# Patient Record
Sex: MALE | Race: WHITE | Employment: FULL TIME | URBAN - METROPOLITAN AREA
[De-identification: names, ages, dates, MRNs, and addresses within clinical notes are randomized per-mention and may not be internally consistent; named-entity substitution may affect disease eponyms.]

---

## 2018-01-17 PROBLEM — I21.4 NSTEMI (NON-ST ELEVATED MYOCARDIAL INFARCTION) (HCC): Status: ACTIVE | Noted: 2018-01-17

## 2018-01-17 PROBLEM — R07.9 CHEST PAIN: Status: ACTIVE | Noted: 2018-01-17

## 2018-01-19 PROBLEM — I21.4 NSTEMI (NON-ST ELEVATED MYOCARDIAL INFARCTION) (HCC): Status: RESOLVED | Noted: 2018-01-17 | Resolved: 2018-01-19

## 2018-01-24 ENCOUNTER — TELEPHONE (OUTPATIENT)
Dept: INTERNAL MEDICINE CLINIC | Age: 68
End: 2018-01-24

## 2018-01-24 NOTE — TELEPHONE ENCOUNTER
They opened the home care today. Wants skilled nursing 2 x a week for 4 weeks and 1x week for 4 weeks.

## 2018-01-30 ENCOUNTER — TELEPHONE (OUTPATIENT)
Dept: CARDIOTHORACIC SURGERY | Age: 68
End: 2018-01-30

## 2018-01-30 NOTE — TELEPHONE ENCOUNTER
I called and spoke with Mr. Mendel Falco in regards to his recovery thus far. He reports that overall he is doing well. Is having some shortness of breath w/exertion that resides quickly with rest.  Is using his IS 3 times a day. Encouraged for him to increase IS use to at least 6 times a day. Denies any redness or purulence to incisions and is cleaning them daily with antibacterial soap. Denies fevers. Heart beat is regular- pulse 77 today and /66. Today's weight was 220.2 lb. He is walking daily and is up to 8 min at a time. Appetite is good and he is moving his bowels regularly. Pain is well controlled. Is only requiring 2 Oxycodone's a day. He is resting well at night. He is scheduled for a f/u appointment with Dr. Lori Krueger on 2/2/18. Instructed for him to call the office in the meantime with any questions or concerns.

## 2018-02-02 ENCOUNTER — OFFICE VISIT (OUTPATIENT)
Dept: CARDIOTHORACIC SURGERY | Age: 68
End: 2018-02-02

## 2018-02-02 VITALS
BODY MASS INDEX: 33.56 KG/M2 | OXYGEN SATURATION: 96 % | TEMPERATURE: 98.4 F | HEIGHT: 68 IN | HEART RATE: 83 BPM | SYSTOLIC BLOOD PRESSURE: 122 MMHG | DIASTOLIC BLOOD PRESSURE: 70 MMHG | WEIGHT: 221.4 LBS

## 2018-02-02 DIAGNOSIS — Z09 POSTOP CHECK: Primary | ICD-10-CM

## 2018-02-02 PROCEDURE — 99024 POSTOP FOLLOW-UP VISIT: CPT | Performed by: THORACIC SURGERY (CARDIOTHORACIC VASCULAR SURGERY)

## 2018-02-02 RX ORDER — OXYCODONE HYDROCHLORIDE 5 MG/1
5 TABLET ORAL EVERY 4 HOURS PRN
COMMUNITY
End: 2018-02-23 | Stop reason: ALTCHOICE

## 2018-02-02 NOTE — PATIENT INSTRUCTIONS
PREVENTION OF RECURRENT ATHEROSCLEROSIS:  A MESSAGE TO PATIENTS AND THEIR LOVED ONES FROM YOUR SURGEON    You have recently had successful surgery for atherosclerosis. Now your real work begins. Atherosclerosis (hardening of the arteries by cholesterol and fat deposits) can be slowed or stopped from further blocking your own arteries or the new bypass grafts by following \"risk factor management\". If you follow these principles carefully, you can reduce your chances of having heart attacks, strokes, and the need for future surgery. Your cardiologist and primary care doctor should follow these concepts, but your surgeons believe that this is so important that we want you to begin thinking about and following these concepts now. These are the important risk factors you and your doctors should follow carefully. The marked ones apply to YOU:    [] SMOKING: Absolutely positively never smoke again. Keep your home and work place smoke­ free. Your doctors can prescribe patches, gum or other medications to help. [x] BLOOD PRESSURE CONTROL: Your blood pressure should be less than 140/90. If you have diabetes or kidney disease, your blood pressure should be less than 130/80. Weight reduction, exercise and medications can help you control high blood pressure. [x] CHOLESTEROL AND FATS: A diet low in saturated fat (< 7%) and low in cholesterol (< 200 mg/day), and weight reduction, and exercise, and medications as necessary can help you achieve these goals:  Low density (bad) cholesterol: <70 mg/dL (the lower, the better)   Triglycerides: <150 mg/dL (the lower, the better)   High density (good) cholesterol: >40 mg/dL (the higher, the better)  Make an appointment to see your primary care physician, Dr. Lazara Gooden 2 months after your surgery to check your cholesterol profile.   [x] EXERCISE: Your cardiac rehabilitation program will help you work up to a regular make you sweat\" program of at least 30 minutes, at least 6 times per week, preferably daily. Make an appointment with your cardiologist Dr. Jeison Chou 3-4 weeks after your surgery. [x] WEIGHT REDUCTION: Your ideal body mass index is 18.5-24.9 kg/m2. Your body mass index is Body mass index is 33.66 kg/m². . You may calculate this by using the following formula: (weight in pounds X 0.45) / (height in inches X 0.0254) squared. A waist circumference of < 40 inches for men and < 35 inches for women is recommended. A 10% weight reduction can lower your risk of future events. [] DIABETES: Your HbA1c should be <7%. Diet, exercise, weight reduction and proper medications can reduce your body's tendency toward high blood sugar. Check with your PCP for assistance. [x] PROPER MEDICATIONS:  [x] Aspirin  [] ACE inhibitor / ARB  (-opril / -sartan)  [x] Beta-blocker (-olol or -ilol)  [x] Statin    Risk factor management works. The person for whom this is most important is YOU. Post this information on your refrigerator or other prominent place as a reminder to you. Please call or write if you have further questions. We want you and your operation to last for many more years.     MAY LIFT MORE THAN 5 LBS ON 2/17/18

## 2018-02-02 NOTE — PROGRESS NOTES
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  Cont BB.  - no need to refill protonix  - cardiac rehab per cards  - follow up 3 weeks    Maicol Garcia MD  2/2/2018  9:51 AM

## 2018-02-23 ENCOUNTER — TELEPHONE (OUTPATIENT)
Dept: INTERNAL MEDICINE CLINIC | Age: 68
End: 2018-02-23

## 2018-02-23 ENCOUNTER — OFFICE VISIT (OUTPATIENT)
Dept: CARDIOTHORACIC SURGERY | Age: 68
End: 2018-02-23

## 2018-02-23 VITALS
OXYGEN SATURATION: 98 % | TEMPERATURE: 98.4 F | WEIGHT: 215.6 LBS | HEIGHT: 68 IN | BODY MASS INDEX: 32.67 KG/M2 | SYSTOLIC BLOOD PRESSURE: 118 MMHG | HEART RATE: 70 BPM | DIASTOLIC BLOOD PRESSURE: 68 MMHG

## 2018-02-23 DIAGNOSIS — Z09 POSTOP CHECK: Primary | ICD-10-CM

## 2018-02-23 PROCEDURE — 99024 POSTOP FOLLOW-UP VISIT: CPT | Performed by: THORACIC SURGERY (CARDIOTHORACIC VASCULAR SURGERY)

## 2018-02-23 NOTE — LETTER
02/23/18        [facility] ([facility]) Cardiovascular and Thoracic Surgeons  [address]  [address] [place] [address]        RE:    Leo Yip,   1950    Dear Dr. Baron Ann,    It was my pleasure to see your patient, Leo Yip, in the office today in follow up of cabgx2 1/17/18 at [facility].    I have attached a copy of the office evaluation. Thank you for allowing me to participate in the care of this patient.       Sincerely,     Diego Espinoza MD    CC: Harpreet Saab MD  [address] [address]  VIA In Basket

## 2018-03-02 ENCOUNTER — OFFICE VISIT (OUTPATIENT)
Dept: CARDIOLOGY CLINIC | Age: 68
End: 2018-03-02

## 2018-03-02 VITALS
DIASTOLIC BLOOD PRESSURE: 68 MMHG | BODY MASS INDEX: 32.74 KG/M2 | OXYGEN SATURATION: 98 % | HEIGHT: 68 IN | WEIGHT: 216 LBS | SYSTOLIC BLOOD PRESSURE: 118 MMHG | HEART RATE: 65 BPM

## 2018-03-02 DIAGNOSIS — Z95.1 S/P CABG X 4: ICD-10-CM

## 2018-03-02 DIAGNOSIS — I21.4 NSTEMI (NON-ST ELEVATED MYOCARDIAL INFARCTION) (HCC): Primary | ICD-10-CM

## 2018-03-02 PROCEDURE — 99214 OFFICE O/P EST MOD 30 MIN: CPT | Performed by: INTERNAL MEDICINE

## 2018-03-02 PROCEDURE — 93000 ELECTROCARDIOGRAM COMPLETE: CPT | Performed by: INTERNAL MEDICINE

## 2018-03-02 RX ORDER — SILDENAFIL 100 MG/1
100 TABLET, FILM COATED ORAL PRN
Qty: 30 TABLET | Refills: 1 | Status: SHIPPED | OUTPATIENT
Start: 2018-03-02 | End: 2018-03-02 | Stop reason: SDUPTHER

## 2018-03-02 RX ORDER — SILDENAFIL 100 MG/1
100 TABLET, FILM COATED ORAL PRN
Qty: 30 TABLET | Refills: 1 | Status: SHIPPED | OUTPATIENT
Start: 2018-03-02 | End: 2018-07-11 | Stop reason: ALTCHOICE

## 2018-03-02 NOTE — PATIENT INSTRUCTIONS
If you avoid sex because you are afraid of having erection problems, your partner may worry that you are no longer interested. ? · If you and your partner have trouble talking about sex, see a therapist who can help you talk about it. Reading books with your partner about sexual health may also help. ? · Relax. Take time for more foreplay. Worrying about your erections may only make things worse. Medicines  ? · Tell your doctor about all the medicines that you take. ¨ Some medicines can cause erection problems. ¨ Some medicines can have dangerous interactions with medicines that are prescribed for ED, including over-the-counter medicines and herbal products. ? · Be safe with medicines. Take your medicines exactly as prescribed. Call your doctor if you think you are having a problem with your medicine. ? · Talk to your doctor about trying a medicine to help you keep an erection. This could be a medicine such as Viagra, Levitra, or Cialis. If you have a heart problem, ask your doctor if these are safe for you. Do not take these medicines if you take nitroglycerin or other nitrate medicine. When should you call for help? Call your doctor now or seek immediate medical care if:  ? · You took a medicine for erectile dysfunction and you have an erection that lasts longer than 3 hours. ? Watch closely for changes in your health, and be sure to contact your doctor if you have any problems. Where can you learn more? Go to https://jose.[employer]z [email or web address] org and sign in to your [employer] account. Enter [phone] in the [facility] box to learn more about \"Erectile Dysfunction: Care Instructions. \"     If you do not have an account, please click on the \"Sign Up Now\" link. Current as of: March 14, 2017  Content Version: 11.5  © 2621-6375 [employer]. Care instructions adapted under license by [facility] ([facility]).  If you have questions about a medical condition or this instruction, always ask

## 2018-03-07 ENCOUNTER — HOSPITAL ENCOUNTER (OUTPATIENT)
Dept: OTHER | Age: 68
Discharge: HOME | End: 2018-03-31
Attending: INTERNAL MEDICINE | Admitting: INTERNAL MEDICINE

## 2018-03-13 ENCOUNTER — OFFICE VISIT (OUTPATIENT)
Dept: INTERNAL MEDICINE CLINIC | Age: 68
End: 2018-03-13

## 2018-03-13 VITALS
RESPIRATION RATE: 18 BRPM | WEIGHT: 218.4 LBS | HEIGHT: 68 IN | HEART RATE: 80 BPM | BODY MASS INDEX: 33.1 KG/M2 | OXYGEN SATURATION: 97 % | SYSTOLIC BLOOD PRESSURE: 118 MMHG | DIASTOLIC BLOOD PRESSURE: 70 MMHG | TEMPERATURE: 98.4 F

## 2018-03-13 DIAGNOSIS — J40 BRONCHITIS: Primary | ICD-10-CM

## 2018-03-13 PROCEDURE — 99213 OFFICE O/P EST LOW 20 MIN: CPT | Performed by: INTERNAL MEDICINE

## 2018-03-13 RX ORDER — AZITHROMYCIN 250 MG/1
TABLET, FILM COATED ORAL
Qty: 1 PACKET | Refills: 0 | Status: SHIPPED | OUTPATIENT
Start: 2018-03-13 | End: 2018-03-23

## 2018-03-13 ASSESSMENT — ENCOUNTER SYMPTOMS
COUGH: 1
RHINORRHEA: 0
SHORTNESS OF BREATH: 0
SORE THROAT: 0
WHEEZING: 1

## 2018-03-21 ENCOUNTER — TELEPHONE (OUTPATIENT)
Dept: CARDIOLOGY CLINIC | Age: 68
End: 2018-03-21

## 2018-03-21 NOTE — TELEPHONE ENCOUNTER
K,  Please call the patient and let him know that we did not receive the [facility] paperwork. Lilliam verified this- she is willing to complete if he has it sent. Thanks!

## 2018-03-21 NOTE — LETTER
[facility] HEART [address]  Phone: [phone]  Fax: [phone]    Harpreet Saab MD        March 22, 2018     Patient: Leo Yip   YOB: 1950   Date: 3/21/2018       To Whom It May Concern: It is my medical opinion that Leo Yip is stable from a cardiovascular standpoint to return to work without restrictions. He is scheduled to follow up with me on 6/4/18. If you have any questions or concerns, please don't hesitate to call.     Sincerely,        Harpreet Saab MD

## 2018-04-01 ENCOUNTER — HOSPITAL ENCOUNTER (OUTPATIENT)
Dept: OTHER | Age: 68
Discharge: HOME | End: 2018-04-30
Attending: INTERNAL MEDICINE | Admitting: INTERNAL MEDICINE

## 2018-05-01 ENCOUNTER — HOSPITAL ENCOUNTER (OUTPATIENT)
Dept: OTHER | Age: 68
Discharge: HOME | End: 2018-05-31
Attending: INTERNAL MEDICINE | Admitting: INTERNAL MEDICINE

## 2018-05-21 RX ORDER — ATORVASTATIN CALCIUM 40 MG/1
40 TABLET, FILM COATED ORAL NIGHTLY
Qty: 30 TABLET | Refills: 5 | Status: SHIPPED | OUTPATIENT
Start: 2018-05-21 | End: 2018-11-25 | Stop reason: SDUPTHER

## 2018-06-01 ENCOUNTER — HOSPITAL ENCOUNTER (OUTPATIENT)
Dept: OTHER | Age: 68
Discharge: HOME | End: 2018-06-11
Attending: INTERNAL MEDICINE | Admitting: INTERNAL MEDICINE

## 2018-06-13 ENCOUNTER — HOSPITAL ENCOUNTER (OUTPATIENT)
Dept: OTHER | Age: 68
Discharge: HOME | End: 2018-06-30
Attending: INTERNAL MEDICINE | Admitting: INTERNAL MEDICINE

## 2018-07-01 ENCOUNTER — HOSPITAL ENCOUNTER (OUTPATIENT)
Dept: OTHER | Age: 68
Discharge: HOME | End: 2018-07-31
Attending: INTERNAL MEDICINE | Admitting: INTERNAL MEDICINE

## 2018-07-11 ENCOUNTER — OFFICE VISIT (OUTPATIENT)
Dept: CARDIOLOGY CLINIC | Age: 68
End: 2018-07-11

## 2018-07-11 VITALS
OXYGEN SATURATION: 98 % | WEIGHT: 207 LBS | HEIGHT: 68 IN | BODY MASS INDEX: 31.37 KG/M2 | DIASTOLIC BLOOD PRESSURE: 68 MMHG | HEART RATE: 63 BPM | SYSTOLIC BLOOD PRESSURE: 128 MMHG

## 2018-07-11 DIAGNOSIS — Z95.1 S/P CABG X 4: Primary | ICD-10-CM

## 2018-07-11 DIAGNOSIS — N52.9 ERECTILE DYSFUNCTION, UNSPECIFIED ERECTILE DYSFUNCTION TYPE: ICD-10-CM

## 2018-07-11 PROCEDURE — 99214 OFFICE O/P EST MOD 30 MIN: CPT | Performed by: INTERNAL MEDICINE

## 2018-07-11 RX ORDER — TADALAFIL 20 MG/1
20 TABLET ORAL PRN
Qty: 30 TABLET | Refills: 3 | Status: SHIPPED | OUTPATIENT
Start: 2018-07-11 | End: 2018-07-11 | Stop reason: DRUGHIGH

## 2018-07-11 RX ORDER — TADALAFIL 20 MG/1
20 TABLET ORAL PRN
Qty: 30 TABLET | Refills: 3 | Status: SHIPPED | OUTPATIENT
Start: 2018-07-11 | End: 2020-10-02

## 2018-07-11 RX ORDER — NAPROXEN SODIUM 220 MG
220 TABLET ORAL 2 TIMES DAILY WITH MEALS
COMMUNITY
End: 2018-10-22

## 2018-07-11 RX ORDER — TADALAFIL 20 MG/1
20 TABLET ORAL PRN
Qty: 30 TABLET | Refills: 3 | Status: SHIPPED | OUTPATIENT
Start: 2018-07-11 | End: 2018-07-11 | Stop reason: SDUPTHER

## 2018-07-11 NOTE — PATIENT INSTRUCTIONS
Patient Education        Erectile Dysfunction: Care Instructions  Your Care Instructions    A man has erectile dysfunction (ED) when he routinely can't get or keep an erection that allows satisfactory sex. He may not be able to have an erection at any time. Or he may not be able to have one that is firm enough or lasts long enough to complete intercourse. ED is not the same as having trouble getting an erection now and then. That's common. It happens to most men at some time. ED can be caused by problems with the blood vessels, nerves, or hormones. It can be caused by diabetes, heart disease, and injuries. Nerve disorders, such as multiple sclerosis or Parkinson's disease, can also cause it. ED can also be caused by medicines, alcohol, and tobacco. Or it may be caused by depression, stress, grief, or relationship problems. Follow-up care is a key part of your treatment and safety. Be sure to make and go to all appointments, and call your doctor if you are having problems. It's also a good idea to know your test results and keep a list of the medicines you take. How can you care for yourself at home?   Lifestyle    · Limit alcohol. Have no more than 2 drinks a day.     · Do not smoke. Smoking makes it harder for the blood vessels in the penis to relax and let blood flow in. If you need help quitting, talk to your doctor about stop-smoking programs and medicines. These can increase your chances of quitting for good.     · Do not use cocaine, heroin, or other illegal drugs.     · Try to reduce stress.     · Give yourself time to adjust to change. Changes in your job, family, relationships, home life, and other areas can cause stress. And stress can cause erection problems.    Work with your partner    · Don't assume that you know what your partner likes when it comes to sex. You may be wrong. Talk about what each of you does and does not enjoy.     · Make time outside of the bedroom to talk about your sex life. If you avoid sex because you are afraid of having erection problems, your partner may worry that you are no longer interested.     · If you and your partner have trouble talking about sex, see a therapist who can help you talk about it. Reading books with your partner about sexual health may also help.     · Relax. Take time for more foreplay. Worrying about your erections may only make things worse. Medicines    · Tell your doctor about all the medicines that you take. ¨ Some medicines can cause erection problems. ¨ Some medicines can have dangerous interactions with medicines that are prescribed for ED, including over-the-counter medicines and herbal products.     · Be safe with medicines. Take your medicines exactly as prescribed. Call your doctor if you think you are having a problem with your medicine.     · Talk to your doctor about trying a medicine to help you keep an erection. This could be a medicine such as Viagra, Levitra, or Cialis. If you have a heart problem, ask your doctor if these are safe for you. Do not take these medicines if you take nitroglycerin or other nitrate medicine. When should you call for help? Call your doctor now or seek immediate medical care if:    · You took a medicine for erectile dysfunction and you have an erection that lasts longer than 3 hours.    Watch closely for changes in your health, and be sure to contact your doctor if you have any problems. Where can you learn more? Go to https://jose.[email or web address] org and sign in to your [employer] account. Enter [phone] in the [facility] box to learn more about \"Erectile Dysfunction: Care Instructions. \"     If you do not have an account, please click on the \"Sign Up Now\" link. Current as of: December 3, 2017  Content Version: 11.6  © 2876-5860 [employer]. Care instructions adapted under license by [facility] ([facility]).  If you have questions about a medical condition or this instruction, always ask your healthcare professional. [place] [address] any warranty or liability for your use of this information.

## 2018-07-11 NOTE — PROGRESS NOTES
[facility]  outpatient Progress Note    CC: \" I've been walking. \"         HPI:   75 yr old patient who presented to [facility] [place] [facility] with c/o pressure and heaviness. He has a FH of CAD. Ruled in for NSTEMI and underwent coronary angiogram showing 3 vessel disease, Ostial LAD 80%, mid LAD 80%, mid circumflex 90%, mid distal RCA 60%. Referred to Dr. Tarun Russell for Bypass. S/p CABGx4 1/17/18. Patient is here for routine f/u. He says he graduated from cardiac rehab and still participates. He also walks 2 days weekly for 1 hr intervals. He denies any shortness of breath, but says he continues to have some some incisional discomfort, no chest pain. He has been well and takes all medication as prescribed. Specifically denies any dyspnea, chest pain, palpitations and dizziness. Review of Systems -   Constitutional: Negative for weight gain/loss; malaise, fever  Respiratory: Negative for Asthma;  cough and hemoptysis  Cardiovascular: Negative for palpitations,dizziness   Gastrointestinal: Negative for abd.pain; constipation/diarrhea;    Genitourinary: Negative for stones; hematuria; frequency hesitancy  Integumentt: Negative for rash or pruritis  Hematologic/lymphatic: Negative for blood dyscrasia; leukemia/lymphoma  Musculoskeletal: Negative for Connective tissue disease  Neurological:  Negative for Seizure   Behavioral/Psych:Negative for Bipolar disorder, Schizophrenia; Dementia  Endocrine: negative for thyroid, parathyroid disease      Physical Examination:    /68 (Site: Left Arm, Position: Sitting)   Pulse 63   Ht 5' 8\" (1.727 m)   Wt 207 lb (93.9 kg)   SpO2 98%   BMI 31.47 kg/m²   HEENT:  Face: Atraumatic, Conjunctiva: Pink; non icteric,  Mucous Memb:  Moist, No thyromegaly or Lymphadenopathy  Respiratory:  Resp Assessment: normal, Resp Auscultation: clear   Cardiovascular: Auscultation: nl S1 & S2, Palpation:  Nl PMI;  No heaves or thrills, JVP:  normal  Abdomen: Soft, non-tender, Normal bowel sounds,  No organomegaly  Extremities: No Cyanosis or Clubbing; Edema none  Neurological: Oriented to time, place, and person, Non-anxious  Psychiatric: Normal mood and affect  Skin: Warm and dry,  No rash seen    Current Outpatient Prescriptions   Medication Sig Dispense Refill    aspirin 325 MG EC tablet Take 1 tablet by mouth daily 30 tablet 5    atorvastatin (LIPITOR) 40 MG tablet Take 1 tablet by mouth nightly 30 tablet 5    sildenafil (VIAGRA) 100 MG tablet Take 1 tablet by mouth as needed for Erectile Dysfunction 30 tablet 1    acetaminophen (TYLENOL) 325 MG tablet Take 2 tablets by mouth every 4 hours as needed for Pain or Fever 120 tablet 0    metoprolol tartrate (LOPRESSOR) 25 MG tablet Take 0.5 tablets by mouth 2 times daily 60 tablet 3     No current facility-administered medications for this visit. Labs:     EKG:  None today    Corornary angiogram: 1/17/18  CORONARY ANGIOGRAM:  There is three-vessel disease in this right dominant  circulation. 1.  Left main:  Free of disease. 2.  LAD:  Ostial has an 80% lesion, proximal has another 70% to 80% lesion  and then the mid has 70% lesion. The first diagonal is a large vessel and  has a 60% to 70% mid-lesion. 3.  Left circumflex:  Has a high-grade 90% mid lesion. 4.  Right coronary artery:  Has a 50% to 60% distal lesion.     CONCLUSIONS:  1. Three-vessel coronary artery disease. 2.  Ostial LAD 80%, mid LAD 80%, mid circumflex 90%, mid distal RCA 60%. 3.  Normal left ventricular size and systolic function. 4.  Slightly elevated left heart filling pressures. 1/17/18: CABGX4 (LIMA-LAD, SVG-D1, SVG-OM1-PLV).        ASSESSMENT AND PLAN:      Status post CABG ×4  for multi-vessel coronary artery disease  CABGX4 (LIMA-LAD, SVG-D1, SVG-OM1-PLV) with Dr. Cornelius Nguyen 1/17/18  Participating in cardiac rehab and walks 2 days weekly for 1 hour  Has no angina; doing well  Continues to have some soreness at sternotomy site  Taking all medication as prescribed    Erectile Dysfunction  May try without BB and see if  ED improves.  If not will try Viagra/Cialis  Viagra not covered by insurance  Will try Cialis      Follow up in 1 year        Kurtis Shone M.D  7/11/2018

## 2018-08-01 ENCOUNTER — HOSPITAL ENCOUNTER (OUTPATIENT)
Dept: OTHER | Age: 68
Discharge: HOME | End: 2018-08-31
Attending: INTERNAL MEDICINE | Admitting: INTERNAL MEDICINE

## 2018-09-01 ENCOUNTER — HOSPITAL ENCOUNTER (OUTPATIENT)
Dept: OTHER | Age: 68
Discharge: HOME | End: 2018-09-01
Attending: INTERNAL MEDICINE | Admitting: INTERNAL MEDICINE

## 2018-09-27 PROCEDURE — 9900000065 HC CARDIAC REHAB PHASE 3 - 1 VISIT

## 2018-09-28 ENCOUNTER — HOSPITAL ENCOUNTER (OUTPATIENT)
Age: 68
Discharge: HOME | End: 2018-09-28

## 2018-10-01 ENCOUNTER — HOSPITAL ENCOUNTER (OUTPATIENT)
Age: 68
Discharge: HOME | End: 2018-10-01

## 2018-10-01 PROCEDURE — 9900000038 HC CARDIAC REHAB PHASE 3 - MONTHLY

## 2018-10-22 ENCOUNTER — OFFICE VISIT (OUTPATIENT)
Dept: INTERNAL MEDICINE CLINIC | Age: 68
End: 2018-10-22
Payer: COMMERCIAL

## 2018-10-22 VITALS
HEART RATE: 60 BPM | HEIGHT: 68 IN | DIASTOLIC BLOOD PRESSURE: 70 MMHG | BODY MASS INDEX: 32.04 KG/M2 | TEMPERATURE: 98.5 F | SYSTOLIC BLOOD PRESSURE: 130 MMHG | RESPIRATION RATE: 18 BRPM | OXYGEN SATURATION: 98 % | WEIGHT: 211.4 LBS

## 2018-10-22 DIAGNOSIS — N40.0 BENIGN PROSTATIC HYPERPLASIA WITHOUT LOWER URINARY TRACT SYMPTOMS: Primary | ICD-10-CM

## 2018-10-22 DIAGNOSIS — R35.0 URINARY FREQUENCY: ICD-10-CM

## 2018-10-22 DIAGNOSIS — R33.9 URINARY RETENTION: ICD-10-CM

## 2018-10-22 LAB
ANION GAP SERPL CALCULATED.3IONS-SCNC: 13 MMOL/L (ref 3–16)
BILIRUBIN, POC: NORMAL
BLOOD URINE, POC: NORMAL
BUN BLDV-MCNC: 26 MG/DL (ref 7–20)
CALCIUM SERPL-MCNC: 9.2 MG/DL (ref 8.3–10.6)
CHLORIDE BLD-SCNC: 106 MMOL/L (ref 99–110)
CLARITY, POC: CLEAR
CO2: 25 MMOL/L (ref 21–32)
COLOR, POC: YELLOW
CREAT SERPL-MCNC: 0.8 MG/DL (ref 0.8–1.3)
GFR AFRICAN AMERICAN: >60
GFR NON-AFRICAN AMERICAN: >60
GLUCOSE BLD-MCNC: 104 MG/DL (ref 70–99)
GLUCOSE URINE, POC: NORMAL
KETONES, POC: NORMAL
LEUKOCYTE EST, POC: NORMAL
NITRITE, POC: NORMAL
PH, POC: 5.5
POTASSIUM SERPL-SCNC: 3.9 MMOL/L (ref 3.5–5.1)
PROSTATE SPECIFIC ANTIGEN: 60.43 NG/ML (ref 0–4)
PROTEIN, POC: NORMAL
SODIUM BLD-SCNC: 144 MMOL/L (ref 136–145)
SPECIFIC GRAVITY, POC: >=1.03
UROBILINOGEN, POC: 0.2

## 2018-10-22 PROCEDURE — 99213 OFFICE O/P EST LOW 20 MIN: CPT | Performed by: INTERNAL MEDICINE

## 2018-10-22 PROCEDURE — 81002 URINALYSIS NONAUTO W/O SCOPE: CPT | Performed by: INTERNAL MEDICINE

## 2018-11-12 ENCOUNTER — TELEPHONE (OUTPATIENT)
Dept: CARDIOLOGY CLINIC | Age: 68
End: 2018-11-12

## 2018-11-12 NOTE — TELEPHONE ENCOUNTER
Please advise if pt can be cleared for surgery and hold aspirin. Pt last seen Dr. Maritza Gordon on 07/11/18. Presented to [facility] [place] [facility] with c/o pressure and heaviness. He has a FH of CAD. Ruled in for NSTEMI and underwent coronary angiogram showing 3 vessel disease, Ostial LAD 80%, mid LAD 80%, mid circumflex 90%, mid distal RCA 60%. Referred to Dr. Kenna Homans for Bypass. S/p CABGx4 1/17/18.

## 2018-11-27 RX ORDER — ATORVASTATIN CALCIUM 40 MG/1
TABLET, FILM COATED ORAL
Qty: 30 TABLET | Refills: 4 | Status: SHIPPED | OUTPATIENT
Start: 2018-11-27 | End: 2019-04-22 | Stop reason: SDUPTHER

## 2018-12-06 ENCOUNTER — HOSPITAL ENCOUNTER (OUTPATIENT)
Dept: NUCLEAR MEDICINE | Age: 68
Discharge: HOME | End: 2018-12-06
Payer: COMMERCIAL

## 2018-12-06 ENCOUNTER — HOSPITAL ENCOUNTER (OUTPATIENT)
Dept: CT IMAGING | Age: 68
Discharge: HOME | End: 2018-12-06
Payer: COMMERCIAL

## 2018-12-06 DIAGNOSIS — R97.20 ELEVATED PROSTATE SPECIFIC ANTIGEN (PSA): ICD-10-CM

## 2018-12-06 DIAGNOSIS — R97.20 ABNORMAL PROSTATE SPECIFIC ANTIGEN: ICD-10-CM

## 2018-12-06 PROCEDURE — A9503 TC99M MEDRONATE: HCPCS | Performed by: UROLOGY

## 2018-12-06 PROCEDURE — 74177 CT ABD & PELVIS W/CONTRAST: CPT

## 2018-12-06 PROCEDURE — 3430000000 HC RX DIAGNOSTIC RADIOPHARMACEUTICAL: Performed by: UROLOGY

## 2018-12-06 PROCEDURE — 6360000004 HC RX CONTRAST MEDICATION: Performed by: UROLOGY

## 2018-12-06 PROCEDURE — 78306 BONE IMAGING WHOLE BODY: CPT

## 2018-12-06 RX ORDER — TC 99M MEDRONATE 20 MG/10ML
25 INJECTION, POWDER, LYOPHILIZED, FOR SOLUTION INTRAVENOUS
Status: COMPLETED | OUTPATIENT
Start: 2018-12-06 | End: 2018-12-06

## 2018-12-06 RX ADMIN — TC 99M MEDRONATE 25 MILLICURIE: 20 INJECTION, POWDER, LYOPHILIZED, FOR SOLUTION INTRAVENOUS at 07:18

## 2018-12-06 RX ADMIN — IOPAMIDOL 75 ML: 755 INJECTION, SOLUTION INTRAVENOUS at 07:13

## 2019-04-22 RX ORDER — ATORVASTATIN CALCIUM 40 MG/1
TABLET, FILM COATED ORAL
Qty: 30 TABLET | Refills: 3 | Status: SHIPPED | OUTPATIENT
Start: 2019-04-22 | End: 2019-07-17 | Stop reason: SDUPTHER

## 2019-06-11 ENCOUNTER — OFFICE VISIT (OUTPATIENT)
Dept: INTERNAL MEDICINE CLINIC | Age: 69
End: 2019-06-11
Payer: COMMERCIAL

## 2019-06-11 VITALS
RESPIRATION RATE: 16 BRPM | SYSTOLIC BLOOD PRESSURE: 128 MMHG | OXYGEN SATURATION: 95 % | HEIGHT: 68 IN | WEIGHT: 226 LBS | BODY MASS INDEX: 34.25 KG/M2 | HEART RATE: 73 BPM | DIASTOLIC BLOOD PRESSURE: 75 MMHG | TEMPERATURE: 98.8 F

## 2019-06-11 DIAGNOSIS — H61.23 HEARING LOSS OF BOTH EARS DUE TO CERUMEN IMPACTION: Primary | ICD-10-CM

## 2019-06-11 PROCEDURE — 99213 OFFICE O/P EST LOW 20 MIN: CPT | Performed by: NURSE PRACTITIONER

## 2019-06-11 RX ORDER — ABIRATERONE ACETATE 500 MG/1
1000 TABLET, FILM COATED ORAL DAILY
COMMUNITY
Start: 2019-05-14

## 2019-06-11 RX ORDER — PREDNISONE 1 MG/1
5 TABLET ORAL DAILY
COMMUNITY
Start: 2019-06-11

## 2019-06-11 ASSESSMENT — PATIENT HEALTH QUESTIONNAIRE - PHQ9
SUM OF ALL RESPONSES TO PHQ QUESTIONS 1-9: 0
1. LITTLE INTEREST OR PLEASURE IN DOING THINGS: 0
2. FEELING DOWN, DEPRESSED OR HOPELESS: 0
SUM OF ALL RESPONSES TO PHQ QUESTIONS 1-9: 0
SUM OF ALL RESPONSES TO PHQ9 QUESTIONS 1 & 2: 0

## 2019-06-11 ASSESSMENT — ENCOUNTER SYMPTOMS
COUGH: 0
SINUS PAIN: 0
RHINORRHEA: 0
VOMITING: 0
SINUS PRESSURE: 0
FACIAL SWELLING: 0
ABDOMINAL PAIN: 0
DIARRHEA: 0
SORE THROAT: 0

## 2019-06-11 NOTE — PROGRESS NOTES
Pt is here for:     Chief Complaint   Patient presents with    Ear Fullness     both ears        HPI      Past Medical History:   Diagnosis Date    CAD (coronary artery disease) 01/17/2018    NSTEMI - CABG x4 1/17/18    Cancer of prostate ([facility] [address]        Past Surgical History:   Procedure Laterality Date    CORONARY ARTERY BYPASS GRAFT  01/17/2018    cabgx4 Sharonda Tellez)       No Known Allergies    Outpatient Medications Marked as Taking for the 6/11/19 encounter (Office Visit) with BRIAN Maloney CNP   Medication Sig Dispense Refill    metoprolol tartrate (LOPRESSOR) 25 MG tablet TAKE ONE-HALF TABLET BY MOUTH TWICE A DAY 60 tablet 2    atorvastatin (LIPITOR) 40 MG tablet TAKE ONE TABLET BY MOUTH ONCE NIGHTLY 30 tablet 3    aspirin 325 MG EC tablet Take 1 tablet by mouth daily 30 tablet 5       Family History   Problem Relation Age of Onset    No Known Problems Mother         still living, age 80    Heart Attack Father         noted at an advanced age, silent. lived to age 80.        Social History     Socioeconomic History    Marital status:      Spouse name: Not on file    Number of children: Not on file    Years of education: Not on file    Highest education level: Not on file   Occupational History    Not on file   Social Needs    Financial resource strain: Not on file    Food insecurity:     Worry: Not on file     Inability: Not on file    Transportation needs:     Medical: Not on file     Non-medical: Not on file   Tobacco Use    Smoking status: Never Smoker    Smokeless tobacco: Never Used    Tobacco comment: occasional cigar years ago   Substance and Sexual Activity    Alcohol use: No     Comment: 2-3 beers, infrequent    Drug use: No     Comment: never    Sexual activity: Not on file   Lifestyle    Physical activity:     Days per week: Not on file     Minutes per session: Not on file    Stress: Not on file   Relationships    Social connections:     Talks on phone: Not on file     Gets together: Not on file     Attends Gnosticism service: Not on file     Active member of club or organization: Not on file     Attends meetings of clubs or organizations: Not on file     Relationship status: Not on file    Intimate partner violence:     Fear of current or ex partner: Not on file     Emotionally abused: Not on file     Physically abused: Not on file     Forced sexual activity: Not on file   Other Topics Concern    Not on file   Social History Narrative    Not on file       Review of Systems    Physical Exam   Nursing note reviewed  /75 (Site: Left Upper Arm, Position: Sitting, Cuff Size: Large Adult)   Pulse 73   Temp 98.8 °F (37.1 °C) (Oral)   Resp 16   Ht 5' 8\" (1.727 m)   Wt 226 lb (102.5 kg)   SpO2 95%   BMI 34.36 kg/m²   BP Readings from Last 3 Encounters:   06/11/19 128/75   10/22/18 130/70   07/11/18 128/68     Wt Readings from Last 3 Encounters:   06/11/19 226 lb (102.5 kg)   10/22/18 211 lb 6.4 oz (95.9 kg)   07/11/18 207 lb (93.9 kg)     Body mass index is 34.36 kg/m². No results found for this visit on 06/11/19. Physical Exam     Assessment/Plan     There are no diagnoses linked to this encounter. No problem-specific Assessment & Plan notes found for this encounter. No follow-ups on file.

## 2019-06-11 NOTE — PROGRESS NOTES
Skin: Negative for rash. Neurological: Negative for headaches. Physical Exam   Nursing note reviewed  /75 (Site: Left Upper Arm, Position: Sitting, Cuff Size: Large Adult)   Pulse 73   Temp 98.8 °F (37.1 °C) (Oral)   Resp 16   Ht 5' 8\" (1.727 m)   Wt 226 lb (102.5 kg)   SpO2 95%   BMI 34.36 kg/m²   BP Readings from Last 3 Encounters:   06/11/19 128/75   10/22/18 130/70   07/11/18 128/68     Wt Readings from Last 3 Encounters:   06/11/19 226 lb (102.5 kg)   10/22/18 211 lb 6.4 oz (95.9 kg)   07/11/18 207 lb (93.9 kg)     Body mass index is 34.36 kg/m². No results found for this visit on 06/11/19. /75 (Site: Left Upper Arm, Position: Sitting, Cuff Size: Large Adult)   Pulse 73   Temp 98.8 °F (37.1 °C) (Oral)   Resp 16   Ht 5' 8\" (1.727 m)   Wt 226 lb (102.5 kg)   SpO2 95%   BMI 34.36 kg/m²       Physical Exam   Constitutional: He is oriented to person, place, and time. He appears well-developed and well-nourished. HENT:   Right Ear: Decreased hearing (ear wax visualized ) is noted. Left Ear: Decreased hearing (ear wax visualized ) is noted. Cardiovascular: Normal rate and normal heart sounds. Pulmonary/Chest: Effort normal and breath sounds normal.   Neurological: He is alert and oriented to person, place, and time. Skin: Skin is warm and dry. Assessment/Plan   Chiara Jarvis was seen today for ear fullness. Diagnoses and all orders for this visit:    Hearing loss of both ears due to cerumen impaction  -s/p bilateral ear wax removal in office   -     Tang Ruelas MD, Otolaryngology, [facility], appreciate input in regards to treatment     All questions addressed and answered. Patient agrees with plan of care. No follow-ups on file.

## 2019-06-13 ENCOUNTER — OFFICE VISIT (OUTPATIENT)
Dept: ENT CLINIC | Age: 69
End: 2019-06-13
Payer: COMMERCIAL

## 2019-06-13 VITALS
TEMPERATURE: 97.7 F | BODY MASS INDEX: 34.1 KG/M2 | DIASTOLIC BLOOD PRESSURE: 88 MMHG | HEART RATE: 75 BPM | HEIGHT: 68 IN | WEIGHT: 225 LBS | SYSTOLIC BLOOD PRESSURE: 130 MMHG

## 2019-06-13 DIAGNOSIS — H91.90 PERCEIVED HEARING CHANGES: ICD-10-CM

## 2019-06-13 DIAGNOSIS — H66.3X3 CHRONIC SUPPURATIVE OTITIS MEDIA OF BOTH EARS, UNSPECIFIED OTITIS MEDIA LOCATION: Primary | ICD-10-CM

## 2019-06-13 PROCEDURE — 92504 EAR MICROSCOPY EXAMINATION: CPT | Performed by: OTOLARYNGOLOGY

## 2019-06-13 PROCEDURE — 99203 OFFICE O/P NEW LOW 30 MIN: CPT | Performed by: OTOLARYNGOLOGY

## 2019-06-13 RX ORDER — AMOXICILLIN AND CLAVULANATE POTASSIUM 875; 125 MG/1; MG/1
1 TABLET, FILM COATED ORAL 2 TIMES DAILY
Qty: 20 TABLET | Refills: 0 | Status: SHIPPED | OUTPATIENT
Start: 2019-06-13 | End: 2019-06-23

## 2019-06-13 ASSESSMENT — ENCOUNTER SYMPTOMS
PHOTOPHOBIA: 0
VOMITING: 0
CHOKING: 0
EYE DISCHARGE: 0
NAUSEA: 0
SORE THROAT: 0
BLOOD IN STOOL: 0
TROUBLE SWALLOWING: 0
SHORTNESS OF BREATH: 0
COLOR CHANGE: 0
SINUS PAIN: 0
STRIDOR: 0
EYE ITCHING: 0
RHINORRHEA: 0
BACK PAIN: 0
COUGH: 0
WHEEZING: 0
CONSTIPATION: 0
SINUS PRESSURE: 0
DIARRHEA: 0
VOICE CHANGE: 0
FACIAL SWELLING: 0

## 2019-06-13 NOTE — PROGRESS NOTES
Positive for hearing loss. Negative for congestion, dental problem, drooling, ear discharge, ear pain, facial swelling, mouth sores, nosebleeds, postnasal drip, rhinorrhea, sinus pressure, sinus pain, sneezing, sore throat, tinnitus, trouble swallowing and voice change. Eyes: Negative for photophobia, discharge, itching and visual disturbance. Respiratory: Negative for cough, choking, shortness of breath, wheezing and stridor. Gastrointestinal: Negative for blood in stool, constipation, diarrhea, nausea and vomiting. Endocrine: Negative for cold intolerance, heat intolerance, polyphagia and polyuria. Musculoskeletal: Negative for back pain, gait problem, neck pain and neck stiffness. Skin: Negative for color change, pallor, rash and wound. Neurological: Negative for dizziness, syncope, facial asymmetry, speech difficulty, light-headedness, numbness and headaches. Hematological: Negative for adenopathy. Does not bruise/bleed easily. Psychiatric/Behavioral: Negative for agitation, confusion and sleep disturbance. PhysicalExam     Vitals:    06/13/19 1518   BP: 130/88   Pulse: 75   Temp: 97.7 °F (36.5 °C)       Physical Exam   Constitutional: He is oriented to person, place, and time. He appears well-developed and well-nourished. HENT:   Head: Normocephalic and atraumatic. Not macrocephalic and not microcephalic. Head is without raccoon's eyes, without Kennedy's sign, without abrasion, without contusion, without laceration, without right periorbital erythema and without left periorbital erythema. Hair is normal.   Right Ear: External ear normal. There is drainage and swelling. No tenderness. No mastoid tenderness. Tympanic membrane is bulging. Tympanic membrane is not perforated and not retracted. Tympanic membrane mobility is normal. A middle ear effusion is present. Decreased hearing is noted. Left Ear: External ear normal. There is drainage and swelling. No tenderness.  No mastoid tenderness. Tympanic membrane is bulging. Tympanic membrane is not perforated and not retracted. Tympanic membrane mobility is normal. A middle ear effusion is present. Decreased hearing is noted. Nose: No mucosal edema, rhinorrhea, nose lacerations, sinus tenderness, nasal deformity, septal deviation or nasal septal hematoma. No epistaxis. No foreign bodies. Right sinus exhibits no maxillary sinus tenderness and no frontal sinus tenderness. Left sinus exhibits no maxillary sinus tenderness and no frontal sinus tenderness. Mouth/Throat: Uvula is midline and oropharynx is clear and moist. Mucous membranes are not pale, not dry and not cyanotic. No oral lesions. No trismus in the jaw. Normal dentition. No dental abscesses, uvula swelling, lacerations or dental caries. No oropharyngeal exudate, posterior oropharyngeal edema, posterior oropharyngeal erythema or tonsillar abscesses. Eyes: Lids are normal. Right eye exhibits no chemosis, no discharge and no exudate. Left eye exhibits no chemosis, no discharge and no exudate. Right eye exhibits normal extraocular motion and no nystagmus. Left eye exhibits normal extraocular motion and no nystagmus. Neck: Neck supple. Normal carotid pulses present. No tracheal tenderness present. No tracheal deviation present. No thyroid mass and no thyromegaly present. Cardiovascular: Normal rate and regular rhythm. Pulmonary/Chest: No stridor. No apnea, no tachypnea and no bradypnea. No respiratory distress. Musculoskeletal:        Right shoulder: He exhibits normal range of motion. Lymphadenopathy:        Head (right side): No submental, no submandibular, no tonsillar, no preauricular, no posterior auricular and no occipital adenopathy present. Head (left side): No submental, no submandibular, no tonsillar, no preauricular, no posterior auricular and no occipital adenopathy present. He has no cervical adenopathy.         Right cervical: No superficial cervical, no deep cervical and no posterior cervical adenopathy present. Left cervical: No superficial cervical, no deep cervical and no posterior cervical adenopathy present. Neurological: He is alert and oriented to person, place, and time. No cranial nerve deficit. Skin: Skin is warm and dry. No bruising, no laceration, no lesion and no rash noted. No erythema. Psychiatric: He has a normal mood and affect. His speech is normal and behavior is normal.         Procedure     Otomicroscopy    An operating microscope was utilized to visualize the external auditory canals using a 5mm speculum. External auditory canals clear bilaterally. Tympanic membranes appear thickened bilaterally there is some purulent debris in the anterior sulcus bilaterally there is purulent debris on the lateral aspect of the tympanic membrane. There appears to be purulent debris in the middle ear space and opacification. Assessment and Plan     1. Chronic suppurative otitis media of both ears, unspecified otitis media location  Patient appears to have bilateral otitis media. I recommend Augmentin for 10 days. Afterwards he will obtain a audiogram.  I will call him with the results to discuss whether we need to do further testing including a CT scan or intervention with possible myringotomy and PE tube placement. - Audiometry with tympanometry; Future  - amoxicillin-clavulanate (AUGMENTIN) 875-125 MG per tablet; Take 1 tablet by mouth 2 times daily for 10 days  Dispense: 20 tablet; Refill: 0    2. Perceived hearing changes    - Audiometry with tympanometry; Future      Return in about 3 weeks (around 7/4/2019). Portions of this note were dictated using Dragon.  There may be linguistic errors secondary to the use of this program.

## 2019-06-18 NOTE — PROGRESS NOTES
Mario Grider   1950, 78 y.o. male   [id number]       Referring Provider: Fede Boone DO  Referral Type: In an order in [address] [place] [address]    Reason for Visit: Evaluation of suspected change in hearing, tinnitus, or balance. ADULT AUDIOLOGIC EVALUATION      Mario Grider is a 78 y.o. male seen today, 6/25/19, for an initial audiologic evaluation. Patient was seen accompanied by his wife. AUDIOLOGIC AND OTHER PERTINENT MEDICAL HISTORY:        Mario Grider noted decreased hearing bilaterally; ear pressure bilaterally, RE>LE, right ear pops at night; began treatment for bilateral ear infections with medication on 6/13/2019 with Dr. Ashlee Rodriguez, he has noted some improvement since that time; some right ear otorrhea, his wife noted concern for the amount of fluid that drains from his right ear; hears his voice echo in his right ear; mother has hearing loss that started with age. Mario Grider denied otalgia, tinnitus, dizziness, history of occupational/recreational noise exposure, history of head trauma, and history of ear surgery. ASSESSMENT AND FINDINGS:       Otoscopy revealed: some white debris in right ear canal, clear ear canal left ear bilaterally      RIGHT EAR:  Hearing Sensitivity: Within normal limits through 500 Hz sloping to moderate mixed hearing loss. Speech Recognition Threshold: 30 dB HL  Word Recognition: Excellent (92%), based on NU-6 25-word list at 55 dBHL, masked, using recorded speech stimuli. This finding is consistent with hearing sensitivity. Tympanometry: Positive peak pressure (+138) with normal compliance. LEFT EAR:  Hearing Sensitivity: Within normal limits through 500 Hz sloping to moderate mixed hearing loss. Speech Recognition Threshold: 25 dB HL  Word Recognition: Excellent (92%), based on NU-6 25-word list at 50 dBHL, masked, using recorded speech stimuli. This finding is consistent with hearing sensitivity.   Tympanometry: Positive peak pressure (+74) with normal compliance. Reliability: Good  Transducer: Phones    See scanned audiogram dated 6/25/19 for results. PATIENT EDUCATION:       The following items were discussed with the patient:    - Good Communication Strategies    Educational information was shared in the After Visit Summary. RECOMMENDATIONS:                                                                                                                                                                                                                                                                      The following items are recommended based on patient report and results from today's appointment:   - Continue medical follow-up with Justen Castro DO. He scheduled a follow-up visit before leaving the office today. - Retest hearing as medically indicated and/or sooner if a change in hearing is noted. - Utilize \"Good Communication Strategies\" as discussed to assist in speech understanding with communication partners. [facility] [place], [place]  Audiologist      Chart CC'd to:  Justen Castro DO      Degree of   Hearing Sensitivity dB Range   Within Normal Limits (WNL) 0 - 20   Mild 20 - 40   Moderate 40 - 55   Moderately-Severe 55 - 70   Severe 70 - 90   Profound 90 +

## 2019-06-25 ENCOUNTER — PROCEDURE VISIT (OUTPATIENT)
Dept: AUDIOLOGY | Age: 69
End: 2019-06-25
Payer: COMMERCIAL

## 2019-06-25 DIAGNOSIS — H90.6 MIXED HEARING LOSS, BILATERAL: Primary | ICD-10-CM

## 2019-06-25 DIAGNOSIS — H93.8X3 EAR PRESSURE, BILATERAL: ICD-10-CM

## 2019-06-25 PROCEDURE — 92557 COMPREHENSIVE HEARING TEST: CPT | Performed by: AUDIOLOGIST

## 2019-06-25 PROCEDURE — 92567 TYMPANOMETRY: CPT | Performed by: AUDIOLOGIST

## 2019-06-25 NOTE — PATIENT INSTRUCTIONS
directly into a persons ear      Some additional items that may be helpful:   - Remain patient - this is important for both parties   - Write down items that still cannot be heard/understood. You may write with pen/paper or consider typing/texting on a cell phone or smart device. - If background noise is unavoidable, try to keep yourself in a good position in the room. By sitting at a [place] ri[place] on the side of the restaurant (preferably a corner), it will be easier to communicate than if you were sitting at a table in the middle with background noise surrounding you. Keep yourself positioned away from music speakers or heavy foot traffic.

## 2019-06-25 NOTE — Clinical Note
Dr. Lian Cornejo see note from this patient's audiogram from today; you saw him in the office 6/13/2019. Please let me know if there is anything further you need. Haider Shankar, AuDAudiologist

## 2019-06-26 NOTE — PROGRESS NOTES
Please notify patient the hearing test shows some mixed hearing loss. There could still be fluid behind the ear drum. Please have him follow up to discuss further management, including possible placement of ear tubes.

## 2019-07-11 ENCOUNTER — OFFICE VISIT (OUTPATIENT)
Dept: ENT CLINIC | Age: 69
End: 2019-07-11
Payer: COMMERCIAL

## 2019-07-11 VITALS
WEIGHT: 227 LBS | BODY MASS INDEX: 34.4 KG/M2 | HEIGHT: 68 IN | HEART RATE: 71 BPM | SYSTOLIC BLOOD PRESSURE: 136 MMHG | DIASTOLIC BLOOD PRESSURE: 85 MMHG | TEMPERATURE: 98.3 F

## 2019-07-11 DIAGNOSIS — H92.11 OTORRHEA OF RIGHT EAR: Primary | ICD-10-CM

## 2019-07-11 DIAGNOSIS — H90.6 MIXED HEARING LOSS, BILATERAL: ICD-10-CM

## 2019-07-11 PROCEDURE — 92504 EAR MICROSCOPY EXAMINATION: CPT | Performed by: OTOLARYNGOLOGY

## 2019-07-11 PROCEDURE — 99213 OFFICE O/P EST LOW 20 MIN: CPT | Performed by: OTOLARYNGOLOGY

## 2019-07-11 RX ORDER — CIPROFLOXACIN AND DEXAMETHASONE 3; 1 MG/ML; MG/ML
4 SUSPENSION/ DROPS AURICULAR (OTIC) 2 TIMES DAILY
Qty: 1 BOTTLE | Refills: 0 | Status: SHIPPED | OUTPATIENT
Start: 2019-07-11 | End: 2019-07-18

## 2019-07-11 ASSESSMENT — ENCOUNTER SYMPTOMS
PHOTOPHOBIA: 0
NAUSEA: 0
SORE THROAT: 0
DIARRHEA: 0
SINUS PRESSURE: 0
EYE REDNESS: 0
TROUBLE SWALLOWING: 0
FACIAL SWELLING: 0
SINUS PAIN: 0
VOICE CHANGE: 0
RHINORRHEA: 0
EYE PAIN: 0
SHORTNESS OF BREATH: 0
COLOR CHANGE: 0
COUGH: 0
STRIDOR: 0
CHOKING: 0
EYE ITCHING: 0

## 2019-07-11 NOTE — PROGRESS NOTES
[facility]  [address] [address] [facility], [address] [place] [address]  P: [phone]  F: [phone]       Patient     [facility]  1950    ChiefComplaint     Chief Complaint   Patient presents with    Follow-up     follow up from 3 weeks ago he can hear alot better, still having some drainage but not as bad as it was        History of Present Illness     Ivon Armenta is here for follow-up for otorrhea. He completed the Augmentin. He noticed improvement in his hearing. He has been having mild drainage from the right ear. He completed his audiogram.  Audiometry reveals bilateral mild to moderate mixed hearing loss with normal tympanograms and good word recognition scores. Overall feels improved. Past Medical History     Past Medical History:   Diagnosis Date    CAD (coronary artery disease) 01/17/2018    NSTEMI - CABG x4 1/17/18    Cancer of prostate [facility])        Past Surgical History     Past Surgical History:   Procedure Laterality Date    CORONARY ARTERY BYPASS GRAFT  01/17/2018    cabgx4 Irena Gaetano)       Family History     Family History   Problem Relation Age of Onset    No Known Problems Mother         still living, age 80    Heart Attack Father         noted at an advanced age, silent. lived to age 80.        Social History     Social History     Socioeconomic History    Marital status:      Spouse name: Not on file    Number of children: Not on file    Years of education: Not on file    Highest education level: Not on file   Occupational History    Not on file   Social Needs    Financial resource strain: Not on file    Food insecurity:     Worry: Not on file     Inability: Not on file    Transportation needs:     Medical: Not on file     Non-medical: Not on file   Tobacco Use    Smoking status: Never Smoker    Smokeless tobacco: Never Used    Tobacco comment: occasional cigar years ago   Substance and Sexual Activity    Alcohol use: No     Comment: 2-3 beers, Right external auditory canal is some otorrhea. Debris suctioned from the ear canal.  Tympanic membrane is intact. Appears to have a right otitis externa. Bucio ear wick placed in the ear canal.  Ciprodex drops placed in ear canal.      Assessment and Plan     1. Otorrhea of right ear  Patient appears to have some right otitis externa. There is purulent debris in the right ear canal.  Debridement of the ear canal was performed. Tympanic membrane intact. Bucio ear wick placed in ear canal.  Recommend Ciprodex for 1 week. We will follow-up in 1 week to assess improvement. - ciprofloxacin-dexamethasone (CIPRODEX) 0.3-0.1 % otic suspension; Place 4 drops into the right ear 2 times daily for 7 days  Dispense: 1 Bottle; Refill: 0    2. Mixed hearing loss, bilateral  Audiometry reveals bilateral mild to moderate mixed hearing loss with normal tympanograms. Return in about 1 week (around 7/18/2019). Portions of this note were dictated using Dragon.  There may be linguistic errors secondary to the use of this program.

## 2019-07-17 ENCOUNTER — OFFICE VISIT (OUTPATIENT)
Dept: CARDIOLOGY CLINIC | Age: 69
End: 2019-07-17
Payer: COMMERCIAL

## 2019-07-17 VITALS
OXYGEN SATURATION: 97 % | HEIGHT: 68 IN | DIASTOLIC BLOOD PRESSURE: 80 MMHG | WEIGHT: 227 LBS | BODY MASS INDEX: 34.4 KG/M2 | SYSTOLIC BLOOD PRESSURE: 138 MMHG | HEART RATE: 54 BPM

## 2019-07-17 DIAGNOSIS — I21.4 NSTEMI (NON-ST ELEVATED MYOCARDIAL INFARCTION) (HCC): Primary | ICD-10-CM

## 2019-07-17 PROCEDURE — 99214 OFFICE O/P EST MOD 30 MIN: CPT | Performed by: INTERNAL MEDICINE

## 2019-07-17 PROCEDURE — 93000 ELECTROCARDIOGRAM COMPLETE: CPT | Performed by: INTERNAL MEDICINE

## 2019-07-17 RX ORDER — ATORVASTATIN CALCIUM 40 MG/1
TABLET, FILM COATED ORAL
Qty: 90 TABLET | Refills: 3 | Status: SHIPPED | OUTPATIENT
Start: 2019-07-17 | End: 2020-07-14 | Stop reason: SDUPTHER

## 2019-07-17 NOTE — PROGRESS NOTES
S2, Palpation:  Nl PMI; No heaves or thrills, JVP:  normal  Abdomen: Soft, non-tender, Normal bowel sounds,  No organomegaly  Extremities: No Cyanosis or Clubbing; Edema none  Neurological: Oriented to time, place, and person, Non-anxious  Psychiatric: Normal mood and affect  Skin: Warm and dry,  No rash seen    Current Outpatient Medications   Medication Sig Dispense Refill    ciprofloxacin-dexamethasone (CIPRODEX) 0.3-0.1 % otic suspension Place 4 drops into the right ear 2 times daily for 7 days 1 Bottle 0    predniSONE (DELTASONE) 5 MG tablet       ZYTIGA 500 MG TABS       metoprolol tartrate (LOPRESSOR) 25 MG tablet TAKE ONE-HALF TABLET BY MOUTH TWICE A DAY 60 tablet 2    atorvastatin (LIPITOR) 40 MG tablet TAKE ONE TABLET BY MOUTH ONCE NIGHTLY 30 tablet 3    tadalafil (CIALIS) 20 MG tablet Take 1 tablet by mouth as needed for Erectile Dysfunction 30 tablet 3    aspirin 325 MG EC tablet Take 1 tablet by mouth daily 30 tablet 5     No current facility-administered medications for this visit. Labs:     EK19, normal rhythm     Corornary angiogram: 18  CORONARY ANGIOGRAM:  There is three-vessel disease in this right dominant  circulation. 1.  Left main:  Free of disease. 2.  LAD:  Ostial has an 80% lesion, proximal has another 70% to 80% lesion  and then the mid has 70% lesion. The first diagonal is a large vessel and  has a 60% to 70% mid-lesion. 3.  Left circumflex:  Has a high-grade 90% mid lesion. 4.  Right coronary artery:  Has a 50% to 60% distal lesion.     CONCLUSIONS:  1. Three-vessel coronary artery disease. 2.  Ostial LAD 80%, mid LAD 80%, mid circumflex 90%, mid distal RCA 60%. 3.  Normal left ventricular size and systolic function. 4.  Slightly elevated left heart filling pressures. 18: CABGX4 (LIMA-LAD, SVG-D1, SVG-OM1-PLV).        ASSESSMENT AND PLAN:      Status post CABG ×4  for multi-vessel coronary artery disease  CABGX4 (LIMA-LAD, SVG-D1, SVG-OM1-PLV)

## 2019-07-18 ENCOUNTER — OFFICE VISIT (OUTPATIENT)
Dept: ENT CLINIC | Age: 69
End: 2019-07-18
Payer: COMMERCIAL

## 2019-07-18 VITALS
HEIGHT: 68 IN | BODY MASS INDEX: 34.1 KG/M2 | WEIGHT: 225 LBS | HEART RATE: 73 BPM | DIASTOLIC BLOOD PRESSURE: 77 MMHG | SYSTOLIC BLOOD PRESSURE: 116 MMHG | TEMPERATURE: 98.4 F

## 2019-07-18 DIAGNOSIS — H90.6 MIXED HEARING LOSS, BILATERAL: ICD-10-CM

## 2019-07-18 DIAGNOSIS — H92.11 OTORRHEA OF RIGHT EAR: Primary | ICD-10-CM

## 2019-07-18 PROCEDURE — 99213 OFFICE O/P EST LOW 20 MIN: CPT | Performed by: OTOLARYNGOLOGY

## 2019-07-18 PROCEDURE — 92504 EAR MICROSCOPY EXAMINATION: CPT | Performed by: OTOLARYNGOLOGY

## 2019-07-18 ASSESSMENT — ENCOUNTER SYMPTOMS
EYE REDNESS: 0
FACIAL SWELLING: 0
STRIDOR: 0
SINUS PAIN: 0
SORE THROAT: 0
CHOKING: 0
COUGH: 0
EYE PAIN: 0
DIARRHEA: 0
RHINORRHEA: 0
VOICE CHANGE: 0
PHOTOPHOBIA: 0
NAUSEA: 0
COLOR CHANGE: 0
SHORTNESS OF BREATH: 0
TROUBLE SWALLOWING: 0
SINUS PRESSURE: 0
EYE ITCHING: 0

## 2019-07-18 NOTE — PROGRESS NOTES
eczematous skin on the chondral bowl. I instructed him to use hydrocortisone cream on this twice a day. May follow-up if he has any further issues. 2. Mixed hearing loss, bilateral  Repeat hearing test in 1 to 3 years. Return in about 1 year (around 7/18/2020). Portions of this note were dictated using Dragon.  There may be linguistic errors secondary to the use of this program.

## 2019-08-03 PROCEDURE — 9900000038 HC CARDIAC REHAB PHASE 3 - MONTHLY

## 2019-08-26 ENCOUNTER — HOSPITAL ENCOUNTER (OUTPATIENT)
Dept: CARDIAC REHAB | Age: 69
Discharge: HOME | End: 2019-08-26

## 2020-02-28 ENCOUNTER — HOSPITAL ENCOUNTER (OUTPATIENT)
Dept: NUCLEAR MEDICINE | Age: 70
Discharge: HOME | End: 2020-02-28
Payer: COMMERCIAL

## 2020-02-28 ENCOUNTER — HOSPITAL ENCOUNTER (OUTPATIENT)
Dept: CT IMAGING | Age: 70
Discharge: HOME | End: 2020-02-28
Payer: COMMERCIAL

## 2020-02-28 PROCEDURE — 74177 CT ABD & PELVIS W/CONTRAST: CPT

## 2020-02-28 PROCEDURE — A9503 TC99M MEDRONATE: HCPCS | Performed by: UROLOGY

## 2020-02-28 PROCEDURE — 6360000004 HC RX CONTRAST MEDICATION: Performed by: UROLOGY

## 2020-02-28 PROCEDURE — 78306 BONE IMAGING WHOLE BODY: CPT

## 2020-02-28 PROCEDURE — 3430000000 HC RX DIAGNOSTIC RADIOPHARMACEUTICAL: Performed by: UROLOGY

## 2020-02-28 RX ORDER — TC 99M MEDRONATE 20 MG/10ML
25 INJECTION, POWDER, LYOPHILIZED, FOR SOLUTION INTRAVENOUS
Status: COMPLETED | OUTPATIENT
Start: 2020-02-28 | End: 2020-02-28

## 2020-02-28 RX ADMIN — IOPAMIDOL 75 ML: 755 INJECTION, SOLUTION INTRAVENOUS at 08:22

## 2020-02-28 RX ADMIN — TC 99M MEDRONATE 25 MILLICURIE: 20 INJECTION, POWDER, LYOPHILIZED, FOR SOLUTION INTRAVENOUS at 08:36

## 2020-05-04 LAB
A/G RATIO: 1.8 (ref 1.1–2.2)
ALBUMIN SERPL-MCNC: 4.4 G/DL (ref 3.4–5)
ALP BLD-CCNC: 83 U/L (ref 40–129)
ALT SERPL-CCNC: 12 U/L (ref 10–40)
ANION GAP SERPL CALCULATED.3IONS-SCNC: 13 MMOL/L (ref 3–16)
AST SERPL-CCNC: 18 U/L (ref 15–37)
BILIRUB SERPL-MCNC: 0.7 MG/DL (ref 0–1)
BUN BLDV-MCNC: 17 MG/DL (ref 7–20)
CALCIUM SERPL-MCNC: 10.1 MG/DL (ref 8.3–10.6)
CHLORIDE BLD-SCNC: 102 MMOL/L (ref 99–110)
CO2: 28 MMOL/L (ref 21–32)
CREAT SERPL-MCNC: 0.9 MG/DL (ref 0.8–1.3)
GFR AFRICAN AMERICAN: >60
GFR NON-AFRICAN AMERICAN: >60
GLOBULIN: 2.4 G/DL
GLUCOSE BLD-MCNC: 89 MG/DL (ref 70–99)
POTASSIUM SERPL-SCNC: 4.6 MMOL/L (ref 3.5–5.1)
PROSTATE SPECIFIC ANTIGEN: 0.62 NG/ML (ref 0–4)
SODIUM BLD-SCNC: 143 MMOL/L (ref 136–145)
TOTAL PROTEIN: 6.8 G/DL (ref 6.4–8.2)

## 2020-07-14 RX ORDER — ATORVASTATIN CALCIUM 40 MG/1
TABLET, FILM COATED ORAL
Qty: 90 TABLET | Refills: 3 | Status: SHIPPED | OUTPATIENT
Start: 2020-07-14 | End: 2020-07-20 | Stop reason: SDUPTHER

## 2020-07-17 NOTE — PROGRESS NOTES
[facility]  outpatient Progress Note    CC: No new complaints         HPI:   78 yr old patient who presented to [facility] 1/[facility] with c/o pressure and heaviness. He has a FH of CAD. Ruled in for NSTEMI and underwent coronary angiogram showing 3 vessel disease, Ostial LAD 80%, mid LAD 80%, mid circumflex 90%, mid distal RCA 60%. Referred to Dr. Bharat Jessica for Bypass. S/p CABGx4 1/17/18. Comes for follow up. Has gained some weight, at least 30 pounds in 1 year. He offers eating more as a result of COVID. He says he and his wife are walking. He denies any chest discomfort when walking. Says he feels good and has no new complaints today. Denies any dyspnea, chest pain, palpitations and dizziness. Review of Systems -   Constitutional: Negative for weight gain/loss; malaise, fever  Respiratory: Negative for Asthma;  cough and hemoptysis  Cardiovascular: Negative for palpitations,dizziness   Gastrointestinal: Negative for abd.pain; constipation/diarrhea;    Genitourinary: Negative for stones; hematuria; frequency hesitancy  Integumentt: Negative for rash or pruritis  Hematologic/lymphatic: Negative for blood dyscrasia; leukemia/lymphoma  Musculoskeletal: Negative for Connective tissue disease  Neurological:  Negative for Seizure   Behavioral/Psych:Negative for Bipolar disorder, Schizophrenia; Dementia  Endocrine: negative for thyroid, parathyroid disease      Physical Examination:    /72   Pulse 73   Temp 98.6 °F (37 °C)   Ht 5' 8\" (1.727 m)   Wt 254 lb 3.2 oz (115.3 kg)   SpO2 95%   BMI 38.65 kg/m²   HEENT:  Face: Atraumatic, Conjunctiva: Pink; non icteric,  Mucous Memb:  Moist, No thyromegaly or Lymphadenopathy  Respiratory:  Resp Assessment: normal, Resp Auscultation: clear   Cardiovascular: Auscultation: nl S1 & S2, Palpation:  Nl PMI;  No heaves or thrills, JVP:  normal  Abdomen: Soft, non-tender, Normal bowel sounds,  No organomegaly  Extremities: No Cyanosis or Clubbing; Edema none  Neurological: Oriented to time, place, and person, Non-anxious  Psychiatric: Normal mood and affect  Skin: Warm and dry,  No rash seen    Current Outpatient Medications   Medication Sig Dispense Refill    metoprolol tartrate (LOPRESSOR) 25 MG tablet TAKE ONE-HALF TABLET BY MOUTH TWICE A DAY 90 tablet 3    atorvastatin (LIPITOR) 40 MG tablet TAKE ONE TABLET BY MOUTH ONCE NIGHTLY 90 tablet 3    predniSONE (DELTASONE) 5 MG tablet       ZYTIGA 500 MG TABS       tadalafil (CIALIS) 20 MG tablet Take 1 tablet by mouth as needed for Erectile Dysfunction 30 tablet 3    aspirin 325 MG EC tablet Take 1 tablet by mouth daily 30 tablet 5     No current facility-administered medications for this visit. Labs:     EK19, normal rhythm   20, sinus rhythm    Corornary angiogram: 18  CORONARY ANGIOGRAM:  There is three-vessel disease in this right dominant circulation. 1.  Left main:  Free of disease. 2.  LAD:  Ostial has an 80% lesion, proximal has another 70% to 80% lesion and then the mid has 70% lesion. The first diagonal is a large vessel and has a 60% to 70% mid-lesion. 3.  Left circumflex:  Has a high-grade 90% mid lesion. 4.  Right coronary artery:  Has a 50% to 60% distal lesion.     CONCLUSIONS:  1. Three-vessel coronary artery disease. 2.  Ostial LAD 80%, mid LAD 80%, mid circumflex 90%, mid distal RCA 60%. 3.  Normal left ventricular size and systolic function. 4.  Slightly elevated left heart filling pressures. 18: CABGX4 (LIMA-LAD, SVG-D1, SVG-OM1-PLV). ASSESSMENT AND PLAN:      Status post CABG ×4  for multi-vessel coronary artery disease  CABGX4 (LIMA-LAD, SVG-D1, SVG-OM1-PLV) with Dr. Camacho Doing 18  Denies any angina  Has put on weight; encouraged daily exercise   Continue medical management     Prostate cancer:  Metastatic but responding well to Rx.       Follow up in 1 year        Tamia Rivas M.D  2020     This note was scribed in the presence of  Ladan Zarate MD by [facility]    Physician Attestation:  The scribes documentation has been prepared under my direction and personally reviewed by me in its entirety. I confirm that the note above accurately reflects all work, treatment, procedures, and medical decision making performed by me.

## 2020-07-20 ENCOUNTER — OFFICE VISIT (OUTPATIENT)
Dept: CARDIOLOGY CLINIC | Age: 70
End: 2020-07-20
Payer: COMMERCIAL

## 2020-07-20 VITALS
WEIGHT: 254.2 LBS | BODY MASS INDEX: 38.52 KG/M2 | OXYGEN SATURATION: 95 % | HEART RATE: 73 BPM | TEMPERATURE: 98.6 F | SYSTOLIC BLOOD PRESSURE: 124 MMHG | DIASTOLIC BLOOD PRESSURE: 72 MMHG | HEIGHT: 68 IN

## 2020-07-20 PROCEDURE — 99214 OFFICE O/P EST MOD 30 MIN: CPT | Performed by: INTERNAL MEDICINE

## 2020-07-20 PROCEDURE — 93000 ELECTROCARDIOGRAM COMPLETE: CPT | Performed by: INTERNAL MEDICINE

## 2020-07-20 RX ORDER — ATORVASTATIN CALCIUM 40 MG/1
TABLET, FILM COATED ORAL
Qty: 90 TABLET | Refills: 3 | Status: SHIPPED
Start: 2020-07-20 | End: 2020-10-23 | Stop reason: ALTCHOICE

## 2020-07-20 NOTE — PATIENT INSTRUCTIONS
Patient Education        Walking for Exercise: Care Instructions  Your Care Instructions     Walking is one of the easiest ways to get the exercise you need for good health. A brisk, 30-minute walk each day can help you feel better and have more energy. It can help you lower your risk of disease. Walking can help you keep your bones strong and your heart healthy. Check with your doctor before you start a walking plan if you have heart problems, other health issues, or you have not been active in a long time. Follow your doctor's instructions for safe levels of exercise. Follow-up care is a key part of your treatment and safety. Be sure to make and go to all appointments, and call your doctor if you are having problems. It's also a good idea to know your test results and keep a list of the medicines you take. How can you care for yourself at home? Getting started  · Start slowly and set a short-term goal. For example, walk for 5 or 10 minutes every day. · Bit by bit, increase the amount you walk every day. Try for at least 30 minutes on most days of the week. You also may want to swim, bike, or do other activities. · If finding enough time is a problem, it is fine to be active in blocks of 10 minutes or more throughout your day and week. · To get the heart-healthy benefits of walking, you need to walk briskly enough to increase your heart rate and breathing, but not so fast that you cannot talk comfortably. · Wear comfortable shoes that fit well and provide good support for your feet and ankles. Staying with your plan  · After you've made walking a habit, set a longer-term goal. You may want to set a goal of walking briskly for longer or walking farther. Experts say to do 2½ hours of moderate activity a week. A faster heartbeat is what defines moderate-level activity. · To stay motivated, walk with friends, coworkers, or pets. · Use a phone nellie or pedometer to track your steps each day.  Set a goal to increase your steps. Once you get there, set a higher goal. Aim for 10,000 steps a day. · If the weather keeps you from walking outside, go for walks at the mall with a friend. Local schools and churches may have indoor gyms where you can walk. Fitting a walk into your workday  · Park several blocks away from work, or get off the bus a few stops early. · Use the stairs instead of the elevator, at least for a few floors. · Suggest holding meetings with colleagues during a walk inside or outside the building. · Use the restroom that is the farthest from your desk or workstation. · Use your morning and afternoon breaks to take quick 15-minute walks. Staying safe  · Know your surroundings. Walk in a well-lighted, safe place. If it is dark, walk with a partner. Wear light-colored clothing. If you can, buy a vest or jacket that reflects light. · Carry a cell phone for emergencies. · Drink plenty of water. Take a water bottle with you when you walk. This is very important if it is hot out. · Be careful not to slip on wet or icy ground. You can buy \"grippers\" for your shoes to help keep you from slipping. · Pay attention to your walking surface. Use sidewalks and paths. · If you have breathing problems like asthma or COPD, ask your doctor when it is safe for you to walk outdoors. Cold, dry air, smog, pollen, or other things in the air could cause breathing problems. Where can you learn more? Go to https://Epticabryan.healthWSO2. org and sign in to your [employer] account. Enter R159 in the [employer] box to learn more about \"Walking for Exercise: Care Instructions. \"     If you do not have an account, please click on the \"Sign Up Now\" link. Current as of: January 16, 2020               Content Version: 12.5  © 1335-8267 [employer]wise, [employer]. Care instructions adapted under license by [facility] ([facility]).  If you have questions about a medical condition or this instruction, always ask your healthcare professional. [address] any warranty or liability for your use of this information.

## 2020-08-04 LAB
A/G RATIO: 1.6 (ref 1.1–2.2)
ALBUMIN SERPL-MCNC: 4.3 G/DL (ref 3.4–5)
ALP BLD-CCNC: 91 U/L (ref 40–129)
ALT SERPL-CCNC: 14 U/L (ref 10–40)
ANION GAP SERPL CALCULATED.3IONS-SCNC: 15 MMOL/L (ref 3–16)
AST SERPL-CCNC: 21 U/L (ref 15–37)
BILIRUB SERPL-MCNC: 0.6 MG/DL (ref 0–1)
BUN BLDV-MCNC: 19 MG/DL (ref 7–20)
CALCIUM SERPL-MCNC: 9.4 MG/DL (ref 8.3–10.6)
CHLORIDE BLD-SCNC: 106 MMOL/L (ref 99–110)
CO2: 21 MMOL/L (ref 21–32)
CREAT SERPL-MCNC: 0.8 MG/DL (ref 0.8–1.3)
GFR AFRICAN AMERICAN: >60
GFR NON-AFRICAN AMERICAN: >60
GLOBULIN: 2.7 G/DL
GLUCOSE BLD-MCNC: 81 MG/DL (ref 70–99)
POTASSIUM SERPL-SCNC: 4.2 MMOL/L (ref 3.5–5.1)
PROSTATE SPECIFIC ANTIGEN: 0.66 NG/ML (ref 0–4)
SODIUM BLD-SCNC: 142 MMOL/L (ref 136–145)
TOTAL PROTEIN: 7 G/DL (ref 6.4–8.2)

## 2020-10-02 ENCOUNTER — OFFICE VISIT (OUTPATIENT)
Dept: INTERNAL MEDICINE CLINIC | Age: 70
End: 2020-10-02
Payer: COMMERCIAL

## 2020-10-02 VITALS
TEMPERATURE: 97.4 F | DIASTOLIC BLOOD PRESSURE: 80 MMHG | RESPIRATION RATE: 18 BRPM | WEIGHT: 246.4 LBS | SYSTOLIC BLOOD PRESSURE: 126 MMHG | BODY MASS INDEX: 37.46 KG/M2 | HEART RATE: 59 BPM | OXYGEN SATURATION: 98 %

## 2020-10-02 LAB
BASOPHILS ABSOLUTE: 0 K/UL (ref 0–0.2)
BASOPHILS RELATIVE PERCENT: 1 %
CHOLESTEROL, TOTAL: 177 MG/DL (ref 0–199)
EOSINOPHILS ABSOLUTE: 0.1 K/UL (ref 0–0.6)
EOSINOPHILS RELATIVE PERCENT: 2.1 %
HCT VFR BLD CALC: 36.2 % (ref 40.5–52.5)
HDLC SERPL-MCNC: 57 MG/DL (ref 40–60)
HEMOGLOBIN: 12.2 G/DL (ref 13.5–17.5)
LDL CHOLESTEROL CALCULATED: 101 MG/DL
LYMPHOCYTES ABSOLUTE: 0.7 K/UL (ref 1–5.1)
LYMPHOCYTES RELATIVE PERCENT: 18.1 %
MCH RBC QN AUTO: 31.5 PG (ref 26–34)
MCHC RBC AUTO-ENTMCNC: 33.6 G/DL (ref 31–36)
MCV RBC AUTO: 93.6 FL (ref 80–100)
MONOCYTES ABSOLUTE: 0.3 K/UL (ref 0–1.3)
MONOCYTES RELATIVE PERCENT: 8.9 %
NEUTROPHILS ABSOLUTE: 2.7 K/UL (ref 1.7–7.7)
NEUTROPHILS RELATIVE PERCENT: 69.9 %
PDW BLD-RTO: 14.5 % (ref 12.4–15.4)
PLATELET # BLD: 130 K/UL (ref 135–450)
PMV BLD AUTO: 10.7 FL (ref 5–10.5)
RBC # BLD: 3.86 M/UL (ref 4.2–5.9)
TRIGL SERPL-MCNC: 96 MG/DL (ref 0–150)
VLDLC SERPL CALC-MCNC: 19 MG/DL
WBC # BLD: 3.9 K/UL (ref 4–11)

## 2020-10-02 PROCEDURE — 90471 IMMUNIZATION ADMIN: CPT | Performed by: NURSE PRACTITIONER

## 2020-10-02 PROCEDURE — 90670 PCV13 VACCINE IM: CPT | Performed by: NURSE PRACTITIONER

## 2020-10-02 PROCEDURE — 90694 VACC AIIV4 NO PRSRV 0.5ML IM: CPT | Performed by: NURSE PRACTITIONER

## 2020-10-02 PROCEDURE — 99214 OFFICE O/P EST MOD 30 MIN: CPT | Performed by: NURSE PRACTITIONER

## 2020-10-02 PROCEDURE — 90472 IMMUNIZATION ADMIN EACH ADD: CPT | Performed by: NURSE PRACTITIONER

## 2020-10-02 ASSESSMENT — ENCOUNTER SYMPTOMS
VOMITING: 0
TROUBLE SWALLOWING: 0
ABDOMINAL PAIN: 0
CONSTIPATION: 0
CHEST TIGHTNESS: 0
COUGH: 0
NAUSEA: 0
WHEEZING: 0
DIARRHEA: 0
SHORTNESS OF BREATH: 0

## 2020-10-02 ASSESSMENT — PATIENT HEALTH QUESTIONNAIRE - PHQ9
2. FEELING DOWN, DEPRESSED OR HOPELESS: 0
SUM OF ALL RESPONSES TO PHQ QUESTIONS 1-9: 0
SUM OF ALL RESPONSES TO PHQ9 QUESTIONS 1 & 2: 0
1. LITTLE INTEREST OR PLEASURE IN DOING THINGS: 0
SUM OF ALL RESPONSES TO PHQ QUESTIONS 1-9: 0

## 2020-10-02 NOTE — PROGRESS NOTES
Vaccine Information Sheet, \"Influenza - Inactivated\"  given to Puma Stephens, or parent/legal guardian of  Puma Stephens and verbalized understanding. Patient responses:    Have you ever had a reaction to a flu vaccine? No  Are you able to eat eggs without adverse effects? Yes  Do you have any current illness? No  Have you ever had Guillian Moffett Syndrome? No    Flu vaccine given per order. Please see immunization tab.

## 2020-10-02 NOTE — PROGRESS NOTES
10/2/2020     Giovanni Vidales (:  1950) is a 69 y.o. male, here for evaluation of the following medical concerns:    Chief Complaint   Patient presents with   Lyubov Esquivel Doctor     former Dr Elizabeth Cochran patient       Hyperlipidemia   This is a chronic problem. The current episode started more than 1 year ago. The problem is controlled. Recent lipid tests were reviewed and are normal. There are no known factors aggravating his hyperlipidemia. Pertinent negatives include no chest pain, focal sensory loss, focal weakness, leg pain, myalgias or shortness of breath. Current antihyperlipidemic treatment includes statins. The current treatment provides significant improvement of lipids. There are no compliance problems. Risk factors for coronary artery disease include dyslipidemia, male sex and obesity. CAD/CABGx 4 : followed and managed by DR Bravo. He is on BB, asa, Lipitor for treatment. Dr Salas Guevara refills all meds. He has a visit yearly. Denies any SOB, chest pain, palpations, LE edema. He walks 2 miles a day . Still works as a . Aware that he needs to lose weight. Metastatic prostate CA: followed an managed by Dr Rey Castaneda every 3- 4 months . Tolerating rx treatment and doing well. Review of Systems   Constitutional: Negative for appetite change, chills, fatigue and fever. HENT: Negative for trouble swallowing. Eyes: Negative for visual disturbance. Respiratory: Negative for cough, chest tightness, shortness of breath and wheezing. Cardiovascular: Negative for chest pain, palpitations and leg swelling. Gastrointestinal: Negative for abdominal pain, constipation, diarrhea, nausea and vomiting. Genitourinary: Negative for dysuria. Musculoskeletal: Negative for arthralgias and myalgias. Neurological: Negative for dizziness, focal weakness, weakness, light-headedness and headaches. Psychiatric/Behavioral: Negative for dysphoric mood.        Prior to Visit Medications Medication Sig Taking? Authorizing Provider   metoprolol tartrate (LOPRESSOR) 25 MG tablet TAKE ONE-HALF TABLET BY MOUTH TWICE A DAY Yes Dejan Torres MD   atorvastatin (LIPITOR) 40 MG tablet TAKE ONE TABLET BY MOUTH ONCE NIGHTLY Yes Dejan Torres MD   predniSONE (DELTASONE) 5 MG tablet  Yes Historical Provider, MD   ZYTIGA 500 MG TABS  Yes Historical Provider, MD   aspirin 325 MG EC tablet Take 1 tablet by mouth daily Yes Dejan Torres MD        Social History     Tobacco Use    Smoking status: Never Smoker    Smokeless tobacco: Never Used    Tobacco comment: occasional cigar years ago   Substance Use Topics    Alcohol use: No     Comment: 2-3 beers, infrequent        Vitals:    10/02/20 0859 10/02/20 0937   BP: (!) 144/84 126/80   Site: Left Upper Arm Right Upper Arm   Position: Sitting Sitting   Cuff Size: Large Adult Large Adult   Pulse: 59    Resp: 18    Temp: 97.4 °F (36.3 °C)    SpO2: 98%    Weight: 246 lb 6.4 oz (111.8 kg)      Estimated body mass index is 37.46 kg/m² as calculated from the following:    Height as of 7/20/20: 5' 8\" (1.727 m). Weight as of this encounter: 246 lb 6.4 oz (111.8 kg). Physical Exam  Vitals signs reviewed. Constitutional:       General: He is not in acute distress. Appearance: Normal appearance. He is not ill-appearing, toxic-appearing or diaphoretic. HENT:      Head: Normocephalic and atraumatic. Right Ear: Tympanic membrane, ear canal and external ear normal.      Left Ear: Tympanic membrane, ear canal and external ear normal.      Nose: Nose normal.      Mouth/Throat:      Mouth: Mucous membranes are moist.   Eyes:      General:         Right eye: No discharge. Left eye: No discharge. Extraocular Movements: Extraocular movements intact. Conjunctiva/sclera: Conjunctivae normal.      Pupils: Pupils are equal, round, and reactive to light. Neck:      Musculoskeletal: Normal range of motion and neck supple. Vascular: No carotid bruit. Cardiovascular:      Rate and Rhythm: Normal rate and regular rhythm. Pulses: Normal pulses. Heart sounds: Normal heart sounds. No murmur. Pulmonary:      Effort: Pulmonary effort is normal. No respiratory distress. Breath sounds: Normal breath sounds. Abdominal:      General: Bowel sounds are normal. There is no distension. Palpations: Abdomen is soft. There is no mass. Tenderness: There is no abdominal tenderness. Hernia: No hernia is present. Musculoskeletal: Normal range of motion. Right lower leg: No edema. Left lower leg: No edema. Lymphadenopathy:      Cervical: No cervical adenopathy. Skin:     General: Skin is warm and dry. Capillary Refill: Capillary refill takes less than 2 seconds. Neurological:      General: No focal deficit present. Mental Status: He is alert and oriented to person, place, and time. Psychiatric:         Mood and Affect: Mood normal.         Behavior: Behavior normal.         ASSESSMENT/PLAN:  1. Mixed hyperlipidemia  Will assess  Currently taking lipitor    Reports compliance   No dosage changes , will monitor   - Lipid Panel; Future  - Lipid Panel    2. S/P CABG x 4, stable   Followed and managed by Dr Andrzej Bejarano BB. ASA, lipitor     3. Prostate cancer [facility])  Followed an managed by Dr Paulo Newell  Last PSA: .66  On pred and zytiga    4. Abnormal CBC  Lab Results   Component Value Date    WBC 7.5 01/23/2018    HGB 10.0 (L) 01/23/2018    HCT 29.7 (L) 01/23/2018    MCV 92.9 01/23/2018     01/23/2018   will assess  - CBC Auto Differential; Future  - CBC Auto Differential    5. Diabetes mellitus screening    - Hemoglobin A1C; Future  - Hemoglobin A1C    6. Immunization due  Agrees to vaccine  - PREVNAR 13 IM (Pneumococcal conjugate vaccine 13-valent)  Flu vaccine      Return in about 6 months (around 4/2/2021). All questions addresses and answered . Patient agrees with plan of care.      An electronic signature was used to authenticate this note.     --Noe Hall, APRN - CNP on 10/2/2020 at 2:01 PM 95

## 2020-10-03 LAB
ESTIMATED AVERAGE GLUCOSE: 116.9 MG/DL
HBA1C MFR BLD: 5.7 %

## 2020-10-14 DIAGNOSIS — Z95.1 S/P CABG X 4: ICD-10-CM

## 2020-10-14 DIAGNOSIS — D64.9 ANEMIA, UNSPECIFIED TYPE: ICD-10-CM

## 2020-10-14 LAB
A/G RATIO: 1.8 (ref 1.1–2.2)
ALBUMIN SERPL-MCNC: 4.2 G/DL (ref 3.4–5)
ALP BLD-CCNC: 84 U/L (ref 40–129)
ALT SERPL-CCNC: 13 U/L (ref 10–40)
ANION GAP SERPL CALCULATED.3IONS-SCNC: 10 MMOL/L (ref 3–16)
AST SERPL-CCNC: 17 U/L (ref 15–37)
BASOPHILS ABSOLUTE: 0 K/UL (ref 0–0.2)
BASOPHILS RELATIVE PERCENT: 0.8 %
BILIRUB SERPL-MCNC: 0.9 MG/DL (ref 0–1)
BUN BLDV-MCNC: 18 MG/DL (ref 7–20)
CALCIUM SERPL-MCNC: 9.8 MG/DL (ref 8.3–10.6)
CHLORIDE BLD-SCNC: 103 MMOL/L (ref 99–110)
CHOLESTEROL, FASTING: 167 MG/DL (ref 0–199)
CO2: 25 MMOL/L (ref 21–32)
CREAT SERPL-MCNC: 0.8 MG/DL (ref 0.8–1.3)
EOSINOPHILS ABSOLUTE: 0.1 K/UL (ref 0–0.6)
EOSINOPHILS RELATIVE PERCENT: 1.8 %
GFR AFRICAN AMERICAN: >60
GFR NON-AFRICAN AMERICAN: >60
GLOBULIN: 2.3 G/DL
GLUCOSE BLD-MCNC: 85 MG/DL (ref 70–99)
HCT VFR BLD CALC: 36.7 % (ref 40.5–52.5)
HDLC SERPL-MCNC: 62 MG/DL (ref 40–60)
HEMOGLOBIN: 12.6 G/DL (ref 13.5–17.5)
LDL CHOLESTEROL CALCULATED: 84 MG/DL
LYMPHOCYTES ABSOLUTE: 1.2 K/UL (ref 1–5.1)
LYMPHOCYTES RELATIVE PERCENT: 25.7 %
MCH RBC QN AUTO: 31.9 PG (ref 26–34)
MCHC RBC AUTO-ENTMCNC: 34.4 G/DL (ref 31–36)
MCV RBC AUTO: 92.9 FL (ref 80–100)
MONOCYTES ABSOLUTE: 0.4 K/UL (ref 0–1.3)
MONOCYTES RELATIVE PERCENT: 7.9 %
NEUTROPHILS ABSOLUTE: 3 K/UL (ref 1.7–7.7)
NEUTROPHILS RELATIVE PERCENT: 63.8 %
PDW BLD-RTO: 14.3 % (ref 12.4–15.4)
PLATELET # BLD: 120 K/UL (ref 135–450)
PMV BLD AUTO: 10.6 FL (ref 5–10.5)
POTASSIUM SERPL-SCNC: 4.2 MMOL/L (ref 3.5–5.1)
PROSTATE SPECIFIC ANTIGEN: 0.53 NG/ML (ref 0–4)
RBC # BLD: 3.96 M/UL (ref 4.2–5.9)
SODIUM BLD-SCNC: 138 MMOL/L (ref 136–145)
TOTAL PROTEIN: 6.5 G/DL (ref 6.4–8.2)
TRIGLYCERIDE, FASTING: 107 MG/DL (ref 0–150)
VLDLC SERPL CALC-MCNC: 21 MG/DL
WBC # BLD: 4.6 K/UL (ref 4–11)

## 2020-10-15 DIAGNOSIS — D64.9 ANEMIA, UNSPECIFIED TYPE: ICD-10-CM

## 2020-10-15 DIAGNOSIS — D64.9 ANEMIA, UNSPECIFIED TYPE: Primary | ICD-10-CM

## 2020-10-15 DIAGNOSIS — R79.89 ABNORMAL CBC: ICD-10-CM

## 2020-10-15 LAB — BLOOD SMEAR REVIEW: NORMAL

## 2020-10-16 LAB — HEMATOLOGY PATH CONSULT: NORMAL

## 2020-10-23 RX ORDER — ROSUVASTATIN CALCIUM 40 MG/1
40 TABLET, COATED ORAL DAILY
Qty: 30 TABLET | Refills: 5 | Status: SHIPPED | OUTPATIENT
Start: 2020-10-23 | End: 2021-04-28

## 2020-12-30 LAB
A/G RATIO: 1.9 (ref 1.1–2.2)
ALBUMIN SERPL-MCNC: 4.3 G/DL (ref 3.4–5)
ALP BLD-CCNC: 70 U/L (ref 40–129)
ALT SERPL-CCNC: 10 U/L (ref 10–40)
ANION GAP SERPL CALCULATED.3IONS-SCNC: 11 MMOL/L (ref 3–16)
AST SERPL-CCNC: 15 U/L (ref 15–37)
BILIRUB SERPL-MCNC: 0.6 MG/DL (ref 0–1)
BUN BLDV-MCNC: 22 MG/DL (ref 7–20)
CALCIUM SERPL-MCNC: 9.6 MG/DL (ref 8.3–10.6)
CHLORIDE BLD-SCNC: 105 MMOL/L (ref 99–110)
CO2: 26 MMOL/L (ref 21–32)
CREAT SERPL-MCNC: 0.7 MG/DL (ref 0.8–1.3)
GFR AFRICAN AMERICAN: >60
GFR NON-AFRICAN AMERICAN: >60
GLOBULIN: 2.3 G/DL
GLUCOSE BLD-MCNC: 90 MG/DL (ref 70–99)
POTASSIUM SERPL-SCNC: 4.5 MMOL/L (ref 3.5–5.1)
PROSTATE SPECIFIC ANTIGEN: 0.44 NG/ML (ref 0–4)
SODIUM BLD-SCNC: 142 MMOL/L (ref 136–145)
TOTAL PROTEIN: 6.6 G/DL (ref 6.4–8.2)

## 2021-02-10 ENCOUNTER — IMMUNIZATION (OUTPATIENT)
Dept: PRIMARY CARE CLINIC | Age: 71
End: 2021-02-10
Payer: COMMERCIAL

## 2021-02-10 PROCEDURE — 0001A COVID-19, PFIZER VACCINE 30MCG/0.3ML DOSE: CPT | Performed by: FAMILY MEDICINE

## 2021-02-10 PROCEDURE — 91300 COVID-19, PFIZER VACCINE 30MCG/0.3ML DOSE: CPT | Performed by: FAMILY MEDICINE

## 2021-03-03 ENCOUNTER — IMMUNIZATION (OUTPATIENT)
Dept: PRIMARY CARE CLINIC | Age: 71
End: 2021-03-03
Payer: COMMERCIAL

## 2021-03-03 PROCEDURE — 0002A COVID-19, PFIZER VACCINE 30MCG/0.3ML DOSE: CPT | Performed by: NURSE PRACTITIONER

## 2021-03-03 PROCEDURE — 91300 COVID-19, PFIZER VACCINE 30MCG/0.3ML DOSE: CPT | Performed by: NURSE PRACTITIONER

## 2021-04-17 LAB
A/G RATIO: 2.3 (ref 1.1–2.2)
ALBUMIN SERPL-MCNC: 4.6 G/DL (ref 3.4–5)
ALP BLD-CCNC: 61 U/L (ref 40–129)
ALT SERPL-CCNC: 11 U/L (ref 10–40)
ANION GAP SERPL CALCULATED.3IONS-SCNC: 11 MMOL/L (ref 3–16)
AST SERPL-CCNC: 16 U/L (ref 15–37)
BILIRUB SERPL-MCNC: 0.8 MG/DL (ref 0–1)
BUN BLDV-MCNC: 32 MG/DL (ref 7–20)
CALCIUM SERPL-MCNC: 9.5 MG/DL (ref 8.3–10.6)
CHLORIDE BLD-SCNC: 106 MMOL/L (ref 99–110)
CO2: 24 MMOL/L (ref 21–32)
CREAT SERPL-MCNC: 1 MG/DL (ref 0.8–1.3)
GFR AFRICAN AMERICAN: >60
GFR NON-AFRICAN AMERICAN: >60
GLOBULIN: 2 G/DL
GLUCOSE BLD-MCNC: 102 MG/DL (ref 70–99)
POTASSIUM SERPL-SCNC: 4.4 MMOL/L (ref 3.5–5.1)
PROSTATE SPECIFIC ANTIGEN: 0.56 NG/ML (ref 0–4)
SODIUM BLD-SCNC: 141 MMOL/L (ref 136–145)
TOTAL PROTEIN: 6.6 G/DL (ref 6.4–8.2)

## 2021-04-28 DIAGNOSIS — E78.5 HYPERLIPIDEMIA LDL GOAL <70: ICD-10-CM

## 2021-04-30 RX ORDER — ROSUVASTATIN CALCIUM 40 MG/1
TABLET, COATED ORAL
Qty: 30 TABLET | Refills: 2 | Status: SHIPPED | OUTPATIENT
Start: 2021-04-30 | End: 2021-08-02

## 2021-06-23 DIAGNOSIS — E78.5 HYPERLIPIDEMIA LDL GOAL <70: ICD-10-CM

## 2021-06-24 LAB
CHOLESTEROL, FASTING: 162 MG/DL (ref 0–199)
HDLC SERPL-MCNC: 61 MG/DL (ref 40–60)
LDL CHOLESTEROL CALCULATED: 83 MG/DL
PROSTATE SPECIFIC ANTIGEN: 0.58 NG/ML (ref 0–4)
TRIGLYCERIDE, FASTING: 92 MG/DL (ref 0–150)
VLDLC SERPL CALC-MCNC: 18 MG/DL

## 2021-06-25 LAB
A/G RATIO: 1.8 (ref 1.1–2.2)
ALBUMIN SERPL-MCNC: 4.4 G/DL (ref 3.4–5)
ALP BLD-CCNC: 73 U/L (ref 40–129)
ALT SERPL-CCNC: 11 U/L (ref 10–40)
ANION GAP SERPL CALCULATED.3IONS-SCNC: 15 MMOL/L (ref 3–16)
AST SERPL-CCNC: 17 U/L (ref 15–37)
BILIRUB SERPL-MCNC: 0.5 MG/DL (ref 0–1)
BUN BLDV-MCNC: 22 MG/DL (ref 7–20)
CALCIUM SERPL-MCNC: 9.8 MG/DL (ref 8.3–10.6)
CHLORIDE BLD-SCNC: 107 MMOL/L (ref 99–110)
CO2: 21 MMOL/L (ref 21–32)
CREAT SERPL-MCNC: 0.9 MG/DL (ref 0.8–1.3)
GFR AFRICAN AMERICAN: >60
GFR NON-AFRICAN AMERICAN: >60
GLOBULIN: 2.4 G/DL
GLUCOSE BLD-MCNC: 109 MG/DL (ref 70–99)
POTASSIUM SERPL-SCNC: 4.6 MMOL/L (ref 3.5–5.1)
SODIUM BLD-SCNC: 143 MMOL/L (ref 136–145)
TOTAL PROTEIN: 6.8 G/DL (ref 6.4–8.2)

## 2021-07-21 NOTE — TELEPHONE ENCOUNTER
Last OV: 07/20/2020  Next OV: 08/05/2021  Last refill:07/14/2020  Most recent Labs: cmp, lipid 06/24/2021  Last PT/INR (if needed):  Last EKG (if needed):07/20/2020

## 2021-07-23 NOTE — TELEPHONE ENCOUNTER
Medication Refill    Medication needing refilled:METOPROLOL    Dosage of the medication:25MG    How are you taking this medication (QD, BID, TID, QID, PRN):I/2 TABLET TWICE DAILY    30 or 90 day supply called in:90 DAYS    When will you run out of your medication:OUT    Which Pharmacy are we sending the medication to?:[facility] [address] [place] [address] [phone]   [place] [address], [address]   Phone:  [phone]  Fax:  [phone]

## 2021-08-01 DIAGNOSIS — E78.5 HYPERLIPIDEMIA LDL GOAL <70: ICD-10-CM

## 2021-08-02 RX ORDER — ROSUVASTATIN CALCIUM 40 MG/1
TABLET, COATED ORAL
Qty: 30 TABLET | Refills: 1 | Status: SHIPPED | OUTPATIENT
Start: 2021-08-02 | End: 2021-08-05 | Stop reason: SDUPTHER

## 2021-08-04 NOTE — PROGRESS NOTES
[facility] [address]  outpatient Progress Note    CC: No new complaints         HPI:   66 yr old patient who presented to [facility] 1/2018 with c/o pressure and heaviness. He has a FH of CAD. Ruled in for NSTEMI and underwent coronary angiogram showing 3 vessel disease, Ostial LAD 80%, mid LAD 80%, mid circumflex 90%, mid distal RCA 60%. Referred to Dr. Eugenie Galvan for Bypass. S/p CABGx4 1/17/18. Here for yearly follow up and accompanied by wife. 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 Has no new cardiac complaints. Stays active walking daily. Says he walks at least 10,000 steps daily. He also walks up an incline and denies any chest pain, pressure, tightness or shortness of breath. Remains compliant with medication and tolerating. Review of Systems -   Constitutional: Negative for weight gain/loss; malaise, fever  Respiratory: Negative for Asthma;  cough and hemoptysis  Cardiovascular: Negative for palpitations,dizziness   Gastrointestinal: Negative for abd.pain; constipation/diarrhea;    Genitourinary: Negative for stones; hematuria; frequency hesitancy  Integumentt: Negative for rash or pruritis  Hematologic/lymphatic: Negative for blood dyscrasia; leukemia/lymphoma  Musculoskeletal: Negative for Connective tissue disease  Neurological:  Negative for Seizure   Behavioral/Psych:Negative for Bipolar disorder, Schizophrenia; Dementia  Endocrine: negative for thyroid, parathyroid disease      Physical Examination:    /76   Pulse 56   Ht 5' 9\" (1.753 m)   Wt 240 lb (108.9 kg)   SpO2 98%   BMI 35.44 kg/m²   HEENT:  Face: Atraumatic, Conjunctiva: Pink; non icteric,  Mucous Memb:  Moist, No thyromegaly or Lymphadenopathy  Respiratory:  Resp Assessment: normal, Resp Auscultation: clear   Cardiovascular: Auscultation: nl S1 & S2, Palpation:  Nl PMI;  No heaves or thrills, JVP:  normal  Abdomen: Soft, non-tender, Normal bowel sounds,  No organomegaly  Extremities: No Cyanosis or Clubbing; Edema none  Neurological: Oriented to time, place, and person, Non-anxious  Psychiatric: Normal mood and affect  Skin: Warm and dry,  No rash seen    Current Outpatient Medications   Medication Sig Dispense Refill    Denosumab (PROLIA SC) Inject into the skin every 6 months      Leuprolide Acetate (LUPRON SC) Inject into the skin every 6 months      Multiple Minerals-Vitamins (PROSTEON PO) Take by mouth 4 tablets daily      rosuvastatin (CRESTOR) 40 MG tablet TAKE ONE TABLET BY MOUTH DAILY 30 tablet 1    metoprolol tartrate (LOPRESSOR) 25 MG tablet TAKE ONE-HALF TABLET BY MOUTH TWICE A DAY 90 tablet 3    predniSONE (DELTASONE) 5 MG tablet Take 5 mg by mouth daily       ZYTIGA 500 MG TABS Take 1,000 mg by mouth daily       aspirin 325 MG EC tablet Take 1 tablet by mouth daily 30 tablet 5     No current facility-administered medications for this visit. Labs:     EK19, normal rhythm   20, sinus rhythm  21, sinus rhythm rate 54      Corornary angiogram: 18  CORONARY ANGIOGRAM:  There is three-vessel disease in this right dominant circulation. 1.  Left main:  Free of disease. 2.  LAD:  Ostial has an 80% lesion, proximal has another 70% to 80% lesion and then the mid has 70% lesion. The first diagonal is a large vessel and has a 60% to 70% mid-lesion. 3.  Left circumflex:  Has a high-grade 90% mid lesion. 4.  Right coronary artery:  Has a 50% to 60% distal lesion.     CONCLUSIONS:  1. Three-vessel coronary artery disease. 2.  Ostial LAD 80%, mid LAD 80%, mid circumflex 90%, mid distal RCA 60%. 3.  Normal left ventricular size and systolic function. 4.  Slightly elevated left heart filling pressures. 18: CABGX4 (LIMA-LAD, SVG-D1, SVG-OM1-PLV).        ASSESSMENT AND PLAN:      Status post CABG ×4  for multi-vessel coronary artery disease  CABGX4 (LIMA-LAD, SVG-D1, SVG-OM1-PLV) with Dr. Natty Parisi 18  Continue medical management     Prostate cancer:  Metastatic but responding well to therapy     Hyperlipidemia  LDL goal <70  Current LDL 83 (6/24/21)   Continue Rosuvastatin 40 mg nightly and add Zetia 10 mg daily     Bradycardia  Rate 54  Can discontinue Metoprolol       Follow up in 1 year        Rochelle Clark M.D  8/5/2021     This note was scribed in the presence of Dr. Rochelle Clark MD by Simone Phelan  Physician Attestation:  The scribes documentation has been prepared under my direction and personally reviewed by me in its entirety. I confirm that the note above accurately reflects all work, treatment, procedures, and medical decision making performed by me.

## 2021-08-05 ENCOUNTER — OFFICE VISIT (OUTPATIENT)
Dept: CARDIOLOGY CLINIC | Age: 71
End: 2021-08-05
Payer: COMMERCIAL

## 2021-08-05 VITALS
DIASTOLIC BLOOD PRESSURE: 76 MMHG | WEIGHT: 240 LBS | HEIGHT: 69 IN | HEART RATE: 56 BPM | OXYGEN SATURATION: 98 % | SYSTOLIC BLOOD PRESSURE: 118 MMHG | BODY MASS INDEX: 35.55 KG/M2

## 2021-08-05 DIAGNOSIS — E78.5 HYPERLIPIDEMIA LDL GOAL <70: ICD-10-CM

## 2021-08-05 DIAGNOSIS — Z95.1 S/P CORONARY ARTERY BYPASS GRAFT X 4: ICD-10-CM

## 2021-08-05 DIAGNOSIS — I25.10 CAD IN NATIVE ARTERY: Primary | ICD-10-CM

## 2021-08-05 DIAGNOSIS — C61 PROSTATE CANCER (HCC): ICD-10-CM

## 2021-08-05 PROCEDURE — 93000 ELECTROCARDIOGRAM COMPLETE: CPT | Performed by: INTERNAL MEDICINE

## 2021-08-05 PROCEDURE — G8427 DOCREV CUR MEDS BY ELIG CLIN: HCPCS | Performed by: INTERNAL MEDICINE

## 2021-08-05 PROCEDURE — 99213 OFFICE O/P EST LOW 20 MIN: CPT | Performed by: INTERNAL MEDICINE

## 2021-08-05 PROCEDURE — 1036F TOBACCO NON-USER: CPT | Performed by: INTERNAL MEDICINE

## 2021-08-05 PROCEDURE — 1123F ACP DISCUSS/DSCN MKR DOCD: CPT | Performed by: INTERNAL MEDICINE

## 2021-08-05 PROCEDURE — 4040F PNEUMOC VAC/ADMIN/RCVD: CPT | Performed by: INTERNAL MEDICINE

## 2021-08-05 PROCEDURE — 3017F COLORECTAL CA SCREEN DOC REV: CPT | Performed by: INTERNAL MEDICINE

## 2021-08-05 PROCEDURE — G8417 CALC BMI ABV UP PARAM F/U: HCPCS | Performed by: INTERNAL MEDICINE

## 2021-08-05 RX ORDER — ROSUVASTATIN CALCIUM 40 MG/1
40 TABLET, COATED ORAL EVERY EVENING
Qty: 90 TABLET | Refills: 3 | Status: SHIPPED | OUTPATIENT
Start: 2021-08-05 | End: 2022-09-29

## 2021-08-05 RX ORDER — EZETIMIBE 10 MG/1
10 TABLET ORAL DAILY
Qty: 90 TABLET | Refills: 3 | Status: SHIPPED | OUTPATIENT
Start: 2021-08-05 | End: 2022-07-28

## 2021-09-14 LAB
A/G RATIO: 1.9 (ref 1.1–2.2)
ALBUMIN SERPL-MCNC: 4.1 G/DL (ref 3.4–5)
ALP BLD-CCNC: 62 U/L (ref 40–129)
ALT SERPL-CCNC: 12 U/L (ref 10–40)
ANION GAP SERPL CALCULATED.3IONS-SCNC: 9 MMOL/L (ref 3–16)
AST SERPL-CCNC: 16 U/L (ref 15–37)
BILIRUB SERPL-MCNC: 0.5 MG/DL (ref 0–1)
BUN BLDV-MCNC: 23 MG/DL (ref 7–20)
CALCIUM SERPL-MCNC: 9.5 MG/DL (ref 8.3–10.6)
CHLORIDE BLD-SCNC: 108 MMOL/L (ref 99–110)
CO2: 22 MMOL/L (ref 21–32)
CREAT SERPL-MCNC: 0.8 MG/DL (ref 0.8–1.3)
GFR AFRICAN AMERICAN: >60
GFR NON-AFRICAN AMERICAN: >60
GLOBULIN: 2.2 G/DL
GLUCOSE BLD-MCNC: 102 MG/DL (ref 70–99)
POTASSIUM SERPL-SCNC: 4.6 MMOL/L (ref 3.5–5.1)
PROSTATE SPECIFIC ANTIGEN: 0.71 NG/ML (ref 0–4)
SODIUM BLD-SCNC: 139 MMOL/L (ref 136–145)
TOTAL PROTEIN: 6.3 G/DL (ref 6.4–8.2)

## 2021-11-09 ENCOUNTER — OFFICE VISIT (OUTPATIENT)
Dept: INTERNAL MEDICINE CLINIC | Age: 71
End: 2021-11-09
Payer: COMMERCIAL

## 2021-11-09 VITALS
OXYGEN SATURATION: 97 % | HEIGHT: 69 IN | HEART RATE: 65 BPM | DIASTOLIC BLOOD PRESSURE: 80 MMHG | RESPIRATION RATE: 16 BRPM | WEIGHT: 241.8 LBS | SYSTOLIC BLOOD PRESSURE: 130 MMHG | BODY MASS INDEX: 35.81 KG/M2

## 2021-11-09 DIAGNOSIS — Z85.46 H/O PROSTATE CANCER: ICD-10-CM

## 2021-11-09 DIAGNOSIS — Z12.11 COLON CANCER SCREENING: ICD-10-CM

## 2021-11-09 DIAGNOSIS — Z95.1 S/P CABG X 4: ICD-10-CM

## 2021-11-09 DIAGNOSIS — Z23 NEED FOR TDAP VACCINATION: ICD-10-CM

## 2021-11-09 DIAGNOSIS — Z23 FLU VACCINE NEED: ICD-10-CM

## 2021-11-09 DIAGNOSIS — Z86.39 H/O MIXED HYPERLIPIDEMIA: Primary | ICD-10-CM

## 2021-11-09 DIAGNOSIS — Z23 NEED FOR 23-POLYVALENT PNEUMOCOCCAL POLYSACCHARIDE VACCINE: ICD-10-CM

## 2021-11-09 DIAGNOSIS — R73.03 PRE-DIABETES: ICD-10-CM

## 2021-11-09 PROBLEM — H93.8X3 EAR PRESSURE, BILATERAL: Status: RESOLVED | Noted: 2019-06-25 | Resolved: 2021-11-09

## 2021-11-09 PROBLEM — R07.9 CHEST PAIN: Status: RESOLVED | Noted: 2018-01-17 | Resolved: 2021-11-09

## 2021-11-09 PROCEDURE — 90694 VACC AIIV4 NO PRSRV 0.5ML IM: CPT | Performed by: NURSE PRACTITIONER

## 2021-11-09 PROCEDURE — G8484 FLU IMMUNIZE NO ADMIN: HCPCS | Performed by: NURSE PRACTITIONER

## 2021-11-09 PROCEDURE — 4040F PNEUMOC VAC/ADMIN/RCVD: CPT | Performed by: NURSE PRACTITIONER

## 2021-11-09 PROCEDURE — 1036F TOBACCO NON-USER: CPT | Performed by: NURSE PRACTITIONER

## 2021-11-09 PROCEDURE — G8427 DOCREV CUR MEDS BY ELIG CLIN: HCPCS | Performed by: NURSE PRACTITIONER

## 2021-11-09 PROCEDURE — 3017F COLORECTAL CA SCREEN DOC REV: CPT | Performed by: NURSE PRACTITIONER

## 2021-11-09 PROCEDURE — 90732 PPSV23 VACC 2 YRS+ SUBQ/IM: CPT | Performed by: NURSE PRACTITIONER

## 2021-11-09 PROCEDURE — 90471 IMMUNIZATION ADMIN: CPT | Performed by: NURSE PRACTITIONER

## 2021-11-09 PROCEDURE — 1123F ACP DISCUSS/DSCN MKR DOCD: CPT | Performed by: NURSE PRACTITIONER

## 2021-11-09 PROCEDURE — 99214 OFFICE O/P EST MOD 30 MIN: CPT | Performed by: NURSE PRACTITIONER

## 2021-11-09 PROCEDURE — 90472 IMMUNIZATION ADMIN EACH ADD: CPT | Performed by: NURSE PRACTITIONER

## 2021-11-09 PROCEDURE — G8417 CALC BMI ABV UP PARAM F/U: HCPCS | Performed by: NURSE PRACTITIONER

## 2021-11-09 SDOH — ECONOMIC STABILITY: FOOD INSECURITY: WITHIN THE PAST 12 MONTHS, YOU WORRIED THAT YOUR FOOD WOULD RUN OUT BEFORE YOU GOT MONEY TO BUY MORE.: NEVER TRUE

## 2021-11-09 SDOH — ECONOMIC STABILITY: FOOD INSECURITY: WITHIN THE PAST 12 MONTHS, THE FOOD YOU BOUGHT JUST DIDN'T LAST AND YOU DIDN'T HAVE MONEY TO GET MORE.: NEVER TRUE

## 2021-11-09 SDOH — ECONOMIC STABILITY: TRANSPORTATION INSECURITY
IN THE PAST 12 MONTHS, HAS LACK OF TRANSPORTATION KEPT YOU FROM MEETINGS, WORK, OR FROM GETTING THINGS NEEDED FOR DAILY LIVING?: NO

## 2021-11-09 SDOH — ECONOMIC STABILITY: TRANSPORTATION INSECURITY
IN THE PAST 12 MONTHS, HAS THE LACK OF TRANSPORTATION KEPT YOU FROM MEDICAL APPOINTMENTS OR FROM GETTING MEDICATIONS?: NO

## 2021-11-09 ASSESSMENT — ENCOUNTER SYMPTOMS
SHORTNESS OF BREATH: 0
NAUSEA: 0
ABDOMINAL PAIN: 0
VOMITING: 0

## 2021-11-09 ASSESSMENT — PATIENT HEALTH QUESTIONNAIRE - PHQ9
SUM OF ALL RESPONSES TO PHQ QUESTIONS 1-9: 0
SUM OF ALL RESPONSES TO PHQ9 QUESTIONS 1 & 2: 0
2. FEELING DOWN, DEPRESSED OR HOPELESS: 0
1. LITTLE INTEREST OR PLEASURE IN DOING THINGS: 0

## 2021-11-09 ASSESSMENT — SOCIAL DETERMINANTS OF HEALTH (SDOH): HOW HARD IS IT FOR YOU TO PAY FOR THE VERY BASICS LIKE FOOD, HOUSING, MEDICAL CARE, AND HEATING?: NOT HARD AT ALL

## 2021-11-09 NOTE — PROGRESS NOTES
2021     Ferdinand Israel (:  1950) is a 66 y.o. male, here for evaluation of the following medical concerns:    Chief Complaint   Patient presents with    Hyperlipidemia     Overall feels well. No complaints today. HPI     Hyperlipidemia   This is a chronic problem. The current episode started more than 1 year ago. The problem is controlled. Recent lipid tests were reviewed and are normal. There are no known factors aggravating his hyperlipidemia. Pertinent negatives include no chest pain, focal sensory loss, focal weakness, leg pain, myalgias or shortness of breath. Current antihyperlipidemic treatment includes statins. The current treatment provides significant improvement of lipids. There are no compliance problems. Risk factors for coronary artery disease include dyslipidemia, male sex and obesity.      CAD/CABGx 4 : followed and managed by DR Robert Rivas. He is on asa, crestor and zetia  for treatment. Dr Robert Rivas refills all meds. He has a visit yearly. Denies any SOB, chest pain, palpations, LE edema. He walks 2 miles a day . Still works as a . Aware that he needs to lose weight.      Metastatic prostate CA: followed an managed by Dr Madisyn Everett every 3- 4 months . Tolerating rx treatment and doing well. Prediabetes: discussed dx and need for testing. Agrees to have A1c drawn at his next lab appt for Dr Robert Rivas. Aware of need for FIT testing for colon cancer screen - agrees to complete FIT card. Agrees to flu and pneumonia vaccine today. Review of Systems   Constitutional: Negative for appetite change, chills, fatigue and fever. HENT: Negative for tinnitus. Eyes: Negative for visual disturbance. Respiratory: Negative for shortness of breath. Cardiovascular: Negative for chest pain and leg swelling. Gastrointestinal: Negative for abdominal pain, nausea and vomiting. Musculoskeletal: Negative for myalgias.    Neurological: Negative for dizziness, syncope, numbness and headaches. Psychiatric/Behavioral: Negative for dysphoric mood and sleep disturbance. The patient is not nervous/anxious. Prior to Visit Medications    Medication Sig Taking? Authorizing Provider   Tetanus-Diphth-Acell Pertussis (BOOSTRIX) 5-2.5-18.5 LF-MCG/0.5 injection Inject 0.5 mLs into the muscle once for 1 dose Yes BRIAN Berg - CNP   Denosumab (PROLIA SC) Inject into the skin every 6 months Yes Historical Provider, MD   Leuprolide Acetate (LUPRON SC) Inject into the skin every 6 months Yes Historical Provider, MD   Multiple Minerals-Vitamins (PROSTEON PO) Take by mouth 4 tablets daily Yes Historical Provider, MD   rosuvastatin (CRESTOR) 40 MG tablet Take 1 tablet by mouth every evening Yes Sheri Huang MD   ezetimibe (ZETIA) 10 MG tablet Take 1 tablet by mouth daily Yes Sheri Huang MD   predniSONE (DELTASONE) 5 MG tablet Take 5 mg by mouth daily  Yes Historical Provider, MD   ZYTIGA 500 MG TABS Take 1,000 mg by mouth daily  Yes Historical Provider, MD   aspirin 325 MG EC tablet Take 1 tablet by mouth daily Yes Sheri Huang MD        Social History     Tobacco Use    Smoking status: Never Smoker    Smokeless tobacco: Never Used    Tobacco comment: occasional cigar years ago   Substance Use Topics    Alcohol use: No     Comment: 2-3 beers, infrequent        Vitals:    11/09/21 1125   BP: 130/80   Site: Left Upper Arm   Position: Sitting   Cuff Size: Large Adult   Pulse: 65   Resp: 16   SpO2: 97%   Weight: 241 lb 12.8 oz (109.7 kg)   Height: 5' 9\" (1.753 m)     Estimated body mass index is 35.71 kg/m² as calculated from the following:    Height as of this encounter: 5' 9\" (1.753 m). Weight as of this encounter: 241 lb 12.8 oz (109.7 kg). Physical Exam  Vitals reviewed. Constitutional:       General: He is not in acute distress. Appearance: Normal appearance. He is not ill-appearing, toxic-appearing or diaphoretic. HENT:      Head: Normocephalic and atraumatic.    Neck: Vascular: No carotid bruit. Cardiovascular:      Rate and Rhythm: Normal rate and regular rhythm. Pulses: Normal pulses. Heart sounds: Normal heart sounds. Pulmonary:      Effort: Pulmonary effort is normal.      Breath sounds: Normal breath sounds. Abdominal:      General: Bowel sounds are normal. There is no distension. Palpations: Abdomen is soft. There is no mass. Tenderness: There is no abdominal tenderness. There is no guarding or rebound. Hernia: No hernia is present. Musculoskeletal:         General: Normal range of motion. Cervical back: Normal range of motion and neck supple. Right lower leg: No edema. Left lower leg: No edema. Lymphadenopathy:      Cervical: No cervical adenopathy. Skin:     General: Skin is warm and dry. Capillary Refill: Capillary refill takes less than 2 seconds. Neurological:      General: No focal deficit present. Mental Status: He is alert and oriented to person, place, and time. Psychiatric:         Mood and Affect: Mood normal.         Behavior: Behavior normal.         Thought Content: Thought content normal.         Judgment: Judgment normal.         ASSESSMENT/PLAN:  1. H/O mixed hyperlipidemia, stable   Currently taking crestor  zetia   Reports compliance   No dosage changes , will monitor   Last LDL 81  HDL 61    2. S/P CABG x 4, stable   Followed and managed by amrik Malcolm ASA     3. H/O prostate cancer, stable   Last PSA: .71  On pred and zytiga, lupron, prolia   Followed and managed by Dr Glen Benavides       4. Flu vaccine need    - INFLUENZA, QUADV, ADJUVANTED, 65 YRS =, IM, PF, PREFILL SYR, 0.5ML (FLUAD)    5. Colon cancer screening  FIT card given   - POCT Fecal Immunochemical Test (FIT); Future    6. Pre-diabetes  Last A1C 5.7  Will obtain lab next week   + exercise daily , watching diet   - Hemoglobin A1C; Future    7.  Need for 23-polyvalent pneumococcal polysaccharide vaccine    - PNEUMOVAX 23 subcutaneous/IM (Pneumococcal polysaccharide vaccine 23-valent >= 1yo)    8. Need for Tdap vaccination  Aware to obtain at pharmacy   - Tetanus-Diphth-Acell Pertussis ([address]) 5-2.5-18.5 LF-MCG/0.5 injection; Inject 0.5 mLs into the muscle once for 1 dose  Dispense: 0.5 mL; Refill: 0      Return in about 6 months (around 5/9/2022) for f/u with DR PAREDES. All questions addresses and answered . Patient agrees with plan of care. An electronic signature was used to authenticate this note.     --BRIAN Armas - CNP on 11/9/2021 at 11:49 AM

## 2021-11-23 DIAGNOSIS — E78.5 HYPERLIPIDEMIA LDL GOAL <70: ICD-10-CM

## 2021-11-23 DIAGNOSIS — R73.03 PRE-DIABETES: ICD-10-CM

## 2021-11-23 LAB
CHOLESTEROL, FASTING: 147 MG/DL (ref 0–199)
HDLC SERPL-MCNC: 53 MG/DL (ref 40–60)
LDL CHOLESTEROL CALCULATED: 73 MG/DL
PROSTATE SPECIFIC ANTIGEN: 0.9 NG/ML (ref 0–4)
TRIGLYCERIDE, FASTING: 106 MG/DL (ref 0–150)
VLDLC SERPL CALC-MCNC: 21 MG/DL

## 2021-11-24 LAB
ESTIMATED AVERAGE GLUCOSE: 114 MG/DL
HBA1C MFR BLD: 5.6 %

## 2022-01-20 LAB
A/G RATIO: 1.8 (ref 1.1–2.2)
ALBUMIN SERPL-MCNC: 4.4 G/DL (ref 3.4–5)
ALP BLD-CCNC: 62 U/L (ref 40–129)
ALT SERPL-CCNC: 15 U/L (ref 10–40)
ANION GAP SERPL CALCULATED.3IONS-SCNC: 14 MMOL/L (ref 3–16)
AST SERPL-CCNC: 18 U/L (ref 15–37)
BILIRUB SERPL-MCNC: 0.6 MG/DL (ref 0–1)
BUN BLDV-MCNC: 17 MG/DL (ref 7–20)
CALCIUM SERPL-MCNC: 9.9 MG/DL (ref 8.3–10.6)
CHLORIDE BLD-SCNC: 104 MMOL/L (ref 99–110)
CO2: 25 MMOL/L (ref 21–32)
CREAT SERPL-MCNC: 0.9 MG/DL (ref 0.8–1.3)
GFR AFRICAN AMERICAN: >60
GFR NON-AFRICAN AMERICAN: >60
GLUCOSE BLD-MCNC: 94 MG/DL (ref 70–99)
POTASSIUM SERPL-SCNC: 5 MMOL/L (ref 3.5–5.1)
PROSTATE SPECIFIC ANTIGEN: 0.99 NG/ML (ref 0–4)
SODIUM BLD-SCNC: 143 MMOL/L (ref 136–145)
TOTAL PROTEIN: 6.8 G/DL (ref 6.4–8.2)

## 2022-02-03 ENCOUNTER — HOSPITAL ENCOUNTER (OUTPATIENT)
Dept: CT IMAGING | Age: 72
Discharge: HOME | End: 2022-02-03
Payer: COMMERCIAL

## 2022-02-03 ENCOUNTER — HOSPITAL ENCOUNTER (OUTPATIENT)
Dept: NUCLEAR MEDICINE | Age: 72
Discharge: HOME | End: 2022-02-03
Payer: COMMERCIAL

## 2022-02-03 DIAGNOSIS — C79.52 SECONDARY MALIGNANT NEOPLASM OF BONE AND BONE MARROW (HCC): ICD-10-CM

## 2022-02-03 DIAGNOSIS — R39.12 POOR URINARY STREAM: ICD-10-CM

## 2022-02-03 DIAGNOSIS — M81.8 OTHER OSTEOPOROSIS WITHOUT CURRENT PATHOLOGICAL FRACTURE: ICD-10-CM

## 2022-02-03 DIAGNOSIS — C61 MALIGNANT NEOPLASM OF PROSTATE (HCC): ICD-10-CM

## 2022-02-03 DIAGNOSIS — R33.9 RETENTION OF URINE, UNSPECIFIED: ICD-10-CM

## 2022-02-03 DIAGNOSIS — N40.3 NODULAR PROSTATE WITH LOWER URINARY TRACT SYMPTOMS: ICD-10-CM

## 2022-02-03 DIAGNOSIS — R97.20 ELEVATED PROSTATE SPECIFIC ANTIGEN (PSA): ICD-10-CM

## 2022-02-03 DIAGNOSIS — C79.51 SECONDARY MALIGNANT NEOPLASM OF BONE AND BONE MARROW (HCC): ICD-10-CM

## 2022-02-03 PROCEDURE — 3430000000 HC RX DIAGNOSTIC RADIOPHARMACEUTICAL: Performed by: UROLOGY

## 2022-02-03 PROCEDURE — 74177 CT ABD & PELVIS W/CONTRAST: CPT

## 2022-02-03 PROCEDURE — 78306 BONE IMAGING WHOLE BODY: CPT

## 2022-02-03 PROCEDURE — 6360000004 HC RX CONTRAST MEDICATION: Performed by: UROLOGY

## 2022-02-03 PROCEDURE — A9503 TC99M MEDRONATE: HCPCS | Performed by: UROLOGY

## 2022-02-03 RX ORDER — TC 99M MEDRONATE 20 MG/10ML
25 INJECTION, POWDER, LYOPHILIZED, FOR SOLUTION INTRAVENOUS
Status: COMPLETED | OUTPATIENT
Start: 2022-02-03 | End: 2022-02-03

## 2022-02-03 RX ADMIN — IOPAMIDOL 75 ML: 755 INJECTION, SOLUTION INTRAVENOUS at 07:13

## 2022-02-03 RX ADMIN — TC 99M MEDRONATE 25 MILLICURIE: 20 INJECTION, POWDER, LYOPHILIZED, FOR SOLUTION INTRAVENOUS at 07:42

## 2022-04-04 LAB
A/G RATIO: 2 (ref 1.1–2.2)
ALBUMIN SERPL-MCNC: 4.7 G/DL (ref 3.4–5)
ALP BLD-CCNC: 52 U/L (ref 40–129)
ALT SERPL-CCNC: 12 U/L (ref 10–40)
ANION GAP SERPL CALCULATED.3IONS-SCNC: 15 MMOL/L (ref 3–16)
AST SERPL-CCNC: 16 U/L (ref 15–37)
BILIRUB SERPL-MCNC: 0.5 MG/DL (ref 0–1)
BUN BLDV-MCNC: 22 MG/DL (ref 7–20)
CALCIUM SERPL-MCNC: 10 MG/DL (ref 8.3–10.6)
CHLORIDE BLD-SCNC: 107 MMOL/L (ref 99–110)
CO2: 23 MMOL/L (ref 21–32)
CREAT SERPL-MCNC: 0.9 MG/DL (ref 0.8–1.3)
GFR AFRICAN AMERICAN: >60
GFR NON-AFRICAN AMERICAN: >60
GLUCOSE BLD-MCNC: 95 MG/DL (ref 70–99)
POTASSIUM SERPL-SCNC: 4.9 MMOL/L (ref 3.5–5.1)
PROSTATE SPECIFIC ANTIGEN: 1.2 NG/ML (ref 0–4)
SODIUM BLD-SCNC: 145 MMOL/L (ref 136–145)
TOTAL PROTEIN: 7 G/DL (ref 6.4–8.2)

## 2022-06-14 ENCOUNTER — OFFICE VISIT (OUTPATIENT)
Dept: INTERNAL MEDICINE CLINIC | Age: 72
End: 2022-06-14
Payer: COMMERCIAL

## 2022-06-14 VITALS
WEIGHT: 244.4 LBS | SYSTOLIC BLOOD PRESSURE: 130 MMHG | DIASTOLIC BLOOD PRESSURE: 64 MMHG | HEART RATE: 58 BPM | RESPIRATION RATE: 16 BRPM | HEIGHT: 68 IN | OXYGEN SATURATION: 98 % | BODY MASS INDEX: 37.04 KG/M2

## 2022-06-14 DIAGNOSIS — E78.2 MIXED HYPERLIPIDEMIA: Primary | ICD-10-CM

## 2022-06-14 DIAGNOSIS — Z23 NEED FOR TDAP VACCINATION: ICD-10-CM

## 2022-06-14 DIAGNOSIS — Z95.1 S/P CABG X 4: ICD-10-CM

## 2022-06-14 DIAGNOSIS — Z23 NEED FOR SHINGLES VACCINE: ICD-10-CM

## 2022-06-14 DIAGNOSIS — C61 PROSTATE CANCER (HCC): ICD-10-CM

## 2022-06-14 LAB — PROSTATE SPECIFIC ANTIGEN: 1.4 NG/ML (ref 0–4)

## 2022-06-14 PROCEDURE — G8427 DOCREV CUR MEDS BY ELIG CLIN: HCPCS | Performed by: INTERNAL MEDICINE

## 2022-06-14 PROCEDURE — 99214 OFFICE O/P EST MOD 30 MIN: CPT | Performed by: INTERNAL MEDICINE

## 2022-06-14 PROCEDURE — 1036F TOBACCO NON-USER: CPT | Performed by: INTERNAL MEDICINE

## 2022-06-14 PROCEDURE — 3017F COLORECTAL CA SCREEN DOC REV: CPT | Performed by: INTERNAL MEDICINE

## 2022-06-14 PROCEDURE — G8417 CALC BMI ABV UP PARAM F/U: HCPCS | Performed by: INTERNAL MEDICINE

## 2022-06-14 PROCEDURE — 1123F ACP DISCUSS/DSCN MKR DOCD: CPT | Performed by: INTERNAL MEDICINE

## 2022-06-14 RX ORDER — RELUGOLIX 120 MG/1
TABLET, FILM COATED ORAL
COMMUNITY
Start: 2022-06-13

## 2022-06-14 RX ORDER — CLINDAMYCIN PHOSPHATE 10 UG/ML
LOTION TOPICAL
COMMUNITY
Start: 2022-06-07

## 2022-06-14 ASSESSMENT — PATIENT HEALTH QUESTIONNAIRE - PHQ9
1. LITTLE INTEREST OR PLEASURE IN DOING THINGS: 0
SUM OF ALL RESPONSES TO PHQ QUESTIONS 1-9: 0
SUM OF ALL RESPONSES TO PHQ QUESTIONS 1-9: 0
2. FEELING DOWN, DEPRESSED OR HOPELESS: 0
SUM OF ALL RESPONSES TO PHQ QUESTIONS 1-9: 0
SUM OF ALL RESPONSES TO PHQ9 QUESTIONS 1 & 2: 0
SUM OF ALL RESPONSES TO PHQ QUESTIONS 1-9: 0

## 2022-06-14 ASSESSMENT — ENCOUNTER SYMPTOMS
ABDOMINAL PAIN: 0
NAUSEA: 0
SORE THROAT: 0
CHEST TIGHTNESS: 0
BLOOD IN STOOL: 0
VOMITING: 0
SHORTNESS OF BREATH: 0
COUGH: 0

## 2022-06-14 NOTE — PROGRESS NOTES
Bryan Pereira (:  1950) is a 67 y.o. male, New patient, here for evaluation of the following chief complaint(s):  Established New Doctor and Hyperlipidemia         ASSESSMENT/PLAN:  1. Mixed hyperlipidemia  - Stable   - Continue current dose of Crestor and Zetia  2. S/P CABG x 4  - Stable   - Continue current dose of aspirin and Crestor  3. Prostate cancer ([facility] [address]  - Stable   Continue current dose of zytiga, lupron, prolia   Follows with urology  4. Need for shingles vaccine  -     Zoster, SHINGRIX, (18 yrs +), IM; Future  5. Need for Tdap vaccination  -     Tdap, BOOSTRIX, (age 8 yrs+), IM; Future      Return in about 3 months (around 2022). Subjective   SUBJECTIVE/OBJECTIVE:  Hyperlipidemia  This is a chronic problem. The current episode started more than 1 year ago. The problem is controlled. Pertinent negatives include no chest pain or shortness of breath. Current antihyperlipidemic treatment includes statins and ezetimibe. The current treatment provides significant improvement of lipids. There are no compliance problems. Risk factors for coronary artery disease include dyslipidemia, male sex and obesity. Coronary Artery Disease  Presents for follow-up visit. Pertinent negatives include no chest pain, chest pressure, chest tightness, dizziness, leg swelling, palpitations or shortness of breath. Risk factors include hyperlipidemia. The symptoms have been resolved. Compliance with diet is good. Compliance with exercise is good. Compliance with medications is good. Review of Systems   Constitutional: Negative for fatigue and fever. HENT: Negative for nosebleeds and sore throat. Respiratory: Negative for cough, chest tightness and shortness of breath. Cardiovascular: Negative for chest pain, palpitations and leg swelling. Gastrointestinal: Negative for abdominal pain, blood in stool, nausea and vomiting. Neurological: Negative for dizziness and weakness.           Objective Physical Exam  Constitutional:       Appearance: Normal appearance. HENT:      Head: Normocephalic and atraumatic. Eyes:      General: No scleral icterus. Conjunctiva/sclera: Conjunctivae normal.   Cardiovascular:      Rate and Rhythm: Normal rate and regular rhythm. Pulses: Normal pulses. Heart sounds: Normal heart sounds. Pulmonary:      Effort: Pulmonary effort is normal.      Breath sounds: Normal breath sounds. Musculoskeletal:         General: No swelling. Right lower leg: No edema. Left lower leg: No edema. Skin:     General: Skin is warm and dry. Neurological:      Mental Status: He is alert and oriented to person, place, and time. Mental status is at baseline. Psychiatric:         Mood and Affect: Mood normal.         Behavior: Behavior normal.              An electronic signature was used to authenticate this note.     --Manisha Veronica MD

## 2022-06-15 LAB
A/G RATIO: 2.3 (ref 1.1–2.2)
ALBUMIN SERPL-MCNC: 4.9 G/DL (ref 3.4–5)
ALP BLD-CCNC: 65 U/L (ref 40–129)
ALT SERPL-CCNC: 11 U/L (ref 10–40)
ANION GAP SERPL CALCULATED.3IONS-SCNC: 17 MMOL/L (ref 3–16)
AST SERPL-CCNC: 17 U/L (ref 15–37)
BILIRUB SERPL-MCNC: 0.5 MG/DL (ref 0–1)
BUN BLDV-MCNC: 22 MG/DL (ref 7–20)
CALCIUM SERPL-MCNC: 10 MG/DL (ref 8.3–10.6)
CHLORIDE BLD-SCNC: 102 MMOL/L (ref 99–110)
CO2: 21 MMOL/L (ref 21–32)
CREAT SERPL-MCNC: 0.8 MG/DL (ref 0.8–1.3)
GFR AFRICAN AMERICAN: >60
GFR NON-AFRICAN AMERICAN: >60
GLUCOSE BLD-MCNC: 93 MG/DL (ref 70–99)
POTASSIUM SERPL-SCNC: 4.3 MMOL/L (ref 3.5–5.1)
SODIUM BLD-SCNC: 140 MMOL/L (ref 136–145)
TOTAL PROTEIN: 7 G/DL (ref 6.4–8.2)

## 2022-07-28 RX ORDER — EZETIMIBE 10 MG/1
TABLET ORAL
Qty: 90 TABLET | Refills: 3 | Status: SHIPPED | OUTPATIENT
Start: 2022-07-28

## 2022-07-28 NOTE — TELEPHONE ENCOUNTER
Last OV: 8/5/21  Next OV: 8/8/22  Last refill:8/5/21  Most recent Labs: 6/14/22  Last EKG (if needed):8/5/21

## 2022-08-08 ENCOUNTER — OFFICE VISIT (OUTPATIENT)
Dept: CARDIOLOGY CLINIC | Age: 72
End: 2022-08-08
Payer: COMMERCIAL

## 2022-08-08 VITALS
SYSTOLIC BLOOD PRESSURE: 136 MMHG | BODY MASS INDEX: 36.68 KG/M2 | HEART RATE: 92 BPM | DIASTOLIC BLOOD PRESSURE: 66 MMHG | OXYGEN SATURATION: 97 % | WEIGHT: 242 LBS | HEIGHT: 68 IN

## 2022-08-08 DIAGNOSIS — Z95.1 S/P CABG X 4: Primary | ICD-10-CM

## 2022-08-08 DIAGNOSIS — C61 PROSTATE CANCER (HCC): ICD-10-CM

## 2022-08-08 DIAGNOSIS — I10 BENIGN ESSENTIAL HYPERTENSION: ICD-10-CM

## 2022-08-08 DIAGNOSIS — E78.5 HYPERLIPIDEMIA LDL GOAL <70: ICD-10-CM

## 2022-08-08 PROCEDURE — G8427 DOCREV CUR MEDS BY ELIG CLIN: HCPCS | Performed by: INTERNAL MEDICINE

## 2022-08-08 PROCEDURE — G8417 CALC BMI ABV UP PARAM F/U: HCPCS | Performed by: INTERNAL MEDICINE

## 2022-08-08 PROCEDURE — 1123F ACP DISCUSS/DSCN MKR DOCD: CPT | Performed by: INTERNAL MEDICINE

## 2022-08-08 PROCEDURE — 1036F TOBACCO NON-USER: CPT | Performed by: INTERNAL MEDICINE

## 2022-08-08 PROCEDURE — 93000 ELECTROCARDIOGRAM COMPLETE: CPT | Performed by: INTERNAL MEDICINE

## 2022-08-08 PROCEDURE — 99214 OFFICE O/P EST MOD 30 MIN: CPT | Performed by: INTERNAL MEDICINE

## 2022-08-08 PROCEDURE — 3017F COLORECTAL CA SCREEN DOC REV: CPT | Performed by: INTERNAL MEDICINE

## 2022-08-08 RX ORDER — LISINOPRIL 20 MG/1
20 TABLET ORAL DAILY
Qty: 90 TABLET | Refills: 3 | Status: SHIPPED | OUTPATIENT
Start: 2022-08-08

## 2022-08-08 NOTE — PATIENT INSTRUCTIONS
Blood pressure is slightly elevated today. Begin taking lisinopril 20 mg daily. Repeat labs in the next 7-10 days (lipid panel to check cholesterol and CMP to check kidney function and potassium).

## 2022-08-08 NOTE — PROGRESS NOTES
[facility]  outpatient Progress Note    CC: \" I'm retired and play golf. \"         HPI:   67 yr old patient who presented to [facility], [facility] 1/2018 with c/o pressure and heaviness. He has a FH of CAD. Ruled in for NSTEMI and underwent coronary angiogram showing 3 vessel disease, Ostial LAD 80%, mid LAD 80%, mid circumflex 90%, mid distal RCA 60%. Referred to Dr. Myrna Womack for Bypass. S/p CABGx4 1/17/18. He returns for 1 year follow up. Accompanied by his wife and denies any cardiac symptoms. States that he is now retired (). He is feeling well and has been spending time playing golf. States he plays 18 holes and occasionally plays 2 rounds. He specifically denies any chest pain or pressure, shortness of breath, dyspnea on exertion, lightheadedness/dizziness, PND, orthopnea, and syncope. Review of Systems -   Constitutional: Negative for weight gain/loss; malaise, fever  Respiratory: Negative for Asthma;  cough and hemoptysis  Cardiovascular: Negative for palpitations,dizziness   Gastrointestinal: Negative for abd.pain; constipation/diarrhea;    Genitourinary: Negative for stones; hematuria; frequency hesitancy  Integumentt: Negative for rash or pruritis  Hematologic/lymphatic: Negative for blood dyscrasia; leukemia/lymphoma  Musculoskeletal: Negative for Connective tissue disease  Neurological:  Negative for Seizure   Behavioral/Psych:Negative for Bipolar disorder, Schizophrenia; Dementia  Endocrine: negative for thyroid, parathyroid disease      Physical Examination:    /66   Pulse 92   Ht 5' 8\" (1.727 m)   Wt 242 lb (109.8 kg)   SpO2 97%   BMI 36.80 kg/m²   HEENT:  Face: Atraumatic, Conjunctiva: Pink; non icteric,  Mucous Memb:  Moist, No thyromegaly or Lymphadenopathy  Respiratory:  Resp Assessment: normal, Resp Auscultation: clear   Cardiovascular: Auscultation: nl S1 & S2, Palpation:  Nl PMI;  No heaves or thrills, JVP:  normal  Abdomen: Soft, non-tender, Normal bowel sounds,  No organomegaly  Extremities: No Cyanosis or Clubbing; Edema none  Neurological: Oriented to time, place, and person, Non-anxious  Psychiatric: Normal mood and affect  Skin: Warm and dry,  No rash seen    Current Outpatient Medications   Medication Sig Dispense Refill    ezetimibe (ZETIA) 10 MG tablet TAKE ONE TABLET BY MOUTH DAILY 90 tablet 3    clindamycin (CLEOCIN T) 1 % lotion       ORGOVYX 120 MG TABS       Denosumab (PROLIA SC) Inject into the skin every 6 months      Multiple Minerals-Vitamins (PROSTEON PO) Take by mouth 4 tablets daily      rosuvastatin (CRESTOR) 40 MG tablet Take 1 tablet by mouth every evening 90 tablet 3    predniSONE (DELTASONE) 5 MG tablet Take 5 mg by mouth daily       ZYTIGA 500 MG TABS Take 1,000 mg by mouth daily       aspirin 325 MG EC tablet Take 1 tablet by mouth daily 30 tablet 5     No current facility-administered medications for this visit. Labs:     EK19, normal rhythm   20, sinus rhythm  21, sinus rhythm rate 54  22, sinus rhythm     Corornary angiogram: 18  CORONARY ANGIOGRAM:  There is three-vessel disease in this right dominant circulation. 1.  Left main:  Free of disease. 2.  LAD:  Ostial has an 80% lesion, proximal has another 70% to 80% lesion and then the mid has 70% lesion. The first diagonal is a large vessel and has a 60% to 70% mid-lesion. 3.  Left circumflex:  Has a high-grade 90% mid lesion. 4.  Right coronary artery:  Has a 50% to 60% distal lesion. CONCLUSIONS:  1. Three-vessel coronary artery disease. 2.  Ostial LAD 80%, mid LAD 80%, mid circumflex 90%, mid distal RCA 60%. 3.  Normal left ventricular size and systolic function. 4.  Slightly elevated left heart filling pressures. 18: CABGX4 (LIMA-LAD, SVG-D1, SVG-OM1-PLV).        ASSESSMENT AND PLAN:      Status post CABG ×4  for multi-vessel coronary artery disease  CABGX4 (LIMA-LAD, SVG-D1, SVG-OM1-PLV) with Dr. Heidi Anderson 18  Denies any angina  Continue medical therapy; aspirin, statin and Zetia     Prostate cancer:  Metastatic  Responding well to therapy     Hyperlipidemia  LDL goal <70  Current LDL 73 (11/23/21)  Continue high intensity statin and Zetia  Repeat fasting lipid panel    Benign essential hypertension  Goal BP <130/80  Today's BP is slightly elevated  Will begin lisinopril 20 mg daily  Repeat CMP in 7-10 days       Follow up in 1 year        Charmayne Coral M.D  8/8/2022     This note was scribed in the presence of Dr. Charmayne Coral, MD by Pepper Arora, ALBERT  Physician Attestation:  The scribes documentation has been prepared under my direction and personally reviewed by me in its entirety. I confirm that the note above accurately reflects all work, treatment, procedures, and medical decision making performed by me.

## 2022-08-23 DIAGNOSIS — E78.5 HYPERLIPIDEMIA LDL GOAL <70: ICD-10-CM

## 2022-08-23 DIAGNOSIS — I10 BENIGN ESSENTIAL HYPERTENSION: ICD-10-CM

## 2022-08-23 LAB
A/G RATIO: 2.4 (ref 1.1–2.2)
ALBUMIN SERPL-MCNC: 4.7 G/DL (ref 3.4–5)
ALP BLD-CCNC: 64 U/L (ref 40–129)
ALT SERPL-CCNC: 12 U/L (ref 10–40)
ANION GAP SERPL CALCULATED.3IONS-SCNC: 10 MMOL/L (ref 3–16)
AST SERPL-CCNC: 19 U/L (ref 15–37)
BILIRUB SERPL-MCNC: 0.5 MG/DL (ref 0–1)
BUN BLDV-MCNC: 17 MG/DL (ref 7–20)
CALCIUM SERPL-MCNC: 10.1 MG/DL (ref 8.3–10.6)
CHLORIDE BLD-SCNC: 104 MMOL/L (ref 99–110)
CHOLESTEROL, FASTING: 141 MG/DL (ref 0–199)
CO2: 25 MMOL/L (ref 21–32)
CREAT SERPL-MCNC: 0.8 MG/DL (ref 0.8–1.3)
GFR AFRICAN AMERICAN: >60
GFR NON-AFRICAN AMERICAN: >60
GLUCOSE BLD-MCNC: 89 MG/DL (ref 70–99)
HDLC SERPL-MCNC: 59 MG/DL (ref 40–60)
LDL CHOLESTEROL CALCULATED: 67 MG/DL
POTASSIUM SERPL-SCNC: 4.7 MMOL/L (ref 3.5–5.1)
SODIUM BLD-SCNC: 139 MMOL/L (ref 136–145)
TOTAL PROTEIN: 6.7 G/DL (ref 6.4–8.2)
TRIGLYCERIDE, FASTING: 76 MG/DL (ref 0–150)
VLDLC SERPL CALC-MCNC: 15 MG/DL

## 2022-09-29 DIAGNOSIS — E78.5 HYPERLIPIDEMIA LDL GOAL <70: ICD-10-CM

## 2022-09-29 RX ORDER — ROSUVASTATIN CALCIUM 40 MG/1
TABLET, COATED ORAL
Qty: 90 TABLET | Refills: 3 | Status: SHIPPED | OUTPATIENT
Start: 2022-09-29

## 2022-12-14 ENCOUNTER — OFFICE VISIT (OUTPATIENT)
Dept: INTERNAL MEDICINE CLINIC | Age: 72
End: 2022-12-14
Payer: COMMERCIAL

## 2022-12-14 VITALS
HEART RATE: 66 BPM | OXYGEN SATURATION: 97 % | BODY MASS INDEX: 36.49 KG/M2 | SYSTOLIC BLOOD PRESSURE: 107 MMHG | DIASTOLIC BLOOD PRESSURE: 66 MMHG | TEMPERATURE: 97.6 F | WEIGHT: 240 LBS

## 2022-12-14 DIAGNOSIS — Z12.11 COLON CANCER SCREENING: ICD-10-CM

## 2022-12-14 DIAGNOSIS — Z95.1 S/P CABG X 4: ICD-10-CM

## 2022-12-14 DIAGNOSIS — Z23 NEED FOR INFLUENZA VACCINATION: ICD-10-CM

## 2022-12-14 DIAGNOSIS — R73.09 INCREASED BLOOD GLUCOSE: ICD-10-CM

## 2022-12-14 DIAGNOSIS — E78.2 MIXED HYPERLIPIDEMIA: Primary | ICD-10-CM

## 2022-12-14 DIAGNOSIS — C61 PROSTATE CANCER (HCC): ICD-10-CM

## 2022-12-14 LAB — HBA1C MFR BLD: 5.7 %

## 2022-12-14 PROCEDURE — 90694 VACC AIIV4 NO PRSRV 0.5ML IM: CPT | Performed by: INTERNAL MEDICINE

## 2022-12-14 PROCEDURE — 83036 HEMOGLOBIN GLYCOSYLATED A1C: CPT | Performed by: INTERNAL MEDICINE

## 2022-12-14 PROCEDURE — G8417 CALC BMI ABV UP PARAM F/U: HCPCS | Performed by: INTERNAL MEDICINE

## 2022-12-14 PROCEDURE — 1036F TOBACCO NON-USER: CPT | Performed by: INTERNAL MEDICINE

## 2022-12-14 PROCEDURE — 90471 IMMUNIZATION ADMIN: CPT | Performed by: INTERNAL MEDICINE

## 2022-12-14 PROCEDURE — G8484 FLU IMMUNIZE NO ADMIN: HCPCS | Performed by: INTERNAL MEDICINE

## 2022-12-14 PROCEDURE — 3017F COLORECTAL CA SCREEN DOC REV: CPT | Performed by: INTERNAL MEDICINE

## 2022-12-14 PROCEDURE — 1123F ACP DISCUSS/DSCN MKR DOCD: CPT | Performed by: INTERNAL MEDICINE

## 2022-12-14 PROCEDURE — 99214 OFFICE O/P EST MOD 30 MIN: CPT | Performed by: INTERNAL MEDICINE

## 2022-12-14 PROCEDURE — G8427 DOCREV CUR MEDS BY ELIG CLIN: HCPCS | Performed by: INTERNAL MEDICINE

## 2022-12-14 SDOH — ECONOMIC STABILITY: FOOD INSECURITY: WITHIN THE PAST 12 MONTHS, YOU WORRIED THAT YOUR FOOD WOULD RUN OUT BEFORE YOU GOT MONEY TO BUY MORE.: NEVER TRUE

## 2022-12-14 SDOH — ECONOMIC STABILITY: FOOD INSECURITY: WITHIN THE PAST 12 MONTHS, THE FOOD YOU BOUGHT JUST DIDN'T LAST AND YOU DIDN'T HAVE MONEY TO GET MORE.: NEVER TRUE

## 2022-12-14 ASSESSMENT — ENCOUNTER SYMPTOMS
CHEST TIGHTNESS: 0
BLOOD IN STOOL: 0
SHORTNESS OF BREATH: 0

## 2022-12-14 ASSESSMENT — SOCIAL DETERMINANTS OF HEALTH (SDOH): HOW HARD IS IT FOR YOU TO PAY FOR THE VERY BASICS LIKE FOOD, HOUSING, MEDICAL CARE, AND HEATING?: NOT HARD AT ALL

## 2022-12-14 NOTE — PROGRESS NOTES
Jermaine Warren (:  1950) is a 67 y.o. male, New patient, here for evaluation of the following chief complaint(s):  Medication Refill (Flu shot) and Cholesterol Problem         ASSESSMENT/PLAN:  1. Mixed hyperlipidemia  - Stable   - Continue current dose of Crestor and Zetia  2. S/P CABG x 4  - Stable   - Continue current dose of aspirin and Crestor  3. Prostate cancer [facility])  Follows with urology and oncology. Planning for chemotherapy since the cancer is active now. Patient takes zytiga, lupron, prolia   4. Colon cancer screening  -     AFL - Cleve Chilel MD, Gastroenterology (ERCP & EUS), [facility]  5. Increased blood glucose  -     POCT glycosylated hemoglobin (Hb A1C)  6. Need for influenza vaccination  -     Influenza, FLUAD, (age 72 y+), IM, Preservative Free, 0.5 mL        Return in about 6 months (around 2023). Subjective   SUBJECTIVE/OBJECTIVE:  Hyperlipidemia  This is a chronic problem. The current episode started more than 1 year ago. The problem is controlled. Pertinent negatives include no shortness of breath. Current antihyperlipidemic treatment includes statins and ezetimibe. The current treatment provides significant improvement of lipids. There are no compliance problems. Risk factors for coronary artery disease include dyslipidemia, male sex and obesity. Coronary Artery Disease  Presents for follow-up visit. Pertinent negatives include no chest pressure, chest tightness, dizziness, leg swelling, palpitations or shortness of breath. Risk factors include hyperlipidemia. The symptoms have been resolved. Compliance with diet is good. Compliance with exercise is good. Compliance with medications is good. Review of Systems   HENT:  Negative for nosebleeds. Respiratory:  Negative for chest tightness and shortness of breath. Cardiovascular:  Negative for palpitations and leg swelling. Gastrointestinal:  Negative for blood in stool.    Neurological: Negative for dizziness. Objective   Physical Exam  Constitutional:       Appearance: Normal appearance. HENT:      Head: Normocephalic and atraumatic. Eyes:      General: No scleral icterus. Conjunctiva/sclera: Conjunctivae normal.   Cardiovascular:      Rate and Rhythm: Normal rate and regular rhythm. Pulses: Normal pulses. Heart sounds: Normal heart sounds. Pulmonary:      Effort: Pulmonary effort is normal.      Breath sounds: Normal breath sounds. Musculoskeletal:         General: No swelling. Right lower leg: No edema. Left lower leg: No edema. Skin:     General: Skin is warm and dry. Neurological:      Mental Status: He is alert and oriented to person, place, and time. Mental status is at baseline. Psychiatric:         Mood and Affect: Mood normal.         Behavior: Behavior normal.            An electronic signature was used to authenticate this note.     --Domingo Khan MD

## 2022-12-28 ENCOUNTER — TELEPHONE (OUTPATIENT)
Dept: INTERVENTIONAL RADIOLOGY/VASCULAR | Age: 72
End: 2022-12-28

## 2022-12-28 DIAGNOSIS — C61 PROSTATE CANCER (HCC): Primary | ICD-10-CM

## 2022-12-28 NOTE — TELEPHONE ENCOUNTER
Order for port placement from Dr. Shirley Lopezelor before 1/4. Spoke with the patients wife for scheduling. The following was discussed:    -Will arrive at 1130 for procedure at 1300  -NPO after midnight  -Will have a  home  -Takes 435 ASA. Instructed to hold starting tomorrow. 5 day hold. Verbalized understanding of instructions.      Electronically signed by Henrik Schaffer RN on 12/28/2022 at 3:33 PM

## 2023-01-03 ENCOUNTER — HOSPITAL ENCOUNTER (OUTPATIENT)
Dept: INTERVENTIONAL RADIOLOGY/VASCULAR | Age: 73
Discharge: HOME | End: 2023-01-03
Payer: COMMERCIAL

## 2023-01-03 VITALS
OXYGEN SATURATION: 98 % | TEMPERATURE: 97 F | DIASTOLIC BLOOD PRESSURE: 70 MMHG | SYSTOLIC BLOOD PRESSURE: 128 MMHG | HEART RATE: 77 BPM | BODY MASS INDEX: 37.11 KG/M2 | HEIGHT: 68 IN | RESPIRATION RATE: 16 BRPM | WEIGHT: 244.9 LBS

## 2023-01-03 DIAGNOSIS — C61 PROSTATE CANCER (HCC): ICD-10-CM

## 2023-01-03 LAB
HCT VFR BLD CALC: 36.2 % (ref 40.5–52.5)
HEMOGLOBIN: 11.7 G/DL (ref 13.5–17.5)
INR BLD: 1.01 (ref 0.87–1.14)
MCH RBC QN AUTO: 30.5 PG (ref 26–34)
MCHC RBC AUTO-ENTMCNC: 32.2 G/DL (ref 31–36)
MCV RBC AUTO: 94.6 FL (ref 80–100)
PDW BLD-RTO: 14.4 % (ref 12.4–15.4)
PLATELET # BLD: 164 K/UL (ref 135–450)
PMV BLD AUTO: 9.9 FL (ref 5–10.5)
PROTHROMBIN TIME: 13.2 SEC (ref 11.7–14.5)
RBC # BLD: 3.83 M/UL (ref 4.2–5.9)
WBC # BLD: 4.2 K/UL (ref 4–11)

## 2023-01-03 PROCEDURE — 6360000002 HC RX W HCPCS: Performed by: RADIOLOGY

## 2023-01-03 PROCEDURE — 76937 US GUIDE VASCULAR ACCESS: CPT

## 2023-01-03 PROCEDURE — 85027 COMPLETE CBC AUTOMATED: CPT

## 2023-01-03 PROCEDURE — 2580000003 HC RX 258: Performed by: RADIOLOGY

## 2023-01-03 PROCEDURE — 7100000010 HC PHASE II RECOVERY - FIRST 15 MIN

## 2023-01-03 PROCEDURE — 77001 FLUOROGUIDE FOR VEIN DEVICE: CPT

## 2023-01-03 PROCEDURE — 2500000003 HC RX 250 WO HCPCS

## 2023-01-03 PROCEDURE — 99152 MOD SED SAME PHYS/QHP 5/>YRS: CPT

## 2023-01-03 PROCEDURE — 7100000011 HC PHASE II RECOVERY - ADDTL 15 MIN

## 2023-01-03 PROCEDURE — 36415 COLL VENOUS BLD VENIPUNCTURE: CPT

## 2023-01-03 PROCEDURE — 85610 PROTHROMBIN TIME: CPT

## 2023-01-03 PROCEDURE — C1788 PORT, INDWELLING, IMP: HCPCS

## 2023-01-03 PROCEDURE — 6360000004 HC RX CONTRAST MEDICATION: Performed by: RADIOLOGY

## 2023-01-03 PROCEDURE — 36561 INSERT TUNNELED CV CATH: CPT

## 2023-01-03 PROCEDURE — 99153 MOD SED SAME PHYS/QHP EA: CPT

## 2023-01-03 RX ORDER — ACETAMINOPHEN 325 MG/1
650 TABLET ORAL EVERY 4 HOURS PRN
Status: DISCONTINUED | OUTPATIENT
Start: 2023-01-03 | End: 2023-01-04 | Stop reason: HOSPADM

## 2023-01-03 RX ORDER — FENTANYL CITRATE 50 UG/ML
INJECTION, SOLUTION INTRAMUSCULAR; INTRAVENOUS DAILY PRN
Status: COMPLETED | OUTPATIENT
Start: 2023-01-03 | End: 2023-01-03

## 2023-01-03 RX ORDER — SODIUM CHLORIDE 9 MG/ML
INJECTION, SOLUTION INTRAVENOUS ONCE
Status: DISCONTINUED | OUTPATIENT
Start: 2023-01-03 | End: 2023-01-04 | Stop reason: HOSPADM

## 2023-01-03 RX ORDER — MIDAZOLAM HYDROCHLORIDE 1 MG/ML
INJECTION INTRAMUSCULAR; INTRAVENOUS DAILY PRN
Status: COMPLETED | OUTPATIENT
Start: 2023-01-03 | End: 2023-01-03

## 2023-01-03 RX ADMIN — CEFAZOLIN 2000 MG: 2 INJECTION, POWDER, FOR SOLUTION INTRAMUSCULAR; INTRAVENOUS at 13:25

## 2023-01-03 RX ADMIN — IOPAMIDOL 5 ML: 612 INJECTION, SOLUTION INTRAVENOUS at 15:16

## 2023-01-03 RX ADMIN — MIDAZOLAM 1 MG: 1 INJECTION INTRAMUSCULAR; INTRAVENOUS at 13:41

## 2023-01-03 RX ADMIN — FENTANYL CITRATE 50 MCG: 50 INJECTION INTRAMUSCULAR; INTRAVENOUS at 13:50

## 2023-01-03 RX ADMIN — MIDAZOLAM 1 MG: 1 INJECTION INTRAMUSCULAR; INTRAVENOUS at 13:50

## 2023-01-03 RX ADMIN — FENTANYL CITRATE 50 MCG: 50 INJECTION INTRAMUSCULAR; INTRAVENOUS at 13:42

## 2023-01-03 ASSESSMENT — PAIN SCALES - GENERAL
PAINLEVEL_OUTOF10: 0

## 2023-01-03 ASSESSMENT — PAIN - FUNCTIONAL ASSESSMENT: PAIN_FUNCTIONAL_ASSESSMENT: 0-10

## 2023-01-03 NOTE — PRE SEDATION
Sedation Pre-Procedure Note    Patient Name: Meche Davila   YOB: 1950  Room/Bed: Room/bed info not found  Medical Record Number: [id number]  Date: 1/3/2023   Time: 2:24 PM       Indication:  Port placement. Consent: I have discussed with the patient and/or the patient representative the indication, alternatives, and the possible risks and/or complications of the planned procedure and the anesthesia methods. The patient and/or patient representative appear to understand and agree to proceed. Vital Signs:   Vitals:    01/03/23 1420   BP:    Pulse: 75   Resp: 15   Temp:    SpO2: 99%       Past Medical History:   has a past medical history of CAD (coronary artery disease), Cancer of prostate ([facility] [address] Chest pain, and Unstable angina pectoris ([facility] [address] Past Surgical History:   has a past surgical history that includes Coronary artery bypass graft (01/17/2018); Skin cancer excision; and IR PORT PLACEMENT > 5 YEARS (Right, 01/03/2023). Medications:   Scheduled Meds:   Continuous Infusions:    sodium chloride       PRN Meds: midazolam, fentanNYL  Home Meds:   Prior to Admission medications    Medication Sig Start Date End Date Taking? Authorizing Provider   rosuvastatin (CRESTOR) 40 MG tablet TAKE ONE TABLET BY MOUTH EVERY EVENING 9/29/22   Monet Carrillo MD   lisinopril (PRINIVIL;ZESTRIL) 20 MG tablet Take 1 tablet by mouth in the morning.  8/8/22   Monet Carrillo MD   ezetimibe (ZETIA) 10 MG tablet TAKE ONE TABLET BY MOUTH DAILY 7/28/22   Monet Carrillo MD   clindamycin (CLEOCIN T) 1 % lotion  6/7/22   Historical Provider, MD   ORGOVYX 120 MG TABS  6/13/22   Historical Provider, MD   Denosumab (PROLIA SC) Inject into the skin every 6 months    Historical Provider, MD   Multiple Minerals-Vitamins (PROSTEON PO) Take by mouth 4 tablets daily    Historical Provider, MD   predniSONE (DELTASONE) 5 MG tablet Take 5 mg by mouth daily  6/11/19   Historical Provider, MD   ZYTIGA 500 MG TABS Take 1,000 mg by mouth daily 5/14/19   Historical Provider, MD   aspirin 325 MG EC tablet Take 1 tablet by mouth daily 5/21/18   Hortensia Desir MD     Coumadin Use Last 7 Days:  no  Antiplatelet drug therapy use last 7 days: no  Other anticoagulant use last 7 days: no  Additional Medication Information:  n/a      Pre-Sedation Documentation and Exam:   I have reviewed the patient's history and review of systems.     Mallampati Airway Assessment:  Mallampati Class II - (soft palate, fauces & uvula are visible)    Prior History of Anesthesia Complications:   none    ASA Classification:  Class 2 - A normal healthy patient with mild systemic disease    Sedation/ Anesthesia Plan:   intravenous sedation    Medications Planned:   midazolam (Versed) intravenously and fentanyl intravenously    Patient is an appropriate candidate for plan of sedation: yes    Electronically signed by Bao Toure MD on 1/3/2023 at 2:24 PM

## 2023-01-03 NOTE — DISCHARGE INSTRUCTIONS
Rigo De La Torre  [address]  Telephone: [phone]      PATIENT NAME: [address] RECORD NUMBER:  [id number]  TODAY'S DATE: @ED@          [facility] DISCHARGE INSTRUCTIONS      If you have a dressing in place, do not remove for two days. If there is no dressing, that means we used a liquid bandage underneath your steri strips. A little oozing is expected, if it continues contact BRIAN Kan CNP . No lifting over 5 lbs for the first 4 days and nothing over 10lbs for the next three days. Also limit your overhead arm movement and pulling for 3-4 days. You have dissolvable sutures in place so they will not need to be removed. The little steri strips will fall off over the next 2-3 weeks - do not remove them, just let them fall off. You may shower starting tomorrow, just dry off your incision area with a towel when you are done. Do not submerge the incision in water until the steri strips are completely gone. 7.     You should contact BRIAN Kan CNP if you experience any of the following: Fever          Greater than 100, any pus or drainage from the incision, any redness,warmth           Or swelling at the site. 8.     Always keep your port ID card with you. 9.   If you take blood thinners,including Aspirin,  you may resume them tomorrow, do not take them today.

## 2023-01-03 NOTE — PROGRESS NOTES
Discharge instructions given to Pt and spouse, all questions answered.  Pt discharged to home with spouse to transport

## 2023-01-03 NOTE — BRIEF OP NOTE
Brief Postoperative Note    Apolonio Crigler  YOB: 1950  [id number]    Pre-operative Diagnosis: Prostate cancer    Post-operative Diagnosis: Same    Procedure: Port placement    Anesthesia: Moderate Sedation    Surgeons: Yon Polo MD    Estimated Blood Loss: Less than 5 mL    Complications: None    Specimens: Was Not Obtained    Findings: Successful placement of a right IJ port.     Electronically signed by Yon Polo MD on 1/3/2023 at 2:25 PM

## 2023-04-17 ENCOUNTER — HOSPITAL ENCOUNTER (OUTPATIENT)
Dept: CT IMAGING | Age: 73
Discharge: HOME | End: 2023-04-17

## 2023-04-17 DIAGNOSIS — C61 PROSTATE CA (HCC): ICD-10-CM

## 2023-07-21 RX ORDER — EZETIMIBE 10 MG/1
TABLET ORAL
Qty: 90 TABLET | Refills: 0 | Status: SHIPPED | OUTPATIENT
Start: 2023-07-21

## 2023-07-21 NOTE — TELEPHONE ENCOUNTER
Last OV: 8/8/22  Next OV: 8/14/23  Last refill: 7/28/22  #90  3 R/F  Most recent Labs: 8/23/22  Last EKG (if needed): 8/8/22

## 2023-08-01 RX ORDER — LISINOPRIL 20 MG/1
TABLET ORAL
Qty: 90 TABLET | Refills: 3 | Status: SHIPPED | OUTPATIENT
Start: 2023-08-01

## 2023-08-01 NOTE — TELEPHONE ENCOUNTER
Medication:   Requested Prescriptions     Pending Prescriptions Disp Refills    lisinopril (PRINIVIL;ZESTRIL) 20 MG tablet [Pharmacy Med Name: LISINOPRIL 20 MG TABLET] 90 tablet 3     Sig: TAKE ONE TABLET BY MOUTH EVERY MORNING       Last Filled:  5/3/2023    Patient Phone Number: [phone] (home)     Last appt: 2022   Next appt: 2023  Last ek2022    Last Lipid:   Lab Results   Component Value Date/Time    CHOL 177 10/02/2020 09:29 AM    TRIG 96 10/02/2020 09:29 AM    HDL 59 2022 11:53 AM    LDLCALC 67 2022 11:53 AM

## 2023-08-21 ENCOUNTER — APPOINTMENT (OUTPATIENT)
Dept: CT IMAGING | Age: 73
DRG: 683 | End: 2023-08-21
Payer: COMMERCIAL

## 2023-08-21 ENCOUNTER — HOSPITAL ENCOUNTER (INPATIENT)
Age: 73
LOS: 5 days | Discharge: HOME | DRG: 683 | End: 2023-08-26
Attending: INTERNAL MEDICINE | Admitting: INTERNAL MEDICINE
Payer: COMMERCIAL

## 2023-08-21 DIAGNOSIS — C61 PROSTATE CANCER (HCC): ICD-10-CM

## 2023-08-21 DIAGNOSIS — R63.0 DECREASED APPETITE: ICD-10-CM

## 2023-08-21 DIAGNOSIS — N30.01 ACUTE CYSTITIS WITH HEMATURIA: ICD-10-CM

## 2023-08-21 DIAGNOSIS — A41.9 SEPTICEMIA (HCC): ICD-10-CM

## 2023-08-21 DIAGNOSIS — N17.9 AKI (ACUTE KIDNEY INJURY) (HCC): Primary | ICD-10-CM

## 2023-08-21 LAB
ALBUMIN SERPL-MCNC: 4 G/DL (ref 3.4–5)
ALBUMIN/GLOB SERPL: 1.7 {RATIO} (ref 1.1–2.2)
ALP SERPL-CCNC: 55 U/L (ref 40–129)
ALT SERPL-CCNC: 12 U/L (ref 10–40)
ANION GAP SERPL CALCULATED.3IONS-SCNC: 10 MMOL/L (ref 3–16)
AST SERPL-CCNC: 15 U/L (ref 15–37)
BACTERIA URNS QL MICRO: ABNORMAL /HPF
BASOPHILS # BLD: 0.1 K/UL (ref 0–0.2)
BASOPHILS NFR BLD: 0.4 %
BILIRUB SERPL-MCNC: 0.4 MG/DL (ref 0–1)
BILIRUB UR QL STRIP.AUTO: NEGATIVE
BUN SERPL-MCNC: 66 MG/DL (ref 7–20)
CALCIUM SERPL-MCNC: 9.8 MG/DL (ref 8.3–10.6)
CHLORIDE SERPL-SCNC: 104 MMOL/L (ref 99–110)
CLARITY UR: ABNORMAL
CO2 SERPL-SCNC: 19 MMOL/L (ref 21–32)
COLOR UR: ABNORMAL
CREAT SERPL-MCNC: 4.7 MG/DL (ref 0.8–1.3)
DEPRECATED RDW RBC AUTO: 15.5 % (ref 12.4–15.4)
EOSINOPHIL # BLD: 0 K/UL (ref 0–0.6)
EOSINOPHIL NFR BLD: 0.2 %
EPI CELLS #/AREA URNS AUTO: 2 /HPF (ref 0–5)
GFR SERPLBLD CREATININE-BSD FMLA CKD-EPI: 12 ML/MIN/{1.73_M2}
GLUCOSE SERPL-MCNC: 102 MG/DL (ref 70–99)
GLUCOSE UR STRIP.AUTO-MCNC: NEGATIVE MG/DL
HCT VFR BLD AUTO: 28.7 % (ref 40.5–52.5)
HGB BLD-MCNC: 9.5 G/DL (ref 13.5–17.5)
HGB UR QL STRIP.AUTO: ABNORMAL
HYALINE CASTS #/AREA URNS AUTO: 7 /LPF (ref 0–8)
KETONES UR STRIP.AUTO-MCNC: NEGATIVE MG/DL
LACTATE BLDV-SCNC: 1.2 MMOL/L (ref 0.4–1.9)
LACTATE BLDV-SCNC: 1.3 MMOL/L (ref 0.4–1.9)
LEUKOCYTE ESTERASE UR QL STRIP.AUTO: ABNORMAL
LYMPHOCYTES # BLD: 1.4 K/UL (ref 1–5.1)
LYMPHOCYTES NFR BLD: 9.2 %
MCH RBC QN AUTO: 30.7 PG (ref 26–34)
MCHC RBC AUTO-ENTMCNC: 33.2 G/DL (ref 31–36)
MCV RBC AUTO: 92.5 FL (ref 80–100)
MONOCYTES # BLD: 1.3 K/UL (ref 0–1.3)
MONOCYTES NFR BLD: 8.4 %
NEUTROPHILS # BLD: 12.6 K/UL (ref 1.7–7.7)
NEUTROPHILS NFR BLD: 81.8 %
NITRITE UR QL STRIP.AUTO: NEGATIVE
PH UR STRIP.AUTO: 6.5 [PH] (ref 5–8)
PLATELET # BLD AUTO: 160 K/UL (ref 135–450)
PMV BLD AUTO: 10.9 FL (ref 5–10.5)
POTASSIUM SERPL-SCNC: 4.7 MMOL/L (ref 3.5–5.1)
PROT SERPL-MCNC: 6.4 G/DL (ref 6.4–8.2)
PROT UR STRIP.AUTO-MCNC: 300 MG/DL
RBC # BLD AUTO: 3.11 M/UL (ref 4.2–5.9)
RBC CLUMPS #/AREA URNS AUTO: 774 /HPF (ref 0–4)
SODIUM SERPL-SCNC: 133 MMOL/L (ref 136–145)
SP GR UR STRIP.AUTO: 1.01 (ref 1–1.03)
UA DIPSTICK W REFLEX MICRO PNL UR: YES
URN SPEC COLLECT METH UR: ABNORMAL
UROBILINOGEN UR STRIP-ACNC: 0.2 E.U./DL
WBC # BLD AUTO: 15.4 K/UL (ref 4–11)
WBC #/AREA URNS AUTO: 65 /HPF (ref 0–5)

## 2023-08-21 PROCEDURE — 81001 URINALYSIS AUTO W/SCOPE: CPT

## 2023-08-21 PROCEDURE — 72125 CT NECK SPINE W/O DYE: CPT

## 2023-08-21 PROCEDURE — 85025 COMPLETE CBC W/AUTO DIFF WBC: CPT

## 2023-08-21 PROCEDURE — 80053 COMPREHEN METABOLIC PANEL: CPT

## 2023-08-21 PROCEDURE — 2580000003 HC RX 258: Performed by: INTERNAL MEDICINE

## 2023-08-21 PROCEDURE — 2580000003 HC RX 258: Performed by: PHYSICIAN ASSISTANT

## 2023-08-21 PROCEDURE — 36415 COLL VENOUS BLD VENIPUNCTURE: CPT

## 2023-08-21 PROCEDURE — 87040 BLOOD CULTURE FOR BACTERIA: CPT

## 2023-08-21 PROCEDURE — 70450 CT HEAD/BRAIN W/O DYE: CPT

## 2023-08-21 PROCEDURE — 71250 CT THORAX DX C-: CPT

## 2023-08-21 PROCEDURE — 96365 THER/PROPH/DIAG IV INF INIT: CPT

## 2023-08-21 PROCEDURE — 83605 ASSAY OF LACTIC ACID: CPT

## 2023-08-21 PROCEDURE — 99285 EMERGENCY DEPT VISIT HI MDM: CPT

## 2023-08-21 PROCEDURE — 6360000002 HC RX W HCPCS: Performed by: PHYSICIAN ASSISTANT

## 2023-08-21 PROCEDURE — 96361 HYDRATE IV INFUSION ADD-ON: CPT

## 2023-08-21 PROCEDURE — 87086 URINE CULTURE/COLONY COUNT: CPT

## 2023-08-21 PROCEDURE — 1200000000 HC SEMI PRIVATE

## 2023-08-21 RX ORDER — SODIUM CHLORIDE 9 MG/ML
INJECTION, SOLUTION INTRAVENOUS CONTINUOUS
Status: DISCONTINUED | OUTPATIENT
Start: 2023-08-21 | End: 2023-08-22

## 2023-08-21 RX ORDER — SODIUM CHLORIDE 0.9 % (FLUSH) 0.9 %
5-40 SYRINGE (ML) INJECTION EVERY 12 HOURS SCHEDULED
Status: DISCONTINUED | OUTPATIENT
Start: 2023-08-21 | End: 2023-08-26 | Stop reason: HOSPADM

## 2023-08-21 RX ORDER — PREDNISONE 5 MG/1
5 TABLET ORAL DAILY
Status: DISCONTINUED | OUTPATIENT
Start: 2023-08-22 | End: 2023-08-26 | Stop reason: HOSPADM

## 2023-08-21 RX ORDER — ONDANSETRON 2 MG/ML
4 INJECTION INTRAMUSCULAR; INTRAVENOUS EVERY 6 HOURS PRN
Status: DISCONTINUED | OUTPATIENT
Start: 2023-08-21 | End: 2023-08-26 | Stop reason: HOSPADM

## 2023-08-21 RX ORDER — 0.9 % SODIUM CHLORIDE 0.9 %
1000 INTRAVENOUS SOLUTION INTRAVENOUS ONCE
Status: COMPLETED | OUTPATIENT
Start: 2023-08-21 | End: 2023-08-21

## 2023-08-21 RX ORDER — ACETAMINOPHEN 325 MG/1
650 TABLET ORAL EVERY 6 HOURS PRN
Status: DISCONTINUED | OUTPATIENT
Start: 2023-08-21 | End: 2023-08-26 | Stop reason: HOSPADM

## 2023-08-21 RX ORDER — ACETAMINOPHEN 650 MG/1
650 SUPPOSITORY RECTAL EVERY 6 HOURS PRN
Status: DISCONTINUED | OUTPATIENT
Start: 2023-08-21 | End: 2023-08-26 | Stop reason: HOSPADM

## 2023-08-21 RX ORDER — SODIUM CHLORIDE 9 MG/ML
INJECTION, SOLUTION INTRAVENOUS PRN
Status: DISCONTINUED | OUTPATIENT
Start: 2023-08-21 | End: 2023-08-26 | Stop reason: HOSPADM

## 2023-08-21 RX ORDER — ONDANSETRON 4 MG/1
4 TABLET, ORALLY DISINTEGRATING ORAL EVERY 8 HOURS PRN
Status: DISCONTINUED | OUTPATIENT
Start: 2023-08-21 | End: 2023-08-26 | Stop reason: HOSPADM

## 2023-08-21 RX ORDER — CLINDAMYCIN PHOSPHATE 10 UG/ML
1 LOTION TOPICAL 2 TIMES DAILY PRN
COMMUNITY

## 2023-08-21 RX ORDER — SODIUM CHLORIDE 0.9 % (FLUSH) 0.9 %
5-40 SYRINGE (ML) INJECTION PRN
Status: DISCONTINUED | OUTPATIENT
Start: 2023-08-21 | End: 2023-08-26 | Stop reason: HOSPADM

## 2023-08-21 RX ORDER — POLYETHYLENE GLYCOL 3350 17 G/17G
17 POWDER, FOR SOLUTION ORAL DAILY PRN
Status: DISCONTINUED | OUTPATIENT
Start: 2023-08-21 | End: 2023-08-26 | Stop reason: HOSPADM

## 2023-08-21 RX ADMIN — SODIUM CHLORIDE, PRESERVATIVE FREE 10 ML: 5 INJECTION INTRAVENOUS at 22:56

## 2023-08-21 RX ADMIN — SODIUM CHLORIDE: 9 INJECTION, SOLUTION INTRAVENOUS at 22:55

## 2023-08-21 RX ADMIN — SODIUM CHLORIDE 1000 ML: 9 INJECTION, SOLUTION INTRAVENOUS at 18:56

## 2023-08-21 RX ADMIN — CEFTRIAXONE 1000 MG: 1 INJECTION, POWDER, FOR SOLUTION INTRAMUSCULAR; INTRAVENOUS at 20:09

## 2023-08-21 ASSESSMENT — PAIN SCALES - GENERAL
PAINLEVEL_OUTOF10: 9
PAINLEVEL_OUTOF10: 0

## 2023-08-21 ASSESSMENT — PAIN DESCRIPTION - LOCATION: LOCATION: BACK

## 2023-08-21 ASSESSMENT — PAIN DESCRIPTION - ORIENTATION: ORIENTATION: LEFT;LOWER

## 2023-08-21 ASSESSMENT — PAIN - FUNCTIONAL ASSESSMENT: PAIN_FUNCTIONAL_ASSESSMENT: 0-10

## 2023-08-21 NOTE — ED PROVIDER NOTES
[address]        Pt Name: Ana María Finley  MRN: [id number]  [address] [facility] [place] 1950  Date of evaluation: 8/21/2023  Provider: Yvrose Feliciano PA-C  PCP: Raffy Wang MD  Note Started: 6:55 PM EDT 8/21/23      NESHA. I have evaluated this patient. CHIEF COMPLAINT       Chief Complaint   Patient presents with    Abnormal Lab     Had labs drawn today at [facility], sent for elevated BUN and creatinine. Pt also had a fall at home last Sunday, bruising and pain to lower back and left side. HISTORY OF PRESENT ILLNESS: 1 or more Elements             Ana María Finley is a 75 y.o. male who presents to the emergency department with complaint of abnormal lab value while he went to [facility] today. He has metastatic prostate cancer. They noticed that his kidney function was little bit worse and he is peeing blood. He says that about a week ago he actually fell while going to the bathroom. He does not remember how it happened. States he was in the middle the night and he did hit his head. He does complain of low back pain that is progressively worsening since it started about a week ago. He has been taking over-the-counter medication for this. he denies any fevers or chills, blurred vision, lightheadedness, dizziness, chest pain, shortness of breath. His wife states that he has not eaten over the past couple of days. Nursing Notes were all reviewed and agreed with or any disagreements were addressed in the HPI. REVIEW OF SYSTEMS :      Review of Systems   All other systems reviewed and are negative. Positives and Pertinent negatives as per HPI.      SURGICAL HISTORY     Past Surgical History:   Procedure Laterality Date    COLONOSCOPY      CORONARY ARTERY BYPASS GRAFT  01/17/2018    cabgx4 Billy Frausto    IR PORT PLACEMENT EQUAL OR GREATER THAN 5 YEARS Right 01/03/2023    Power Port; RIJ access; 24cm; Dr. Mike LEAL Annia Bank 5 YEARS Bibasilar atelectasis. No acute process. CT ABDOMEN AND PELVIS      Organs Liver, spleen, adrenals are unremarkable in appearance. No   hydronephrosis. Small hypodensity in the posterior segment the right lobe of   the liver medially. This is unchanged. GI No small bowel dilation. No colonic wall thickening. Stomach is   unremarkable. Aorta No aneurysm. Atherosclerotic changes. Peritoneum/retroperitoneum No free fluid. No free gas. No enlarged   lymphadenopathy. Other calcifications in the prostate. Phleboliths in the pelvis. The right   testicle is in the inguinal canal.      Bones spondylosis no acute fracture. IMPRESSION:   No acute process in the abdomen or pelvis. No results found. No results found. PROCEDURES   Unless otherwise noted below, none     Procedures    CRITICAL CARE TIME (.cctime)   CRITICAL CARE NOTE:    LANA Gramajo am the primary clinician of record. There was a high probability of clinically significant life-threatening deterioration of the patient's condition requiring my urgent intervention. Total critical care time was at least 31 minutes. Of nonconcurrent critical care time. This includes vital sign monitoring, pulse oximetry monitoring, telemetry monitoring, clinical response to the IV medications, reviewing the nursing notes, consultation time, dictation/documentation time, and interpretation of the labwork. This excludes any separately billable procedures performed. The critical care time above also includes time spent obtaining history from wife, as the patient was unable to provide the history AND the history obtained was directly relevant to the care of the patient. PAST MEDICAL HISTORY      has a past medical history of CAD (coronary artery disease) (01/17/2018), Cancer of prostate ([address]), Chest pain (01/17/2018), CHF (congestive heart failure) ([address]), and Unstable angina pectoris ([address]).      Chronic Vitals upon arrival show tachycardia, mild hypotension--BP improved. Physical Exam shows as above. Blood work was done and shows:   -leukocytosis 15k  Urinalysis: +uti  EKG: interpreted by my attending, please see note     Imaging was reviewed and interpreted by radiologist as above showing:   -no acute findings  Imaging was independently reviewed by myself. Consults as above. Ddx includes: Cystitis, mental status change, generalized weakness, intracranial hemorrhage, cervical spine fracture, pneumonia, other    Management Given:  -NS IV, Rocephin, symptoms unchanged    Disposition: Admit  Patient was admitted in stable condition to hospitalist team.     All questions were answered. Disposition Considerations (include 1 Tests not done, Shared Decision Making, Pt Expectation of Test or Tx.): Unfortunately with patient's ERIBERTO cannot do a CAT scan with IV contrast to further evaluate for any internal bleeding after his fall about a week ago. I think his back pain is likely contributed to his cystitis as it is low back pain not relieved CVA tenderness. We will treat him with antibiotics here and he will be admitted to the hospital for sepsis, ERIBERTO, cystitis. I am the Primary Clinician of Record. FINAL IMPRESSION      1. ERIBERTO (acute kidney injury) ([address])    2. Acute cystitis with hematuria    3. Decreased appetite    4. Septicemia [facility])          DISPOSITION/PLAN     DISPOSITION Admitted 08/21/2023 08:27:51 PM      PATIENT REFERRED TO:  No follow-up provider specified.     DISCHARGE MEDICATIONS:  Current Discharge Medication List          DISCONTINUED MEDICATIONS:  Current Discharge Medication List        STOP taking these medications       ZYTIGA 500 MG TABS Comments:   Reason for Stopping:                      (Please note that portions of this note were completed with a voice recognition program.  Efforts were made to edit the dictations but occasionally words are mis-transcribed.)    Rickie Courtney

## 2023-08-21 NOTE — ED TRIAGE NOTES
Arrives to ER with wife with port already accessed, came from [facility] office. Pt received 1 liter NS in office today.

## 2023-08-22 ENCOUNTER — APPOINTMENT (OUTPATIENT)
Dept: ULTRASOUND IMAGING | Age: 73
DRG: 683 | End: 2023-08-22
Payer: COMMERCIAL

## 2023-08-22 LAB
ANION GAP SERPL CALCULATED.3IONS-SCNC: 9 MMOL/L (ref 3–16)
BASOPHILS # BLD: 0.1 K/UL (ref 0–0.2)
BASOPHILS NFR BLD: 1 %
BUN SERPL-MCNC: 53 MG/DL (ref 7–20)
C3 SERPL-MCNC: 104.7 MG/DL (ref 90–180)
C4 SERPL-MCNC: 29.9 MG/DL (ref 10–40)
CALCIUM SERPL-MCNC: 8.8 MG/DL (ref 8.3–10.6)
CHLORIDE SERPL-SCNC: 111 MMOL/L (ref 99–110)
CO2 SERPL-SCNC: 19 MMOL/L (ref 21–32)
CREAT SERPL-MCNC: 3.7 MG/DL (ref 0.8–1.3)
CREAT UR-MCNC: 68.9 MG/DL (ref 39–259)
DEPRECATED RDW RBC AUTO: 15.5 % (ref 12.4–15.4)
EOSINOPHIL # BLD: 0 K/UL (ref 0–0.6)
EOSINOPHIL NFR BLD: 0.3 %
GFR SERPLBLD CREATININE-BSD FMLA CKD-EPI: 16 ML/MIN/{1.73_M2}
GLUCOSE SERPL-MCNC: 92 MG/DL (ref 70–99)
HCT VFR BLD AUTO: 23.8 % (ref 40.5–52.5)
HCT VFR BLD AUTO: 23.9 % (ref 40.5–52.5)
HCT VFR BLD AUTO: 25.7 % (ref 40.5–52.5)
HGB BLD-MCNC: 8 G/DL (ref 13.5–17.5)
HGB BLD-MCNC: 8.1 G/DL (ref 13.5–17.5)
HGB BLD-MCNC: 8.6 G/DL (ref 13.5–17.5)
LYMPHOCYTES # BLD: 1.3 K/UL (ref 1–5.1)
LYMPHOCYTES NFR BLD: 11.7 %
MCH RBC QN AUTO: 31.1 PG (ref 26–34)
MCHC RBC AUTO-ENTMCNC: 33.5 G/DL (ref 31–36)
MCV RBC AUTO: 92.9 FL (ref 80–100)
MONOCYTES # BLD: 0.8 K/UL (ref 0–1.3)
MONOCYTES NFR BLD: 7.4 %
NEUTROPHILS # BLD: 8.6 K/UL (ref 1.7–7.7)
NEUTROPHILS NFR BLD: 79.6 %
PHOSPHATE SERPL-MCNC: 3.6 MG/DL (ref 2.5–4.9)
PLATELET # BLD AUTO: 135 K/UL (ref 135–450)
PMV BLD AUTO: 10.2 FL (ref 5–10.5)
POTASSIUM SERPL-SCNC: 5.1 MMOL/L (ref 3.5–5.1)
RBC # BLD AUTO: 2.76 M/UL (ref 4.2–5.9)
SODIUM SERPL-SCNC: 139 MMOL/L (ref 136–145)
SODIUM UR-SCNC: 97 MMOL/L
UUN UR-MCNC: 431.6 MG/DL (ref 800–1666)
WBC # BLD AUTO: 10.8 K/UL (ref 4–11)

## 2023-08-22 PROCEDURE — 84100 ASSAY OF PHOSPHORUS: CPT

## 2023-08-22 PROCEDURE — 6370000000 HC RX 637 (ALT 250 FOR IP): Performed by: INTERNAL MEDICINE

## 2023-08-22 PROCEDURE — 6360000002 HC RX W HCPCS: Performed by: NURSE PRACTITIONER

## 2023-08-22 PROCEDURE — 2500000003 HC RX 250 WO HCPCS: Performed by: INTERNAL MEDICINE

## 2023-08-22 PROCEDURE — 87390 HIV-1 AG IA: CPT

## 2023-08-22 PROCEDURE — 84540 ASSAY OF URINE/UREA-N: CPT

## 2023-08-22 PROCEDURE — 6360000002 HC RX W HCPCS: Performed by: INTERNAL MEDICINE

## 2023-08-22 PROCEDURE — 86702 HIV-2 ANTIBODY: CPT

## 2023-08-22 PROCEDURE — 97161 PT EVAL LOW COMPLEX 20 MIN: CPT

## 2023-08-22 PROCEDURE — 2580000003 HC RX 258: Performed by: INTERNAL MEDICINE

## 2023-08-22 PROCEDURE — 6370000000 HC RX 637 (ALT 250 FOR IP): Performed by: NURSE PRACTITIONER

## 2023-08-22 PROCEDURE — 85014 HEMATOCRIT: CPT

## 2023-08-22 PROCEDURE — 86701 HIV-1ANTIBODY: CPT

## 2023-08-22 PROCEDURE — 2580000003 HC RX 258: Performed by: NURSE PRACTITIONER

## 2023-08-22 PROCEDURE — 85018 HEMOGLOBIN: CPT

## 2023-08-22 PROCEDURE — 80074 ACUTE HEPATITIS PANEL: CPT

## 2023-08-22 PROCEDURE — 94760 N-INVAS EAR/PLS OXIMETRY 1: CPT

## 2023-08-22 PROCEDURE — 84155 ASSAY OF PROTEIN SERUM: CPT

## 2023-08-22 PROCEDURE — 1200000000 HC SEMI PRIVATE

## 2023-08-22 PROCEDURE — 97530 THERAPEUTIC ACTIVITIES: CPT

## 2023-08-22 PROCEDURE — 97165 OT EVAL LOW COMPLEX 30 MIN: CPT

## 2023-08-22 PROCEDURE — 76770 US EXAM ABDO BACK WALL COMP: CPT

## 2023-08-22 PROCEDURE — 84300 ASSAY OF URINE SODIUM: CPT

## 2023-08-22 PROCEDURE — 85025 COMPLETE CBC W/AUTO DIFF WBC: CPT

## 2023-08-22 PROCEDURE — 82570 ASSAY OF URINE CREATININE: CPT

## 2023-08-22 PROCEDURE — 86038 ANTINUCLEAR ANTIBODIES: CPT

## 2023-08-22 PROCEDURE — 80048 BASIC METABOLIC PNL TOTAL CA: CPT

## 2023-08-22 PROCEDURE — 86160 COMPLEMENT ANTIGEN: CPT

## 2023-08-22 PROCEDURE — 84165 PROTEIN E-PHORESIS SERUM: CPT

## 2023-08-22 PROCEDURE — 83516 IMMUNOASSAY NONANTIBODY: CPT

## 2023-08-22 RX ORDER — HYDROCODONE BITARTRATE AND ACETAMINOPHEN 5; 325 MG/1; MG/1
1 TABLET ORAL EVERY 6 HOURS PRN
Status: DISCONTINUED | OUTPATIENT
Start: 2023-08-22 | End: 2023-08-26 | Stop reason: HOSPADM

## 2023-08-22 RX ORDER — 0.9 % SODIUM CHLORIDE 0.9 %
1000 INTRAVENOUS SOLUTION INTRAVENOUS ONCE
Status: COMPLETED | OUTPATIENT
Start: 2023-08-22 | End: 2023-08-22

## 2023-08-22 RX ADMIN — SODIUM BICARBONATE: 84 INJECTION, SOLUTION INTRAVENOUS at 22:30

## 2023-08-22 RX ADMIN — ACETAMINOPHEN 650 MG: 325 TABLET ORAL at 13:56

## 2023-08-22 RX ADMIN — SODIUM CHLORIDE, PRESERVATIVE FREE 10 ML: 5 INJECTION INTRAVENOUS at 08:03

## 2023-08-22 RX ADMIN — SODIUM CHLORIDE: 9 INJECTION, SOLUTION INTRAVENOUS at 06:20

## 2023-08-22 RX ADMIN — HYDROCODONE BITARTRATE AND ACETAMINOPHEN 1 TABLET: 5; 325 TABLET ORAL at 16:16

## 2023-08-22 RX ADMIN — SODIUM BICARBONATE: 84 INJECTION, SOLUTION INTRAVENOUS at 13:32

## 2023-08-22 RX ADMIN — SODIUM CHLORIDE 1000 ML: 9 INJECTION, SOLUTION INTRAVENOUS at 11:05

## 2023-08-22 RX ADMIN — PREDNISONE 5 MG: 5 TABLET ORAL at 08:02

## 2023-08-22 RX ADMIN — ONDANSETRON 4 MG: 2 INJECTION INTRAMUSCULAR; INTRAVENOUS at 14:08

## 2023-08-22 RX ADMIN — CEFTRIAXONE 1000 MG: 1 INJECTION, POWDER, FOR SOLUTION INTRAMUSCULAR; INTRAVENOUS at 11:56

## 2023-08-22 ASSESSMENT — PAIN DESCRIPTION - LOCATION
LOCATION: FLANK
LOCATION: BACK;FLANK

## 2023-08-22 ASSESSMENT — PAIN SCALES - GENERAL
PAINLEVEL_OUTOF10: 8
PAINLEVEL_OUTOF10: 8

## 2023-08-22 ASSESSMENT — PAIN DESCRIPTION - ORIENTATION
ORIENTATION: RIGHT
ORIENTATION: RIGHT

## 2023-08-22 ASSESSMENT — PAIN DESCRIPTION - DESCRIPTORS
DESCRIPTORS: ACHING
DESCRIPTORS: ACHING

## 2023-08-22 NOTE — PLAN OF CARE
Problem: Discharge Planning  Goal: Discharge to home or other facility with appropriate resources  8/22/2023 0484 by Kenya Kelly RN  Flowsheets (Taken 8/22/2023 7060)  Discharge to home or other facility with appropriate resources:   Identify barriers to discharge with patient and caregiver   Arrange for needed discharge resources and transportation as appropriate     Problem: Pain  Goal: Verbalizes/displays adequate comfort level or baseline comfort level  8/22/2023 0922 by Kenya Kelly RN  Flowsheets (Taken 8/22/2023 0590)  Verbalizes/displays adequate comfort level or baseline comfort level:   Encourage patient to monitor pain and request assistance   Assess pain using appropriate pain scale     Problem: Safety - Adult  Goal: Free from fall injury  Flowsheets (Taken 8/22/2023 3188)  Free From Fall Injury: Instruct family/caregiver on patient safety

## 2023-08-22 NOTE — CONSULTS
Consulting Physician: Lianna Tenorio MD    Reason for Consult: Gross hematuria    History of Present Illness: Karyn Bowman is a 75 y.o. male CAD, CHF. Urologic history includes CRPC with bone mets diagnosed in 11/2018 Neha 9, 9/12 cores, PSA 60, +NMBS, follows with [facility] for chemotherapy management, on Orgovyx and Jevtana per patient. Presented to ER 8/21/23  w/ CC abnormal renal function labs from [facility] and urinating blood, had a fall last week. CTCAP 8/21/23 was negative for an acute  abnormality. On admission, Hgb 9.5, creatinine 4.7, WBC 15.4, afebrile. UA reveals likely infection, urine culture pending. Patient reports he has been having gross hematuria for \"a while\" probably since starting chemo but never followed up w/ provider for this. The gross hematuria has ranged from light pink in color to thick and bright red. He also endorses urinary frequency and nocturia, worse in the past few weeks. The bleeding seems to improve when he increases his fluid intake. He denies episodes of [address] prior to chemo. Was on aspririn for CAD PTA, currently being held. Hematuria has improved, now light pink per patient.     Past Medical History:   Past Medical History:   Diagnosis Date    CAD (coronary artery disease) 01/17/2018    NSTEMI - CABG x4 1/17/18    Cancer of prostate ([address])     Chest pain 01/17/2018    CHF (congestive heart failure) ([address])     Unstable angina pectoris [facility])        Past Surgical History:  Past Surgical History:   Procedure Laterality Date    COLONOSCOPY      CORONARY ARTERY BYPASS GRAFT  01/17/2018    cabgx4 ([place])    IR PORT PLACEMENT EQUAL OR GREATER THAN 5 YEARS Right 01/03/2023    Power Port; RIJ access; 24cm; Dr. Shalonda Ashraf    IR Michelle Almanza 5 YEARS  01/03/2023    IR PORT PLACEMENT EQUAL OR GREATER THAN 5 YEARS 1/3/2023 [facility] SPECIAL PROCEDURES    SKIN CANCER EXCISION         Social History:  Social History     Socioeconomic History    Marital status:

## 2023-08-22 NOTE — CONSULTS
[facility]    Consult Note      Requesting Physician: Neelima Moore     CHIEF COMPLAINT:  Prostate cancer       HISTORY OF PRESENT ILLNESS:    Mr. Yudith Bailey  is a 75 y.o. male we are seeing in consultation for prostate cancer. Patient well known to our practice follows with . Oncologic history; Stage IV prostate cancer. He was initially diagnosed in November 2018 and had metastatic disease to the bone at the time of diagnosis with a PSA of 60. A prostate biopsy revealed a Neha 9 prostate cancer in 9 out of 12 cores. He was started on [place] and then progressed in December 2022. Previous therapies; Taxotere every 3 weeks. He will also get Zometa every 6 weeks. Progression 4/2023. Current therapy; Marvine Canard     Patient was in our office yesterday with hematuria X 2 weeks. Little to no appetite did sustain a fall 1 week ago with significant bruising to the left lower back. CMP revealed significant ERIBERTO with a creatinine of 4.7 he was sent the ED to be admitted for further eval and management. Patient is feeling better today. CR starting to improve with fluids. Hypotensive, but asymptomatic. ICD-10-CM    1. ERIBERTO (acute kidney injury) ([address])  N17.9       2. Acute cystitis with hematuria  N30.01       3. Decreased appetite  R63.0       4. Septicemia ([address])  A41.9       5.  Prostate cancer ([address])  C61              Past Medical History:  Past Medical History:   Diagnosis Date    CAD (coronary artery disease) 01/17/2018    NSTEMI - CABG x4 1/17/18    Cancer of prostate ([address])     Chest pain 01/17/2018    CHF (congestive heart failure) ([address])     Unstable angina pectoris [facility])        Past Surgical History:  Past Surgical History:   Procedure Laterality Date    COLONOSCOPY      CORONARY ARTERY BYPASS GRAFT  01/17/2018    cabgx4 [facility])    IR PORT

## 2023-08-22 NOTE — PROGRESS NOTES
Occupational Therapy  Facility/Department: [facility]  Occupational Therapy Initial Assessment    Name: Alva Hodgkins  : 1950  MRN: [id number]  Date of Service: 2023    Discharge Recommendations:  Home with assist PRN     Alva Hodgkins scored a 24/24 on the AM-PAC ADL Inpatient form. At this time, no further OT is recommended upon discharge due to pt functioning at baseline and wife to assist at home. Recommend patient returns to prior setting with prior services. Patient Diagnosis(es): The primary encounter diagnosis was ERIBERTO (acute kidney injury) ([address]). Diagnoses of Acute cystitis with hematuria, Decreased appetite, Septicemia ([address]), and Prostate cancer ([address]) were also pertinent to this visit. Past Medical History:  has a past medical history of CAD (coronary artery disease), Cancer of prostate ([address]), Chest pain, CHF (congestive heart failure) ([address]), and Unstable angina pectoris ([address] Past Surgical History:  has a past surgical history that includes Coronary artery bypass graft (2018); Skin cancer excision; IR PORT PLACEMENT > 5 YEARS (Right, 2023); IR PORT PLACEMENT > 5 YEARS (2023); and Colonoscopy. Assessment   Assessment: Per H&P note on 23, \"The patient is a 75 y.o. male who was referred to ED for abnormal labs and hematuria. Pt c/o lower back pain after a fall about a week ago. Pt states that he tripped and fell in bathroom. Reports he developed a bruit to rt lower back. States he is been having hematuria for about 1 week. He also c/o feeling lightheadedness over the past 2 days. States he is currently on chemotherapy for prostate cancer, due for tomorrow. Denied cough, chest pain, fever, chills, nausea, vomiting, headache, abdominal pain, diarrhea, or dysuria. \" PTA, pt living with spouse at home and independent with ADLs and fxl mobility no device.  This date, pt functioning at baseline Independent/mod I bed mobility, fxl tx and fxl mobility 300 ft without device managing own IV pole. Pt independent toileting to void in stance at toilet. Anticipate pt to be independent with all ADLs due to strenth, balance and endurance observed this session. No complaints of dizziness or any LOB throughout mobility. No acute OT goals identified at this time. Anticipate pt safe to return home with wife in order to maximize pt's safety, function, and independence. Prognosis: Good  Decision Making: Low Complexity  History: see above  REQUIRES OT FOLLOW-UP: No  Activity Tolerance  Activity Tolerance: Patient Tolerated treatment well        Restrictions  Restrictions/Precautions  Restrictions/Precautions: Up Ad Michelle    Subjective   General  Chart Reviewed: Yes  Patient assessed for rehabilitation services?: Yes  Additional Pertinent Hx: Per H&P note on 8/21/23, \"The patient is a 75 y.o. male who was referred to ED for abnormal labs and hematuria. Pt c/o lower back pain after a fall about a week ago. Pt states that he tripped and fell in bathroom. Reports he developed a bruit to rt lower back. States he is been having hematuria for about 1 week. He also c/o feeling lightheadedness over the past 2 days. States he is currently on chemotherapy for prostate cancer, due for tomorrow. Denied cough, chest pain, fever, chills, nausea, vomiting, headache, abdominal pain, diarrhea, or dysuria. \"  Family / Caregiver Present: Yes (wife)  Referring Practitioner: Caleb Avendano MD  Diagnosis: ERIBERTO  Subjective  Subjective: Pt met bedside upon therapist arrival. agreeable to OT eval/tx. Denies any pain.      Social/Functional History  Social/Functional History  Lives With: Spouse (+dog)  Type of Home: House  Home Layout: Two level  Home Access: Stairs to enter with rails, Stairs to enter without rails  Entrance Stairs - Number of Steps: 2  Bathroom Shower/Tub: Walk-in shower  Has the patient had two or more falls in the past year or any fall with injury in the past year?: Yes (this

## 2023-08-22 NOTE — ED NOTES
Report called to [facility] on 4N for room 4250. Transport placed.      Janel Tran RN  08/21/23 [id number]

## 2023-08-22 NOTE — PLAN OF CARE
Problem: Discharge Planning  Goal: Discharge to home or other facility with appropriate resources  Outcome: Progressing  Flowsheets (Taken 8/21/2023 2232)  Discharge to home or other facility with appropriate resources:   Identify barriers to discharge with patient and caregiver   Identify discharge learning needs (meds, wound care, etc)   Arrange for needed discharge resources and transportation as appropriate     Problem: PainMedication per Mar  Goal: Verbalizes/displays adequate comfort level or baseline comfort level  Outcome: Progressing

## 2023-08-22 NOTE — PROGRESS NOTES
Pt back from ultrasound. Pt with nausea and pain to right lower flank. Prn medication given. ROSA Moore notified.  Electronically signed by Cruz Long RN on 8/22/2023 at 2:46 PM

## 2023-08-22 NOTE — PROGRESS NOTES
Physical Therapy  Facility/Department: [address] MED SURG  Physical Therapy Initial Assessment and Discharge Summary    Name: Zo Farias  : 1950  MRN: [id number]  Date of Service: 2023    Discharge Recommendations:  Home with assist PRN, No therapy recommended at discharge     Zo Farias scored a 24/24 on the AM-PAC short mobility form. At this time, no further PT is recommended upon discharge due to indepedence. Recommend patient returns to prior setting with prior services. Patient Diagnosis(es): The primary encounter diagnosis was ERIBERTO (acute kidney injury) ([address]). Diagnoses of Acute cystitis with hematuria, Decreased appetite, Septicemia ([address]), and Prostate cancer ([address]) were also pertinent to this visit. Past Medical History:  has a past medical history of CAD (coronary artery disease), Cancer of prostate ([address]), Chest pain, CHF (congestive heart failure) ([address]), and Unstable angina pectoris ([address]). Past Surgical History:  has a past surgical history that includes Coronary artery bypass graft (2018); Skin cancer excision; IR PORT PLACEMENT > 5 YEARS (Right, 2023); IR PORT PLACEMENT > 5 YEARS (2023); and Colonoscopy. Assessment   Assessment: Pt is a 76 yo male admitted 23 s/p fall at home and acute kidney injury. PLOF is Indep for all functional mobility and amb community distance w/o an AD. Currently pt demonstrates [place] with bed mobility, transfers and ambulation community distance and ability to manage IV pole. No skilled PT services are needed at this time due to independence and anticipate safe discharge home w/o need for further therapy services.   Therapy Prognosis: Good  Decision Making: Low Complexity  History: see above  Exam: see below  Clinical Presentation: stable  Barriers to Learning: none  No Skilled PT: Independent with functional mobility   Requires PT Follow-Up: No  Activity Tolerance  Activity Tolerance: Patient tolerated evaluation without incident     Plan   Physcial Therapy Plan  General Plan: Discharge with evaluation only  Safety Devices  Type of Devices: Left in bed, Call light within reach, Nurse notified (spouse in room)     Restrictions  Restrictions/Precautions  Restrictions/Precautions: Up as Tolerated     Subjective   General  Chart Reviewed: Yes  Patient assessed for rehabilitation services?: Yes  Additional Pertinent Hx: PMH prostate cancer, NSTEMI, CABG x 4, bilateral hearing loss  Response To Previous Treatment: Not applicable  Family / Caregiver Present: Yes (spouse)  Referring Practitioner: Dr. Zeenat Long  Referral Date : 08/22/23  Diagnosis: acute kidney injury  Follows Commands: Within Functional Limits  Subjective  Subjective: Pt supine w/ HOB elevated and IV in place. Spouse present. Reports mild-moderate low back pain s/p fall at home. Agreeable to PT/OT evaluation. Social/Functional History  Social/Functional History  Lives With: Spouse (+dog)  Type of Home: House  Home Layout: Two level  Home Access: Stairs to enter with rails, Stairs to enter without rails  Entrance Stairs - Number of Steps: 2  Bathroom Shower/Tub: Walk-in shower  Has the patient had two or more falls in the past year or any fall with injury in the past year?: Yes (this admission)  ADL Assistance: Independent  Homemaking Responsibilities: No (wife completes)  Ambulation Assistance: Independent  Transfer Assistance: Independent  Active : Yes  Occupation: Retired  Type of Occupation:   Leisure & Hobbies: play golf  Vision/Hearing  Vision  Vision: Impaired  Vision Exceptions: Wears glasses for reading  Hearing  Hearing: Within functional limits    Cognition   Orientation  Overall Orientation Status: Within Normal Limits  Cognition  Overall Cognitive Status: WFL     Objective     Gross Assessment  AROM: Within functional limits  Strength:  Within functional limits  Coordination: Within functional limits  Tone: Normal  Sensation: Intact

## 2023-08-22 NOTE — PROGRESS NOTES
Pharmacy Medication Reconciliation Note     List of medications Veena Vazquez is currently taking is complete. Source of information:   1. Conversation with patient and family at bedside  2. EMR    Notes regarding home medications:   1. Patient has not taken any home medications today PTA in the ED -- reports taking all home medications yesterday  2.  Patient prescribed nystatin suspension 5 mL QID x 10 days earlier today -- has not started taking    Patient denies taking any OTC or herbal medications other than those listed    Aly Brambila, Pharmacy Intern  8/21/2023  8:41 PM

## 2023-08-22 NOTE — CONSULTS
non tender, no organomegaly, no bruit appreciated  Extremities: no edema, no cyanosis. Skin: normal texture, normal skin turgor, no rash  Musculoskeletal: normal ROM, no joint swelling, no visible deformity  Neurological: CN intact, no focal motor neurological deficit  Psych: normal affect; O x 3      Allergies:  No Known Allergies     Past Medical History:   Diagnosis Date    CAD (coronary artery disease) 01/17/2018    NSTEMI - CABG x4 1/17/18    Cancer of prostate ([address])     Chest pain 01/17/2018    CHF (congestive heart failure) ([address])     Unstable angina pectoris [facility])      Past Surgical History:   Procedure Laterality Date    COLONOSCOPY      CORONARY ARTERY BYPASS GRAFT  01/17/2018    cabgx4 ([facility])    IR PORT PLACEMENT EQUAL OR GREATER THAN 5 YEARS Right 01/03/2023    Power Port; RIJ access; 24cm; Dr. Denzel Rodriguez    IR Friddie Modest 5 YEARS  01/03/2023    IR PORT PLACEMENT EQUAL OR GREATER THAN 5 YEARS 1/3/2023 [facility] SPECIAL PROCEDURES    SKIN CANCER EXCISION       Family History   Problem Relation Age of Onset    No Known Problems Mother         still living, age 80    Heart Attack Father         noted at an advanced age, silent. lived to age 80.      Social History     Socioeconomic History    Marital status:      Spouse name: Not on file    Number of children: Not on file    Years of education: Not on file    Highest education level: Not on file   Occupational History    Not on file   Tobacco Use    Smoking status: Never    Smokeless tobacco: Never    Tobacco comments:     occasional cigar years ago   Vaping Use    Vaping Use: Never used   Substance and Sexual Activity    Alcohol use: No     Comment: 2-3 beers, infrequent    Drug use: No     Comment: never    Sexual activity: Not on file   Other Topics Concern    Not on file   Social History Narrative    Not on file     Social Determinants of Health     Financial Resource Strain: Low Risk     Difficulty of Paying Living Expenses: Not 08/21/2023 06:59 PM    UROBILINOGEN 0.2 08/21/2023 06:59 PM    BILIRUBINUR Negative 08/21/2023 06:59 PM    BILIRUBINUR N 10/22/2018 04:11 PM    BLOODU LARGE 08/21/2023 06:59 PM    GLUCOSEU Negative 08/21/2023 06:59 PM         IMPRESSION/RECOMMENDATIONS:      Principal Problem:    ERIBERTO (acute kidney injury) ([address])  Resolved Problems:    * No resolved hospital problems. *    ERIBERTO - Agree with holding ACE and continuing IVF but change composition  - Renal U/S pending  - Check urine lytes  - Avoid Hypotension and exposure to Nephrotoxic agents  - No immediate indication for RRT at this point in time  - ERIBERTO likely secondary to Hypotension and continued use of ACE  - Start small dose of Midodrine    2. Acidosis - changed IVF to [facility] gtt    3. Hypotension - should improve with volume resc and off ACE    4. Proteinuria / Hematuria - quantify and check Serologies    5.  Prostate cancer

## 2023-08-22 NOTE — PROGRESS NOTES
4 Eyes Skin Assessment     NAME:  Kandace Flores OF BIRTH:  1950  MEDICAL RECORD NUMBER:  [id number]    The patient is bAdmissioneing assessed for  Admission    I agree that at least one RN has performed a thorough Head to Toe Skin Assessment on the patient. ALL assessment sites listed below have been assessed. Areas assessed by both nurses:    Head, Face, Ears, Shoulders, Back, Chest, Arms, Elbows, Hands, Sacrum. Buttock, Coccyx, Ischium, Legs. Feet and Heels, and Under Medical Devices         Does the Patient have a Wound?  No noted wound(s)       Jaime Prevention initiated by RN: No  Wound Care Orders initiated by RN: No    Pressure Injury (Stage 3,4, Unstageable, DTI, NWPT, and Complex wounds) if present, place Wound referral order by RN under : No    New Ostomies, if present place, Ostomy referral order under : No     Nurse 1 eSignature: Electronically signed by Daisy Hansen RN on 8/21/23 at 11:36 PM EDT    **SHARE this note so that the co-signing nurse can place an eSignature**    Nurse 2 eSignature: Electronically signed by Edmar Woods RN on 8/22/23 at 2:46 AM EDT

## 2023-08-22 NOTE — PROGRESS NOTES
Pt AOX4. BP is now 99/60. Pt voiding blood in urine. Urine is bright red, NP oscar notified. No further needs at this time. Call light is within reach.

## 2023-08-23 LAB
ABO + RH BLD: NORMAL
ALBUMIN SERPL ELPH-MCNC: 2.8 G/DL (ref 3.1–4.9)
ALBUMIN SERPL-MCNC: 3.5 G/DL (ref 3.4–5)
ALPHA1 GLOB SERPL ELPH-MCNC: 0.3 G/DL (ref 0.2–0.4)
ALPHA2 GLOB SERPL ELPH-MCNC: 1 G/DL (ref 0.4–1.1)
ANA SER QL IA: NEGATIVE
ANION GAP SERPL CALCULATED.3IONS-SCNC: 9 MMOL/L (ref 3–16)
B-GLOBULIN SERPL ELPH-MCNC: 0.8 G/DL (ref 0.9–1.6)
BACTERIA UR CULT: NORMAL
BLD GP AB SCN SERPL QL: NORMAL
BLOOD BANK DISPENSE STATUS: NORMAL
BLOOD BANK PRODUCT CODE: NORMAL
BPU ID: NORMAL
BUN SERPL-MCNC: 36 MG/DL (ref 7–20)
CALCIUM SERPL-MCNC: 7.7 MG/DL (ref 8.3–10.6)
CHLORIDE SERPL-SCNC: 107 MMOL/L (ref 99–110)
CO2 SERPL-SCNC: 25 MMOL/L (ref 21–32)
CREAT SERPL-MCNC: 2.9 MG/DL (ref 0.8–1.3)
CREAT UR-MCNC: 27.3 MG/DL (ref 39–259)
DEPRECATED RDW RBC AUTO: 15.7 % (ref 12.4–15.4)
DESCRIPTION BLOOD BANK: NORMAL
GAMMA GLOB SERPL ELPH-MCNC: 0.6 G/DL (ref 0.6–1.8)
GFR SERPLBLD CREATININE-BSD FMLA CKD-EPI: 22 ML/MIN/{1.73_M2}
GLUCOSE SERPL-MCNC: 101 MG/DL (ref 70–99)
HCT VFR BLD AUTO: 22.2 % (ref 40.5–52.5)
HCT VFR BLD AUTO: 22.2 % (ref 40.5–52.5)
HCT VFR BLD AUTO: 28.1 % (ref 40.5–52.5)
HGB BLD-MCNC: 7.3 G/DL (ref 13.5–17.5)
HGB BLD-MCNC: 7.6 G/DL (ref 13.5–17.5)
HGB BLD-MCNC: 9.4 G/DL (ref 13.5–17.5)
HIV 1+2 AB+HIV1 P24 AG SERPL QL IA: NORMAL
HIV 2 AB SERPL QL IA: NORMAL
HIV1 AB SERPL QL IA: NORMAL
HIV1 P24 AG SERPL QL IA: NORMAL
MAGNESIUM SERPL-MCNC: 2 MG/DL (ref 1.8–2.4)
MCH RBC QN AUTO: 31.2 PG (ref 26–34)
MCHC RBC AUTO-ENTMCNC: 34.1 G/DL (ref 31–36)
MCV RBC AUTO: 91.5 FL (ref 80–100)
MICROALBUMIN UR DL<=1MG/L-MCNC: 9.1 MG/DL
MICROALBUMIN/CREAT UR: 333.3 MG/G (ref 0–30)
PHOSPHATE SERPL-MCNC: 2.9 MG/DL (ref 2.5–4.9)
PLATELET # BLD AUTO: 119 K/UL (ref 135–450)
PMV BLD AUTO: 10.4 FL (ref 5–10.5)
POTASSIUM SERPL-SCNC: 4 MMOL/L (ref 3.5–5.1)
PROT SERPL-MCNC: 5.5 G/DL (ref 6.4–8.2)
RBC # BLD AUTO: 2.43 M/UL (ref 4.2–5.9)
SODIUM SERPL-SCNC: 141 MMOL/L (ref 136–145)
SPE/IFE INTERPRETATION: NORMAL
WBC # BLD AUTO: 8.8 K/UL (ref 4–11)

## 2023-08-23 PROCEDURE — 2580000003 HC RX 258: Performed by: INTERNAL MEDICINE

## 2023-08-23 PROCEDURE — 82043 UR ALBUMIN QUANTITATIVE: CPT

## 2023-08-23 PROCEDURE — 82570 ASSAY OF URINE CREATININE: CPT

## 2023-08-23 PROCEDURE — 1200000000 HC SEMI PRIVATE

## 2023-08-23 PROCEDURE — 6370000000 HC RX 637 (ALT 250 FOR IP): Performed by: INTERNAL MEDICINE

## 2023-08-23 PROCEDURE — 86850 RBC ANTIBODY SCREEN: CPT

## 2023-08-23 PROCEDURE — 2580000003 HC RX 258: Performed by: NURSE PRACTITIONER

## 2023-08-23 PROCEDURE — 30233N1 TRANSFUSION OF NONAUTOLOGOUS RED BLOOD CELLS INTO PERIPHERAL VEIN, PERCUTANEOUS APPROACH: ICD-10-PCS | Performed by: INTERNAL MEDICINE

## 2023-08-23 PROCEDURE — 86900 BLOOD TYPING SEROLOGIC ABO: CPT

## 2023-08-23 PROCEDURE — 85018 HEMOGLOBIN: CPT

## 2023-08-23 PROCEDURE — 85027 COMPLETE CBC AUTOMATED: CPT

## 2023-08-23 PROCEDURE — P9016 RBC LEUKOCYTES REDUCED: HCPCS

## 2023-08-23 PROCEDURE — 36430 TRANSFUSION BLD/BLD COMPNT: CPT

## 2023-08-23 PROCEDURE — 36415 COLL VENOUS BLD VENIPUNCTURE: CPT

## 2023-08-23 PROCEDURE — 80069 RENAL FUNCTION PANEL: CPT

## 2023-08-23 PROCEDURE — 85014 HEMATOCRIT: CPT

## 2023-08-23 PROCEDURE — 83735 ASSAY OF MAGNESIUM: CPT

## 2023-08-23 PROCEDURE — 86901 BLOOD TYPING SEROLOGIC RH(D): CPT

## 2023-08-23 PROCEDURE — 6360000002 HC RX W HCPCS: Performed by: NURSE PRACTITIONER

## 2023-08-23 PROCEDURE — 2500000003 HC RX 250 WO HCPCS: Performed by: INTERNAL MEDICINE

## 2023-08-23 PROCEDURE — 94760 N-INVAS EAR/PLS OXIMETRY 1: CPT

## 2023-08-23 PROCEDURE — 86923 COMPATIBILITY TEST ELECTRIC: CPT

## 2023-08-23 RX ORDER — SODIUM CHLORIDE 9 MG/ML
INJECTION, SOLUTION INTRAVENOUS PRN
Status: DISCONTINUED | OUTPATIENT
Start: 2023-08-23 | End: 2023-08-26 | Stop reason: HOSPADM

## 2023-08-23 RX ADMIN — PREDNISONE 5 MG: 5 TABLET ORAL at 08:26

## 2023-08-23 RX ADMIN — SODIUM BICARBONATE: 84 INJECTION, SOLUTION INTRAVENOUS at 20:32

## 2023-08-23 RX ADMIN — SODIUM BICARBONATE: 84 INJECTION, SOLUTION INTRAVENOUS at 07:19

## 2023-08-23 RX ADMIN — CEFTRIAXONE 1000 MG: 1 INJECTION, POWDER, FOR SOLUTION INTRAMUSCULAR; INTRAVENOUS at 11:36

## 2023-08-23 RX ADMIN — SODIUM CHLORIDE, PRESERVATIVE FREE 10 ML: 5 INJECTION INTRAVENOUS at 08:28

## 2023-08-23 NOTE — DISCHARGE INSTRUCTIONS
hold ASA until urine clear for 3 days  Follow up in 1 week with Dr. Michael Miller for continued evaluation/management of advanced prostate cancer and for resolution of hematuria

## 2023-08-23 NOTE — ACP (ADVANCE CARE PLANNING)
Advance Care Planning     Advance Care Planning Activator (Inpatient)  Conversation Note      Date of ACP Conversation: 8/23/2023     Conversation Conducted with: Patient with Decision Making Capacity    ACP Activator: Cassidy Pierce RN  \    Health Care Decision Maker:     Current Designated Health Care Decision Maker:     Primary Decision Maker: Yareli Conklin - [phone]    Secondary Decision Maker: Sunny Angeles - [phone]    Care Preferences    Ventilation: \"If you were in your present state of health and suddenly became very ill and were unable to breathe on your own, what would your preference be about the use of a ventilator (breathing machine) if it were available to you? \"      Would the patient desire the use of ventilator (breathing machine)?: yes    \"If your health worsens and it becomes clear that your chance of recovery is unlikely, what would your preference be about the use of a ventilator (breathing machine) if it were available to you? \"     Would the patient desire the use of ventilator (breathing machine)?: Yes      Resuscitation  \"CPR works best to restart the heart when there is a sudden event, like a heart attack, in someone who is otherwise healthy. Unfortunately, CPR does not typically restart the heart for people who have serious health conditions or who are very sick. \"    \"In the event your heart stopped as a result of an underlying serious health condition, would you want attempts to be made to restart your heart (answer \"yes\" for attempt to resuscitate) or would you prefer a natural death (answer \"no\" for do not attempt to resuscitate)? \" yes       [] Yes   [x] No   Educated Patient / Navarro Christiano regarding differences between Advance Directives and portable DNR orders.     Length of ACP Conversation in minutes:  5    Conversation Outcomes:  ACP discussion completed    Follow-up plan:    [] Schedule follow-up conversation to continue planning  [] Referred individual to

## 2023-08-23 NOTE — PROGRESS NOTES
Hospitalist Progress Note      PCP: Asa Poole MD    Date of Admission: 8/21/2023    Chief Complaint: dizziness     Hospital Course: The patient is a 75 y.o. male who was referred to ED for abnormal labs and hematuria. Pt c/o lower back pain after a fall about a week ago. Pt states that he tripped and fell in bathroom. Reports he developed a bruit to rt lower back. States he is been having hematuria for about 1 week. He also c/o feeling lightheadedness over the past 2 days. States he is currently on chemotherapy for prostate cancer, due for tomorrow. Denied cough, chest pain, fever, chills, nausea, vomiting, headache, abdominal pain, diarrhea, or dysuria. Subjective: Patient sitting up in bed, states he feels much better today. Oncology ordered 1 unit of packed red blood cells. Urology said to follow-up outpatient in signed off. Creatinine trending down, still not close to baseline. Nephrology following. No family at bedside. Patient denied chest pain shortness with palpitation abdominal pain headache. Reviewed plan of care, Micronase questions.     Assessment/Plan:    ERIBERTO  -Baseline creatinine 0.8, creatinine on admission 4.7, creatinine today 2.9  -Continue IV fluids  -Hold lisinopril  -Nephrology consulted  -renal ultrasound nonacute    Hematuria  -May be secondary to fall, has large bruising to left flank  -Urology consulted, aspirin until urine is clear for 3 days, follow-up outpatient  -Hematuria has resolved today    Acute blood loss anemia  -Hemoglobin on admission 9.5, has now trended down to 7.6  -Oncology ordered 1 unit packed red blood cells today and dose of Retacrit  -Monitor H&H every 6    Hypotension  -Per wife patient has not had lisinopril for the last 3 to 4 days  -Blood pressure dropped to 73/47 8/22/2023, improved with saline bolus and midodrine  -Likely secondary to anemia   -Solved    UTI  -Culture with no growth  -Continue Rocephin as patient was symptomatic and

## 2023-08-24 LAB
ALBUMIN SERPL-MCNC: 3.5 G/DL (ref 3.4–5)
ANION GAP SERPL CALCULATED.3IONS-SCNC: 8 MMOL/L (ref 3–16)
BUN SERPL-MCNC: 26 MG/DL (ref 7–20)
CALCIUM SERPL-MCNC: 7.5 MG/DL (ref 8.3–10.6)
CHLORIDE SERPL-SCNC: 103 MMOL/L (ref 99–110)
CO2 SERPL-SCNC: 30 MMOL/L (ref 21–32)
CREAT SERPL-MCNC: 2.4 MG/DL (ref 0.8–1.3)
DEPRECATED RDW RBC AUTO: 15.6 % (ref 12.4–15.4)
EKG ATRIAL RATE: 93 BPM
EKG DIAGNOSIS: NORMAL
EKG P AXIS: 76 DEGREES
EKG P-R INTERVAL: 208 MS
EKG Q-T INTERVAL: 374 MS
EKG QRS DURATION: 84 MS
EKG QTC CALCULATION (BAZETT): 465 MS
EKG R AXIS: 5 DEGREES
EKG T AXIS: 59 DEGREES
EKG VENTRICULAR RATE: 93 BPM
GFR SERPLBLD CREATININE-BSD FMLA CKD-EPI: 28 ML/MIN/{1.73_M2}
GLUCOSE SERPL-MCNC: 99 MG/DL (ref 70–99)
HAV IGM SERPL QL IA: NORMAL
HBV CORE IGM SERPL QL IA: NORMAL
HBV SURFACE AG SERPL QL IA: NORMAL
HCT VFR BLD AUTO: 24.5 % (ref 40.5–52.5)
HCV AB SERPL QL IA: NORMAL
HGB BLD-MCNC: 8.5 G/DL (ref 13.5–17.5)
MAGNESIUM SERPL-MCNC: 1.9 MG/DL (ref 1.8–2.4)
MCH RBC QN AUTO: 31.5 PG (ref 26–34)
MCHC RBC AUTO-ENTMCNC: 34.9 G/DL (ref 31–36)
MCV RBC AUTO: 90.2 FL (ref 80–100)
MYELOPEROXIDASE AB SER-ACNC: 0 AU/ML (ref 0–19)
PHOSPHATE SERPL-MCNC: 2.2 MG/DL (ref 2.5–4.9)
PLATELET # BLD AUTO: 114 K/UL (ref 135–450)
PMV BLD AUTO: 10.2 FL (ref 5–10.5)
POTASSIUM SERPL-SCNC: 3.6 MMOL/L (ref 3.5–5.1)
PROTEINASE3 AB SER-ACNC: 0 AU/ML (ref 0–19)
RBC # BLD AUTO: 2.71 M/UL (ref 4.2–5.9)
SODIUM SERPL-SCNC: 141 MMOL/L (ref 136–145)
WBC # BLD AUTO: 8.3 K/UL (ref 4–11)

## 2023-08-24 PROCEDURE — 6370000000 HC RX 637 (ALT 250 FOR IP): Performed by: INTERNAL MEDICINE

## 2023-08-24 PROCEDURE — 83735 ASSAY OF MAGNESIUM: CPT

## 2023-08-24 PROCEDURE — 93005 ELECTROCARDIOGRAM TRACING: CPT | Performed by: NURSE PRACTITIONER

## 2023-08-24 PROCEDURE — 2580000003 HC RX 258: Performed by: INTERNAL MEDICINE

## 2023-08-24 PROCEDURE — 1200000000 HC SEMI PRIVATE

## 2023-08-24 PROCEDURE — 93010 ELECTROCARDIOGRAM REPORT: CPT | Performed by: INTERNAL MEDICINE

## 2023-08-24 PROCEDURE — 6370000000 HC RX 637 (ALT 250 FOR IP): Performed by: NURSE PRACTITIONER

## 2023-08-24 PROCEDURE — 2500000003 HC RX 250 WO HCPCS: Performed by: INTERNAL MEDICINE

## 2023-08-24 PROCEDURE — 99223 1ST HOSP IP/OBS HIGH 75: CPT | Performed by: INTERNAL MEDICINE

## 2023-08-24 PROCEDURE — 80069 RENAL FUNCTION PANEL: CPT

## 2023-08-24 PROCEDURE — 85027 COMPLETE CBC AUTOMATED: CPT

## 2023-08-24 PROCEDURE — 94760 N-INVAS EAR/PLS OXIMETRY 1: CPT

## 2023-08-24 RX ORDER — MIDODRINE HYDROCHLORIDE 5 MG/1
5 TABLET ORAL
Status: DISCONTINUED | OUTPATIENT
Start: 2023-08-24 | End: 2023-08-26 | Stop reason: HOSPADM

## 2023-08-24 RX ORDER — 0.9 % SODIUM CHLORIDE 0.9 %
500 INTRAVENOUS SOLUTION INTRAVENOUS ONCE
Status: DISCONTINUED | OUTPATIENT
Start: 2023-08-24 | End: 2023-08-24

## 2023-08-24 RX ORDER — MIDODRINE HYDROCHLORIDE 5 MG/1
5 TABLET ORAL
Status: DISCONTINUED | OUTPATIENT
Start: 2023-08-24 | End: 2023-08-24

## 2023-08-24 RX ORDER — SODIUM CHLORIDE 450 MG/100ML
INJECTION, SOLUTION INTRAVENOUS CONTINUOUS
Status: DISCONTINUED | OUTPATIENT
Start: 2023-08-24 | End: 2023-08-26 | Stop reason: HOSPADM

## 2023-08-24 RX ORDER — AMIODARONE HYDROCHLORIDE 200 MG/1
200 TABLET ORAL 2 TIMES DAILY
Status: DISCONTINUED | OUTPATIENT
Start: 2023-08-24 | End: 2023-08-26 | Stop reason: HOSPADM

## 2023-08-24 RX ADMIN — AMIODARONE HYDROCHLORIDE 200 MG: 200 TABLET ORAL at 20:26

## 2023-08-24 RX ADMIN — SODIUM CHLORIDE: 4.5 INJECTION, SOLUTION INTRAVENOUS at 13:35

## 2023-08-24 RX ADMIN — SODIUM BICARBONATE: 84 INJECTION, SOLUTION INTRAVENOUS at 05:18

## 2023-08-24 RX ADMIN — SODIUM PHOSPHATE, MONOBASIC, MONOHYDRATE AND SODIUM PHOSPHATE, DIBASIC, ANHYDROUS 20 MMOL: 142; 276 INJECTION, SOLUTION INTRAVENOUS at 13:37

## 2023-08-24 RX ADMIN — MIDODRINE HYDROCHLORIDE 5 MG: 5 TABLET ORAL at 12:45

## 2023-08-24 RX ADMIN — PREDNISONE 5 MG: 5 TABLET ORAL at 08:21

## 2023-08-24 RX ADMIN — MIDODRINE HYDROCHLORIDE 5 MG: 5 TABLET ORAL at 18:03

## 2023-08-24 NOTE — PROGRESS NOTES
(Last 24 hours) at 8/24/2023 1149  Last data filed at 8/24/2023 2140  Gross per 24 hour   Intake 4748.27 ml   Output --   Net 4748.27 ml       CONSTITUTIONAL: awake, alert, cooperative, no apparent distress   EYES: pupils equal, round and reactive to light, sclera clear and conjunctiva normal  ENT: Normocephalic, without obvious abnormality, atraumatic  NECK: supple, symmetrical, no jugular venous distension and no carotid bruits   HEMATOLOGIC/LYMPHATIC: no cervical, supraclavicular or axillary lymphadenopathy   LUNGS: no increased work of breathing and clear to auscultation   CARDIOVASCULAR: regular rate and rhythm, normal S1 and S2, no murmur noted  ABDOMEN: normal bowel sounds x 4, soft, non-distended, non-tender, no masses palpated, no hepatosplenomgaly   MUSCULOSKELETAL: full range of motion noted, tone is normal  NEUROLOGIC: awake, alert, oriented to name, place and time. Motor skills grossly intact. SKIN: Normal skin color, texture, turgor and no jaundice. appears intact   EXTREMITIES: no LE edema     DATA:  CBC:    Recent Labs     08/24/23  0515 08/23/23  1647 08/23/23  0635 08/23/23  0145 08/22/23  1120 08/22/23  0614 08/21/23  1706   WBC 8.3  --  8.8  --   --  10.8 15.4*   NEUTROABS  --   --   --   --   --  8.6* 12.6*   LYMPHOPCT  --   --   --   --   --  11.7 9.2   RBC 2.71*  --  2.43*  --   --  2.76* 3.11*   HGB 8.5* 9.4* 7.6* 7.3*   < > 8.6* 9.5*   HCT 24.5* 28.1* 22.2* 22.2*   < > 25.7* 28.7*   MCV 90.2  --  91.5  --   --  92.9 92.5   MCH 31.5  --  31.2  --   --  31.1 30.7   MCHC 34.9  --  34.1  --   --  33.5 33.2   RDW 15.6*  --  15.7*  --   --  15.5* 15.5*   *  --  119*  --   --  135 160    < > = values in this interval not displayed. PT/INR:    Recent Labs     08/22/23  1650 08/21/23  1705   PROT 5.5* 6.4     PTT:  No results for input(s): APTT in the last 720 hours.     CMP:    Recent Labs     08/24/23  0515 08/23/23  0635 08/22/23  1650 08/22/23  0614 08/21/23  1705    141  -- 139 133*   K 3.6 4.0  --  5.1 4.7    107  --  111* 104   CO2 30 25  --  19* 19*   GLUCOSE 99 101*  --  92 102*   BUN 26* 36*  --  53* 66*   CREATININE 2.4* 2.9*  --  3.7* 4.7*   LABGLOM 28* 22*  --  16* 12*   CALCIUM 7.5* 7.7*  --  8.8 9.8   PROT  --   --  5.5*  --  6.4   LABALBU 3.5 3.5 2.8*  --  4.0   AGRATIO  --   --   --   --  1.7   BILITOT  --   --   --   --  0.4   ALKPHOS  --   --   --   --  55   ALT  --   --   --   --  12   AST  --   --   --   --  15   MG 1.90 2.00  --   --   --        Lab Results   Component Value Date    CALCIUM 7.5 (L) 08/24/2023    PHOS 2.2 (L) 08/24/2023       LDH:No results for input(s): LDH in the last 720 hours. Radiology Review:  US RENAL COMPLETE  Narrative: EXAMINATION:  RETROPERITONEAL ULTRASOUND OF THE KIDNEYS AND URINARY BLADDER    8/22/2023    COMPARISON:  None    HISTORY:  ORDERING SYSTEM PROVIDED HISTORY: ERIBERTO, hematuria  TECHNOLOGIST PROVIDED HISTORY:    Reason for exam:->ERIBERTO, hematuria    FINDINGS:    Kidneys: The right kidney measures 10.5 cm in length and the left kidney measures 10  cm in length. Kidneys demonstrate normal cortical echogenicity. No evidence of  hydronephrosis or intrarenal stones. Bladder: The bladder is decompressed and thus not evaluated  Impression: No sonographic abnormality. Problem List  Patient Active Problem List   Diagnosis    NSTEMI (non-ST elevated myocardial infarction) ([address])    S/P CABG x 4    Mixed hearing loss, bilateral    ERIBERTO (acute kidney injury) ([address])       ASSESSMENT AND PLAN:  1.  Stage IV prostate cancer. He will resume Deshawn Vidal as an outpatient. 2.  Acute kidney injury. This is of unclear etiology. Urology and nephrology are following. His renal ultrasound was unremarkable. CR continues to improve     3. Anemia. This is multifactorial and partially due to bone marrow suppression from chemotherapy as well as acute kidney injury.       He received procrit and 1 unit of PRBC's yesterday--symptoms greatly improved after transfusion           Chinedu Todd, BRIAN - CNP  Please contact through [address] [place]

## 2023-08-24 NOTE — FLOWSHEET NOTE
Assessment completed, instructed that we should be keeping track of urine output due to impaired kidney function. Patient stated that they have been measuring for 4 days and it is no longer necessary. Refused to use urinal, voiding per BR.

## 2023-08-24 NOTE — CONSULTS
[facility]  Cardiology Consult Note        CC:      New bigeminy, history of CAD, dizziness             HPI:   This is a 75 y.o. male who is sent from [facility] for elevated BUN/creatinine. The patient has history of metastatic prostate cancer. The patient also has had a recent fall at home with bruising and pain in lower back. We are asked to see this patient for new bigeminy. The patient has a history of CAD. In the ER the patient was found to be ill-appearing; he was hypotensive , blood pressure 85/54. He was admitted with a diagnosis of acute kidney injury, acute cystitis and possible septicemia. Mr. Luis Orozco has a history of three-vessel coronary disease and a four-vessel bypass in January 2018. His last EKG in our office showed sinus rhythm    Patient's creatinine on admission was 4.7prior to that his creatinine was 0.8 and August 2022. Also found to have hematocrit of 28.7 on admission. He was on blood pressure medicines including lisinopril until the day before admission his oral intake has also been low and he has been having blood in his urine. Past Medical History:   Diagnosis Date    CAD (coronary artery disease) 01/17/2018    NSTEMI - CABG x4 1/17/18    Cancer of prostate ([address])     Chest pain 01/17/2018    CHF (congestive heart failure) ([address])     Unstable angina pectoris [facility])       Past Surgical History:   Procedure Laterality Date    COLONOSCOPY      CORONARY ARTERY BYPASS GRAFT  01/17/2018    cabgx4 ([facility])    IR PORT PLACEMENT EQUAL OR GREATER THAN 5 YEARS Right 01/03/2023    Power Port; RIJ access; 24cm; Dr. Justino Gallagher    IR Jo Ann Prasad 5 YEARS  01/03/2023    IR PORT PLACEMENT EQUAL OR GREATER THAN 5 YEARS 1/3/2023 [facility]    SKIN CANCER EXCISION        Family History   Problem Relation Age of Onset    No Known Problems Mother         still living, age 80    Heart Attack Father         noted at an advanced age, silent. lived to age 80.

## 2023-08-24 NOTE — PLAN OF CARE
Problem: Discharge Planning  Goal: Discharge to home or other facility with appropriate resources  8/24/2023 1218 by Mery Rinaldi RN  Outcome: Progressing     Problem: Pain  Goal: Verbalizes/displays adequate comfort level or baseline comfort level  8/24/2023 1218 by Mery Rinaldi RN  Outcome: Progressing     Problem: Safety - Adult  Goal: Free from fall injury  8/24/2023 1218 by Mery Rinaldi RN  Outcome: Progressing     Problem: Skin/Tissue Integrity - Adult  Goal: Skin integrity remains intact  8/24/2023 1218 by Mery Rinaldi RN  Outcome: Progressing     Problem: Genitourinary - Adult  Goal: Absence of urinary retention  8/24/2023 1218 by Mery Rinaldi RN  Outcome: Progressing     Problem: Infection - Adult  Goal: Absence of infection at discharge  8/24/2023 1218 by Mery Rinaldi RN  Outcome: Progressing     Problem: Hematologic - Adult  Goal: Maintains hematologic stability  8/24/2023 1218 by Mery Rinaldi RN  Outcome: Progressing     Problem: Cardiovascular - Adult  Goal: Maintains optimal cardiac output and hemodynamic stability  8/24/2023 1218 by eMry Rinaldi RN  Outcome: Progressing

## 2023-08-25 ENCOUNTER — TELEPHONE (OUTPATIENT)
Dept: CARDIOLOGY CLINIC | Age: 73
End: 2023-08-25

## 2023-08-25 LAB
ALBUMIN SERPL-MCNC: 3.8 G/DL (ref 3.4–5)
ANION GAP SERPL CALCULATED.3IONS-SCNC: 14 MMOL/L (ref 3–16)
BACTERIA BLD CULT ORG #2: NORMAL
BACTERIA BLD CULT: NORMAL
BUN SERPL-MCNC: 20 MG/DL (ref 7–20)
CALCIUM SERPL-MCNC: 7.2 MG/DL (ref 8.3–10.6)
CHLORIDE SERPL-SCNC: 100 MMOL/L (ref 99–110)
CO2 SERPL-SCNC: 25 MMOL/L (ref 21–32)
CREAT SERPL-MCNC: 2.3 MG/DL (ref 0.8–1.3)
DEPRECATED RDW RBC AUTO: 15.7 % (ref 12.4–15.4)
GFR SERPLBLD CREATININE-BSD FMLA CKD-EPI: 29 ML/MIN/{1.73_M2}
GLUCOSE SERPL-MCNC: 84 MG/DL (ref 70–99)
HCT VFR BLD AUTO: 26.5 % (ref 40.5–52.5)
HGB BLD-MCNC: 9.2 G/DL (ref 13.5–17.5)
LV EF: 60 %
LVEF MODALITY: NORMAL
MAGNESIUM SERPL-MCNC: 1.7 MG/DL (ref 1.8–2.4)
MCH RBC QN AUTO: 31.6 PG (ref 26–34)
MCHC RBC AUTO-ENTMCNC: 34.8 G/DL (ref 31–36)
MCV RBC AUTO: 90.6 FL (ref 80–100)
PHOSPHATE SERPL-MCNC: 2.8 MG/DL (ref 2.5–4.9)
PLATELET # BLD AUTO: 124 K/UL (ref 135–450)
PMV BLD AUTO: 10 FL (ref 5–10.5)
POTASSIUM SERPL-SCNC: 3.4 MMOL/L (ref 3.5–5.1)
RBC # BLD AUTO: 2.92 M/UL (ref 4.2–5.9)
SODIUM SERPL-SCNC: 139 MMOL/L (ref 136–145)
WBC # BLD AUTO: 8.8 K/UL (ref 4–11)

## 2023-08-25 PROCEDURE — 80069 RENAL FUNCTION PANEL: CPT

## 2023-08-25 PROCEDURE — 83735 ASSAY OF MAGNESIUM: CPT

## 2023-08-25 PROCEDURE — 99233 SBSQ HOSP IP/OBS HIGH 50: CPT | Performed by: INTERNAL MEDICINE

## 2023-08-25 PROCEDURE — 6360000002 HC RX W HCPCS: Performed by: NURSE PRACTITIONER

## 2023-08-25 PROCEDURE — 93306 TTE W/DOPPLER COMPLETE: CPT

## 2023-08-25 PROCEDURE — 85027 COMPLETE CBC AUTOMATED: CPT

## 2023-08-25 PROCEDURE — 94760 N-INVAS EAR/PLS OXIMETRY 1: CPT

## 2023-08-25 PROCEDURE — 6370000000 HC RX 637 (ALT 250 FOR IP): Performed by: INTERNAL MEDICINE

## 2023-08-25 PROCEDURE — 6370000000 HC RX 637 (ALT 250 FOR IP): Performed by: NURSE PRACTITIONER

## 2023-08-25 PROCEDURE — 1200000000 HC SEMI PRIVATE

## 2023-08-25 PROCEDURE — 2580000003 HC RX 258: Performed by: INTERNAL MEDICINE

## 2023-08-25 RX ORDER — MAGNESIUM SULFATE IN WATER 40 MG/ML
2000 INJECTION, SOLUTION INTRAVENOUS ONCE
Status: COMPLETED | OUTPATIENT
Start: 2023-08-25 | End: 2023-08-25

## 2023-08-25 RX ADMIN — MAGNESIUM SULFATE HEPTAHYDRATE 2000 MG: 40 INJECTION, SOLUTION INTRAVENOUS at 10:57

## 2023-08-25 RX ADMIN — SODIUM CHLORIDE: 4.5 INJECTION, SOLUTION INTRAVENOUS at 04:08

## 2023-08-25 RX ADMIN — MIDODRINE HYDROCHLORIDE 5 MG: 5 TABLET ORAL at 12:18

## 2023-08-25 RX ADMIN — SODIUM CHLORIDE: 4.5 INJECTION, SOLUTION INTRAVENOUS at 14:20

## 2023-08-25 RX ADMIN — PREDNISONE 5 MG: 5 TABLET ORAL at 08:13

## 2023-08-25 RX ADMIN — SODIUM CHLORIDE, PRESERVATIVE FREE 10 ML: 5 INJECTION INTRAVENOUS at 08:15

## 2023-08-25 RX ADMIN — MIDODRINE HYDROCHLORIDE 5 MG: 5 TABLET ORAL at 08:13

## 2023-08-25 RX ADMIN — MIDODRINE HYDROCHLORIDE 5 MG: 5 TABLET ORAL at 17:21

## 2023-08-25 RX ADMIN — AMIODARONE HYDROCHLORIDE 200 MG: 200 TABLET ORAL at 20:41

## 2023-08-25 RX ADMIN — POTASSIUM BICARBONATE 20 MEQ: 782 TABLET, EFFERVESCENT ORAL at 10:49

## 2023-08-25 RX ADMIN — AMIODARONE HYDROCHLORIDE 200 MG: 200 TABLET ORAL at 08:14

## 2023-08-25 ASSESSMENT — PAIN SCALES - GENERAL: PAINLEVEL_OUTOF10: 0

## 2023-08-25 NOTE — PROGRESS NOTES
Physician Progress Note      PATIENT:               Eliecer Corbett  CSN #:                  [id number]  :                       1950  ADMIT DATE:       2023 4:05 PM  DISCH DATE:  RESPONDING  PROVIDER #:        Darvin Luke          QUERY TEXT:    Pt admitted with hematuria. Noted documentation of sepsis on ED and   hematology consult notes. If possible, please document in progress notes and   discharge summary:      The medical record reflects the following:  Risk Factors: Prostate cancer, immunodeficiency  Clinical Indicators: Per ED note \"Final impression: Sepsis\". Per Hematology   consult note \" Septicemia\". Per cardiology consult note \"He was admitted with   a diagnosis of acute kidney injury, acute cystitis and possible septicemia\". WBC 15.4, ERIBERTO, , BP 85/54, RR 22.  Per progress note  \"Culture with   no growth. Continue Rocephin as patient was symptomatic and is   immunocompromised on chemo, 3 days of antibiotic\". Treatment: IVF, IV Rocephin, blood and urine cultures, serial labs, supportive   care, nephrology consult    Thank you,  Fariha Tripp RN BSN  Options provided:  -- Sepsis due to UTI confirmed present on arrival  -- Sepsis ruled out  -- Other - I will add my own diagnosis  -- Disagree - Not applicable / Not valid  -- Disagree - Clinically unable to determine / Unknown  -- Refer to Clinical Documentation Reviewer    PROVIDER RESPONSE TEXT:    The diagnosis of sepsis was ruled out.     Query created by: Fariha Tripp on 2023 10:20 AM      Electronically signed by:  Darvin Luke 2023 11:43 AM

## 2023-08-25 NOTE — PLAN OF CARE
Problem: Discharge Planning  Goal: Discharge to home or other facility with appropriate resources  8/25/2023 1124 by Hetal Johnson RN  Flowsheets (Taken 8/25/2023 1124)  Discharge to home or other facility with appropriate resources:   Identify barriers to discharge with patient and caregiver   Arrange for needed discharge resources and transportation as appropriate   Identify discharge learning needs (meds, wound care, etc)     Problem: Safety - Adult  Goal: Free from fall injury  8/25/2023 1124 by Hetal Johnson RN  Flowsheets (Taken 8/25/2023 1124)  Free From Fall Injury: Instruct family/caregiver on patient safety

## 2023-08-25 NOTE — PROGRESS NOTES
Notified by [facility] pt having 43 beats of PVCs/PACs. Pt is asymptomatic. Vitals stable. ROSA Marcos notified.

## 2023-08-25 NOTE — PROGRESS NOTES
[facility]  Cardiology Progress Note        CC:      New bigeminy, history of CAD, dizziness             HPI:   This is a 75 y.o. male who is sent from [facility] for elevated BUN/creatinine. The patient has history of metastatic prostate cancer. The patient also has had a recent fall at home with bruising and pain in lower back. I was told that it was a large hematoma that may have caused the acute kidney injury     We are asked to see this patient for new bigeminy. The patient has a history of CAD. In the ER the patient was found to be ill-appearing; he was hypotensive , blood pressure 85/54. He was admitted with a diagnosis of acute kidney injury, acute cystitis and possible septicemia. Mr. Savage Hargrove has a history of three-vessel coronary disease and a four-vessel bypass in January 2018. His last EKG in our office showed sinus rhythm    Patient's creatinine on admission was 4.7prior to that his creatinine was 0.8 and August 2022. Also found to have hematocrit of 28.7 on admission. He was on blood pressure medicines including lisinopril until the day before admission his oral intake has also been low and he has been having blood in his urine.     Interval history  Patient doing well    Past Medical History:   Diagnosis Date    CAD (coronary artery disease) 01/17/2018    NSTEMI - CABG x4 1/17/18    Cancer of prostate ([address])     Chest pain 01/17/2018    CHF (congestive heart failure) ([address])     Unstable angina pectoris [facility])       Past Surgical History:   Procedure Laterality Date    COLONOSCOPY      CORONARY ARTERY BYPASS GRAFT  01/17/2018    cabgx4 ([facility])    IR PORT PLACEMENT EQUAL OR GREATER THAN 5 YEARS Right 01/03/2023    Power Port; RIJ access; 24cm; Dr. Cisneros March    IR Devol Gelineau 5 YEARS  01/03/2023    IR PORT PLACEMENT EQUAL OR GREATER THAN 5 YEARS 1/3/2023 [facility]    SKIN CANCER EXCISION        Family History   Problem Relation Age of Onset    No sounds,  No organomegaly  Extremities: No Cyanosis or Clubbing; Edema none  Neurological: Oriented to time, place, and person, Non-anxious  Psychiatric: Normal mood and affect  Skin: Warm and dry,  No rash seen      Current Facility-Administered Medications: midodrine (PROAMATINE) tablet 5 mg, 5 mg, Oral, TID WC  0.45 % sodium chloride infusion, , IntraVENous, Continuous  perflutren lipid microspheres (DEFINITY) injection 1.5 mL, 1.5 mL, IntraVENous, ONCE PRN  amiodarone (CORDARONE) tablet 200 mg, 200 mg, Oral, BID  0.9 % sodium chloride infusion, , IntraVENous, PRN  HYDROcodone-acetaminophen (NORCO) 5-325 MG per tablet 1 tablet, 1 tablet, Oral, Q6H PRN  predniSONE (DELTASONE) tablet 5 mg, 5 mg, Oral, Daily  sodium chloride flush 0.9 % injection 5-40 mL, 5-40 mL, IntraVENous, 2 times per day  sodium chloride flush 0.9 % injection 5-40 mL, 5-40 mL, IntraVENous, PRN  0.9 % sodium chloride infusion, , IntraVENous, PRN  ondansetron (ZOFRAN-ODT) disintegrating tablet 4 mg, 4 mg, Oral, Q8H PRN **OR** ondansetron (ZOFRAN) injection 4 mg, 4 mg, IntraVENous, Q6H PRN  polyethylene glycol (GLYCOLAX) packet 17 g, 17 g, Oral, Daily PRN  acetaminophen (TYLENOL) tablet 650 mg, 650 mg, Oral, Q6H PRN **OR** acetaminophen (TYLENOL) suppository 650 mg, 650 mg, Rectal, Q6H PRN      Labs:   Recent Labs     08/24/23  0515 08/25/23  0420   WBC 8.3 8.8   HGB 8.5* 9.2*   HCT 24.5* 26.5*   * 124*     Recent Labs     08/24/23  0515 08/25/23  0420    139   K 3.6 3.4*   CO2 30 25   BUN 26* 20   CREATININE 2.4* 2.3*   GLUCOSE 99 84     No results for input(s): BNP in the last 72 hours. No results for input(s): PROTIME, INR in the last 72 hours. No results for input(s): APTT in the last 72 hours. No results for input(s): CKTOTAL, CKMB, CKMBINDEX, TROPONINI in the last 72 hours.   Lab Results   Component Value Date/Time    HDL 59 08/23/2022 11:53 AM    LDLCALC 67 08/23/2022 11:53 AM    TRIG 96 10/02/2020 09:29 AM     Recent Labs

## 2023-08-25 NOTE — TELEPHONE ENCOUNTER
Spoke to Dr. Sumi Sofia, states patient needs appointment with Dr. Fabby Slaughter to discuss Watchman device. Please call patient to schedule. UPDATE 8/31/23  See rosyte telephone encounter from Edd Hoyos.

## 2023-08-26 VITALS
HEIGHT: 63 IN | WEIGHT: 211.86 LBS | RESPIRATION RATE: 16 BRPM | HEART RATE: 59 BPM | SYSTOLIC BLOOD PRESSURE: 103 MMHG | TEMPERATURE: 98 F | BODY MASS INDEX: 37.54 KG/M2 | DIASTOLIC BLOOD PRESSURE: 69 MMHG | OXYGEN SATURATION: 97 %

## 2023-08-26 LAB
ALBUMIN SERPL-MCNC: 3.6 G/DL (ref 3.4–5)
ANION GAP SERPL CALCULATED.3IONS-SCNC: 9 MMOL/L (ref 3–16)
BUN SERPL-MCNC: 17 MG/DL (ref 7–20)
CALCIUM SERPL-MCNC: 7.1 MG/DL (ref 8.3–10.6)
CHLORIDE SERPL-SCNC: 108 MMOL/L (ref 99–110)
CO2 SERPL-SCNC: 22 MMOL/L (ref 21–32)
CREAT SERPL-MCNC: 2 MG/DL (ref 0.8–1.3)
DEPRECATED RDW RBC AUTO: 15.9 % (ref 12.4–15.4)
GFR SERPLBLD CREATININE-BSD FMLA CKD-EPI: 34 ML/MIN/{1.73_M2}
GLUCOSE SERPL-MCNC: 85 MG/DL (ref 70–99)
HCT VFR BLD AUTO: 25 % (ref 40.5–52.5)
HGB BLD-MCNC: 8.7 G/DL (ref 13.5–17.5)
MAGNESIUM SERPL-MCNC: 2.1 MG/DL (ref 1.8–2.4)
MCH RBC QN AUTO: 31.7 PG (ref 26–34)
MCHC RBC AUTO-ENTMCNC: 34.7 G/DL (ref 31–36)
MCV RBC AUTO: 91.5 FL (ref 80–100)
PHOSPHATE SERPL-MCNC: 2.5 MG/DL (ref 2.5–4.9)
PLATELET # BLD AUTO: 119 K/UL (ref 135–450)
PMV BLD AUTO: 10 FL (ref 5–10.5)
POTASSIUM SERPL-SCNC: 3.8 MMOL/L (ref 3.5–5.1)
RBC # BLD AUTO: 2.73 M/UL (ref 4.2–5.9)
SODIUM SERPL-SCNC: 139 MMOL/L (ref 136–145)
WBC # BLD AUTO: 8 K/UL (ref 4–11)

## 2023-08-26 PROCEDURE — 2580000003 HC RX 258: Performed by: INTERNAL MEDICINE

## 2023-08-26 PROCEDURE — 99233 SBSQ HOSP IP/OBS HIGH 50: CPT | Performed by: INTERNAL MEDICINE

## 2023-08-26 PROCEDURE — 80069 RENAL FUNCTION PANEL: CPT

## 2023-08-26 PROCEDURE — 83735 ASSAY OF MAGNESIUM: CPT

## 2023-08-26 PROCEDURE — 6370000000 HC RX 637 (ALT 250 FOR IP): Performed by: INTERNAL MEDICINE

## 2023-08-26 PROCEDURE — 85027 COMPLETE CBC AUTOMATED: CPT

## 2023-08-26 PROCEDURE — 6370000000 HC RX 637 (ALT 250 FOR IP): Performed by: NURSE PRACTITIONER

## 2023-08-26 RX ORDER — MIDODRINE HYDROCHLORIDE 5 MG/1
5 TABLET ORAL
Qty: 90 TABLET | Refills: 3 | Status: SHIPPED | OUTPATIENT
Start: 2023-08-26

## 2023-08-26 RX ORDER — AMIODARONE HYDROCHLORIDE 200 MG/1
200 TABLET ORAL 2 TIMES DAILY
Qty: 30 TABLET | Refills: 3 | Status: SHIPPED | OUTPATIENT
Start: 2023-08-26

## 2023-08-26 RX ADMIN — MIDODRINE HYDROCHLORIDE 5 MG: 5 TABLET ORAL at 12:52

## 2023-08-26 RX ADMIN — AMIODARONE HYDROCHLORIDE 200 MG: 200 TABLET ORAL at 08:36

## 2023-08-26 RX ADMIN — SODIUM CHLORIDE, PRESERVATIVE FREE 10 ML: 5 INJECTION INTRAVENOUS at 13:58

## 2023-08-26 RX ADMIN — MIDODRINE HYDROCHLORIDE 5 MG: 5 TABLET ORAL at 08:36

## 2023-08-26 RX ADMIN — PREDNISONE 5 MG: 5 TABLET ORAL at 08:36

## 2023-08-26 NOTE — PROGRESS NOTES
This RN discussed discharge instructions with Pt and his wife. Pt and wife educated on the importance of follow up appointments. Both are agreeable. All questions answered. Port de accessed. Pt tolerated well, dressing applied. Pt transported via wheelchair to discharge with all of his belongings. Pt's wife is accompanying him home.

## 2023-08-26 NOTE — PROGRESS NOTES
[facility]  Cardiology Progress Note        CC:      New bigeminy, history of CAD, dizziness             HPI:   This is a 75 y.o. male who is sent from [facility] for elevated BUN/creatinine. The patient has history of metastatic prostate cancer. The patient also has had a recent fall at home with bruising and pain in lower back. I was told that it was a large hematoma that may have caused the acute kidney injury     We are asked to see this patient for new bigeminraymond. The patient has a history of CAD. In the ER the patient was found to be ill-appearing; he was hypotensive , blood pressure 85/54. He was admitted with a diagnosis of acute kidney injury, acute cystitis and possible septicemia. Mr. Angela Garcia has a history of three-vessel coronary disease and a four-vessel bypass in January 2018. His last EKG in our office showed sinus rhythm    Patient's creatinine on admission was 4.7prior to that his creatinine was 0.8 and August 2022. Also found to have hematocrit of 28.7 on admission. He was on blood pressure medicines including lisinopril until the day before admission his oral intake has also been low and he has been having blood in his urine.     Interval history  Patient doing well    Past Medical History:   Diagnosis Date    CAD (coronary artery disease) 01/17/2018    NSTEMI - CABG x4 1/17/18    Cancer of prostate ([address])     Chest pain 01/17/2018    CHF (congestive heart failure) ([address])     Unstable angina pectoris [facility])       Past Surgical History:   Procedure Laterality Date    COLONOSCOPY      CORONARY ARTERY BYPASS GRAFT  01/17/2018    cabgx4 ([facility])    IR PORT PLACEMENT EQUAL OR GREATER THAN 5 YEARS Right 01/03/2023    Power Port; RIJ access; 24cm; Dr. Magdiel Angeles    IR Estelle Boo 5 YEARS  01/03/2023    IR PORT PLACEMENT EQUAL OR GREATER THAN 5 YEARS 1/3/2023 [facility]    SKIN CANCER EXCISION        Family History   Problem Relation Age of Onset    No 08/25/23  0420 08/26/23  0608   LABALBU 3.8 3.6         EKG:   Sinus rhythm with PACs and PVCs    Corornary angiogram: 1/17/18  There is three-vessel disease in this right dominant circulation. 1.  Left main:  Free of disease. 2.  LAD:  Ostial has an 80% lesion, proximal has another 70% to 80% lesion and then the mid has 70% lesion. The first diagonal is a large vessel and has a 60% to 70% mid-lesion. 3.  Left circumflex:  Has a high-grade 90% mid lesion. 4.  Right coronary artery:  Has a 50% to 60% distal lesion. CONCLUSIONS:  1. Three-vessel coronary artery disease. 2.  Ostial LAD 80%, mid LAD 80%, mid circumflex 90%, mid distal RCA 60%. 3.  Normal left ventricular size and systolic function. 4.  Slightly elevated left heart filling pressures. 1/17/18: CABGX4 (LIMA-LAD, SVG-D1, SVG-OM1-PLV    Echocardiogram: 8/25/2023  Normal LV size with normal wall motion; EF ~60%. Indeterminate diastolic function. The left atrium is severely dilated. Normal right ventricular size and function. Mild aortic regurgitation  Trivial Mitral and Tricuspid regurgitation. ASSESSMENT AND PLAN:      Mr. Savage Hargrove is a 69-year-old gentleman with metastatic prostate cancer sent from [facility] for acute kidney injury with creatinine at 4.7. Previously the creatinine was 0.8. Also found to have hematuria cystitis anemia    We are asked to see this patient for frequent PVCs  Patient has history of three-vessel coronary disease and four-vessel bypass  His underlying rhythm is sinus. Asymptomatic PVCs  On the day I am not concerned about his PVCs and PACs even bigeminy but I did see while reviewing the telemetry monitor that he has periodic atrial fibrillation with RVR. These are new findings  I will therefore place him on amiodarone 200 twice daily to keep him in sinus rhythm  At CHADS2 score (hypertension history CABG) is at least 3.   However he cannot take anticoagulation because of recent hematuria and anemia    August 26, 2023  Patient appears to be back in sinus rhythm  Echo shows normal LV size and function but severely dilated left atrium. Plan is to continue amiodarone 200 twice daily as outpatient    Regarding use of anticoagulation for A-fib stroke prevention, I would like to wait until cleared by urology. The patient has an appointment to see urologist.  There is mention about concern for cancer. I am wondering whether a cystoscopy would be done. Pending clearance from neurology we will hold off on the anticoagulation.           Paris Caceres M.D  8/26/2023

## 2023-08-26 NOTE — PROGRESS NOTES
[facility] Medicine Progress Note     Date:  8/26/2023    PCP: Mortimer Pique, MD (Tel: [phone])    Date of Admission: 8/21/2023    Chief complaint:   Chief Complaint   Patient presents with    Abnormal Lab     Had labs drawn today at [facility], sent for elevated BUN and creatinine. Pt also had a fall at home last Sunday, bruising and pain to lower back and left side. Brief admission history:   The patient is a 75 y.o. male with a h/o CAD-->NSTEMI, HTN, Prostate cancer on chemo who was referred to ED for abnormal labs and hematuria. Pt c/o lower back pain after a fall about a week ago. Pt states that he tripped and fell in bathroom. Reports he developed a bruit to rt lower back. States he is been having hematuria for about 1 week. He also c/o feeling lightheadedness over the past 2 days. States he is currently on chemotherapy for prostate cancer, due for tomorrow. Denied cough, chest pain, fever, chills, nausea, vomiting, headache, abdominal pain, diarrhea, or dysuria    Assessment/ Plan:  1. Acute kidney injury with acidemia-prerenal sec to hypotension and ACEI. Renal US -no hydronephrosis. -cont on IV fluids  -HCO3- Dcd   -hold ACEI/ avoid NSAIDs/ IV contrast      2. UTI with hematuria-hematuria resolved. -completed IV antibiotics. -Urology recs to hold aspirin until urine is clear for 3 days. FU as an OP with Dr. Eleni Wolff 8/31/23 for further management of advanced prostate CA     3. CAD s/p CABG 2018-patient denies chest pain. Patient on amnio as noted above, statin, not on beta-blocker    4. Periodic AFib with RVR [id number] score -3. (Ventricular Ectopy/PVCs on tele). -TTE pending  -on amiodarone 200mg qd  -AC on hold due to hematuria and anemia.   -monitor/ replete electrolytes prn.      5. Hypotension  -Improved with midodrine, ACEi hold. 6. Hypokalemia, hypomagnesemia -replacement ordered     7.  Stage IV prostate CA -hematology oncology following patient to resume [facility] 08/26/23  0608   WBC 8.3 8.8 8.0   HGB 8.5* 9.2* 8.7*   * 124* 119*     BMP:    Recent Labs     08/24/23  0515 08/25/23  0420 08/26/23  0608    139 139   K 3.6 3.4* 3.8    100 108   CO2 30 25 22   BUN 26* 20 17   CREATININE 2.4* 2.3* 2.0*   GLUCOSE 99 84 85     Hepatic: No results for input(s): AST, ALT, ALB, BILITOT, ALKPHOS in the last 72 hours.     Emelia Rincon MD  -------------------------------  Rounding hospitalist

## 2023-08-26 NOTE — PROGRESS NOTES
Patient alert and oriented X4. Patient denies pain at this time. Patient tolerated nightly medications well. Patient verbalizes understanding of education. Patient vital signs recorded. Fall precautions in place. Bed in lowest position, side rails X2. Bed locks on. Non slip socks on. Needed items within reach. Call light within reach.

## 2023-08-26 NOTE — PLAN OF CARE
Problem: Discharge Planning  Goal: Discharge to home or other facility with appropriate resources  8/26/2023 1124 by Fiona Maldonado RN  Outcome: Progressing  8/26/2023 0005 by Purvi Tilley RN  Outcome: Progressing  Flowsheets (Taken 8/25/2023 2015)  Discharge to home or other facility with appropriate resources:   Identify barriers to discharge with patient and caregiver   Arrange for needed discharge resources and transportation as appropriate     Problem: Pain  Goal: Verbalizes/displays adequate comfort level or baseline comfort level  8/26/2023 1124 by Fiona Maldonado RN  Outcome: Progressing  8/26/2023 0005 by Purvi Tilley RN  Outcome: Progressing  Flowsheets (Taken 8/25/2023 1950)  Verbalizes/displays adequate comfort level or baseline comfort level: Encourage patient to monitor pain and request assistance     Problem: Safety - Adult  Goal: Free from fall injury  8/26/2023 1124 by Fiona Maldonado RN  Outcome: Progressing  8/26/2023 0005 by Purvi Tilley RN  Outcome: Progressing     Problem: Chronic Conditions and Co-morbidities  Goal: Patient's chronic conditions and co-morbidity symptoms are monitored and maintained or improved  8/26/2023 1124 by Fiona Maldonado RN  Outcome: Progressing  8/26/2023 0005 by Purvi Tilley RN  Outcome: Progressing  Flowsheets (Taken 8/25/2023 2015)  Care Plan - Patient's Chronic Conditions and Co-Morbidity Symptoms are Monitored and Maintained or Improved: Monitor and assess patient's chronic conditions and comorbid symptoms for stability, deterioration, or improvement     Problem: Skin/Tissue Integrity - Adult  Goal: Skin integrity remains intact  8/26/2023 1124 by iFona Maldonado RN  Outcome: Progressing  8/26/2023 0005 by Purvi Tilley RN  Outcome: Progressing  Flowsheets (Taken 8/25/2023 2015)  Skin Integrity Remains Intact: Monitor for areas of redness and/or skin breakdown     Problem: Genitourinary - Adult  Goal: Absence of urinary retention  8/26/2023

## 2023-08-26 NOTE — PROGRESS NOTES
Morning labs sent to [facility]. Patient tolerated well.  Electronically signed by Bernadine Fontenot RN on 8/26/2023 at 6:11 AM

## 2023-08-28 ENCOUNTER — TELEPHONE (OUTPATIENT)
Dept: INTERNAL MEDICINE CLINIC | Age: 73
End: 2023-08-28

## 2023-08-28 NOTE — TELEPHONE ENCOUNTER
Called the pt and left voicemail to call back and make a hospital follow up apt since he was recently discharged from the hospital.

## 2023-08-29 ENCOUNTER — TELEPHONE (OUTPATIENT)
Dept: CARDIOLOGY CLINIC | Age: 73
End: 2023-08-29

## 2023-08-29 NOTE — TELEPHONE ENCOUNTER
Called pt, left vm to return call to schedule watchman consult with Donivan Sever.  Please transfer call to Jackie in FF.

## 2023-08-29 NOTE — TELEPHONE ENCOUNTER
Jackie please call patient and schedule a Watchman consult with Chloe Hurd at [facility].

## 2023-08-31 NOTE — TELEPHONE ENCOUNTER
Called left [facility] to return call to schedule watchman consult with Chanelle Rosen.  Please transfer call to Jackie in FF.

## 2023-09-05 ENCOUNTER — TELEPHONE (OUTPATIENT)
Dept: CARDIOLOGY CLINIC | Age: 73
End: 2023-09-05

## 2023-09-05 NOTE — TELEPHONE ENCOUNTER
CARDIAC CLEARANCE     What type of procedure are you having? BLADDER BIOPSY    Which physician is performing your procedure? DR Brenda Travis    When is your procedure scheduled for?  9/18/23    Where are you having this procedure? [facility]    Are you taking Blood Thinners? If so what? (Name/dose/frequesncy)  Marya Zamarripa     Does the surgeon want you to stop your blood thinner? If so for how long?   YES, 7 DAYS PRIOR    Phone Number and Contact Name for Physicians office:  [phone]    Fax number to send information:    [phone]

## 2023-09-05 NOTE — TELEPHONE ENCOUNTER
CARDIAC CLEARANCE     What type of procedure are you having? Bladder biopsy     Which physician is performing your procedure?     When is your procedure scheduled for? Where [facility]     Are you taking Blood Thinners? If so what? (Name/dose/frequesncy)     Does the surgeon want you to stop your blood thinner? If so for how long?     Phone Number and Contact Name for Physicians office:    Fax number to send information:

## 2023-09-05 NOTE — TELEPHONE ENCOUNTER
Dr. Marlyn Bustillo,   Please review and advise Noa Mackey, RN in Dr. Franky Archuleta absence regarding the below preop cardio risk assessment and request to hold ASA. Recent hospitalization 8/21-8/26/23. HFU with Dr. Jonah Maldonado 9/13/23.

## 2023-09-06 NOTE — TELEPHONE ENCOUNTER
Called both Frannie Morgan & Wife Bc Yanes #s and left vm on both to recall call to schedule Watchman consult with Saleem Alcaraz.

## 2023-09-07 NOTE — TELEPHONE ENCOUNTER
Letter typed in [id number] [id number]. Printed on letter head. Faxed to Dr. Yoan Gray. Fax confirmation rec'd. Scanned into Epic.

## 2023-09-07 NOTE — TELEPHONE ENCOUNTER
Left 4th vm asking pt to return call to schedule Watchman consult with Gita Salazar. If no reply will send letter.

## 2023-09-08 NOTE — TELEPHONE ENCOUNTER
Pallavi Lucio, spoke with patient's wife Yamilka Chowdhury is scheduled on 10-5-23 @ [facility] with Dr. Neelam Jordan - date was requested by Lloyd Hickman due to current appointments scheduled.

## 2023-09-13 ENCOUNTER — OFFICE VISIT (OUTPATIENT)
Dept: CARDIOLOGY CLINIC | Age: 73
End: 2023-09-13
Payer: COMMERCIAL

## 2023-09-13 VITALS
WEIGHT: 208 LBS | DIASTOLIC BLOOD PRESSURE: 82 MMHG | HEIGHT: 63 IN | OXYGEN SATURATION: 97 % | HEART RATE: 109 BPM | BODY MASS INDEX: 36.86 KG/M2 | SYSTOLIC BLOOD PRESSURE: 114 MMHG

## 2023-09-13 DIAGNOSIS — Z09 HOSPITAL DISCHARGE FOLLOW-UP: ICD-10-CM

## 2023-09-13 DIAGNOSIS — E78.5 HYPERLIPIDEMIA LDL GOAL <70: Primary | ICD-10-CM

## 2023-09-13 PROCEDURE — 99214 OFFICE O/P EST MOD 30 MIN: CPT | Performed by: INTERNAL MEDICINE

## 2023-09-13 PROCEDURE — 93000 ELECTROCARDIOGRAM COMPLETE: CPT | Performed by: INTERNAL MEDICINE

## 2023-09-13 PROCEDURE — 1123F ACP DISCUSS/DSCN MKR DOCD: CPT | Performed by: INTERNAL MEDICINE

## 2023-09-13 PROCEDURE — 1111F DSCHRG MED/CURRENT MED MERGE: CPT | Performed by: INTERNAL MEDICINE

## 2023-09-13 PROCEDURE — 3017F COLORECTAL CA SCREEN DOC REV: CPT | Performed by: INTERNAL MEDICINE

## 2023-09-13 PROCEDURE — G8427 DOCREV CUR MEDS BY ELIG CLIN: HCPCS | Performed by: INTERNAL MEDICINE

## 2023-09-13 PROCEDURE — 1036F TOBACCO NON-USER: CPT | Performed by: INTERNAL MEDICINE

## 2023-09-13 PROCEDURE — G8417 CALC BMI ABV UP PARAM F/U: HCPCS | Performed by: INTERNAL MEDICINE

## 2023-09-13 RX ORDER — AMIODARONE HYDROCHLORIDE 200 MG/1
200 TABLET ORAL DAILY
Qty: 30 TABLET | Refills: 5 | Status: SHIPPED | OUTPATIENT
Start: 2023-09-13

## 2023-09-15 ENCOUNTER — HOSPITAL ENCOUNTER (OUTPATIENT)
Dept: WOMENS IMAGING | Age: 73
Discharge: HOME | End: 2023-09-15
Attending: UROLOGY
Payer: COMMERCIAL

## 2023-09-15 DIAGNOSIS — C61 MALIGNANT NEOPLASM OF PROSTATE (HCC): ICD-10-CM

## 2023-09-15 DIAGNOSIS — M81.8 IDIOPATHIC OSTEOPOROSIS: ICD-10-CM

## 2023-09-15 PROCEDURE — 77080 DXA BONE DENSITY AXIAL: CPT

## 2023-09-30 DIAGNOSIS — E78.5 HYPERLIPIDEMIA LDL GOAL <70: ICD-10-CM

## 2023-10-02 RX ORDER — ROSUVASTATIN CALCIUM 40 MG/1
TABLET, COATED ORAL
Qty: 90 TABLET | Refills: 3 | Status: SHIPPED | OUTPATIENT
Start: 2023-10-02

## 2023-10-02 NOTE — TELEPHONE ENCOUNTER
Spoke with patients daughter and Confirmed appointment for [facility] consult. I offered to move to Wednesday but patient is getting fluids in [facility] and wants to keep all in the same day.

## 2023-10-04 NOTE — PROGRESS NOTES
[address] [facility]  Cardiology Consult    Rosangela David  1950 October 4, 2023    Primary Cardiologist: Maty Almeida  Referring Physician: Maty Almeida    Reason for Referral: Watchman    CC:\"Irregular heartbeat\"      Subjective:     History of Present Illness:    Rosangela David is a 75 y.o. patient with a PMH significant for CAD, s/p CABG 2018, Stage IV prostate cancer, chronic kidney disease, most recently admitted to hospital for a fall, found to be related cystitis with sepsis He was also found to have paroxysmal Afib. Anticoagulation was considered but he had hematuria. He presents today for evaluation for Watchman. Indication:  Hematuria      Patient is being referred for Left Atrial Appendage Closure with WATCHMAN device for management of stroke risk resulting from non-valvular atrial fibrillation. Based on their past history, it has been determined that they are poor candidates for long-term oral-anticoagulation, however may be tolerant of short term treatment with [facility] as necessary. Past Medical History:   has a past medical history of CAD (coronary artery disease), Cancer of prostate ([address]), Chest pain, CHF (congestive heart failure) ([address]), and Unstable angina pectoris ([address]). Surgical History:   has a past surgical history that includes Coronary artery bypass graft (01/17/2018); Skin cancer excision; IR PORT PLACEMENT > 5 YEARS (Right, 01/03/2023); IR PORT PLACEMENT > 5 YEARS (01/03/2023); and Colonoscopy. Social History:   reports that he has never smoked. He has never used smokeless tobacco. He reports that he does not drink alcohol and does not use drugs. Family History:  family history includes Heart Attack in his father; No Known Problems in his mother. Home Medications:  Were reviewed and are listed in nursing record and/or below  Prior to Admission medications    Medication Sig Start Date End Date Taking?  Authorizing Provider   rosuvastatin (CRESTOR) 40 MG tablet TAKE ONE

## 2023-10-05 ENCOUNTER — TELEPHONE (OUTPATIENT)
Dept: CARDIOLOGY CLINIC | Age: 73
End: 2023-10-05

## 2023-10-05 ENCOUNTER — OFFICE VISIT (OUTPATIENT)
Dept: CARDIOLOGY CLINIC | Age: 73
End: 2023-10-05
Payer: COMMERCIAL

## 2023-10-05 VITALS
HEIGHT: 63 IN | BODY MASS INDEX: 36.36 KG/M2 | SYSTOLIC BLOOD PRESSURE: 128 MMHG | WEIGHT: 205.2 LBS | OXYGEN SATURATION: 94 % | HEART RATE: 83 BPM | DIASTOLIC BLOOD PRESSURE: 70 MMHG

## 2023-10-05 DIAGNOSIS — Z01.818 PRE-OP TESTING: ICD-10-CM

## 2023-10-05 DIAGNOSIS — I10 BENIGN ESSENTIAL HYPERTENSION: Primary | ICD-10-CM

## 2023-10-05 DIAGNOSIS — I48.0 PAROXYSMAL A-FIB (HCC): Primary | ICD-10-CM

## 2023-10-05 PROCEDURE — G8427 DOCREV CUR MEDS BY ELIG CLIN: HCPCS | Performed by: INTERNAL MEDICINE

## 2023-10-05 PROCEDURE — 99215 OFFICE O/P EST HI 40 MIN: CPT | Performed by: INTERNAL MEDICINE

## 2023-10-05 PROCEDURE — 1036F TOBACCO NON-USER: CPT | Performed by: INTERNAL MEDICINE

## 2023-10-05 PROCEDURE — G8417 CALC BMI ABV UP PARAM F/U: HCPCS | Performed by: INTERNAL MEDICINE

## 2023-10-05 PROCEDURE — 3017F COLORECTAL CA SCREEN DOC REV: CPT | Performed by: INTERNAL MEDICINE

## 2023-10-05 PROCEDURE — G8484 FLU IMMUNIZE NO ADMIN: HCPCS | Performed by: INTERNAL MEDICINE

## 2023-10-05 PROCEDURE — 3078F DIAST BP <80 MM HG: CPT | Performed by: INTERNAL MEDICINE

## 2023-10-05 PROCEDURE — 3074F SYST BP LT 130 MM HG: CPT | Performed by: INTERNAL MEDICINE

## 2023-10-05 PROCEDURE — 93000 ELECTROCARDIOGRAM COMPLETE: CPT | Performed by: INTERNAL MEDICINE

## 2023-10-05 PROCEDURE — 1123F ACP DISCUSS/DSCN MKR DOCD: CPT | Performed by: INTERNAL MEDICINE

## 2023-10-05 NOTE — TELEPHONE ENCOUNTER
Spoke with patient and his wife today regarding Watchman procedure. Patient was shown video on Watchman and given educational material.  Patient states interest and would like to proceed. Copy of the Cardio smart tool was given for shared decision. Patient will need a shared decision office visit before proceeding with Watchman. but first patient will need to talk to Dr. Michael Vieira regarding patients history of prostate CA and his long term outcome. Patient has an appointment on 10/19/2023 and will check chart to see what [facility] thinks of patient getting a Watchman implant.

## 2023-10-05 NOTE — PATIENT INSTRUCTIONS
Will consult with oncologist  Will continue workup  Shared Decision  Patient will require DAPT Post Implant

## 2023-10-16 ENCOUNTER — OFFICE VISIT (OUTPATIENT)
Dept: INTERNAL MEDICINE CLINIC | Age: 73
End: 2023-10-16
Payer: COMMERCIAL

## 2023-10-16 VITALS
OXYGEN SATURATION: 98 % | BODY MASS INDEX: 36.34 KG/M2 | HEART RATE: 74 BPM | DIASTOLIC BLOOD PRESSURE: 76 MMHG | TEMPERATURE: 98.1 F | WEIGHT: 205.13 LBS | SYSTOLIC BLOOD PRESSURE: 134 MMHG

## 2023-10-16 DIAGNOSIS — Z95.1 S/P CABG X 4: ICD-10-CM

## 2023-10-16 DIAGNOSIS — I10 ESSENTIAL HYPERTENSION: ICD-10-CM

## 2023-10-16 DIAGNOSIS — C61 PROSTATE CANCER (HCC): ICD-10-CM

## 2023-10-16 DIAGNOSIS — E78.2 MIXED HYPERLIPIDEMIA: Primary | ICD-10-CM

## 2023-10-16 PROCEDURE — 99214 OFFICE O/P EST MOD 30 MIN: CPT | Performed by: INTERNAL MEDICINE

## 2023-10-16 PROCEDURE — 3017F COLORECTAL CA SCREEN DOC REV: CPT | Performed by: INTERNAL MEDICINE

## 2023-10-16 PROCEDURE — G8427 DOCREV CUR MEDS BY ELIG CLIN: HCPCS | Performed by: INTERNAL MEDICINE

## 2023-10-16 PROCEDURE — 1123F ACP DISCUSS/DSCN MKR DOCD: CPT | Performed by: INTERNAL MEDICINE

## 2023-10-16 PROCEDURE — 36415 COLL VENOUS BLD VENIPUNCTURE: CPT | Performed by: INTERNAL MEDICINE

## 2023-10-16 PROCEDURE — G8417 CALC BMI ABV UP PARAM F/U: HCPCS | Performed by: INTERNAL MEDICINE

## 2023-10-16 PROCEDURE — 3078F DIAST BP <80 MM HG: CPT | Performed by: INTERNAL MEDICINE

## 2023-10-16 PROCEDURE — 1036F TOBACCO NON-USER: CPT | Performed by: INTERNAL MEDICINE

## 2023-10-16 PROCEDURE — G8484 FLU IMMUNIZE NO ADMIN: HCPCS | Performed by: INTERNAL MEDICINE

## 2023-10-16 PROCEDURE — 3075F SYST BP GE 130 - 139MM HG: CPT | Performed by: INTERNAL MEDICINE

## 2023-10-16 ASSESSMENT — ENCOUNTER SYMPTOMS
CHEST TIGHTNESS: 0
BLOOD IN STOOL: 0
ABDOMINAL PAIN: 0
NAUSEA: 0
VOMITING: 0
SHORTNESS OF BREATH: 0
SORE THROAT: 0
COUGH: 0

## 2023-10-16 NOTE — PROGRESS NOTES
nausea and vomiting. Neurological:  Negative for dizziness and weakness. Objective   Physical Exam  Constitutional:       Appearance: Normal appearance. HENT:      Head: Normocephalic and atraumatic. Eyes:      General: No scleral icterus. Conjunctiva/sclera: Conjunctivae normal.   Cardiovascular:      Rate and Rhythm: Normal rate and regular rhythm. Pulses: Normal pulses. Heart sounds: Normal heart sounds. Pulmonary:      Effort: Pulmonary effort is normal.      Breath sounds: Normal breath sounds. Musculoskeletal:         General: No swelling. Skin:     General: Skin is warm and dry. Neurological:      Mental Status: He is alert and oriented to person, place, and time. Mental status is at baseline. Psychiatric:         Mood and Affect: Mood normal.         Behavior: Behavior normal.              An electronic signature was used to authenticate this note.     --Marlene Hoyos MD

## 2023-10-20 RX ORDER — EZETIMIBE 10 MG/1
10 TABLET ORAL DAILY
Qty: 90 TABLET | Refills: 3 | Status: SHIPPED | OUTPATIENT
Start: 2023-10-20

## 2023-10-20 NOTE — TELEPHONE ENCOUNTER
Requested Prescriptions     Pending Prescriptions Disp Refills    ezetimibe (Flaco Cross) 10 MG tablet [Pharmacy Med Name: EZETIMIBE 10 MG TABLET] 90 tablet 0     Sig: TAKE 1 TABLET BY MOUTH DAILY          Number: 90    Refills: 3    Last Office Visit: 09/13/2023    Next Office Visit: 11/2/2023     Last Refill: 07/21/2023    Last Labs: 08/23/2022

## 2023-10-27 ENCOUNTER — TELEPHONE (OUTPATIENT)
Dept: CARDIOLOGY CLINIC | Age: 73
End: 2023-10-27

## 2023-10-27 NOTE — TELEPHONE ENCOUNTER
Brook Garcia called wanting to reschedule Jesse's f/u with Dr. Pawel Boyd. She states that he gets fluids on Thursday and usually has difficulty walking. She shared that if his appointment could be at a later time on Thursdays, he would be able to come in. Please assist with scheduling, should he see a NP?      Marilyn's callback: [phone]

## 2023-10-27 NOTE — TELEPHONE ENCOUNTER
Im not exactly sure what that looks like but over the next 2 weeks, please review with wife Hoa Cano as between 10/30-11/15: please choose what works best with his schedule when he is not exhausted. Office :   Monday PM  Wednesday PM   Thursday AM  Monday PM  Wednesday AM     May book with any follow up between 8-12 months. Any questions, let me know. Thanks.

## 2023-10-31 RX ORDER — CHLORHEXIDINE GLUCONATE 213 G/1000ML
SOLUTION TOPICAL
Qty: 1 EACH | Refills: 0 | Status: SHIPPED | OUTPATIENT
Start: 2023-10-31 | End: 2023-12-22

## 2023-10-31 NOTE — TELEPHONE ENCOUNTER
Spoke with patients wife about scheduling his Watchman procedure. She will contact her  and call me back to let me know if they are ready to schedule the procedure.

## 2023-10-31 NOTE — TELEPHONE ENCOUNTER
Dr.Shelly 8/25/2023 referred for Watchman procedure. Dr. Ebony Mandujano consulted on 10/5/2023. Dr. Devin Mc shared decision on 9/13/2023. GUNNER to be done the day of the procedure.   Insurance to be approved per [employer] [facility]

## 2023-10-31 NOTE — TELEPHONE ENCOUNTER
Spoke with patient's wife and informed her of the procedure that is being scheduled for 12/18/2023 at 9:30 AM arrival.  Lab work has been ordered and instructed to have done at [address] [place] [address] 12/11/2023. Also informed to get Hibiclens bath at their Pharmacy. All procedure instructions were e-mailed to patient. Instructed to contact me with any questions prior to procedure.

## 2023-11-15 NOTE — PROGRESS NOTES
[facility]  outpatient Progress Note    CC: Fibrillation        HPI:   Mr. Alva Hodgkins is a 71-year-old gentleman who has three-vessel coronary disease and is status post four-vessel bypass in January 2018. He also has history of stage IV prostate cancer, chronic kidney disease. He was recently admitted to the hospital for a fall possibly related to hypotension which in turn was related to either cystitis plus minus sepsis. He was markedly dehydrated with his creatinine going up to almost 4 requiring hospitalization. I saw him when he was admitted to the hospital with acute kidney injury hypotension dehydration. I was asked to see him for bigeminy. The bigeminy resolved on its own but he was found to have paroxysmal atrial fibrillation without symptoms. I then placed him on amiodarone 200 twice daily. Patient was having hematuria and therefore we did not start him on blood thinners. His creatinine is improved from 4.7 to 2.. He is only taking aspirin daily. The patient has been approved for watchman which will be placed in December 18. Since then he has had a cystoscopy which has not shown any bladder lesions. He has no hematuria       Review of Systems -   Constitutional: Negative for weight gain/loss; malaise, fever  Respiratory: Negative for Asthma;  cough and hemoptysis  Cardiovascular: Negative for palpitations,dizziness   Gastrointestinal: Negative for abd.pain; constipation/diarrhea;    Genitourinary: Negative for stones; hematuria; frequency hesitancy  Integumentt: Negative for rash or pruritis  Hematologic/lymphatic: Negative for blood dyscrasia; leukemia/lymphoma  Musculoskeletal: Negative for Connective tissue disease  Neurological:  Negative for Seizure   Behavioral/Psych:Negative for Bipolar disorder, Schizophrenia; Dementia  Endocrine: negative for thyroid, parathyroid disease      Physical Examination:    There were no vitals taken for this visit.   HEENT:  Face: Atraumatic,

## 2023-11-17 ENCOUNTER — OFFICE VISIT (OUTPATIENT)
Dept: CARDIOLOGY CLINIC | Age: 73
End: 2023-11-17
Payer: COMMERCIAL

## 2023-11-17 VITALS
HEART RATE: 72 BPM | BODY MASS INDEX: 36.68 KG/M2 | SYSTOLIC BLOOD PRESSURE: 114 MMHG | OXYGEN SATURATION: 97 % | HEIGHT: 63 IN | DIASTOLIC BLOOD PRESSURE: 70 MMHG | WEIGHT: 207 LBS

## 2023-11-17 DIAGNOSIS — I48.0 PAROXYSMAL ATRIAL FIBRILLATION (HCC): ICD-10-CM

## 2023-11-17 DIAGNOSIS — I21.4 NSTEMI (NON-ST ELEVATED MYOCARDIAL INFARCTION) (HCC): Primary | ICD-10-CM

## 2023-11-17 PROCEDURE — 99215 OFFICE O/P EST HI 40 MIN: CPT | Performed by: INTERNAL MEDICINE

## 2023-11-17 PROCEDURE — 1036F TOBACCO NON-USER: CPT | Performed by: INTERNAL MEDICINE

## 2023-11-17 PROCEDURE — 3017F COLORECTAL CA SCREEN DOC REV: CPT | Performed by: INTERNAL MEDICINE

## 2023-11-17 PROCEDURE — 93000 ELECTROCARDIOGRAM COMPLETE: CPT | Performed by: INTERNAL MEDICINE

## 2023-11-17 PROCEDURE — G8427 DOCREV CUR MEDS BY ELIG CLIN: HCPCS | Performed by: INTERNAL MEDICINE

## 2023-11-17 PROCEDURE — G8417 CALC BMI ABV UP PARAM F/U: HCPCS | Performed by: INTERNAL MEDICINE

## 2023-11-17 PROCEDURE — 1123F ACP DISCUSS/DSCN MKR DOCD: CPT | Performed by: INTERNAL MEDICINE

## 2023-11-17 PROCEDURE — G8484 FLU IMMUNIZE NO ADMIN: HCPCS | Performed by: INTERNAL MEDICINE

## 2023-12-04 NOTE — TELEPHONE ENCOUNTER
Last OV: 11/17/23  Next OV: X due in 3 months  Last refill: 9/13/23  #30  5 R/F  Most recent Labs: 9/6/23  Last EKG (if needed): 11/17/23

## 2023-12-04 NOTE — TELEPHONE ENCOUNTER
I spoke with patients wife regarding upcoming Watchman procedure. She informed me that Dr. Harriett Mcintyre placed him back on Eliquis and was told to stop BASA. I informed her since Eliquis was added that the letter I sent doesn't mention this medication but I informed her to stop taking Eliquis after taking 12/16/2023 dose. Also reminded him to go get lab work done next Monday on 12/11/2023.

## 2023-12-04 NOTE — TELEPHONE ENCOUNTER
Medication Refill    Medication needing refilled:  amiodarone (CORDARONE)     Dosage of the medication:  200 MG tablet     How are you taking this medication (QD, BID, TID, QID, PRN):  Take 1 tablet by mouth daily     30 or 90 day supply called in:  90 day supply    When will you run out of your medication:  Couple days left    Which Pharmacy are we sending the medication to?:  [facility] [id number] [place], [place] - [address] [phone]  F [phone]

## 2023-12-05 RX ORDER — AMIODARONE HYDROCHLORIDE 200 MG/1
200 TABLET ORAL DAILY
Qty: 90 TABLET | Refills: 3 | Status: SHIPPED | OUTPATIENT
Start: 2023-12-05

## 2023-12-11 DIAGNOSIS — Z01.818 PRE-OP TESTING: ICD-10-CM

## 2023-12-11 DIAGNOSIS — I48.0 PAROXYSMAL ATRIAL FIBRILLATION (HCC): ICD-10-CM

## 2023-12-11 DIAGNOSIS — I48.0 PAROXYSMAL A-FIB (HCC): ICD-10-CM

## 2023-12-11 LAB
ABO + RH BLD: NORMAL
ALBUMIN SERPL-MCNC: 4.2 G/DL (ref 3.4–5)
ALBUMIN/GLOB SERPL: 1.9 {RATIO} (ref 1.1–2.2)
ALP SERPL-CCNC: 89 U/L (ref 40–129)
ALT SERPL-CCNC: 24 U/L (ref 10–40)
ANION GAP SERPL CALCULATED.3IONS-SCNC: 9 MMOL/L (ref 3–16)
AST SERPL-CCNC: 22 U/L (ref 15–37)
BACTERIA URNS QL MICRO: ABNORMAL /HPF
BILIRUB SERPL-MCNC: 0.3 MG/DL (ref 0–1)
BILIRUB UR QL STRIP.AUTO: NEGATIVE
BLD GP AB SCN SERPL QL: NORMAL
BUN SERPL-MCNC: 18 MG/DL (ref 7–20)
CALCIUM SERPL-MCNC: 9.4 MG/DL (ref 8.3–10.6)
CHLORIDE SERPL-SCNC: 108 MMOL/L (ref 99–110)
CLARITY UR: ABNORMAL
CO2 SERPL-SCNC: 28 MMOL/L (ref 21–32)
COLOR UR: YELLOW
CREAT SERPL-MCNC: 1.5 MG/DL (ref 0.8–1.3)
DEPRECATED RDW RBC AUTO: 16.4 % (ref 12.4–15.4)
EPI CELLS #/AREA URNS AUTO: 1 /HPF (ref 0–5)
GFR SERPLBLD CREATININE-BSD FMLA CKD-EPI: 48 ML/MIN/{1.73_M2}
GLUCOSE SERPL-MCNC: 95 MG/DL (ref 70–99)
GLUCOSE UR STRIP.AUTO-MCNC: NEGATIVE MG/DL
HCT VFR BLD AUTO: 29.4 % (ref 40.5–52.5)
HGB BLD-MCNC: 9.6 G/DL (ref 13.5–17.5)
HGB UR QL STRIP.AUTO: ABNORMAL
HYALINE CASTS #/AREA URNS AUTO: 0 /LPF (ref 0–8)
KETONES UR STRIP.AUTO-MCNC: NEGATIVE MG/DL
LEUKOCYTE ESTERASE UR QL STRIP.AUTO: ABNORMAL
MCH RBC QN AUTO: 32.3 PG (ref 26–34)
MCHC RBC AUTO-ENTMCNC: 32.7 G/DL (ref 31–36)
MCV RBC AUTO: 98.8 FL (ref 80–100)
NITRITE UR QL STRIP.AUTO: NEGATIVE
PH UR STRIP.AUTO: 6 [PH] (ref 5–8)
PLATELET # BLD AUTO: 146 K/UL (ref 135–450)
PMV BLD AUTO: 10.7 FL (ref 5–10.5)
POTASSIUM SERPL-SCNC: 4 MMOL/L (ref 3.5–5.1)
PROT SERPL-MCNC: 6.4 G/DL (ref 6.4–8.2)
PROT UR STRIP.AUTO-MCNC: 30 MG/DL
RBC # BLD AUTO: 2.98 M/UL (ref 4.2–5.9)
RBC CLUMPS #/AREA URNS AUTO: 583 /HPF (ref 0–4)
SODIUM SERPL-SCNC: 145 MMOL/L (ref 136–145)
SP GR UR STRIP.AUTO: 1.02 (ref 1–1.03)
UA DIPSTICK W REFLEX MICRO PNL UR: YES
URN SPEC COLLECT METH UR: ABNORMAL
UROBILINOGEN UR STRIP-ACNC: 0.2 E.U./DL
WBC # BLD AUTO: 6.6 K/UL (ref 4–11)
WBC #/AREA URNS AUTO: 29 /HPF (ref 0–5)

## 2023-12-12 ENCOUNTER — TELEPHONE (OUTPATIENT)
Dept: CARDIOLOGY CLINIC | Age: 73
End: 2023-12-12

## 2023-12-12 DIAGNOSIS — Z95.818 PRESENCE OF WATCHMAN LEFT ATRIAL APPENDAGE CLOSURE DEVICE: Primary | ICD-10-CM

## 2023-12-12 DIAGNOSIS — I48.0 PAROXYSMAL A-FIB (HCC): ICD-10-CM

## 2023-12-12 NOTE — TELEPHONE ENCOUNTER
Called patient to discuss Watchman procedure that is schedule on 12/18/2023 to review instructions and lab results.

## 2023-12-13 NOTE — TELEPHONE ENCOUNTER
Sandra Jackson informed me that patients Authorization for up coming Watchman 12/18....Yury Levin - his insurance came back saying he is not auth'd because criteria doesn't meet the standards for Inpatient approval.  If the doc wants to call and do peer to peer to change to observation he can and it will be approved....    They just said it would be auth'd at an observation status, just not the inpatient portion....    The number for peer to peer is [phone] and they may ask for the ID - it is [id number] and the Case number is [id number]    I called and scheduled a Peer to Peer with Dr. Cochran at 12 Noon on 12/14/2023 with Dr. Mitchell Bañuelos from the insurance company.

## 2023-12-14 NOTE — TELEPHONE ENCOUNTER
9:30 AM Yury Levin 1950 [facility]     I spoke with patient regarding the lab results.  Patient is currently taking Patient now taking Eliquis instructed to stop taking after his dose on 12/16/2023.  Patient instructed to take 1 BASA day of the procedure. Patient has been instructed and went over all instructions.      Creatinine 1.5 below baseline  Glucose 95  HgB 9.6 Dr. Cochran aware  Plt 146  U/A NO UTI  PT/INR NOT DRAWN    CHADSvasc is at least 3 (HTN, VASCULAR DISEASE, AGE)   HASbled is at least 4 (HTN, RENAL DISEASE, HEMATURIA, AGE)    Currently on Eliquis 2.5 mg BID. Patient is a poor candidate for anticoagulation due to his Hematuria.    Dr. Bravo 8/25/2023 referred for Watchman procedure.  Dr. Cochran consulted on 10/5/2023.  Dr. Bravo shared decision on 9/13/2023.  GUNNER to be done the day of the procedure.  Insurance to be approved per Sandra Jackson    Call patient on Friday to review medications/problems. Patient now taking Eliquis instructed to stop taking after his dose on 12/16/2023.   UTI (Antibiotic taken)? NO  Any groin issues? look for any type of rash, open skin, etc NO  On any blood thinners & when to stop taking? Eliquis instructed to stop taking after his dose on 12/16/2023.   Lab work addressed prior to admission. YES  Allergies addressed?  Pre-medication necessary? NO Allergy  Pt. staying overnight? NO   PT/INR, T&C & Glucose ordered for day of procedure on every patient. YES  \"Electrophysiology Testing\" order placed on every patient? YES

## 2023-12-19 NOTE — TELEPHONE ENCOUNTER
Pt is scheduled on 2-7-24 at [facility] for Post Watchman GUNNER with Dr. Cochran.  His 6 mo f/u is on 6-21-24 & his 1 yr f/u on 12-19-24 with Dr. Bravo at [facility].

## 2023-12-19 NOTE — TELEPHONE ENCOUNTER
12/18/2023 s/p SUCCESSFUL WATCHMAN LAAC PROCEDURE PER DR. Dominick Julian of left atrial appendage occlusion device with no complication, WATCHMAN device used 20 mm size.     --6-month f/u unable to schedule d/t next year the schedule is not out yet.  All will print on discharge after visit summary.  --1-year f/u unable to schedule d/t next year the schedule is not out yet.  All will print on discharge after visit summary.     Loreta, Schedule a GUNNER Cochran    Post procedure GUNNER to be completed 45-59 days post procedure (2/1/2024-2/15/2024) Order placed.     Thanks.   Navjot Lacey RN, BSN   Watchman Coordinator

## 2023-12-29 PROBLEM — I48.0 PAF (PAROXYSMAL ATRIAL FIBRILLATION) (HCC): Status: ACTIVE | Noted: 2023-12-29

## 2024-01-26 ENCOUNTER — HOSPITAL ENCOUNTER (OUTPATIENT)
Dept: ULTRASOUND IMAGING | Age: 74
Discharge: HOME | End: 2024-01-26
Attending: INTERNAL MEDICINE
Payer: COMMERCIAL

## 2024-01-26 DIAGNOSIS — C61 MALIGNANT NEOPLASM OF PROSTATE (HCC): ICD-10-CM

## 2024-01-26 PROCEDURE — 76770 US EXAM ABDO BACK WALL COMP: CPT

## 2024-02-07 ENCOUNTER — HOSPITAL ENCOUNTER (OUTPATIENT)
Dept: CARDIAC CATH/INVASIVE PROCEDURES | Age: 74
Discharge: HOME | End: 2024-02-07
Payer: COMMERCIAL

## 2024-02-07 VITALS
TEMPERATURE: 97 F | RESPIRATION RATE: 15 BRPM | SYSTOLIC BLOOD PRESSURE: 118 MMHG | HEART RATE: 60 BPM | OXYGEN SATURATION: 98 % | DIASTOLIC BLOOD PRESSURE: 56 MMHG

## 2024-02-07 DIAGNOSIS — Z95.818 PRESENCE OF WATCHMAN LEFT ATRIAL APPENDAGE CLOSURE DEVICE: ICD-10-CM

## 2024-02-07 DIAGNOSIS — I48.0 PAROXYSMAL A-FIB (HCC): ICD-10-CM

## 2024-02-07 PROCEDURE — 99152 MOD SED SAME PHYS/QHP 5/>YRS: CPT

## 2024-02-07 PROCEDURE — 93312 ECHO TRANSESOPHAGEAL: CPT

## 2024-02-07 PROCEDURE — 93325 DOPPLER ECHO COLOR FLOW MAPG: CPT

## 2024-02-07 PROCEDURE — 6360000002 HC RX W HCPCS

## 2024-02-07 RX ORDER — SODIUM CHLORIDE 9 MG/ML
INJECTION, SOLUTION INTRAVENOUS PRN
Status: DISCONTINUED | OUTPATIENT
Start: 2024-02-07 | End: 2024-02-08 | Stop reason: HOSPADM

## 2024-02-07 RX ORDER — SODIUM CHLORIDE 0.9 % (FLUSH) 0.9 %
5-40 SYRINGE (ML) INJECTION EVERY 12 HOURS SCHEDULED
Status: DISCONTINUED | OUTPATIENT
Start: 2024-02-07 | End: 2024-02-08 | Stop reason: HOSPADM

## 2024-02-07 RX ORDER — CLOPIDOGREL BISULFATE 75 MG/1
75 TABLET ORAL DAILY
Qty: 30 TABLET | Refills: 3 | Status: SHIPPED | OUTPATIENT
Start: 2024-02-07

## 2024-02-07 RX ORDER — SODIUM CHLORIDE 0.9 % (FLUSH) 0.9 %
5-40 SYRINGE (ML) INJECTION PRN
Status: DISCONTINUED | OUTPATIENT
Start: 2024-02-07 | End: 2024-02-08 | Stop reason: HOSPADM

## 2024-02-07 NOTE — FLOWSHEET NOTE
Dr. Cochran and appropriate staff at bedside for GUNNER.  Consent signed.    Time out at 1208    Appropriate equipment at bedside including emergency equipment.  Time out observed.  Patient medicated with 3 mg versed and 100 mcg fentanyl at 1210  GUNNER probe passed and images obtained.1212  GUNNER probe removed without difficulty, mouth suctioned.  Procedure complete. 1216  Vital signs remain stable. Patient is sleeping post procedure. No distress noted.    Post vitals:  BP 96/52 P 64 R 11 O2 sat 99    Dr. Cochran speaking with wife at bedside. 1221   Patient awake and talking. Wife at bedside.  Discharge instructions given to wife and patient with verbalization of understanding.   Patient up and about in room. Right upper chest port deaccessed after flushed with 20cc of normal saline flush. Bandaid placed over port site.  Discharged to car with wife driving.

## 2024-02-07 NOTE — DISCHARGE INSTRUCTIONS
TRANSESOPHAGEAL ECHOCARDIOGRAM (GUNNER)      No driving for 24 hours after procedure  Do not make important / legal decisions within 24 hours after procedure  Do not drink any alcoholic beverages or sleeping pills for 24 hours   Advance your diet as tolerated  Continue all of your medication as directed  Call your doctor for the following symptoms:     -Fever   -Difficulty swallowing   -Chest pain   -Difficulty breathing   -Coughing up blood or bloody sputum    Follow up with your doctor as instructed

## 2024-04-17 ENCOUNTER — APPOINTMENT (OUTPATIENT)
Dept: CT IMAGING | Age: 74
End: 2024-04-17
Payer: COMMERCIAL

## 2024-04-17 ENCOUNTER — HOSPITAL ENCOUNTER (INPATIENT)
Age: 74
LOS: 2 days | Discharge: HOME | End: 2024-04-19
Attending: EMERGENCY MEDICINE | Admitting: INTERNAL MEDICINE
Payer: COMMERCIAL

## 2024-04-17 DIAGNOSIS — R31.9 HEMATURIA, UNSPECIFIED TYPE: ICD-10-CM

## 2024-04-17 DIAGNOSIS — R10.9 FLANK PAIN: Primary | ICD-10-CM

## 2024-04-17 PROBLEM — R31.0 GROSS HEMATURIA: Status: ACTIVE | Noted: 2024-04-17

## 2024-04-17 LAB
ABO + RH BLD: NORMAL
ALBUMIN SERPL-MCNC: 3.7 G/DL (ref 3.4–5)
ALBUMIN/GLOB SERPL: 1.8 {RATIO} (ref 1.1–2.2)
ALP SERPL-CCNC: 86 U/L (ref 40–129)
ALT SERPL-CCNC: 11 U/L (ref 10–40)
ANION GAP SERPL CALCULATED.3IONS-SCNC: 13 MMOL/L (ref 3–16)
AST SERPL-CCNC: 15 U/L (ref 15–37)
BASOPHILS # BLD: 0 K/UL (ref 0–0.2)
BASOPHILS NFR BLD: 0.2 %
BILIRUB SERPL-MCNC: 0.4 MG/DL (ref 0–1)
BILIRUB UR QL STRIP.AUTO: NEGATIVE
BLD GP AB SCN SERPL QL: NORMAL
BUN SERPL-MCNC: 18 MG/DL (ref 7–20)
CALCIUM SERPL-MCNC: 8.8 MG/DL (ref 8.3–10.6)
CHLORIDE SERPL-SCNC: 99 MMOL/L (ref 99–110)
CLARITY UR: ABNORMAL
CO2 SERPL-SCNC: 24 MMOL/L (ref 21–32)
COLOR UR: ABNORMAL
CREAT SERPL-MCNC: 2.2 MG/DL (ref 0.8–1.3)
DEPRECATED RDW RBC AUTO: 16.6 % (ref 12.4–15.4)
EOSINOPHIL # BLD: 0 K/UL (ref 0–0.6)
EOSINOPHIL NFR BLD: 0.1 %
EPI CELLS #/AREA URNS HPF: ABNORMAL /HPF (ref 0–5)
GFR SERPLBLD CREATININE-BSD FMLA CKD-EPI: 30 ML/MIN/{1.73_M2}
GLUCOSE SERPL-MCNC: 122 MG/DL (ref 70–99)
GLUCOSE UR STRIP.AUTO-MCNC: NEGATIVE MG/DL
HCT VFR BLD AUTO: 23.1 % (ref 40.5–52.5)
HCT VFR BLD AUTO: 24.5 % (ref 40.5–52.5)
HGB BLD-MCNC: 7.8 G/DL (ref 13.5–17.5)
HGB BLD-MCNC: 8.1 G/DL (ref 13.5–17.5)
HGB UR QL STRIP.AUTO: ABNORMAL
HYALINE CASTS #/AREA URNS LPF: ABNORMAL /LPF (ref 0–2)
KETONES UR STRIP.AUTO-MCNC: ABNORMAL MG/DL
LEUKOCYTE ESTERASE UR QL STRIP.AUTO: ABNORMAL
LIPASE SERPL-CCNC: 21 U/L (ref 13–60)
LYMPHOCYTES # BLD: 0.5 K/UL (ref 1–5.1)
LYMPHOCYTES NFR BLD: 4.2 %
MAGNESIUM SERPL-MCNC: 2 MG/DL (ref 1.8–2.4)
MCH RBC QN AUTO: 32 PG (ref 26–34)
MCHC RBC AUTO-ENTMCNC: 33.1 G/DL (ref 31–36)
MCV RBC AUTO: 96.8 FL (ref 80–100)
MONOCYTES # BLD: 0.7 K/UL (ref 0–1.3)
MONOCYTES NFR BLD: 6 %
NEUTROPHILS # BLD: 10 K/UL (ref 1.7–7.7)
NEUTROPHILS NFR BLD: 89.5 %
NITRITE UR QL STRIP.AUTO: NEGATIVE
PH UR STRIP.AUTO: 6.5 [PH] (ref 5–8)
PLATELET # BLD AUTO: 139 K/UL (ref 135–450)
PMV BLD AUTO: 9.3 FL (ref 5–10.5)
POTASSIUM SERPL-SCNC: 3.2 MMOL/L (ref 3.5–5.1)
PROT SERPL-MCNC: 5.8 G/DL (ref 6.4–8.2)
PROT UR STRIP.AUTO-MCNC: >=300 MG/DL
RBC # BLD AUTO: 2.53 M/UL (ref 4.2–5.9)
RBC #/AREA URNS HPF: >100 /HPF (ref 0–4)
SODIUM SERPL-SCNC: 136 MMOL/L (ref 136–145)
SP GR UR STRIP.AUTO: 1.02 (ref 1–1.03)
UA COMPLETE W REFLEX CULTURE PNL UR: ABNORMAL
UA DIPSTICK W REFLEX MICRO PNL UR: YES
URN SPEC COLLECT METH UR: ABNORMAL
UROBILINOGEN UR STRIP-ACNC: 0.2 E.U./DL
WBC # BLD AUTO: 11.2 K/UL (ref 4–11)
WBC #/AREA URNS HPF: ABNORMAL /HPF (ref 0–5)

## 2024-04-17 PROCEDURE — 80053 COMPREHEN METABOLIC PANEL: CPT

## 2024-04-17 PROCEDURE — 2580000003 HC RX 258: Performed by: EMERGENCY MEDICINE

## 2024-04-17 PROCEDURE — 2580000003 HC RX 258: Performed by: INTERNAL MEDICINE

## 2024-04-17 PROCEDURE — 6370000000 HC RX 637 (ALT 250 FOR IP): Performed by: INTERNAL MEDICINE

## 2024-04-17 PROCEDURE — 85018 HEMOGLOBIN: CPT

## 2024-04-17 PROCEDURE — 86901 BLOOD TYPING SEROLOGIC RH(D): CPT

## 2024-04-17 PROCEDURE — 83735 ASSAY OF MAGNESIUM: CPT

## 2024-04-17 PROCEDURE — 86923 COMPATIBILITY TEST ELECTRIC: CPT

## 2024-04-17 PROCEDURE — P9016 RBC LEUKOCYTES REDUCED: HCPCS

## 2024-04-17 PROCEDURE — 36591 DRAW BLOOD OFF VENOUS DEVICE: CPT

## 2024-04-17 PROCEDURE — 30233N1 TRANSFUSION OF NONAUTOLOGOUS RED BLOOD CELLS INTO PERIPHERAL VEIN, PERCUTANEOUS APPROACH: ICD-10-PCS | Performed by: INTERNAL MEDICINE

## 2024-04-17 PROCEDURE — 96375 TX/PRO/DX INJ NEW DRUG ADDON: CPT

## 2024-04-17 PROCEDURE — 1200000000 HC SEMI PRIVATE

## 2024-04-17 PROCEDURE — 6360000002 HC RX W HCPCS: Performed by: EMERGENCY MEDICINE

## 2024-04-17 PROCEDURE — 86850 RBC ANTIBODY SCREEN: CPT

## 2024-04-17 PROCEDURE — 85014 HEMATOCRIT: CPT

## 2024-04-17 PROCEDURE — 85025 COMPLETE CBC W/AUTO DIFF WBC: CPT

## 2024-04-17 PROCEDURE — 83690 ASSAY OF LIPASE: CPT

## 2024-04-17 PROCEDURE — 96374 THER/PROPH/DIAG INJ IV PUSH: CPT

## 2024-04-17 PROCEDURE — 99285 EMERGENCY DEPT VISIT HI MDM: CPT

## 2024-04-17 PROCEDURE — 74176 CT ABD & PELVIS W/O CONTRAST: CPT

## 2024-04-17 PROCEDURE — 86900 BLOOD TYPING SEROLOGIC ABO: CPT

## 2024-04-17 PROCEDURE — 81001 URINALYSIS AUTO W/SCOPE: CPT

## 2024-04-17 RX ORDER — 0.9 % SODIUM CHLORIDE 0.9 %
1000 INTRAVENOUS SOLUTION INTRAVENOUS ONCE
Status: COMPLETED | OUTPATIENT
Start: 2024-04-17 | End: 2024-04-17

## 2024-04-17 RX ORDER — MAGNESIUM SULFATE IN WATER 40 MG/ML
2000 INJECTION, SOLUTION INTRAVENOUS PRN
Status: DISCONTINUED | OUTPATIENT
Start: 2024-04-17 | End: 2024-04-19 | Stop reason: HOSPADM

## 2024-04-17 RX ORDER — ENOXAPARIN SODIUM 100 MG/ML
40 INJECTION SUBCUTANEOUS NIGHTLY
Status: DISCONTINUED | OUTPATIENT
Start: 2024-04-17 | End: 2024-04-17 | Stop reason: ALTCHOICE

## 2024-04-17 RX ORDER — ONDANSETRON 2 MG/ML
4 INJECTION INTRAMUSCULAR; INTRAVENOUS ONCE
Status: COMPLETED | OUTPATIENT
Start: 2024-04-17 | End: 2024-04-17

## 2024-04-17 RX ORDER — POTASSIUM CHLORIDE 20 MEQ/1
40 TABLET, EXTENDED RELEASE ORAL PRN
Status: DISCONTINUED | OUTPATIENT
Start: 2024-04-17 | End: 2024-04-19 | Stop reason: HOSPADM

## 2024-04-17 RX ORDER — EZETIMIBE 10 MG/1
10 TABLET ORAL DAILY
Status: DISCONTINUED | OUTPATIENT
Start: 2024-04-17 | End: 2024-04-19 | Stop reason: HOSPADM

## 2024-04-17 RX ORDER — ROSUVASTATIN CALCIUM 40 MG/1
40 TABLET, COATED ORAL NIGHTLY
Status: DISCONTINUED | OUTPATIENT
Start: 2024-04-17 | End: 2024-04-19 | Stop reason: HOSPADM

## 2024-04-17 RX ORDER — SODIUM CHLORIDE 9 MG/ML
INJECTION, SOLUTION INTRAVENOUS CONTINUOUS
Status: ACTIVE | OUTPATIENT
Start: 2024-04-17 | End: 2024-04-19

## 2024-04-17 RX ORDER — SODIUM CHLORIDE 0.9 % (FLUSH) 0.9 %
5-40 SYRINGE (ML) INJECTION PRN
Status: DISCONTINUED | OUTPATIENT
Start: 2024-04-17 | End: 2024-04-19 | Stop reason: HOSPADM

## 2024-04-17 RX ORDER — SODIUM CHLORIDE 9 MG/ML
INJECTION, SOLUTION INTRAVENOUS PRN
Status: DISCONTINUED | OUTPATIENT
Start: 2024-04-17 | End: 2024-04-19 | Stop reason: HOSPADM

## 2024-04-17 RX ORDER — AMIODARONE HYDROCHLORIDE 200 MG/1
200 TABLET ORAL DAILY
Status: DISCONTINUED | OUTPATIENT
Start: 2024-04-17 | End: 2024-04-19 | Stop reason: HOSPADM

## 2024-04-17 RX ORDER — POTASSIUM CHLORIDE 7.45 MG/ML
10 INJECTION INTRAVENOUS PRN
Status: DISCONTINUED | OUTPATIENT
Start: 2024-04-17 | End: 2024-04-19 | Stop reason: HOSPADM

## 2024-04-17 RX ORDER — FENTANYL CITRATE 50 UG/ML
50 INJECTION, SOLUTION INTRAMUSCULAR; INTRAVENOUS ONCE
Status: COMPLETED | OUTPATIENT
Start: 2024-04-17 | End: 2024-04-17

## 2024-04-17 RX ORDER — ACETAMINOPHEN 325 MG/1
650 TABLET ORAL EVERY 6 HOURS PRN
Status: DISCONTINUED | OUTPATIENT
Start: 2024-04-17 | End: 2024-04-19 | Stop reason: HOSPADM

## 2024-04-17 RX ORDER — ONDANSETRON 2 MG/ML
4 INJECTION INTRAMUSCULAR; INTRAVENOUS EVERY 6 HOURS PRN
Status: DISCONTINUED | OUTPATIENT
Start: 2024-04-17 | End: 2024-04-19 | Stop reason: HOSPADM

## 2024-04-17 RX ORDER — SODIUM CHLORIDE 0.9 % (FLUSH) 0.9 %
5-40 SYRINGE (ML) INJECTION EVERY 12 HOURS SCHEDULED
Status: DISCONTINUED | OUTPATIENT
Start: 2024-04-17 | End: 2024-04-19 | Stop reason: HOSPADM

## 2024-04-17 RX ORDER — ACETAMINOPHEN 650 MG/1
650 SUPPOSITORY RECTAL EVERY 6 HOURS PRN
Status: DISCONTINUED | OUTPATIENT
Start: 2024-04-17 | End: 2024-04-19 | Stop reason: HOSPADM

## 2024-04-17 RX ORDER — POLYETHYLENE GLYCOL 3350 17 G/17G
17 POWDER, FOR SOLUTION ORAL DAILY PRN
Status: DISCONTINUED | OUTPATIENT
Start: 2024-04-17 | End: 2024-04-19 | Stop reason: HOSPADM

## 2024-04-17 RX ORDER — ONDANSETRON 4 MG/1
4 TABLET, ORALLY DISINTEGRATING ORAL EVERY 8 HOURS PRN
Status: DISCONTINUED | OUTPATIENT
Start: 2024-04-17 | End: 2024-04-19 | Stop reason: HOSPADM

## 2024-04-17 RX ADMIN — Medication 10 ML: at 10:37

## 2024-04-17 RX ADMIN — ROSUVASTATIN CALCIUM 40 MG: 40 TABLET, FILM COATED ORAL at 19:55

## 2024-04-17 RX ADMIN — EZETIMIBE 10 MG: 10 TABLET ORAL at 12:00

## 2024-04-17 RX ADMIN — SODIUM CHLORIDE: 9 INJECTION, SOLUTION INTRAVENOUS at 10:33

## 2024-04-17 RX ADMIN — SODIUM CHLORIDE 1000 ML: 9 INJECTION, SOLUTION INTRAVENOUS at 03:30

## 2024-04-17 RX ADMIN — FENTANYL CITRATE 50 MCG: 50 INJECTION INTRAMUSCULAR; INTRAVENOUS at 03:32

## 2024-04-17 RX ADMIN — ONDANSETRON 4 MG: 2 INJECTION INTRAMUSCULAR; INTRAVENOUS at 03:31

## 2024-04-17 RX ADMIN — AMIODARONE HYDROCHLORIDE 200 MG: 200 TABLET ORAL at 12:00

## 2024-04-17 ASSESSMENT — PAIN DESCRIPTION - ORIENTATION
ORIENTATION: RIGHT
ORIENTATION: RIGHT

## 2024-04-17 ASSESSMENT — PAIN SCALES - GENERAL
PAINLEVEL_OUTOF10: 4
PAINLEVEL_OUTOF10: 3
PAINLEVEL_OUTOF10: 5
PAINLEVEL_OUTOF10: 8

## 2024-04-17 ASSESSMENT — PAIN DESCRIPTION - LOCATION
LOCATION: FLANK
LOCATION: FLANK

## 2024-04-17 ASSESSMENT — LIFESTYLE VARIABLES
HOW OFTEN DO YOU HAVE A DRINK CONTAINING ALCOHOL: MONTHLY OR LESS
HOW MANY STANDARD DRINKS CONTAINING ALCOHOL DO YOU HAVE ON A TYPICAL DAY: 1 OR 2

## 2024-04-17 ASSESSMENT — PAIN DESCRIPTION - FREQUENCY: FREQUENCY: CONTINUOUS

## 2024-04-17 ASSESSMENT — PAIN DESCRIPTION - DESCRIPTORS
DESCRIPTORS: DULL
DESCRIPTORS: THROBBING

## 2024-04-17 ASSESSMENT — PAIN - FUNCTIONAL ASSESSMENT
PAIN_FUNCTIONAL_ASSESSMENT: ACTIVITIES ARE NOT PREVENTED
PAIN_FUNCTIONAL_ASSESSMENT: 0-10
PAIN_FUNCTIONAL_ASSESSMENT: NONE - DENIES PAIN
PAIN_FUNCTIONAL_ASSESSMENT: ACTIVITIES ARE NOT PREVENTED

## 2024-04-17 ASSESSMENT — PAIN DESCRIPTION - PAIN TYPE: TYPE: ACUTE PAIN

## 2024-04-17 NOTE — PROGRESS NOTES
4 Eyes Admission Assessment     I agree as the admission nurse that 2 RN's have performed a thorough Head to Toe Skin Assessment on the patient. ALL assessment sites listed below have been assessed on admission.       Areas assessed by both nurses: Dorina DUARTE/Amanda   [x]   Head, Face, and Ears   [x]   Shoulders, Back, and Chest  [x]   Arms, Elbows, and Hands   [x]   Coccyx, Sacrum, and Ischum  [x]   Legs, Feet, and Heels        Does the Patient have Skin Breakdown?  No         Jaime Prevention initiated:  Yes   Wound Care Orders initiated:  NA      [facility] nurse consulted for Pressure Injury (Stage 3,4, Unstageable, DTI, NWPT, and Complex wounds):  No      Nurse 1 eSignature: Electronically signed by Dorina Spencer RN on 4/17/24 at 6:49 PM EDT    **SHARE this note so that the co-signing nurse is able to place an eSignature**    Nurse 2 eSignature: Electronically signed by Amanda Iniguez RN on 4/17/24 at 7:55 PM EDT

## 2024-04-17 NOTE — CONSULTS
Consulting Physician: Dr. Adonay Jama in ED    Reason for Consult: bilateral hydronephrosis, gross hematuria, met prostate cancer, possible new bladder mass    History of Present Illness: Yury Levin is a 73 y.o. male who has been followed for metastatic prostate cancer and sees Dr. Wells. This has been fairly well treated s/p chemo with taxotere and now on Jevtana. He presented to the ER with worsening of right flank pain and gross hematuria. CT shows bilateral hydro with clot in right renal collecting system and possible new bladder mass which looks to be at the urachus.    Currently he is improving. His right flank pain is almost completely resolved. He still has blood in the urine but it is fairly thin and he is voiding spontaneously without clots.    Past Medical History:   Past Medical History:   Diagnosis Date    CAD (coronary artery disease) 01/17/2018    NSTEMI - CABG x4 1/17/18    Cancer of prostate (HCC)     Chest pain 01/17/2018    CHF (congestive heart failure) (HCC)     Unstable angina pectoris (HCC)        Past Surgical History:  Past Surgical History:   Procedure Laterality Date    COLONOSCOPY      CORONARY ARTERY BYPASS GRAFT  01/17/2018    cabgx4 ([facility])    IR PORT PLACEMENT EQUAL OR GREATER THAN 5 YEARS Right 01/03/2023    Power Port; RIJ access; 24cm; Dr. Jones    IR PORT PLACEMENT EQUAL OR GREATER THAN 5 YEARS  01/03/2023    IR PORT PLACEMENT EQUAL OR GREATER THAN 5 YEARS 1/3/2023 [facility] SPECIAL PROCEDURES    LEFT ATRIAL APPENDAGE OCCLUDER DEVICE  12/18/2023    21 mm    SKIN CANCER EXCISION         Social History:  Social History     Socioeconomic History    Marital status:      Spouse name: Not on file    Number of children: Not on file    Years of education: Not on file    Highest education level: Not on file   Occupational History    Not on file   Tobacco Use    Smoking status: Never    Smokeless tobacco: Never    Tobacco comments:     occasional cigar years ago   Vaping  03:26 AM    BUN 18 04/17/2024 03:26 AM    CREATININE 2.2 04/17/2024 03:26 AM    GLUCOSE 122 04/17/2024 03:26 AM    CALCIUM 8.8 04/17/2024 03:26 AM       Urinalysis:   Lab Results   Component Value Date/Time    COLORU RED 04/17/2024 03:11 AM    GLUCOSEU Negative 04/17/2024 03:11 AM    BLOODU LARGE 04/17/2024 03:11 AM    NITRU Negative 04/17/2024 03:11 AM    LEUKOCYTESUR TRACE 04/17/2024 03:11 AM       Imaging:  Pertinent images and radiologist's report were reviewed independently  CT OF THE ABDOMEN AND PELVIS WITHOUT CONTRAST 4/17/2024 3:16 am     TECHNIQUE:  CT of the abdomen and pelvis was performed without the administration of  intravenous contrast. Multiplanar reformatted images are provided for review.  Automated exposure control, iterative reconstruction, and/or weight based  adjustment of the mA/kV was utilized to reduce the radiation dose to as low  as reasonably achievable.     COMPARISON:  None.     HISTORY:  ORDERING SYSTEM PROVIDED HISTORY: hematuria  TECHNOLOGIST PROVIDED HISTORY:  Reason for exam:->hematuria  Additional Contrast?->None  Decision Support Exception - unselect if not a suspected or confirmed  emergency medical condition->Emergency Medical Condition (MA)  Reason for Exam: hematuria     FINDINGS:  Lower Chest:  Visualized portion of the lower chest demonstrates no acute  abnormality.     Organs:  Liver demonstrates multiple heterogeneous low-density lesion may  represent metastatic lesions . There is no evidence of intrahepatic biliary  ductal dilatation. The spleen, pancreas and adrenal glands are unremarkable.  There is moderate bilateral hydronephrosis and hydroureter without evidence  of obstructive stones.  There is increased attenuation in the right renal  collecting system consistent with hemorrhage.  There is nonobstructive stone  of the left kidney measures 4 mm     GI/Bowel:  There is no evidence of bowel obstruction.  No evidence of  abnormal bowel wall thickening or

## 2024-04-17 NOTE — ED NOTES
PACU ANESTHESIA ADMISSION NOTE      Procedure: Right carotid endarterectomy    Left carotid endarterectomy      Post op diagnosis:  Carotid stenosis, right        ____  Intubated  TV:______       Rate: ______      FiO2: ______    ___X_  Patent Airway    ____X  Full return of protective reflexes    ____X  Full recovery from anesthesia / back to baseline     Vitals:   T:           R:                  BP:                  Sat:                   P:   SEE ANESTHESIA RECORD    Mental Status:  ___X_ Awake   _____ Alert   _____ Drowsy   _____ Sedated    Nausea/Vomiting:  ____ NO  ____X__Yes,   See Post - Op Orders          Pain Scale (0-10):  _____    Treatment: ____ None    ____ See Post - Op/PCA Orders    Post - Operative Fluids:   ____ Oral   ____X See Post - Op Orders    Plan: Discharge:   ____Home       _____Floor     ___X__Critical Care    _____  Other:_________________    Comments: Report received from Jennifer PRICE. All questions answered.       Dr. Jama at bedside at this time.

## 2024-04-17 NOTE — PROGRESS NOTES
Pt received from ED per stretcher he has 2 liters of oxygen on per nasal cannula and he has his right chest port accessed with normal saline infusing at 75 ml/hr . Pt.'s wife is with him.

## 2024-04-17 NOTE — ED TRIAGE NOTES
Patient comes to ER for right sided flank pain, blood in urine, nausea/emesis, has prostate cancer. Patient last chemo was 2 weeks ago. Patient reports that he drinks a lot of water because if he doesn't he notices more blood in urine. He reports he has been drinking lots of water today but despite hydrating, urine still turned red. Patient reports some clots, denies difficulty urinating.

## 2024-04-17 NOTE — H&P
Smoking status: Never    Smokeless tobacco: Never    Tobacco comments:     occasional cigar years ago   Vaping Use    Vaping Use: Never used   Substance and Sexual Activity    Alcohol use: No     Comment: 2-3 beers, infrequent    Drug use: No     Comment: never     Social Determinants of Health     Financial Resource Strain: Low Risk  (6/14/2023)    Overall Financial Resource Strain (CARDIA)     Difficulty of Paying Living Expenses: Not very hard   Transportation Needs: Unknown (6/14/2023)    PRAPARE - Transportation     Lack of Transportation (Non-Medical): No   Housing Stability: Unknown (6/14/2023)    Housing Stability Vital Sign     Unstable Housing in the Last Year: No       Medications:   Medications:    amiodarone  200 mg Oral Daily    ezetimibe  10 mg Oral Daily    rosuvastatin  40 mg Oral Nightly    sodium chloride flush  5-40 mL IntraVENous 2 times per day      Infusions:    sodium chloride      sodium chloride 100 mL/hr at 04/17/24 1033     PRN Meds: sodium chloride flush, 5-40 mL, PRN  sodium chloride, , PRN  potassium chloride, 40 mEq, PRN   Or  potassium alternative oral replacement, 40 mEq, PRN   Or  potassium chloride, 10 mEq, PRN  magnesium sulfate, 2,000 mg, PRN  ondansetron, 4 mg, Q8H PRN   Or  ondansetron, 4 mg, Q6H PRN  polyethylene glycol, 17 g, Daily PRN  acetaminophen, 650 mg, Q6H PRN   Or  acetaminophen, 650 mg, Q6H PRN        Labs      CBC:   Recent Labs     04/17/24  0326 04/17/24  0659   WBC 11.2*  --    HGB 8.1* 7.8*     --      BMP:    Recent Labs     04/17/24  0326      K 3.2*   CL 99   CO2 24   BUN 18   CREATININE 2.2*   GLUCOSE 122*     Hepatic:   Recent Labs     04/17/24  0326   AST 15   ALT 11   BILITOT 0.4   ALKPHOS 86     Lipids:   Lab Results   Component Value Date/Time    CHOL 177 10/02/2020 09:29 AM    HDL 59 08/23/2022 11:53 AM    TRIG 96 10/02/2020 09:29 AM     Hemoglobin A1C:   Lab Results   Component Value Date/Time    LABA1C 5.7 12/14/2022 12:09 PM     TSH:  unselect if not a suspected or confirmed emergency medical condition->Emergency Medical Condition (MA) Reason for Exam: hematuria FINDINGS: Lower Chest:  Visualized portion of the lower chest demonstrates no acute abnormality. Organs:  Liver demonstrates multiple heterogeneous low-density lesion may represent metastatic lesions . There is no evidence of intrahepatic biliary ductal dilatation. The spleen, pancreas and adrenal glands are unremarkable. There is moderate bilateral hydronephrosis and hydroureter without evidence of obstructive stones.  There is increased attenuation in the right renal collecting system consistent with hemorrhage.  There is nonobstructive stone of the left kidney measures 4 mm GI/Bowel:  There is no evidence of bowel obstruction.  No evidence of abnormal bowel wall thickening or distension. Pelvis: There is possible a rounded mass of the anterior aspect of the urinary bladder at the midline measures 1.5 cm may represent a urachus Mass. Peritoneum/Retroperitoneum: No evidence of ascites or free air. Bones/Soft Tissues: Unremarkable     1. Moderate bilateral hydronephrosis and hydroureter without evidence of obstructive stones. There is increased attenuation in the right renal collecting system consistent with hemorrhage. 2. There is possible a rounded mass of the anterior aspect of the urinary bladder at the midline measures 1.5 cm may represent a urachus Mass. 3. Liver demonstrates multiple heterogeneous low-density lesion may represent metastatic lesions. 4. Nonobstructive stone of the left kidney measures 4 mm.        This note was transcribed using Dragon Dictation software. Please disregard any translational errors.     Electronically signed by Baylee Sorensen MD on 4/17/2024 at 6:01 PM

## 2024-04-17 NOTE — ED NOTES
Pt stood to urinate with a bedside urinal at this time. Tolerated well.     Patient resting comfortably, respirations easy, unlabored. Patient in no acute distress. Denies needs at this time. Call light within reach, bed in lowest position, side rails up x 2.

## 2024-04-17 NOTE — PROGRESS NOTES
Medication Reconciliation    List of medications patient is currently taking is complete.     Source of information: 1. Conversation with patient and family at bedside                                      2. EPIC records         Notes regarding home medications:   1. Informed patient that Orgovyx is non-formulary and will need to be supplied from home if ordered.      Tone Petersen, formerly [facility]   4/17/2024  2:37 PM

## 2024-04-17 NOTE — ED PROVIDER NOTES
prostate (HCC), Chest pain (01/17/2018), CHF (congestive heart failure) (HCC), and Unstable angina pectoris (HCC).     EMERGENCY DEPARTMENT COURSE and DIFFERENTIAL DIAGNOSIS/MDM:   Vitals:    Vitals:    04/17/24 0342 04/17/24 0343 04/17/24 0345 04/17/24 0700   BP:    (!) 166/65   Pulse: 63 62 59 71   Resp: 20 20 13 16   Temp:       TempSrc:       SpO2: (!) 80% 90% 96% 100%   Weight:       Height:           Patient was given the following medications:  Medications   sodium chloride 0.9 % bolus 1,000 mL (0 mLs IntraVENous Stopped 4/17/24 0541)   ondansetron (ZOFRAN) injection 4 mg (4 mg IntraVENous Given 4/17/24 0331)   fentaNYL (SUBLIMAZE) injection 50 mcg (50 mcg IntraVENous Given 4/17/24 0332)             Is this patient to be included in the SEP-1 Core Measure due to severe sepsis or septic shock?   No   Exclusion criteria - the patient is NOT to be included for SEP-1 Core Measure due to:  Infection is not suspected    CC/HPI Summary, DDx, ED Course, and Reassessment: Differential Diagnosis: Kidney Stone, Pyelonephritis, Renal Artery Aneurysm,  Abdominal Aortic Aneurysm, Metastases to back, Mechanical Back Pain, Cauda Equina Syndrome    73-year-old male with a history of prostate cancer presents ED for evaluation of gross hematuria.  He also reports right-sided flank pain.  On presentation vital signs within normal limits.  Patient's abdominal exam is benign.     Basic laboratory obtained showed a hemoglobin within normal limits but slightly decreased from previous. Patient's renal function is slightly elevated compared to baseline.  Urinalysis  shows gross hematuria without signs of infection.     CT abdomen obtained demonstrates bilateral hydronephrosis.  There is a suspicion for bladder mass and possible metastases to the patient's liver.  Appears to be bleeding within the right urinary collecting system.     Patient's oncologist was informed the patient will be admitted.  I did speak with the hospitalist

## 2024-04-17 NOTE — CONSULTS
5.8*     PTT:  No results for input(s): \"APTT\" in the last 720 hours.    CMP:    Recent Labs     04/17/24  0326      K 3.2*   CL 99   CO2 24   BUN 18   PROT 5.8*     Mg:    Recent Labs     04/17/24  0326   MG 2.00       Lab Results   Component Value Date    CALCIUM 8.8 04/17/2024    PHOS 3.0 09/06/2023       CBC:    Recent Labs     04/17/24  0659 04/17/24 0326   WBC  --  11.2*   NEUTROABS  --  10.0*   LYMPHOPCT  --  4.2   RBC  --  2.53*   HGB 7.8* 8.1*   HCT 23.1* 24.5*   MCV  --  96.8   MCH  --  32.0   MCHC  --  33.1   RDW  --  16.6*   PLT  --  139        LDH:No results for input(s): \"LDH\" in the last 720 hours.      Radiology Review:  CT ABDOMEN PELVIS WO CONTRAST Additional Contrast? None  Narrative: EXAMINATION:  CT OF THE ABDOMEN AND PELVIS WITHOUT CONTRAST 4/17/2024 3:16 am    TECHNIQUE:  CT of the abdomen and pelvis was performed without the administration of  intravenous contrast. Multiplanar reformatted images are provided for review.  Automated exposure control, iterative reconstruction, and/or weight based  adjustment of the mA/kV was utilized to reduce the radiation dose to as low  as reasonably achievable.    COMPARISON:  None.    HISTORY:  ORDERING SYSTEM PROVIDED HISTORY: hematuria  TECHNOLOGIST PROVIDED HISTORY:  Reason for exam:->hematuria  Additional Contrast?->None  Decision Support Exception - unselect if not a suspected or confirmed  emergency medical condition->Emergency Medical Condition (MA)  Reason for Exam: hematuria    FINDINGS:  Lower Chest:  Visualized portion of the lower chest demonstrates no acute  abnormality.    Organs:  Liver demonstrates multiple heterogeneous low-density lesion may  represent metastatic lesions . There is no evidence of intrahepatic biliary  ductal dilatation. The spleen, pancreas and adrenal glands are unremarkable.  There is moderate bilateral hydronephrosis and hydroureter without evidence  of obstructive stones.  There is increased attenuation in the

## 2024-04-17 NOTE — ED NOTES
Report called to ALBERT Cam   To Room 3122  Cardiac monitor on during transfer  Pt's pain level denies  VSS, Afebrile   IV site is clean, dry and intact, Normal saline locked   Family updated on transfer per pt

## 2024-04-18 ENCOUNTER — APPOINTMENT (OUTPATIENT)
Dept: CT IMAGING | Age: 74
End: 2024-04-18
Payer: COMMERCIAL

## 2024-04-18 LAB
BASOPHILS # BLD: 0 K/UL (ref 0–0.2)
BASOPHILS NFR BLD: 0.4 %
DEPRECATED RDW RBC AUTO: 16.6 % (ref 12.4–15.4)
EOSINOPHIL # BLD: 0 K/UL (ref 0–0.6)
EOSINOPHIL NFR BLD: 0.3 %
GLUCOSE BLD-MCNC: 116 MG/DL (ref 70–99)
HCT VFR BLD AUTO: 21.5 % (ref 40.5–52.5)
HGB BLD-MCNC: 7.3 G/DL (ref 13.5–17.5)
INR PPP: 1.12 (ref 0.85–1.15)
LYMPHOCYTES # BLD: 0.6 K/UL (ref 1–5.1)
LYMPHOCYTES NFR BLD: 6.5 %
MCH RBC QN AUTO: 32.8 PG (ref 26–34)
MCHC RBC AUTO-ENTMCNC: 34.1 G/DL (ref 31–36)
MCV RBC AUTO: 96.2 FL (ref 80–100)
MONOCYTES # BLD: 0.8 K/UL (ref 0–1.3)
MONOCYTES NFR BLD: 7.8 %
NEUTROPHILS # BLD: 8.3 K/UL (ref 1.7–7.7)
NEUTROPHILS NFR BLD: 85 %
PERFORMED ON: ABNORMAL
PLATELET # BLD AUTO: 119 K/UL (ref 135–450)
PMV BLD AUTO: 9 FL (ref 5–10.5)
PROTHROMBIN TIME: 14.6 SEC (ref 11.9–14.9)
RBC # BLD AUTO: 2.23 M/UL (ref 4.2–5.9)
WBC # BLD AUTO: 9.7 K/UL (ref 4–11)

## 2024-04-18 PROCEDURE — 6360000004 HC RX CONTRAST MEDICATION: Performed by: INTERNAL MEDICINE

## 2024-04-18 PROCEDURE — 74177 CT ABD & PELVIS W/CONTRAST: CPT

## 2024-04-18 PROCEDURE — 6370000000 HC RX 637 (ALT 250 FOR IP)

## 2024-04-18 PROCEDURE — 85610 PROTHROMBIN TIME: CPT

## 2024-04-18 PROCEDURE — 85025 COMPLETE CBC W/AUTO DIFF WBC: CPT

## 2024-04-18 PROCEDURE — 2580000003 HC RX 258: Performed by: INTERNAL MEDICINE

## 2024-04-18 PROCEDURE — 6370000000 HC RX 637 (ALT 250 FOR IP): Performed by: INTERNAL MEDICINE

## 2024-04-18 PROCEDURE — 1200000000 HC SEMI PRIVATE

## 2024-04-18 RX ORDER — POTASSIUM CHLORIDE 20 MEQ/1
40 TABLET, EXTENDED RELEASE ORAL ONCE
Status: COMPLETED | OUTPATIENT
Start: 2024-04-18 | End: 2024-04-18

## 2024-04-18 RX ADMIN — POTASSIUM CHLORIDE 40 MEQ: 1500 TABLET, EXTENDED RELEASE ORAL at 15:37

## 2024-04-18 RX ADMIN — EZETIMIBE 10 MG: 10 TABLET ORAL at 12:00

## 2024-04-18 RX ADMIN — Medication 10 ML: at 09:07

## 2024-04-18 RX ADMIN — IOPAMIDOL 75 ML: 755 INJECTION, SOLUTION INTRAVENOUS at 09:18

## 2024-04-18 RX ADMIN — AMIODARONE HYDROCHLORIDE 200 MG: 200 TABLET ORAL at 12:00

## 2024-04-18 RX ADMIN — ROSUVASTATIN CALCIUM 40 MG: 40 TABLET, FILM COATED ORAL at 21:08

## 2024-04-18 NOTE — PROGRESS NOTES
Pt Name: Yury Levin  Medical Record Number: [id number]  Date of Birth 1950   Today's Date: 4/18/2024      Subjective:  denies pain.  Still passing blood.  No clots.  No retention.  Plan for liver biopsy today.    ROS: Constitutional: No fever    Vitals:  Vitals:    04/18/24 0110 04/18/24 0415 04/18/24 0538 04/18/24 0737   BP: 128/64 (!) 152/70  132/60   Pulse: 72 73  67   Resp: 16 17  14   Temp: 98.4 °F (36.9 °C) 98.1 °F (36.7 °C)  98.5 °F (36.9 °C)   TempSrc: Oral Oral  Oral   SpO2: 96% 96%  95%   Weight:   89.7 kg (197 lb 11.2 oz)    Height:         I/O last 3 completed shifts:  In: 1300 [P.O.:300; IV Piggyback:1000]  Out: 100 [Urine:100]    Exam:  General: Awake, oriented, no acute distress  Respiratory: Nonlabored breathing  Abdomen: Soft, non-tender, non-distended, no masses      CURRENT MEDICATIONS   Scheduled Meds:   amiodarone  200 mg Oral Daily    ezetimibe  10 mg Oral Daily    rosuvastatin  40 mg Oral Nightly    sodium chloride flush  5-40 mL IntraVENous 2 times per day     Continuous Infusions:   sodium chloride      sodium chloride 100 mL/hr at 04/17/24 1033     PRN Meds:.sodium chloride flush, sodium chloride, potassium chloride **OR** potassium alternative oral replacement **OR** potassium chloride, magnesium sulfate, ondansetron **OR** ondansetron, polyethylene glycol, acetaminophen **OR** acetaminophen    LABS     Recent Labs     04/17/24  0326 04/17/24  0659 04/18/24  0515   WBC 11.2*  --  9.7   HGB 8.1* 7.8* 7.3*   HCT 24.5* 23.1* 21.5*     --  119*     --   --    K 3.2*  --   --    CL 99  --   --    CO2 24  --   --    BUN 18  --   --    CREATININE 2.2*  --   --    MG 2.00  --   --    CALCIUM 8.8  --   --    AST 15  --   --    ALT 11  --   --    BILITOT 0.4  --   --            ASSESSMENT   Gross hematuria   Bilateral hydronephrosis  Right renal pelvis mass vs clot  Bladder / urachal mass  Metastatic prostate cancer  PLAN   He will need cysto with TURBT, bilateral retrograde  pyelograms.  I will work on getting a date and time for this    Irish Bains MD 4/18/2024 8:37 AM

## 2024-04-18 NOTE — PROGRESS NOTES
-- -- 59 17 100 % --   04/17/24 1230 -- -- -- 66 18 98 % --   04/17/24 1215 -- -- -- 56 18 100 % --   04/17/24 1200 (!) 151/128 -- -- 78 18 98 % --   04/17/24 1145 (!) 153/80 -- -- 68 16 95 % --   04/17/24 1130 (!) 143/69 -- -- 57 14 100 % --   04/17/24 1115 (!) 142/80 -- -- 57 23 100 % --   04/17/24 1100 139/65 -- -- 55 15 100 % --   04/17/24 1045 (!) 140/65 -- -- 56 15 100 % --   04/17/24 0915 (!) 142/60 -- -- 66 17 100 % --   04/17/24 0900 118/61 -- -- 55 16 99 % --       24HR INTAKE/OUTPUT:    Intake/Output Summary (Last 24 hours) at 4/18/2024 0847  Last data filed at 4/17/2024 2252  Gross per 24 hour   Intake 300 ml   Output 100 ml   Net 200 ml       CONSTITUTIONAL: awake, alert, cooperative, no apparent distress   EYES: pupils equal, round and reactive to light, sclera clear and conjunctiva normal  ENT: Normocephalic, without obvious abnormality, atraumatic  NECK: supple, symmetrical, no jugular venous distension and no carotid bruits   HEMATOLOGIC/LYMPHATIC: no cervical, supraclavicular or axillary lymphadenopathy   LUNGS: no increased work of breathing and clear to auscultation   CARDIOVASCULAR: regular rate and rhythm, normal S1 and S2, no murmur noted  ABDOMEN: normal bowel sounds x 4, soft, non-distended, non-tender, no masses palpated, no hepatosplenomgaly   MUSCULOSKELETAL: full range of motion noted, tone is normal  NEUROLOGIC: awake, alert, oriented to name, place and time. Motor skills grossly intact.   SKIN: Normal skin color, texture, turgor and no jaundice. appears intact   EXTREMITIES: no LE edema     DATA:  CBC:    Recent Labs     04/18/24  0515 04/17/24  0659 04/17/24  0326   WBC 9.7  --  11.2*   NEUTROABS 8.3*  --  10.0*   LYMPHOPCT 6.5  --  4.2   RBC 2.23*  --  2.53*   HGB 7.3* 7.8* 8.1*   HCT 21.5* 23.1* 24.5*   MCV 96.2  --  96.8   MCH 32.8  --  32.0   MCHC 34.1  --  33.1   RDW 16.6*  --  16.6*   *  --  139       PT/INR:    Recent Labs     04/17/24  0326   PROT 5.8*     PTT:  No  of  abnormal bowel wall thickening or distension.    Pelvis: There is possible a rounded mass of the anterior aspect of the  urinary bladder at the midline measures 1.5 cm may represent a urachus Mass.    Peritoneum/Retroperitoneum: No evidence of ascites or free air.    Bones/Soft Tissues: Unremarkable  Impression: 1. Moderate bilateral hydronephrosis and hydroureter without evidence of  obstructive stones. There is increased attenuation in the right renal  collecting system consistent with hemorrhage.  2. There is possible a rounded mass of the anterior aspect of the urinary  bladder at the midline measures 1.5 cm may represent a urachus Mass.  3. Liver demonstrates multiple heterogeneous low-density lesion may represent  metastatic lesions.  4. Nonobstructive stone of the left kidney measures 4 mm.      Problem List  Patient Active Problem List   Diagnosis    NSTEMI (non-ST elevated myocardial infarction) (HCC)    S/P CABG x 4    Mixed hearing loss, bilateral    ERIBERTO (acute kidney injury) (HCC)    PAF (paroxysmal atrial fibrillation) (HCC)    Gross hematuria       ASSESSMENT AND PLAN:  1.  History of prostate cancer.  His PSA has been going down lately on Jevtana.    2.  Hematuria\new liver lesions.  IR will not do a liver biopsy until we get a CT with contrast first.  I will arrange that for today.  I definitely recommend an inpatient cystoscopy.  Transfuse as needed.            ONCOLOGIC DISPOSITION:      Edgardo Wells Jr, MD  Please contact through [employer]

## 2024-04-18 NOTE — PROGRESS NOTES
Patient went for CT this morning, and currently in bed, patient was suppose to have a procedure, however, procedure was cancelled and patient placed on a regular diet. Patient took medications, and tolerating PO intake, no n/v reported. Patient also denies pain at this time. Patient IV flushed and infusing as ordered. Patient voiding adequately, and still red in color. Patient inquiring about discharge, urology and MD assistant notified. Call light and bedside table within reach. Will continue to monitor and reassess.

## 2024-04-18 NOTE — PROGRESS NOTES
Hospitalist Progress Note      PCP: Jhoan Guevara MD    Date of Admission: 4/17/2024    Chief Complaint: Gross hematuria    Hospital Course: Yury Levin is a 73 y.o. male with medical history significant for prostate cancer, CAD's s/p CABG, paroxysmal A-fib and intermittent hematuria who presents with Gross hematuria.  Initial CT scan showed moderate bilateral hydronephrosis and hydroureter without evidence of obstructive stone.  Increased attenuation in the right renal collecting system consistent with hemorrhage and also possible rounded mass on the anterior aspect of the urinary bladder with incidentally seen heterogeneous low-density liver lesions concerning for metastatic disease.  Urology and oncology were consulted.  Urology recommending TURBT with cystoscopy and bilateral retrograde pyelograms, timing to be determined.  Oncology recommended IR guided biopsy of liver lesions but after CT A/P with contrast was completed lesions were found to be stable hepatic cysts.     Subjective: Patient lying in bed, states he is feeling fine.  States the hematuria did improve but now has picked back up again.  Denies any dysuria, flank pain or other infectious symptoms such as frequency, urgency, fever or chills.    Assessment/Plan:  Gross hematuria  Possible urinary bladder mass  -CT A/P with moderate bilateral hydronephrosis and hydroureter without any obvious stone and possible rounded mass of the anterior aspect of the urinary bladder  -Urology consulted, patient will need cystoscopy with TURBT and bilateral retrograde pyelograms, timing to be determined  -UA does not appear infected, hold off on antibiotics  -Holding ASA/Plavix below    Acute on chronic anemia  -Secondary hematuria.  Baseline around 9-10.  -Hemoglobin dropping to 7.3 this a.m., holding home ASA and Plavix.  -Transfuse to keep Hgb greater than 7    Metastatic prostate cancer  -Follows with Dr. Wells.  Currently on Konstantin  Guille    Multiple liver lesions  -Seen initially on CT A/P without contrast.  IR biopsy was ordered but not completed after CT with contrast showed the mass stable hepatic cyst    CAD s/p CABG  -Holding home ASA and Plavix secondary gross materia.  Continue Crestor and Zetia.  Resume ASA/Plavix when okay with urology    PAF  -Continue home amiodarone      CKD stage IV  -Creatinine stable near most recent baseline 2.2-2.3.  Monitor with daily BMP.  Did receive IV contrast 4/18, monitor creatinine closely.  Continue gentle IV fluids  Active Hospital Problems    Diagnosis     Gross hematuria [R31.0]        Medications:  Reviewed    Infusion Medications    sodium chloride      sodium chloride 100 mL/hr at 04/17/24 1033     Scheduled Medications    amiodarone  200 mg Oral Daily    ezetimibe  10 mg Oral Daily    rosuvastatin  40 mg Oral Nightly    sodium chloride flush  5-40 mL IntraVENous 2 times per day     PRN Meds: sodium chloride flush, sodium chloride, potassium chloride **OR** potassium alternative oral replacement **OR** potassium chloride, magnesium sulfate, ondansetron **OR** ondansetron, polyethylene glycol, acetaminophen **OR** acetaminophen      Intake/Output Summary (Last 24 hours) at 4/18/2024 0740  Last data filed at 4/17/2024 2252  Gross per 24 hour   Intake 300 ml   Output 100 ml   Net 200 ml       Physical Exam Performed:    /60   Pulse 67   Temp 98.5 °F (36.9 °C) (Oral)   Resp 14   Ht 1.702 m (5' 7\")   Wt 89.7 kg (197 lb 11.2 oz)   SpO2 95%   BMI 30.96 kg/m²     General appearance: No apparent distress, appears stated age and cooperative.  HEENT: Conjunctivae/corneas clear.  Neck: Supple, with full range of motion. N  Respiratory:  Normal respiratory effort. Clear to auscultation, bilaterally without Rales/Wheezes/Rhonchi.  Cardiovascular: Regular rate and rhythm with normal S1/S2 without murmurs, rubs or gallops.  Abdomen: Soft, non-tender, non-distended with normal bowel

## 2024-04-18 NOTE — CARE COORDINATION
Case Management Assessment  Initial Evaluation    Date/Time of Evaluation: 4/18/2024 2:11 PM  Assessment Completed by: LUCINA Galloway    If patient is discharged prior to next notation, then this note serves as note for discharge by case management.    Patient Name: Yury Levin                   YOB: 1950  Diagnosis: Gross hematuria [R31.0]  Flank pain [R10.9]  Hematuria, unspecified type [R31.9]                   Date / Time: 4/17/2024  1:47 AM    Patient Admission Status: Inpatient   Readmission Risk (Low < 19, Mod (19-27), High > 27): Readmission Risk Score: 16.1    Current PCP: Jhoan Guevara MD  PCP verified by CM? Yes    Chart Reviewed: Yes      History Provided by: Patient, Spouse, Medical Record  Patient Orientation: Alert and Oriented    Patient Cognition: Alert    Hospitalization in the last 30 days (Readmission):  No    If yes, Readmission Assessment in  Navigator will be completed.    Advance Directives:      Code Status: Full Code   Patient's Primary Decision Maker is: Legal Next of Kin    Primary Decision Maker: Marilyn Levin - Spouse - [phone]    Secondary Decision Maker: LevinAlejandro - Child - [phone]    Discharge Planning:    Patient lives with: Spouse/Significant Other Type of Home: House  Primary Care Giver: Self  Patient Support Systems include: Spouse/Significant Other   Current Financial resources: Medicare  Current community resources: None  Current services prior to admission: None            Current DME:              Type of Home Care services:  None    ADLS  Prior functional level: Independent in ADLs/IADLs  Current functional level: Independent in ADLs/IADLs    PT AM-PAC:   /24  OT AM-PAC:   /24    Family can provide assistance at DC: Yes  Would you like Case Management to discuss the discharge plan with any other family members/significant others, and if so, who? Yes  Plans to Return to Present Housing: Yes  Other Identified Issues/Barriers to  RETURNING to current housing: none  Potential Assistance needed at discharge: N/A            Potential DME:  none  Patient expects to discharge to: House  Plan for transportation at discharge: Family    Financial    Payor: [place] [employer] / Plan: [employer] PLU / Product Type: *No Product type* /     Does insurance require precert for SNF: Yes    Potential assistance Purchasing Medications: No  Meds-to-Beds request: Yes      [facility] [id number] [facility] [address] [facility] [phone] - F [phone] [address] [facility] [address]  Phone: [phone] Fax: [phone]      Notes:    Factors facilitating achievement of predicted outcomes: Family support, Motivated, and Cooperative    Barriers to discharge: none    Additional Case Management Notes:   Spoke with patient and spouse present in room. Patient reported he was independent prior to admission and plans to return home with support of his spouse. He denies needs at the present time.     The Plan for Transition of Care is related to the following treatment goals of Gross hematuria [R31.0]  Flank pain [R10.9]  Hematuria, unspecified type [R31.9]    The Patient and/or Patient Representative Agree with the Discharge Plan?      LUCINA Galloway  Case Management Department  Ph: [phone]

## 2024-04-18 NOTE — PROGRESS NOTES
Physician Progress Note      PATIENT:               REX HESS  [facility] #:                  [id number]  :                       1950  ADMIT DATE:       2024 1:47 AM  DISCH DATE:  RESPONDING  PROVIDER #:        Jose J Sorensen MD          QUERY TEXT:    Pt admitted with Gross Hematuria and has anemia documented. If possible,   please document in progress notes and discharge summary further specificity   regarding the acuity and type of anemia:    The medical record reflects the following:  Risk Factors: Prostate Cancer, on Chemotherapy  Clinical Indicators: Per H/P dated , 'Acute on Chronic anemia baseline Hgb   9.6', Hgb on admission 8.1then trended down to7.8>7.3  Treatment: Diagnostic labs, Plavix and asa held,  consult    Thank you,  Earnestine Winn RN BSN  Clinical   [email or web address]  Options provided:  -- Anemia due to acute blood loss  -- Anemia due to acute on chronic blood loss  -- Other - I will add my own diagnosis  -- Disagree - Not applicable / Not valid  -- Disagree - Clinically unable to determine / Unknown  -- Refer to Clinical Documentation Reviewer    PROVIDER RESPONSE TEXT:    This patient has acute blood loss anemia.    Query created by: Earnestine Winn on 2024 12:48 PM      Electronically signed by:  Jose J Sorensen MD 2024 5:30 PM

## 2024-04-18 NOTE — PLAN OF CARE
Problem: Discharge Planning  Goal: Discharge to home or other facility with appropriate resources  4/18/2024 0755 by Temo Sam RN  Outcome: Progressing  Flowsheets (Taken 4/17/2024 1846 by Dorina Spencer RN)  Discharge to home or other facility with appropriate resources: Identify barriers to discharge with patient and caregiver  4/18/2024 0402 by Melly Ingram RN  Outcome: Progressing  Flowsheets  Taken 4/17/2024 1846 by Dorina Spencer RN  Discharge to home or other facility with appropriate resources: Identify barriers to discharge with patient and caregiver  Taken 4/17/2024 1646 by Dorina Spencer RN  Discharge to home or other facility with appropriate resources:   Identify barriers to discharge with patient and caregiver   Arrange for needed discharge resources and transportation as appropriate   Arrange for interpreters to assist at discharge as needed   Identify discharge learning needs (meds, wound care, etc)   Refer to discharge planning if patient needs post-hospital services based on physician order or complex needs related to functional status, cognitive ability or social support system     Problem: Safety - Adult  Goal: Free from fall injury  4/18/2024 0755 by Temo Sam RN  Outcome: Progressing  Flowsheets (Taken 4/18/2024 0755)  Free From Fall Injury: Instruct family/caregiver on patient safety  4/18/2024 0402 by Melly Ingram RN  Outcome: Progressing     Problem: ABCDS Injury Assessment  Goal: Absence of physical injury  4/18/2024 0755 by Temo Sam RN  Outcome: Progressing  Flowsheets (Taken 4/18/2024 0755)  Absence of Physical Injury: Implement safety measures based on patient assessment  4/18/2024 0402 by Melly Ingram RN  Outcome: Progressing     Problem: Skin/Tissue Integrity  Goal: Absence of new skin breakdown  Description: 1.  Monitor for areas of redness and/or skin breakdown  2.  Assess vascular access sites hourly  3.  Every 4-6

## 2024-04-18 NOTE — PLAN OF CARE
Problem: Discharge Planning  Goal: Discharge to home or other facility with appropriate resources  Outcome: Progressing  Flowsheets  Taken 4/17/2024 1846 by Dorina Spencer RN  Discharge to home or other facility with appropriate resources: Identify barriers to discharge with patient and caregiver  Taken 4/17/2024 1646 by Dorina Spencer, RN  Discharge to home or other facility with appropriate resources:   Identify barriers to discharge with patient and caregiver   Arrange for needed discharge resources and transportation as appropriate   Arrange for interpreters to assist at discharge as needed   Identify discharge learning needs (meds, wound care, etc)   Refer to discharge planning if patient needs post-hospital services based on physician order or complex needs related to functional status, cognitive ability or social support system     Problem: Safety - Adult  Goal: Free from fall injury  Outcome: Progressing     Problem: ABCDS Injury Assessment  Goal: Absence of physical injury  Outcome: Progressing     Problem: Skin/Tissue Integrity  Goal: Absence of new skin breakdown  Description: 1.  Monitor for areas of redness and/or skin breakdown  2.  Assess vascular access sites hourly  3.  Every 4-6 hours minimum:  Change oxygen saturation probe site  4.  Every 4-6 hours:  If on nasal continuous positive airway pressure, respiratory therapy assess nares and determine need for appliance change or resting period.  Outcome: Progressing

## 2024-04-18 NOTE — PROGRESS NOTES
Assisted pt to the bathroom multiple times this shift, pt forgets to press call light every time, SBA to the bathroom, ambulation steady. Urine is bright red, output not measured. Assisted back to bed, reminded to use call light before getting up, pt agreeable. Fall and Standard safety precautions in place, call light and bedside table within reach.

## 2024-04-19 VITALS
TEMPERATURE: 98.5 F | BODY MASS INDEX: 30.21 KG/M2 | DIASTOLIC BLOOD PRESSURE: 83 MMHG | WEIGHT: 192.5 LBS | HEART RATE: 73 BPM | SYSTOLIC BLOOD PRESSURE: 137 MMHG | HEIGHT: 67 IN | OXYGEN SATURATION: 97 % | RESPIRATION RATE: 18 BRPM

## 2024-04-19 LAB
ANION GAP SERPL CALCULATED.3IONS-SCNC: 8 MMOL/L (ref 3–16)
BASOPHILS # BLD: 0 K/UL (ref 0–0.2)
BASOPHILS NFR BLD: 0.2 %
BLOOD BANK DISPENSE STATUS: NORMAL
BLOOD BANK PRODUCT CODE: NORMAL
BPU ID: NORMAL
BUN SERPL-MCNC: 16 MG/DL (ref 7–20)
CALCIUM SERPL-MCNC: 7.4 MG/DL (ref 8.3–10.6)
CHLORIDE SERPL-SCNC: 112 MMOL/L (ref 99–110)
CO2 SERPL-SCNC: 23 MMOL/L (ref 21–32)
CREAT SERPL-MCNC: 2.1 MG/DL (ref 0.8–1.3)
DEPRECATED RDW RBC AUTO: 16.6 % (ref 12.4–15.4)
DESCRIPTION BLOOD BANK: NORMAL
EOSINOPHIL # BLD: 0.1 K/UL (ref 0–0.6)
EOSINOPHIL NFR BLD: 0.7 %
GFR SERPLBLD CREATININE-BSD FMLA CKD-EPI: 32 ML/MIN/{1.73_M2}
GLUCOSE SERPL-MCNC: 108 MG/DL (ref 70–99)
HCT VFR BLD AUTO: 20.6 % (ref 40.5–52.5)
HCT VFR BLD AUTO: 24.1 % (ref 40.5–52.5)
HGB BLD-MCNC: 7 G/DL (ref 13.5–17.5)
HGB BLD-MCNC: 8 G/DL (ref 13.5–17.5)
LYMPHOCYTES # BLD: 0.6 K/UL (ref 1–5.1)
LYMPHOCYTES NFR BLD: 6.5 %
MCH RBC QN AUTO: 32.7 PG (ref 26–34)
MCHC RBC AUTO-ENTMCNC: 34.1 G/DL (ref 31–36)
MCV RBC AUTO: 95.9 FL (ref 80–100)
MONOCYTES # BLD: 0.6 K/UL (ref 0–1.3)
MONOCYTES NFR BLD: 6.4 %
NEUTROPHILS # BLD: 8.1 K/UL (ref 1.7–7.7)
NEUTROPHILS NFR BLD: 86.2 %
PLATELET # BLD AUTO: 126 K/UL (ref 135–450)
PMV BLD AUTO: 9.3 FL (ref 5–10.5)
POTASSIUM SERPL-SCNC: 3.7 MMOL/L (ref 3.5–5.1)
RBC # BLD AUTO: 2.15 M/UL (ref 4.2–5.9)
SODIUM SERPL-SCNC: 143 MMOL/L (ref 136–145)
WBC # BLD AUTO: 9.4 K/UL (ref 4–11)

## 2024-04-19 PROCEDURE — 36591 DRAW BLOOD OFF VENOUS DEVICE: CPT

## 2024-04-19 PROCEDURE — 36430 TRANSFUSION BLD/BLD COMPNT: CPT

## 2024-04-19 PROCEDURE — 85014 HEMATOCRIT: CPT

## 2024-04-19 PROCEDURE — 85025 COMPLETE CBC W/AUTO DIFF WBC: CPT

## 2024-04-19 PROCEDURE — 85018 HEMOGLOBIN: CPT

## 2024-04-19 PROCEDURE — 80048 BASIC METABOLIC PNL TOTAL CA: CPT

## 2024-04-19 PROCEDURE — 94761 N-INVAS EAR/PLS OXIMETRY MLT: CPT

## 2024-04-19 PROCEDURE — 2580000003 HC RX 258: Performed by: INTERNAL MEDICINE

## 2024-04-19 PROCEDURE — 6370000000 HC RX 637 (ALT 250 FOR IP): Performed by: INTERNAL MEDICINE

## 2024-04-19 PROCEDURE — 2700000000 HC OXYGEN THERAPY PER DAY

## 2024-04-19 RX ORDER — ASPIRIN 81 MG/1
81 TABLET ORAL DAILY
Qty: 90 TABLET | Refills: 1
Start: 2024-04-19

## 2024-04-19 RX ORDER — SODIUM CHLORIDE 9 MG/ML
INJECTION, SOLUTION INTRAVENOUS PRN
Status: DISCONTINUED | OUTPATIENT
Start: 2024-04-19 | End: 2024-04-19 | Stop reason: HOSPADM

## 2024-04-19 RX ORDER — CLOPIDOGREL BISULFATE 75 MG/1
75 TABLET ORAL DAILY
Qty: 30 TABLET | Refills: 3
Start: 2024-04-19

## 2024-04-19 RX ADMIN — Medication 10 ML: at 10:00

## 2024-04-19 RX ADMIN — EZETIMIBE 10 MG: 10 TABLET ORAL at 10:00

## 2024-04-19 RX ADMIN — AMIODARONE HYDROCHLORIDE 200 MG: 200 TABLET ORAL at 10:00

## 2024-04-19 NOTE — PROGRESS NOTES
PT AAO x3 except to date. VSS. Pt tolerating diet. Pt imguel pain. Pt ambulating with stedy gait. Pt's urine is still light cherry red. No clots noticed per this shift. Pt resting well. No further needs voiced at this time. Fall precautions in place. Bed alarm on. Call light within reach. Will continue care. Electronically signed by Maria A Wells RN on 4/19/2024 at 5:45 AM

## 2024-04-19 NOTE — PROGRESS NOTES
Pt has critical hematocrit . Level at 7.0/20.6. Md Joan Alejandre notified. Waiting for reply. Will pass on in shift report. Electronically signed by Maria A Wells RN on 4/19/2024 at 7:04 AM

## 2024-04-19 NOTE — PROGRESS NOTES
09/06/2023       LDH:No results for input(s): \"LDH\" in the last 720 hours.    Radiology Review:  CT ABDOMEN PELVIS W IV CONTRAST Additional Contrast? None  Narrative: EXAMINATION:  CT OF THE ABDOMEN AND PELVIS WITH CONTRAST 4/18/2024 9:17 am    TECHNIQUE:  CT of the abdomen and pelvis was performed with the administration of  intravenous contrast. Multiplanar reformatted images are provided for review.  Automated exposure control, iterative reconstruction, and/or weight based  adjustment of the mA/kV was utilized to reduce the radiation dose to as low  as reasonably achievable.    COMPARISON:  CT abdomen and pelvis without contrast 1 day prior and CT abdomen and pelvis  with contrast February 3, 2022    HISTORY:  ORDERING SYSTEM PROVIDED HISTORY: new liver lesions    FINDINGS:  Lower Chest: Atelectasis in the lung bases.    Organs: 1 cm hypodense lesion in the posterior right hepatic lobe with stable  from 2022 and with Hounsfield units most consistent with simple cysts.  Additional stable cyst in the left hepatic lobe best appreciated on image 42  of series 2 measuring 9 mm.    Gallbladder, spleen, pancreas, adrenal glands are unremarkable.  2 mm  nonobstructing calculus in the mid left kidney.  Mild left  hydroureteronephrosis with ureteral enlargement to the level of the bladder  however there does appear to be some urothelial enhancement on image 145 of  series 2 at the level the pelvic inlet.  Moderate right hydroureteronephrosis  with ureteral enlargement to the level of the bladder.  Hyperdense fluid is  once again noted within the right renal pelvis and proximal ureter.    GI/Bowel: Colon, pending ext, small bowel are unremarkable.  Stomach is  normal.    Pelvis: Bladder wall thickening in the inferior aspect of the bladder  extending anteriorly and posteriorly.    Peritoneum/Retroperitoneum: No free air, free fluid or adenopathy.  Aorta and  IVC are normal in size.    Bones/Soft Tissues: Multilevel  degenerative changes of the spine without  acute osseous abnormality.  Impression: 1. Stable hepatic cysts. No suspicious hepatic lesion.  2. Moderate right and mild left hydroureteronephrosis with ureteral  enlargement to the level of the bladder. There is some urothelial enhancement  in the left ureter at the level of the pelvic inlet which may represent  ureteritis.  3. Hyperdense fluid within the right renal pelvis and proximal ureter is once  again noted and may represent blood products.  4. Bladder wall thickening in the inferior aspect of the bladder extending  anteriorly and posteriorly.      Problem List  Patient Active Problem List   Diagnosis    NSTEMI (non-ST elevated myocardial infarction) (HCC)    S/P CABG x 4    Mixed hearing loss, bilateral    ERIBERTO (acute kidney injury) (HCC)    PAF (paroxysmal atrial fibrillation) (HCC)    Gross hematuria       ASSESSMENT AND PLAN:  1.  History of prostate cancer.  His PSA has been going down on Jevtana.    2.  Hematuria\abnormal CT.  It turns out the liver lesion seen on the noncontrast CT were actually cysts on the IV contrast CT that have been there.  Therefore, the liver biopsy is canceled.  Due to his severe anemia, I will arrange 1 unit since he will be in the [facility] by tomorrow.  I would have a cystoscopy done as quickly as possible.  Ideally it would be done as an inpatient.  If it has to be done as an outpatient, I would hope that it would be done early next week.          ONCOLOGIC DISPOSITION:      Edgardo Wells Jr, MD  Please contact through [facility]

## 2024-04-19 NOTE — DISCHARGE INSTRUCTIONS
Hold aspirin and Plavix until after cystoscopy or until urology okay with resuming.  Cystoscopy tentatively planned for next Wednesday 4/24, urology group will reach out to verify.  Return to ER for worsening hematuria, dizziness, lightheadedness or shortness of breath

## 2024-04-19 NOTE — DISCHARGE SUMMARY
Hospital Medicine Discharge Summary    Patient ID: Yury Levin      Patient's PCP: Jhoan Guevara MD    Admit Date: 4/17/2024     Discharge Date:   4/19/24    Admitting Physician: Baylee Sorensen MD     Discharge Physician: Bel Fry PA-C     Discharge Diagnoses  Gross hematuria  Possible urinary bladder mass  Acute on chronic anemia  Metastatic prostate cancer  Liver lesions  CAD s/p CABG  CKD      Hospital Course: Yury Levin is a 73 y.o. male with medical history significant for prostate cancer, CAD's s/p CABG, paroxysmal A-fib and intermittent hematuria who presents with Gross hematuria.  Initial CT scan showed moderate bilateral hydronephrosis and hydroureter without evidence of obstructive stone.  Increased attenuation in the right renal collecting system consistent with hemorrhage and also possible rounded mass on the anterior aspect of the urinary bladder with incidentally seen heterogeneous low-density liver lesions concerning for metastatic disease.  Urology and oncology were consulted.  Urology recommending TURBT with cystoscopy and bilateral retrograde pyelograms, timing to be determined.  Oncology recommended IR guided biopsy of liver lesions but after CT A/P with contrast was completed lesions were found to be stable hepatic cysts.  Globin dropped on 4/19, oncology ordered 1 unit PRBCs.  Repeat H&H after was 8.0.  Hematuria resolved.  Patient will follow-up outpatient, discussed with urology coordinator, likely cystoscopy planned for Wednesday 4/24 with Dr. Hagen.  Patient and wife aware to expect phone call for scheduling.  He will hold ASA and Plavix until then.  He will return to ER for worsening hematuria, patient and wife verbalized understanding and agreement.    Gross hematuria  Possible urinary bladder mass  -CT A/P with moderate bilateral hydronephrosis and hydroureter without any obvious stone and possible rounded mass of the anterior aspect of the urinary

## 2024-04-19 NOTE — PLAN OF CARE
Problem: Discharge Planning  Goal: Discharge to home or other facility with appropriate resources  Outcome: Progressing  Flowsheets  Taken 4/18/2024 2337 by Maria A Wells RN  Discharge to home or other facility with appropriate resources:   Identify barriers to discharge with patient and caregiver   Arrange for needed discharge resources and transportation as appropriate   Identify discharge learning needs (meds, wound care, etc)  Taken 4/18/2024 1116 by Temo Sam RN  Discharge to home or other facility with appropriate resources:   Identify barriers to discharge with patient and caregiver   Arrange for needed discharge resources and transportation as appropriate   Identify discharge learning needs (meds, wound care, etc)     Problem: Safety - Adult  Goal: Free from fall injury  Outcome: Progressing  Flowsheets  Taken 4/18/2024 2337  Free From Fall Injury:   Instruct family/caregiver on patient safety   Based on caregiver fall risk screen, instruct family/caregiver to ask for assistance with transferring infant if caregiver noted to have fall risk factors  Taken 4/18/2024 2331  Free From Fall Injury:   Based on caregiver fall risk screen, instruct family/caregiver to ask for assistance with transferring infant if caregiver noted to have fall risk factors   Instruct family/caregiver on patient safety     Problem: ABCDS Injury Assessment  Goal: Absence of physical injury  Outcome: Progressing  Flowsheets  Taken 4/18/2024 2337  Absence of Physical Injury: Implement safety measures based on patient assessment  Taken 4/18/2024 2331  Absence of Physical Injury: Implement safety measures based on patient assessment     Problem: Skin/Tissue Integrity  Goal: Absence of new skin breakdown  Description: 1.  Monitor for areas of redness and/or skin breakdown  2.  Assess vascular access sites hourly  3.  Every 4-6 hours minimum:  Change oxygen saturation probe site  4.  Every 4-6 hours:  If on nasal continuous

## 2024-04-19 NOTE — PROGRESS NOTES
Patient alert and oriented x4, discharged to with documented belongings. Port de-accessed without any complications. Transported out of hospital via wheelchair by transport staff. Reviewed discharge, follow up, and medication instructions with patient and patient verbalized understanding. All questions and concerns addressed at this time.

## 2024-04-19 NOTE — PROGRESS NOTES
Pt Name: Yury Levin  Medical Record Number: [id number]  Date of Birth 1950   Today's Date: 4/19/2024      Subjective: No complaints today.  Wants to go home.  Reportedly urine is clear, less bloody, light tea colored.  No discomfort    ROS: Constitutional: No fever    Vitals:  Vitals:    04/19/24 0017 04/19/24 0532 04/19/24 0545 04/19/24 0731   BP: 117/62   117/71   Pulse: 63   75   Resp: 16   16   Temp: 97.9 °F (36.6 °C)   97.9 °F (36.6 °C)   TempSrc: Oral   Oral   SpO2: 98%  96% 96%   Weight:  87.3 kg (192 lb 8 oz)     Height:         I/O last 3 completed shifts:  In: 1905 [P.O.:1040; I.V.:865]  Out: 750 [Urine:750]    Exam:  General: Awake, oriented, no acute distress  Respiratory: Nonlabored breathing  Abdomen: Soft, non-tender, non-distended, no masses  : No catheter  Skin: Skin color, texture, turgor normal, no rashes or lesions  Neurologic: no gross deficits    CURRENT MEDICATIONS   Scheduled Meds:   amiodarone  200 mg Oral Daily    ezetimibe  10 mg Oral Daily    rosuvastatin  40 mg Oral Nightly    sodium chloride flush  5-40 mL IntraVENous 2 times per day     Continuous Infusions:   sodium chloride      sodium chloride 75 mL/hr at 04/18/24 1119     PRN Meds:.sodium chloride flush, sodium chloride, potassium chloride **OR** potassium alternative oral replacement **OR** potassium chloride, magnesium sulfate, ondansetron **OR** ondansetron, polyethylene glycol, acetaminophen **OR** acetaminophen    LABS     Recent Labs     04/17/24  0326 04/17/24  0659 04/18/24  0515 04/18/24  0920 04/19/24  0630   WBC 11.2*  --  9.7  --  9.4   HGB 8.1* 7.8* 7.3*  --  7.0*   HCT 24.5* 23.1* 21.5*  --  20.6*     --  119*  --  126*     --   --   --  143   K 3.2*  --   --   --  3.7   CL 99  --   --   --  112*   CO2 24  --   --   --  23   BUN 18  --   --   --  16   CREATININE 2.2*  --   --   --  2.1*   MG 2.00  --   --   --   --    CALCIUM 8.8  --   --   --  7.4*   INR  --   --   --  1.12  --    AST 15   Cr at baseline  - Avoid nephrotoxic medications

## 2024-04-19 NOTE — CARE COORDINATION
CASE MANAGEMENT DISCHARGE SUMMARY:    DISCHARGE DATE: 4/19/2024    DISCHARGED TO HOME     TRANSPORTATION: family                        COMMENTS: Discharge to home--no needs.     Electronically signed by HORTENCIA Doll, LISW, Case Management on 4/19/2024 at 3:00 PM  [place] [phone]

## 2024-04-19 NOTE — PLAN OF CARE
Problem: Discharge Planning  Goal: Discharge to home or other facility with appropriate resources  4/19/2024 0744 by Temo Sam RN  Outcome: Progressing  Flowsheets (Taken 4/18/2024 2337 by Maria A Wells RN)  Discharge to home or other facility with appropriate resources:   Identify barriers to discharge with patient and caregiver   Arrange for needed discharge resources and transportation as appropriate   Identify discharge learning needs (meds, wound care, etc)  4/18/2024 2337 by Maria A Wells RN  Outcome: Progressing  Flowsheets  Taken 4/18/2024 2337 by Maria A Wells RN  Discharge to home or other facility with appropriate resources:   Identify barriers to discharge with patient and caregiver   Arrange for needed discharge resources and transportation as appropriate   Identify discharge learning needs (meds, wound care, etc)  Taken 4/18/2024 2045 by Maria A Wells RN  Discharge to home or other facility with appropriate resources:   Arrange for needed discharge resources and transportation as appropriate   Identify barriers to discharge with patient and caregiver   Identify discharge learning needs (meds, wound care, etc)  Taken 4/18/2024 1116 by Temo Sam RN  Discharge to home or other facility with appropriate resources:   Identify barriers to discharge with patient and caregiver   Arrange for needed discharge resources and transportation as appropriate   Identify discharge learning needs (meds, wound care, etc)     Problem: Safety - Adult  Goal: Free from fall injury  4/19/2024 0744 by Temo Sam RN  Outcome: Progressing  Flowsheets (Taken 4/18/2024 2337 by Maria A Wells RN)  Free From Fall Injury:   Instruct family/caregiver on patient safety   Based on caregiver fall risk screen, instruct family/caregiver to ask for assistance with transferring infant if caregiver noted to have fall risk factors  4/18/2024 2337 by Maria A Wells RN  Outcome: Progressing  Flowsheets  Taken

## 2024-04-22 ENCOUNTER — TELEPHONE (OUTPATIENT)
Dept: INTERNAL MEDICINE CLINIC | Age: 74
End: 2024-04-22

## 2024-04-22 ENCOUNTER — TELEPHONE (OUTPATIENT)
Dept: CARDIOLOGY CLINIC | Age: 74
End: 2024-04-22

## 2024-04-22 NOTE — TELEPHONE ENCOUNTER
Lona returned message and stated the medication can not be held for 6 months postprocedure  Dr. Wade's office was phoned and coordinator aware of the recommendation not to stop the Plavix.  She stated she will inform the surgeon and the patient about cancelling the procedure and restarting the Plavix.

## 2024-04-22 NOTE — TELEPHONE ENCOUNTER
Patient had a watchman procedure on 12/18/23 and is having a  bladder BX on the 24th, in 2 days.  On 2/7/24 follow up appointment Dr. Cochran's note was \"however may be tolerant of short term treatment with AC as necessary.\"  The watchman was done December 18th, 2023.  Is the patient ok to be off this medication for the 5 days, which started holding it on 4/19.   Please advise.

## 2024-04-22 NOTE — TELEPHONE ENCOUNTER
CARDIAC CLEARANCE     What type of procedure are you having?  Bladder bx, MAC anesthesia <30 min    Which physician is performing your procedure?  DR. TORRES    When is your procedure scheduled for?  4/24/24    Where [facility] you having this [facility]    Are you taking Blood Thinners?   If so what? (Name/dose/frequesncy)  clopidogrel (PLAVIX) 75 MG tablet     Does the surgeon want you to stop your blood thinner?  If so for how long?  Since Friday, 4/19    Phone Number and Contact Name for Physicians office:  [phone]    Fax number to send information:  [phone]

## 2024-04-22 NOTE — TELEPHONE ENCOUNTER
Care Transitions Initial Follow Up Call    Outreach made within 2 business days of discharge: Yes    Patient: Yury Levin Patient : 1950   MRN: [id number]  Reason for Admission: There are no discharge diagnoses documented for the most recent discharge.  Discharge Date: 24       Spoke with: Wife    Discharge department/facility: [facility] Interactive Patient Contact:  Was patient able to fill all prescriptions: Yes  Was patient instructed to bring all medications to the follow-up visit: No:   Is patient taking all medications as directed in the discharge summary? Yes  Does patient understand their discharge instructions: Yes  Does patient have questions or concerns that need addressed prior to 7-14 day follow up office visit: no    Scheduled appointment with PCP within 7-14 days    I spoke to wife about making hospital follow up and she said that they would like to wait as he is going to have a biopsy of his bladder on 2024 as well as several other Dr live's.  She will call back.      Follow Up  Future Appointments   Date Time Provider Department Center   2024 10:00 AM Jason Bravo MD [facility]   2024 10:00 AM Jason Bravo MD [facility] Krystal L Collins, MA

## 2024-04-22 NOTE — TELEPHONE ENCOUNTER
Sophie phoned back, patient is still scheduled for 4/24 with Dr. Cochran's approval. Family is aware and ready to proceed.

## 2024-04-22 NOTE — TELEPHONE ENCOUNTER
Discussed with Dr Cochran and okay to hold medication and may hold Plavix. Called and left message that patient may proceed and may hold the Plavix. Can you please try again so the procedure is not delayed.

## 2024-04-22 NOTE — TELEPHONE ENCOUNTER
Coordinator phoned and left message for her to phone office asap, that Plavix can be held.awaiting return

## 2024-04-25 ENCOUNTER — TELEPHONE (OUTPATIENT)
Dept: CARDIOLOGY CLINIC | Age: 74
End: 2024-04-25

## 2024-04-25 NOTE — TELEPHONE ENCOUNTER
Lona,  Please discuss this with Dr. Cochran. They need to proceed with the treatments for cancer. When can he hold the medications again?

## 2024-04-25 NOTE — TELEPHONE ENCOUNTER
Azul @ , copied from telephone encounter to this one related to the procedures.    The urology group called to find out how soon after his last procedure (4/24) he can have the next one? Will need to hold plavix and aspirin    Callback: [phone]    Fax: [phone]

## 2024-04-25 NOTE — TELEPHONE ENCOUNTER
The urology group called to find out how soon after his last procedure (4/24) he can have the next one? Will need to hold plavix and aspirin    Callback: [phone]    Fax: [phone]

## 2024-04-26 NOTE — TELEPHONE ENCOUNTER
Discussed with Dr Cochran and nestor to hold medication and may hold Plavix. Called and left message that patient may proceed and may hold the Plavix.

## 2024-04-26 NOTE — TELEPHONE ENCOUNTER
CARDIAC CLEARANCE      What type of procedure are you having?  CYSTOSCOPY DIAGNOSTIC URETEROSCOPY, BLADDER BIOPSY      Which physician is performing your procedure?  Dr. Irish Bains       When is your procedure scheduled for?  5/10/24   Where are you having this [facility]     Are you taking Blood Thinners?              If so what? (Name/dose/frequesncy)  clopidogrel (PLAVIX) 75 MG tablet . Aspirin 81 MG      Does the surgeon want you to stop your blood thinner?  If so for how long?  Yes, 7 days prior      Phone Number and Contact Name for Physicians office:  [phone]     Fax number to send information:  [phone]

## 2024-04-26 NOTE — TELEPHONE ENCOUNTER
Spoke with pt wife ( okay with HIPAA) informed that Yury has been cleared from a cardiac standpoint for upcoming cystoscopy diagnostic ureteroscopy, with bladder biopsy procedure with the Urology group.  Letter created and sent over to Dr. Bains's office. Scanned into media for review.

## 2024-04-26 NOTE — TELEPHONE ENCOUNTER
Spoke with pt wife ( ok with HIPAA) she states that pt is still taking Plavix and ASA. Surgery was originally scheduled 5/10 But cancelled due to Dr. Wade's office unaware if pt is cleared to hold blood thinners.     Wife will call Dr. Wade's office to see If the procedure could be rescheduled. Advised to refax CC request and we will proceed to fax over new letter.

## 2024-05-16 ENCOUNTER — TELEPHONE (OUTPATIENT)
Dept: CARDIOLOGY CLINIC | Age: 74
End: 2024-05-16

## 2024-05-16 NOTE — TELEPHONE ENCOUNTER
Karen from [facility] called to discuss Yury starting back on his Plavix. Informed that she will receive a call back.    Please advise, Karen provided personal number.    [facility] callback: [phone]

## 2024-05-16 NOTE — TELEPHONE ENCOUNTER
Returned call to Karen LEE.  Relayed message from Dr. Cochran and Beatriz PRICE.  Karen LEE verbalized and confirmed understanding.  I told her that I would call patient to make aware.  She said that she is going to call patient herself right now..

## 2024-05-16 NOTE — TELEPHONE ENCOUNTER
Phoned [facility] to speak with Karen to find out why he was off his Plavix. ROSA Jones was not available and will return call.

## 2024-05-16 NOTE — TELEPHONE ENCOUNTER
Patient is having hematuria with having prostate cancer per Karen [facility] NP. Plavix was being held to have cystoscopy, patient is not wanting to do another procedure at this time. Karen from [facility] states he is having gross hematuria and patient has restarted the Plavix until he hears from Cardiology tells him to stop it. He had a watchman with Dr. Cochran on 123/18/23. Please advise.

## 2024-05-16 NOTE — TELEPHONE ENCOUNTER
Per Dr. Cochran, nestor for patient to stop Plavix.   Patient must continue ASA.   Called [facility] to relay recommendations to Karen LEE who was unavailable, seeing patients per the .     Please try to call back with recommendations and notify patient.

## 2024-06-03 RX ORDER — CLOPIDOGREL BISULFATE 75 MG/1
75 TABLET ORAL DAILY
Qty: 90 TABLET | OUTPATIENT
Start: 2024-06-03

## 2024-06-10 ENCOUNTER — APPOINTMENT (OUTPATIENT)
Dept: CT IMAGING | Age: 74
End: 2024-06-10
Payer: COMMERCIAL

## 2024-06-10 ENCOUNTER — APPOINTMENT (OUTPATIENT)
Dept: GENERAL RADIOLOGY | Age: 74
End: 2024-06-10
Payer: COMMERCIAL

## 2024-06-10 ENCOUNTER — HOSPITAL ENCOUNTER (INPATIENT)
Age: 74
LOS: 3 days | Discharge: HOME | End: 2024-06-13
Attending: INTERNAL MEDICINE | Admitting: INTERNAL MEDICINE
Payer: COMMERCIAL

## 2024-06-10 DIAGNOSIS — Z71.89 GOALS OF CARE, COUNSELING/DISCUSSION: ICD-10-CM

## 2024-06-10 DIAGNOSIS — Z51.89 ENCOUNTER FOR BLOOD TRANSFUSION: ICD-10-CM

## 2024-06-10 DIAGNOSIS — Z78.9 FULL CODE STATUS: ICD-10-CM

## 2024-06-10 DIAGNOSIS — D64.9 ANEMIA, UNSPECIFIED TYPE: Primary | ICD-10-CM

## 2024-06-10 DIAGNOSIS — R79.89 ELEVATED TROPONIN: ICD-10-CM

## 2024-06-10 DIAGNOSIS — N39.0 URINARY TRACT INFECTION WITH HEMATURIA, SITE UNSPECIFIED: ICD-10-CM

## 2024-06-10 DIAGNOSIS — I44.0 FIRST DEGREE AV BLOCK: ICD-10-CM

## 2024-06-10 DIAGNOSIS — N13.39 OTHER HYDRONEPHROSIS: ICD-10-CM

## 2024-06-10 DIAGNOSIS — R53.1 GENERAL WEAKNESS: ICD-10-CM

## 2024-06-10 DIAGNOSIS — R31.9 URINARY TRACT INFECTION WITH HEMATURIA, SITE UNSPECIFIED: ICD-10-CM

## 2024-06-10 LAB
ABO + RH BLD: NORMAL
ALBUMIN SERPL-MCNC: 3.1 G/DL (ref 3.4–5)
ALP SERPL-CCNC: 89 U/L (ref 40–129)
ALT SERPL-CCNC: 19 U/L (ref 10–40)
ANION GAP SERPL CALCULATED.3IONS-SCNC: 9 MMOL/L (ref 3–16)
AST SERPL-CCNC: 16 U/L (ref 15–37)
BASOPHILS # BLD: 0 K/UL (ref 0–0.2)
BASOPHILS # BLD: 0.1 K/UL (ref 0–0.2)
BASOPHILS NFR BLD: 0.4 %
BASOPHILS NFR BLD: 0.9 %
BILIRUB DIRECT SERPL-MCNC: <0.2 MG/DL (ref 0–0.3)
BILIRUB INDIRECT SERPL-MCNC: ABNORMAL MG/DL (ref 0–1)
BILIRUB SERPL-MCNC: <0.2 MG/DL (ref 0–1)
BILIRUB UR QL STRIP.AUTO: NEGATIVE
BLD GP AB SCN SERPL QL: NORMAL
BLOOD BANK DISPENSE STATUS: NORMAL
BLOOD BANK DISPENSE STATUS: NORMAL
BLOOD BANK PRODUCT CODE: NORMAL
BLOOD BANK PRODUCT CODE: NORMAL
BPU ID: NORMAL
BPU ID: NORMAL
BUN SERPL-MCNC: 31 MG/DL (ref 7–20)
CALCIUM SERPL-MCNC: 8.6 MG/DL (ref 8.3–10.6)
CHLORIDE SERPL-SCNC: 104 MMOL/L (ref 99–110)
CLARITY UR: ABNORMAL
CO2 SERPL-SCNC: 26 MMOL/L (ref 21–32)
COLOR UR: ABNORMAL
CREAT SERPL-MCNC: 2.2 MG/DL (ref 0.8–1.3)
DEPRECATED RDW RBC AUTO: 19.2 % (ref 12.4–15.4)
DEPRECATED RDW RBC AUTO: 21 % (ref 12.4–15.4)
DESCRIPTION BLOOD BANK: NORMAL
DESCRIPTION BLOOD BANK: NORMAL
EKG ATRIAL RATE: 90 BPM
EKG DIAGNOSIS: NORMAL
EKG P AXIS: 42 DEGREES
EKG P-R INTERVAL: 380 MS
EKG Q-T INTERVAL: 264 MS
EKG QRS DURATION: 92 MS
EKG QTC CALCULATION (BAZETT): 322 MS
EKG R AXIS: 24 DEGREES
EKG T AXIS: 124 DEGREES
EKG VENTRICULAR RATE: 90 BPM
EOSINOPHIL # BLD: 0 K/UL (ref 0–0.6)
EOSINOPHIL # BLD: 0 K/UL (ref 0–0.6)
EOSINOPHIL NFR BLD: 0.1 %
EOSINOPHIL NFR BLD: 0.3 %
EPI CELLS #/AREA URNS HPF: ABNORMAL /HPF (ref 0–5)
GFR SERPLBLD CREATININE-BSD FMLA CKD-EPI: 30 ML/MIN/{1.73_M2}
GLUCOSE SERPL-MCNC: 160 MG/DL (ref 70–99)
GLUCOSE UR STRIP.AUTO-MCNC: NEGATIVE MG/DL
HCT VFR BLD AUTO: 16.7 % (ref 40.5–52.5)
HCT VFR BLD AUTO: 20.6 % (ref 40.5–52.5)
HCT VFR BLD AUTO: 21.8 % (ref 40.5–52.5)
HGB BLD-MCNC: 5.3 G/DL (ref 13.5–17.5)
HGB BLD-MCNC: 6.9 G/DL (ref 13.5–17.5)
HGB BLD-MCNC: 7.4 G/DL (ref 13.5–17.5)
HGB UR QL STRIP.AUTO: ABNORMAL
KETONES UR STRIP.AUTO-MCNC: NEGATIVE MG/DL
LEUKOCYTE ESTERASE UR QL STRIP.AUTO: ABNORMAL
LIPASE SERPL-CCNC: 25 U/L (ref 13–60)
LYMPHOCYTES # BLD: 0.5 K/UL (ref 1–5.1)
LYMPHOCYTES # BLD: 0.8 K/UL (ref 1–5.1)
LYMPHOCYTES NFR BLD: 4.3 %
LYMPHOCYTES NFR BLD: 6.6 %
MCH RBC QN AUTO: 31.7 PG (ref 26–34)
MCH RBC QN AUTO: 32.3 PG (ref 26–34)
MCHC RBC AUTO-ENTMCNC: 31.9 G/DL (ref 31–36)
MCHC RBC AUTO-ENTMCNC: 34 G/DL (ref 31–36)
MCV RBC AUTO: 101.2 FL (ref 80–100)
MCV RBC AUTO: 93 FL (ref 80–100)
MONOCYTES # BLD: 0.7 K/UL (ref 0–1.3)
MONOCYTES # BLD: 0.8 K/UL (ref 0–1.3)
MONOCYTES NFR BLD: 6.2 %
MONOCYTES NFR BLD: 6.9 %
NEUTROPHILS # BLD: 10.2 K/UL (ref 1.7–7.7)
NEUTROPHILS # BLD: 9.7 K/UL (ref 1.7–7.7)
NEUTROPHILS NFR BLD: 85.8 %
NEUTROPHILS NFR BLD: 88.5 %
NITRITE UR QL STRIP.AUTO: NEGATIVE
PH UR STRIP.AUTO: 7 [PH] (ref 5–8)
PLATELET # BLD AUTO: 135 K/UL (ref 135–450)
PLATELET # BLD AUTO: 163 K/UL (ref 135–450)
PMV BLD AUTO: 8.5 FL (ref 5–10.5)
PMV BLD AUTO: 8.5 FL (ref 5–10.5)
POTASSIUM SERPL-SCNC: 4 MMOL/L (ref 3.5–5.1)
PROT SERPL-MCNC: 5.2 G/DL (ref 6.4–8.2)
PROT UR STRIP.AUTO-MCNC: 100 MG/DL
RBC # BLD AUTO: 1.65 M/UL (ref 4.2–5.9)
RBC # BLD AUTO: 2.35 M/UL (ref 4.2–5.9)
RBC #/AREA URNS HPF: >100 /HPF (ref 0–4)
SODIUM SERPL-SCNC: 139 MMOL/L (ref 136–145)
SP GR UR STRIP.AUTO: 1.02 (ref 1–1.03)
TROPONIN, HIGH SENSITIVITY: 26 NG/L (ref 0–22)
TROPONIN, HIGH SENSITIVITY: 29 NG/L (ref 0–22)
UA COMPLETE W REFLEX CULTURE PNL UR: ABNORMAL
UA DIPSTICK W REFLEX MICRO PNL UR: YES
URN SPEC COLLECT METH UR: ABNORMAL
UROBILINOGEN UR STRIP-ACNC: 0.2 E.U./DL
WBC # BLD AUTO: 10.9 K/UL (ref 4–11)
WBC # BLD AUTO: 11.9 K/UL (ref 4–11)
WBC #/AREA URNS HPF: ABNORMAL /HPF (ref 0–5)

## 2024-06-10 PROCEDURE — 99285 EMERGENCY DEPT VISIT HI MDM: CPT

## 2024-06-10 PROCEDURE — 71045 X-RAY EXAM CHEST 1 VIEW: CPT

## 2024-06-10 PROCEDURE — 96374 THER/PROPH/DIAG INJ IV PUSH: CPT

## 2024-06-10 PROCEDURE — 36430 TRANSFUSION BLD/BLD COMPNT: CPT

## 2024-06-10 PROCEDURE — 6360000002 HC RX W HCPCS: Performed by: NURSE PRACTITIONER

## 2024-06-10 PROCEDURE — 6370000000 HC RX 637 (ALT 250 FOR IP): Performed by: INTERNAL MEDICINE

## 2024-06-10 PROCEDURE — 86901 BLOOD TYPING SEROLOGIC RH(D): CPT

## 2024-06-10 PROCEDURE — 87040 BLOOD CULTURE FOR BACTERIA: CPT

## 2024-06-10 PROCEDURE — 96361 HYDRATE IV INFUSION ADD-ON: CPT

## 2024-06-10 PROCEDURE — 93010 ELECTROCARDIOGRAM REPORT: CPT | Performed by: INTERNAL MEDICINE

## 2024-06-10 PROCEDURE — 86850 RBC ANTIBODY SCREEN: CPT

## 2024-06-10 PROCEDURE — 74176 CT ABD & PELVIS W/O CONTRAST: CPT

## 2024-06-10 PROCEDURE — P9016 RBC LEUKOCYTES REDUCED: HCPCS

## 2024-06-10 PROCEDURE — 81001 URINALYSIS AUTO W/SCOPE: CPT

## 2024-06-10 PROCEDURE — 51702 INSERT TEMP BLADDER CATH: CPT

## 2024-06-10 PROCEDURE — 80048 BASIC METABOLIC PNL TOTAL CA: CPT

## 2024-06-10 PROCEDURE — 84484 ASSAY OF TROPONIN QUANT: CPT

## 2024-06-10 PROCEDURE — 85018 HEMOGLOBIN: CPT

## 2024-06-10 PROCEDURE — 30233N1 TRANSFUSION OF NONAUTOLOGOUS RED BLOOD CELLS INTO PERIPHERAL VEIN, PERCUTANEOUS APPROACH: ICD-10-PCS | Performed by: INTERNAL MEDICINE

## 2024-06-10 PROCEDURE — 2580000003 HC RX 258: Performed by: NURSE PRACTITIONER

## 2024-06-10 PROCEDURE — 86900 BLOOD TYPING SEROLOGIC ABO: CPT

## 2024-06-10 PROCEDURE — 85014 HEMATOCRIT: CPT

## 2024-06-10 PROCEDURE — 2060000000 HC ICU INTERMEDIATE R&B

## 2024-06-10 PROCEDURE — 93005 ELECTROCARDIOGRAM TRACING: CPT | Performed by: NURSE PRACTITIONER

## 2024-06-10 PROCEDURE — 86923 COMPATIBILITY TEST ELECTRIC: CPT

## 2024-06-10 PROCEDURE — 80076 HEPATIC FUNCTION PANEL: CPT

## 2024-06-10 PROCEDURE — 85025 COMPLETE CBC W/AUTO DIFF WBC: CPT

## 2024-06-10 PROCEDURE — 83690 ASSAY OF LIPASE: CPT

## 2024-06-10 RX ORDER — SODIUM CHLORIDE 0.9 % (FLUSH) 0.9 %
5-40 SYRINGE (ML) INJECTION EVERY 12 HOURS SCHEDULED
Status: DISCONTINUED | OUTPATIENT
Start: 2024-06-10 | End: 2024-06-13 | Stop reason: HOSPADM

## 2024-06-10 RX ORDER — ONDANSETRON 4 MG/1
4 TABLET, ORALLY DISINTEGRATING ORAL EVERY 8 HOURS PRN
Status: DISCONTINUED | OUTPATIENT
Start: 2024-06-10 | End: 2024-06-13 | Stop reason: HOSPADM

## 2024-06-10 RX ORDER — SODIUM CHLORIDE, SODIUM LACTATE, POTASSIUM CHLORIDE, AND CALCIUM CHLORIDE .6; .31; .03; .02 G/100ML; G/100ML; G/100ML; G/100ML
500 INJECTION, SOLUTION INTRAVENOUS ONCE
Status: COMPLETED | OUTPATIENT
Start: 2024-06-10 | End: 2024-06-10

## 2024-06-10 RX ORDER — ACETAMINOPHEN 325 MG/1
650 TABLET ORAL EVERY 6 HOURS PRN
Status: DISCONTINUED | OUTPATIENT
Start: 2024-06-10 | End: 2024-06-13 | Stop reason: HOSPADM

## 2024-06-10 RX ORDER — SODIUM CHLORIDE 0.9 % (FLUSH) 0.9 %
5-40 SYRINGE (ML) INJECTION PRN
Status: DISCONTINUED | OUTPATIENT
Start: 2024-06-10 | End: 2024-06-13 | Stop reason: HOSPADM

## 2024-06-10 RX ORDER — SODIUM CHLORIDE 9 MG/ML
INJECTION, SOLUTION INTRAVENOUS PRN
Status: DISCONTINUED | OUTPATIENT
Start: 2024-06-10 | End: 2024-06-13 | Stop reason: HOSPADM

## 2024-06-10 RX ORDER — AMIODARONE HYDROCHLORIDE 200 MG/1
200 TABLET ORAL DAILY
Status: DISCONTINUED | OUTPATIENT
Start: 2024-06-11 | End: 2024-06-13 | Stop reason: HOSPADM

## 2024-06-10 RX ORDER — ONDANSETRON 2 MG/ML
4 INJECTION INTRAMUSCULAR; INTRAVENOUS EVERY 6 HOURS PRN
Status: DISCONTINUED | OUTPATIENT
Start: 2024-06-10 | End: 2024-06-13 | Stop reason: HOSPADM

## 2024-06-10 RX ORDER — TAMSULOSIN HYDROCHLORIDE 0.4 MG/1
0.4 CAPSULE ORAL NIGHTLY
COMMUNITY

## 2024-06-10 RX ORDER — LORATADINE 10 MG/1
10 TABLET ORAL DAILY
COMMUNITY

## 2024-06-10 RX ORDER — POTASSIUM CHLORIDE 20 MEQ/1
40 TABLET, EXTENDED RELEASE ORAL PRN
Status: DISCONTINUED | OUTPATIENT
Start: 2024-06-10 | End: 2024-06-13 | Stop reason: HOSPADM

## 2024-06-10 RX ORDER — POTASSIUM CHLORIDE 7.45 MG/ML
10 INJECTION INTRAVENOUS PRN
Status: DISCONTINUED | OUTPATIENT
Start: 2024-06-10 | End: 2024-06-13 | Stop reason: HOSPADM

## 2024-06-10 RX ORDER — EZETIMIBE 10 MG/1
10 TABLET ORAL DAILY
Status: DISCONTINUED | OUTPATIENT
Start: 2024-06-11 | End: 2024-06-13 | Stop reason: HOSPADM

## 2024-06-10 RX ORDER — POLYETHYLENE GLYCOL 3350 17 G/17G
17 POWDER, FOR SOLUTION ORAL DAILY PRN
Status: DISCONTINUED | OUTPATIENT
Start: 2024-06-10 | End: 2024-06-13 | Stop reason: HOSPADM

## 2024-06-10 RX ORDER — MAGNESIUM SULFATE IN WATER 40 MG/ML
2000 INJECTION, SOLUTION INTRAVENOUS PRN
Status: DISCONTINUED | OUTPATIENT
Start: 2024-06-10 | End: 2024-06-13 | Stop reason: HOSPADM

## 2024-06-10 RX ORDER — ACETAMINOPHEN 650 MG/1
650 SUPPOSITORY RECTAL EVERY 6 HOURS PRN
Status: DISCONTINUED | OUTPATIENT
Start: 2024-06-10 | End: 2024-06-13 | Stop reason: HOSPADM

## 2024-06-10 RX ORDER — ROSUVASTATIN CALCIUM 40 MG/1
40 TABLET, COATED ORAL NIGHTLY
Status: DISCONTINUED | OUTPATIENT
Start: 2024-06-10 | End: 2024-06-13 | Stop reason: HOSPADM

## 2024-06-10 RX ADMIN — ROSUVASTATIN CALCIUM 40 MG: 40 TABLET, FILM COATED ORAL at 19:41

## 2024-06-10 RX ADMIN — WATER 1000 MG: 1 INJECTION INTRAMUSCULAR; INTRAVENOUS; SUBCUTANEOUS at 12:52

## 2024-06-10 RX ADMIN — SODIUM CHLORIDE, POTASSIUM CHLORIDE, SODIUM LACTATE AND CALCIUM CHLORIDE 500 ML: 600; 310; 30; 20 INJECTION, SOLUTION INTRAVENOUS at 09:49

## 2024-06-10 ASSESSMENT — PAIN SCALES - GENERAL
PAINLEVEL_OUTOF10: 0

## 2024-06-10 ASSESSMENT — LIFESTYLE VARIABLES
HOW MANY STANDARD DRINKS CONTAINING ALCOHOL DO YOU HAVE ON A TYPICAL DAY: PATIENT DOES NOT DRINK
HOW OFTEN DO YOU HAVE A DRINK CONTAINING ALCOHOL: NEVER

## 2024-06-10 ASSESSMENT — PAIN - FUNCTIONAL ASSESSMENT: PAIN_FUNCTIONAL_ASSESSMENT: NONE - DENIES PAIN

## 2024-06-10 NOTE — ED PROVIDER NOTES
Use    Vaping Use: Never used   Substance and Sexual Activity    Alcohol use: No     Comment: 2-3 beers, infrequent    Drug use: No     Comment: never     Social Determinants of Health     Financial Resource Strain: Low Risk  (6/14/2023)    Overall Financial Resource Strain (CARDIA)     Difficulty of Paying Living Expenses: Not very hard   Food Insecurity: No Food Insecurity (4/17/2024)    Hunger Vital Sign     Worried About Running Out of Food in the Last Year: Never true     Ran Out of Food in the Last Year: Never true   Transportation Needs: No Transportation Needs (4/17/2024)    PRAPARE - Transportation     Lack of Transportation (Medical): No     Lack of Transportation (Non-Medical): No   Housing Stability: Low Risk  (4/17/2024)    Housing Stability Vital Sign     Unable to Pay for Housing in the Last Year: No     Number of Places Lived in the Last Year: 1     Unstable Housing in the Last Year: No     Family History   Problem Relation Age of Onset    No Known Problems Mother         still living, age 95    Heart Attack Father         noted at an advanced age, silent. lived to age 87.       PHYSICAL EXAM    VITAL SIGNS: /64   Pulse 66   Temp 98 °F (36.7 °C) (Oral)   Resp 12   Ht 1.702 m (5' 7\")   Wt 87.3 kg (192 lb 7.4 oz)   SpO2 95%   BMI 30.14 kg/m²   Constitutional:  Well developed, well nourished, no acute distress  Eyes:  Pupils equally round and reactive to light.  Conjunctival are pale and dry.  Oral mucous membranes are pale and dry.  HENT:  External ears normal, nose normal  NECK: Normal range of motion, no tenderness  Respiratory:  Lungs clear to auscultation bilaterally, no respiratory distress, no wheezing   Cardiovascular:  Regular rate, normal rhythm, no murmurs   GI:  Soft, nontender, nondistended, normal bowel sounds  Musculoskeletal:  No edema, no acute deformities   Integument:  Skin is warm and dry, pale, no obvious rash    Vascular: Radial and DP pulses 2+ equal  requiring my urgent intervention.    I personally saw the patient and independently provided 35 minutes of non-concurrent critical care out of the total shared critical care time provided.    This includes multiple reevaluations, vital sign monitoring, pulse oximetry monitoring, telemetry monitoring, clinical response to the IV medications, reviewing the nursing notes, consultation time, dictation/documentation time, and interpretation of the labwork. (This time excludes time spent performing procedures).      FINAL IMPRESSION    1. Anemia, unspecified type    2. General weakness    3. Urinary tract infection with hematuria, site unspecified    4. Other hydronephrosis    5. Encounter for blood transfusion    6. Full code status    7. Goals of care, counseling/discussion    8. First degree AV block    9. Elevated troponin        PLAN  Admission    (Please note that this note was completed with a voice recognition program.  Every attempt was made to edit the dictations, but inevitably there remain words that are mis-transcribed.)            Uzma Samuel, BRIAN - CNP  06/10/24 1421

## 2024-06-10 NOTE — PROGRESS NOTES
EDSBAR report has been reviewed.      Electronically signed by Chen Cueva RN on 6/10/2024 at 3:00 PM

## 2024-06-10 NOTE — PROGRESS NOTES
4 Eyes Skin Assessment     NAME:  Yury Levin  YOB: 1950  MEDICAL RECORD NUMBER:  [id number]    The patient is being assessed for  Admission    I agree that at least one RN has performed a thorough Head to Toe Skin Assessment on the patient. ALL assessment sites listed below have been assessed.      Areas assessed by both nurses:    Head, Face, Ears, Shoulders, Back, Chest, Arms, Elbows, Hands, Sacrum. Buttock, Coccyx, Ischium, Legs. Feet and Heels, and Under Medical Devices         Does the Patient have a Wound? Yes wound(s) were present on assessment. LDA wound assessment was Initiated and completed by RN       Jaime Prevention initiated by RN: No  Wound Care Orders initiated by RN: Yes    Pressure Injury (Stage 3,4, Unstageable, DTI, NWPT, and Complex wounds) if present, place Wound referral order by RN under : No    New Ostomies, if present place, Ostomy referral order under : No     Nurse 1 eSignature: Electronically signed by Chen Cueva RN on 6/10/24 at 6:02 PM EDT    **SHARE this note so that the co-signing nurse can place an eSignature**    Nurse 2 eSignature: Electronically signed by Clarissa Bal RN on 6/12/24 at 6:33 AM EDT

## 2024-06-10 NOTE — CONSENT
Informed Consent for Blood Component Transfusion Note    I have discussed with the patient the rationale for blood component transfusion; its benefits in treating or preventing fatigue, organ damage, or death; and its risk which includes mild transfusion reactions, rare risk of blood borne infection, or more serious but rare reactions. I have discussed the alternatives to transfusion, including the risk and consequences of not receiving transfusion. The patient had an opportunity to ask questions and had agreed to proceed with transfusion of blood components.    Electronically signed by BRIAN Brewer CNP on 6/10/24 at 10:29 AM EDT

## 2024-06-10 NOTE — PROGRESS NOTES
Medication Reconciliation    List of medications patient is currently taking is complete.     Source of information: 1. Conversation with patient and family at bedside                                       2. EPIC records      Notes regarding home medications:   1. Patient received all of his morning home medications prior to arrival to the ER today.  2. Patient can provide his own supply of Orgovyx if continued during acute hospital admission.     Naresh Jordan RPH, PharmD, BCPS  6/10/2024 2:39 PM

## 2024-06-10 NOTE — PLAN OF CARE
Problem: Discharge Planning  Goal: Discharge to home or other facility with appropriate resources  Outcome: Completed     Problem: Pain  Goal: Verbalizes/displays adequate comfort level or baseline comfort level  Outcome: Completed

## 2024-06-10 NOTE — CONSULTS
[facility]    Consult Note      Requesting Physician:  Liliam Farrell    CHIEF COMPLAINT:  prostate cancer/anemia       HISTORY OF PRESENT ILLNESS:    Mr. Levin  is a 74 y.o. male we are seeing in consultation for History of metastatic prostate cancer follows with Dr. Wells.  Sent to the emergency department from our office for symptomatic anemia and hematuria.  Patient reports about a week ago he started having right flank pain he is always had some intermittent hematuria, but the hematuria Suddenly increased in volume last Wednesday.  He was seen by his urologist on Friday and told that his hemoglobin was 7.0 He then came into our office today because over the weekend he was feeling worse.  His hemoglobin in our office was 6 and given his symptoms he was taken over to the emergency department for urgent evaluation and treatment.  Hemoglobin found to be 5.3 in the emergency department Snowden catheter inserted, Hematuria still present.  Received 1 unit of packed red blood cells.  Symptoms greatly improving.    He had already had a outpatient cystoscopy scheduled for this week Wednesday urology now plans to do an inpatient tomorrow.    Oncologic history; Stage IV prostate cancer.  He was initially diagnosed in November 2018 and had metastatic disease to the bone at the time of diagnosis with a PSA of 60.  A prostate biopsy revealed a [place] 9 prostate cancer in 9 out of 12 cores.  He was started on Orgovyx and Zytiga and then progressed in December 2022.    Previous therapy; Completed 6 cycles of Taxotere in April 2023.  He was then switched to Jevtana in May 2023 due to progression. He also gets Zometa every 6 weeks.    Current therapy; Jevtana With Udenyca every 3 weeks.Last treatment cycle 18 on 5/18/2024.  Relugolix orally daily.        ICD-10-CM    1. Anemia, unspecified type   (36.8 °C) Oral 99 19 98 % 1.702 m (5' 7\") 87.3 kg (192 lb 7.4 oz)       Date 06/10/24 0000 - 06/10/24 2359   Shift 0325-7137 5554-6789 5838-1794 24 Hour Total   INTAKE   Blood  292.5  292.5   Shift Total(mL/kg)  292.5(3.4)  292.5(3.4)   OUTPUT   Shift Total(mL/kg)       Weight (kg)  87.3 87.3 87.3       CONSTITUTIONAL: awake, alert, cooperative, no apparent distress   EYES: pupils equal, round and reactive to light, sclera clear and conjunctiva normal  ENT: Normocephalic, without obvious abnormality, atraumatic  NECK: supple, symmetrical, no jugular venous distension and no carotid bruits   HEMATOLOGIC/LYMPHATIC: no cervical, supraclavicular or axillary lymphadenopathy   LUNGS: no increased work of breathing and clear to auscultation   CARDIOVASCULAR: regular rate and rhythm, normal S1 and S2, no murmur noted  ABDOMEN: normal bowel sounds x 4, soft, non-distended, non-tender, no masses palpated, no hepatosplenomegaly   : Hematuria. Snowden catheter in place.  MUSCULOSKELETAL: full range of motion noted, tone is normal  NEUROLOGIC: awake, alert, oriented to name, place and time. Motor skills grossly intact.   SKIN: Pale Normal texture, turgor and no jaundice. appears intact   EXTREMITIES: no LE edema       DATA:    PT/INR:  No results for input(s): \"PROT\", \"INR\" in the last 720 hours.  PTT:  No results for input(s): \"APTT\" in the last 720 hours.    CMP:    Recent Labs     06/10/24  0944      K 4.0      CO2 26   BUN 31*     Mg:  No results for input(s): \"MG\" in the last 720 hours.    Lab Results   Component Value Date    CALCIUM 8.6 06/10/2024    PHOS 3.0 09/06/2023       CBC:    Recent Labs     06/10/24  0944   WBC 10.9   NEUTROABS 9.7*   LYMPHOPCT 4.3   RBC 1.65*   HGB 5.3*   HCT 16.7*   .2*   MCH 32.3   MCHC 31.9   RDW 21.0*           LDH:No results for input(s): \"LDH\" in the last 720 hours.      Radiology Review:  CT ABDOMEN PELVIS WO CONTRAST  Narrative: EXAMINATION:  CT OF THE ABDOMEN

## 2024-06-10 NOTE — CONSULTS
Reason for Consult: anemia from recurrent hematuria    History of Present Illness: Yury Levin is a 74 y.o. male PMH CAD, CHF, metastatic prostate cancer w/ bone mets follows with Dr. Wells and Dr. Hagen, currently Jevtana With Udenyca every 3 weeks (last received 5/18), also on Relugolix orally daily. Has had several endoscopic evaluations of gross hematuria since 4/2024 with unknown source of gross hematuria due to poor intrarenal visualization, had bilateral ureteral stents placed 4/2024. Saw Dr. Hagen in office this past week, reported right flank pain associated w/ continued GH. Was then scheduled for right URS for further evaluation.    Patient presented to ER today from [facility] due to near syncopal episode while in the office. Hgb noted to be 5.3, UA grossly bloody (lg blood, mod leuks, neg nitrites, 3-5 WBC/hpf) -- not consistent w/ infection however urine culture pending, receiving empiric IV abx. CTAP reveals stents are in correct position, hydronephrosis improved compared to prior imaging in 4/2024, no obstructive uropathy, no evidence of blood product in the bladder. Cr 2.2 which appears consistent with patient baseline. He has received 1u PRBC. VSS, afebrile.     Snowden catheter placed w/ return of bloody urine output, no clots present, catheter draining well. Patient reports some mild right flank pain currently. Also reports GH is much improved since when he was at home.     Past Medical History:   Past Medical History:   Diagnosis Date    CAD (coronary artery disease) 01/17/2018    NSTEMI - CABG x4 1/17/18    Cancer of prostate (HCC)     Chest pain 01/17/2018    CHF (congestive heart failure) (HCC)     Unstable angina pectoris (HCC)        Past Surgical History:  Past Surgical History:   Procedure Laterality Date    COLONOSCOPY      CORONARY ARTERY BYPASS GRAFT  01/17/2018    cabgx4 ([place])    IR PORT PLACEMENT EQUAL OR GREATER THAN 5 YEARS Right 01/03/2023    Power Port; RIJ access;

## 2024-06-10 NOTE — PROGRESS NOTES
Patient here from ED via stretcher. 02 and heart monitor placed. SR on tele. VSS. Patient oriented to unit and room. Admit assessment complete and history obtained. Orders reviewed with patient and POC discussed. Post transfusion Hgb and troponin collect and sent to lab. Call light in reach. Bed in lowest position, bed alarm on. Denies other needs. Will continue to monitor.     Electronically signed by Chen Cueva RN on 6/10/2024 at 5:00 PM

## 2024-06-10 NOTE — H&P
V2.0  History and Physical      Name:  Yury Levin /Age/Sex: 1950  (74 y.o. male)   MRN & CSN:  [id number] & [id number] Encounter Date/Time: 6/10/2024 1:44 PM EDT   Location:  [address] [place] [address] PCP: Jhoan Guevara MD       Hospital Day: 1    Assessment and Plan:   Yury Levin is a 74 y.o. male with a PMH of Prostate cancer, CAD, status post CABG, paroxysmal A-fib, recurrent hematuria presented to hospital from Dr. Wells's office due to ongoing hematuria and near syncopal episode.  Patient is on chemotherapy and next chemotherapy scheduled for Thursday.  Patient is supposed to get an outpatient cystoscopy next week at [facility] by Dr. Hagen for intermittent hematuria. But when pt was in Dr. Wells's office he did have a near syncopal episode and sent to emergency room for further evaluation.  Patient reports feeling tired in last few days.  And he has been having intermittent episodes of hematuria, and he is seeing blood clots in his urine.  Patient denies any fever/chills/chest pain/shortness of breath at home.  Reports bilateral flank pain. Snowden placed in ER.  Bloody urine noted.  Workup in ER included CBC, CMP.  Noted to have low hemoglobin at 5.3.  Blood transfusions ordered.  MCV high at 101.  Patient has CKD, creatinine at baseline at 2.2.  Blood culture sent.  Urine with large blood, moderate leukocyte esterase, nitrate negative.  Started on empiric antibiotics with ceftriaxone.  CT abdomen and pelvis showed persistent but decreased degree of hydronephrosis compared to prior studies, status post ureteral stent insertion. Case discussed with urology here and patient recommended to be admitted.       Hospital Problems             Last Modified POA    * (Principal) Anemia 6/10/2024 Yes       Plan:  Anemia, due to chronic hematuria. Pl;an for 2 units of pRBC transfusion, check H&H q12h. Ceftriaxone empirically while waiting for urine cultures.  Urology on consult, patient will likely  10/02/2020 09:29 AM     Hemoglobin A1C:   Lab Results   Component Value Date/Time    LABA1C 5.7 12/14/2022 12:09 PM     TSH: No results found for: \"TSH\"  Troponin: No results found for: \"TROPONINT\"  Lactic Acid: No results for input(s): \"LACTA\" in the last 72 hours.  BNP: No results for input(s): \"PROBNP\" in the last 72 hours.  UA:  Lab Results   Component Value Date/Time    NITRU Negative 06/10/2024 11:01 AM    COLORU RED 06/10/2024 11:01 AM    PHUR 7.0 06/10/2024 11:01 AM    PHUR 6.5 04/17/2024 03:11 AM    WBCUA 6-9 06/10/2024 11:01 AM    RBCUA >100 06/10/2024 11:01 AM    BACTERIA None Seen 12/11/2023 10:14 AM    CLARITYU CLOUDY 06/10/2024 11:01 AM    SPECGRAV >=1.030 10/22/2018 04:11 PM    LEUKOCYTESUR MODERATE 06/10/2024 11:01 AM    UROBILINOGEN 0.2 06/10/2024 11:01 AM    BILIRUBINUR Negative 06/10/2024 11:01 AM    BLOODU LARGE 06/10/2024 11:01 AM    GLUCOSEU Negative 06/10/2024 11:01 AM    KETUA Negative 06/10/2024 11:01 AM     Urine Cultures:   Lab Results   Component Value Date/Time    LABURIN No growth at 18 to 36 hours 08/21/2023 06:59 PM     Blood Cultures:   Lab Results   Component Value Date/Time    BC No Growth after 4 days of incubation. 08/21/2023 06:59 PM     Lab Results   Component Value Date/Time    BLOODCULT2 No Growth after 4 days of incubation. 08/21/2023 06:59 PM     Organism: No results found for: \"ORG\"    Imaging/Diagnostics Last 24 Hours   CT ABDOMEN PELVIS WO CONTRAST    Result Date: 6/10/2024  EXAMINATION: CT OF THE ABDOMEN AND PELVIS WITHOUT CONTRAST 6/10/2024 10:44 am TECHNIQUE: CT of the abdomen and pelvis was performed without the administration of intravenous contrast. Multiplanar reformatted images are provided for review. Automated exposure control, iterative reconstruction, and/or weight based adjustment of the mA/kV was utilized to reduce the radiation dose to as low as reasonably achievable. COMPARISON: 04/18/2024 HISTORY: ORDERING SYSTEM PROVIDED HISTORY: bilateral flank pain,

## 2024-06-11 ENCOUNTER — ANESTHESIA (OUTPATIENT)
Dept: OPERATING ROOM | Age: 74
End: 2024-06-11
Payer: COMMERCIAL

## 2024-06-11 ENCOUNTER — ANESTHESIA EVENT (OUTPATIENT)
Dept: OPERATING ROOM | Age: 74
End: 2024-06-11
Payer: COMMERCIAL

## 2024-06-11 LAB
ANION GAP SERPL CALCULATED.3IONS-SCNC: 7 MMOL/L (ref 3–16)
BASOPHILS # BLD: 0 K/UL (ref 0–0.2)
BASOPHILS NFR BLD: 0.4 %
BUN SERPL-MCNC: 28 MG/DL (ref 7–20)
CALCIUM SERPL-MCNC: 8.1 MG/DL (ref 8.3–10.6)
CHLORIDE SERPL-SCNC: 108 MMOL/L (ref 99–110)
CO2 SERPL-SCNC: 26 MMOL/L (ref 21–32)
CREAT SERPL-MCNC: 2.2 MG/DL (ref 0.8–1.3)
DEPRECATED RDW RBC AUTO: 19.4 % (ref 12.4–15.4)
EOSINOPHIL # BLD: 0 K/UL (ref 0–0.6)
EOSINOPHIL NFR BLD: 0.3 %
GFR SERPLBLD CREATININE-BSD FMLA CKD-EPI: 30 ML/MIN/{1.73_M2}
GLUCOSE SERPL-MCNC: 77 MG/DL (ref 70–99)
HCT VFR BLD AUTO: 22.7 % (ref 40.5–52.5)
HCT VFR BLD AUTO: 23 % (ref 40.5–52.5)
HGB BLD-MCNC: 7.7 G/DL (ref 13.5–17.5)
HGB BLD-MCNC: 8 G/DL (ref 13.5–17.5)
LYMPHOCYTES # BLD: 0.8 K/UL (ref 1–5.1)
LYMPHOCYTES NFR BLD: 7.8 %
MCH RBC QN AUTO: 32 PG (ref 26–34)
MCHC RBC AUTO-ENTMCNC: 34.1 G/DL (ref 31–36)
MCV RBC AUTO: 93.7 FL (ref 80–100)
MONOCYTES # BLD: 0.7 K/UL (ref 0–1.3)
MONOCYTES NFR BLD: 6.4 %
NEUTROPHILS # BLD: 9.1 K/UL (ref 1.7–7.7)
NEUTROPHILS NFR BLD: 85.1 %
PLATELET # BLD AUTO: 144 K/UL (ref 135–450)
PMV BLD AUTO: 8.8 FL (ref 5–10.5)
POTASSIUM SERPL-SCNC: 3.8 MMOL/L (ref 3.5–5.1)
RBC # BLD AUTO: 2.42 M/UL (ref 4.2–5.9)
SODIUM SERPL-SCNC: 141 MMOL/L (ref 136–145)
WBC # BLD AUTO: 10.7 K/UL (ref 4–11)

## 2024-06-11 PROCEDURE — C1769 GUIDE WIRE: HCPCS | Performed by: SURGERY

## 2024-06-11 PROCEDURE — 6370000000 HC RX 637 (ALT 250 FOR IP): Performed by: SURGERY

## 2024-06-11 PROCEDURE — 3600000003 HC SURGERY LEVEL 3 BASE: Performed by: SURGERY

## 2024-06-11 PROCEDURE — 3700000001 HC ADD 15 MINUTES (ANESTHESIA): Performed by: SURGERY

## 2024-06-11 PROCEDURE — 6360000002 HC RX W HCPCS: Performed by: INTERNAL MEDICINE

## 2024-06-11 PROCEDURE — 0TP98DZ REMOVAL OF INTRALUMINAL DEVICE FROM URETER, VIA NATURAL OR ARTIFICIAL OPENING ENDOSCOPIC: ICD-10-PCS | Performed by: SURGERY

## 2024-06-11 PROCEDURE — 2580000003 HC RX 258: Performed by: INTERNAL MEDICINE

## 2024-06-11 PROCEDURE — 2580000003 HC RX 258: Performed by: SURGERY

## 2024-06-11 PROCEDURE — 2720000010 HC SURG SUPPLY STERILE: Performed by: SURGERY

## 2024-06-11 PROCEDURE — C2617 STENT, NON-COR, TEM W/O DEL: HCPCS | Performed by: SURGERY

## 2024-06-11 PROCEDURE — 7100000001 HC PACU RECOVERY - ADDTL 15 MIN: Performed by: SURGERY

## 2024-06-11 PROCEDURE — 2500000003 HC RX 250 WO HCPCS: Performed by: NURSE ANESTHETIST, CERTIFIED REGISTERED

## 2024-06-11 PROCEDURE — 3600000013 HC SURGERY LEVEL 3 ADDTL 15MIN: Performed by: SURGERY

## 2024-06-11 PROCEDURE — C1747 HC ENDOSCOPE, SINGLE, URINARY TRACT: HCPCS | Performed by: SURGERY

## 2024-06-11 PROCEDURE — C1894 INTRO/SHEATH, NON-LASER: HCPCS | Performed by: SURGERY

## 2024-06-11 PROCEDURE — 2709999900 HC NON-CHARGEABLE SUPPLY: Performed by: SURGERY

## 2024-06-11 PROCEDURE — 1200000000 HC SEMI PRIVATE

## 2024-06-11 PROCEDURE — 2580000003 HC RX 258: Performed by: NURSE ANESTHETIST, CERTIFIED REGISTERED

## 2024-06-11 PROCEDURE — 85025 COMPLETE CBC W/AUTO DIFF WBC: CPT

## 2024-06-11 PROCEDURE — 0T768DZ DILATION OF RIGHT URETER WITH INTRALUMINAL DEVICE, VIA NATURAL OR ARTIFICIAL OPENING ENDOSCOPIC: ICD-10-PCS | Performed by: SURGERY

## 2024-06-11 PROCEDURE — 85018 HEMOGLOBIN: CPT

## 2024-06-11 PROCEDURE — 6360000002 HC RX W HCPCS: Performed by: NURSE ANESTHETIST, CERTIFIED REGISTERED

## 2024-06-11 PROCEDURE — 80048 BASIC METABOLIC PNL TOTAL CA: CPT

## 2024-06-11 PROCEDURE — 3700000000 HC ANESTHESIA ATTENDED CARE: Performed by: SURGERY

## 2024-06-11 PROCEDURE — 7100000000 HC PACU RECOVERY - FIRST 15 MIN: Performed by: SURGERY

## 2024-06-11 PROCEDURE — 85014 HEMATOCRIT: CPT

## 2024-06-11 DEVICE — URETERAL STENT
Type: IMPLANTABLE DEVICE | Site: URETHRA | Status: FUNCTIONAL
Brand: CONTOUR™

## 2024-06-11 RX ORDER — ONDANSETRON 2 MG/ML
INJECTION INTRAMUSCULAR; INTRAVENOUS PRN
Status: DISCONTINUED | OUTPATIENT
Start: 2024-06-11 | End: 2024-06-11 | Stop reason: SDUPTHER

## 2024-06-11 RX ORDER — SODIUM CHLORIDE 0.9 % (FLUSH) 0.9 %
5-40 SYRINGE (ML) INJECTION EVERY 12 HOURS SCHEDULED
Status: DISCONTINUED | OUTPATIENT
Start: 2024-06-11 | End: 2024-06-11 | Stop reason: HOSPADM

## 2024-06-11 RX ORDER — SODIUM CHLORIDE 0.9 % (FLUSH) 0.9 %
5-40 SYRINGE (ML) INJECTION PRN
Status: DISCONTINUED | OUTPATIENT
Start: 2024-06-11 | End: 2024-06-11 | Stop reason: HOSPADM

## 2024-06-11 RX ORDER — GLYCOPYRROLATE 0.2 MG/ML
INJECTION INTRAMUSCULAR; INTRAVENOUS PRN
Status: DISCONTINUED | OUTPATIENT
Start: 2024-06-11 | End: 2024-06-11 | Stop reason: SDUPTHER

## 2024-06-11 RX ORDER — FENTANYL CITRATE 50 UG/ML
INJECTION, SOLUTION INTRAMUSCULAR; INTRAVENOUS PRN
Status: DISCONTINUED | OUTPATIENT
Start: 2024-06-11 | End: 2024-06-11 | Stop reason: SDUPTHER

## 2024-06-11 RX ORDER — EPHEDRINE SULFATE/0.9% NACL/PF 25 MG/5 ML
SYRINGE (ML) INTRAVENOUS PRN
Status: DISCONTINUED | OUTPATIENT
Start: 2024-06-11 | End: 2024-06-11 | Stop reason: SDUPTHER

## 2024-06-11 RX ORDER — DEXAMETHASONE SODIUM PHOSPHATE 4 MG/ML
INJECTION, SOLUTION INTRA-ARTICULAR; INTRALESIONAL; INTRAMUSCULAR; INTRAVENOUS; SOFT TISSUE PRN
Status: DISCONTINUED | OUTPATIENT
Start: 2024-06-11 | End: 2024-06-11 | Stop reason: SDUPTHER

## 2024-06-11 RX ORDER — ROCURONIUM BROMIDE 10 MG/ML
INJECTION, SOLUTION INTRAVENOUS PRN
Status: DISCONTINUED | OUTPATIENT
Start: 2024-06-11 | End: 2024-06-11 | Stop reason: SDUPTHER

## 2024-06-11 RX ORDER — ONDANSETRON 2 MG/ML
4 INJECTION INTRAMUSCULAR; INTRAVENOUS
Status: DISCONTINUED | OUTPATIENT
Start: 2024-06-11 | End: 2024-06-11 | Stop reason: HOSPADM

## 2024-06-11 RX ORDER — FENTANYL CITRATE 0.05 MG/ML
25 INJECTION, SOLUTION INTRAMUSCULAR; INTRAVENOUS EVERY 5 MIN PRN
Status: DISCONTINUED | OUTPATIENT
Start: 2024-06-11 | End: 2024-06-11 | Stop reason: HOSPADM

## 2024-06-11 RX ORDER — LIDOCAINE HYDROCHLORIDE 20 MG/ML
INJECTION, SOLUTION EPIDURAL; INFILTRATION; INTRACAUDAL; PERINEURAL PRN
Status: DISCONTINUED | OUTPATIENT
Start: 2024-06-11 | End: 2024-06-11 | Stop reason: SDUPTHER

## 2024-06-11 RX ORDER — FENTANYL CITRATE 0.05 MG/ML
50 INJECTION, SOLUTION INTRAMUSCULAR; INTRAVENOUS EVERY 5 MIN PRN
Status: DISCONTINUED | OUTPATIENT
Start: 2024-06-11 | End: 2024-06-11 | Stop reason: HOSPADM

## 2024-06-11 RX ORDER — PROPOFOL 10 MG/ML
INJECTION, EMULSION INTRAVENOUS PRN
Status: DISCONTINUED | OUTPATIENT
Start: 2024-06-11 | End: 2024-06-11 | Stop reason: SDUPTHER

## 2024-06-11 RX ORDER — SODIUM CHLORIDE 9 MG/ML
INJECTION, SOLUTION INTRAVENOUS CONTINUOUS PRN
Status: DISCONTINUED | OUTPATIENT
Start: 2024-06-11 | End: 2024-06-11 | Stop reason: SDUPTHER

## 2024-06-11 RX ORDER — NALOXONE HYDROCHLORIDE 0.4 MG/ML
INJECTION, SOLUTION INTRAMUSCULAR; INTRAVENOUS; SUBCUTANEOUS PRN
Status: DISCONTINUED | OUTPATIENT
Start: 2024-06-11 | End: 2024-06-11 | Stop reason: HOSPADM

## 2024-06-11 RX ORDER — SODIUM CHLORIDE 9 MG/ML
INJECTION, SOLUTION INTRAVENOUS PRN
Status: DISCONTINUED | OUTPATIENT
Start: 2024-06-11 | End: 2024-06-11 | Stop reason: HOSPADM

## 2024-06-11 RX ORDER — SUCCINYLCHOLINE/SOD CL,ISO/PF 200MG/10ML
SYRINGE (ML) INTRAVENOUS PRN
Status: DISCONTINUED | OUTPATIENT
Start: 2024-06-11 | End: 2024-06-11 | Stop reason: SDUPTHER

## 2024-06-11 RX ADMIN — PROPOFOL 50 MG: 10 INJECTION, EMULSION INTRAVENOUS at 08:52

## 2024-06-11 RX ADMIN — SODIUM CHLORIDE: 9 INJECTION, SOLUTION INTRAVENOUS at 08:47

## 2024-06-11 RX ADMIN — EZETIMIBE 10 MG: 10 TABLET ORAL at 10:58

## 2024-06-11 RX ADMIN — EPHEDRINE SULFATE 10 MG: 5 INJECTION INTRAVENOUS at 08:58

## 2024-06-11 RX ADMIN — Medication 100 MG: at 08:52

## 2024-06-11 RX ADMIN — ACETAMINOPHEN 325MG 650 MG: 325 TABLET ORAL at 10:58

## 2024-06-11 RX ADMIN — ROCURONIUM BROMIDE 10 MG: 10 INJECTION, SOLUTION INTRAVENOUS at 08:52

## 2024-06-11 RX ADMIN — GLYCOPYRROLATE 0.2 MG: 0.2 INJECTION INTRAMUSCULAR; INTRAVENOUS at 08:57

## 2024-06-11 RX ADMIN — ROSUVASTATIN CALCIUM 40 MG: 40 TABLET, FILM COATED ORAL at 20:28

## 2024-06-11 RX ADMIN — FENTANYL CITRATE 50 MCG: 50 INJECTION INTRAMUSCULAR; INTRAVENOUS at 08:52

## 2024-06-11 RX ADMIN — SODIUM CHLORIDE, PRESERVATIVE FREE 10 ML: 5 INJECTION INTRAVENOUS at 20:28

## 2024-06-11 RX ADMIN — SUGAMMADEX 200 MG: 100 INJECTION, SOLUTION INTRAVENOUS at 09:45

## 2024-06-11 RX ADMIN — ROCURONIUM BROMIDE 10 MG: 10 INJECTION, SOLUTION INTRAVENOUS at 08:55

## 2024-06-11 RX ADMIN — IRON SUCROSE 200 MG: 20 INJECTION, SOLUTION INTRAVENOUS at 12:54

## 2024-06-11 RX ADMIN — DEXAMETHASONE SODIUM PHOSPHATE 8 MG: 4 INJECTION, SOLUTION INTRAMUSCULAR; INTRAVENOUS at 08:57

## 2024-06-11 RX ADMIN — WATER 1000 MG: 1 INJECTION INTRAMUSCULAR; INTRAVENOUS; SUBCUTANEOUS at 08:57

## 2024-06-11 RX ADMIN — EPOETIN ALFA-EPBX 40000 UNITS: 40000 INJECTION, SOLUTION INTRAVENOUS; SUBCUTANEOUS at 14:12

## 2024-06-11 RX ADMIN — EPHEDRINE SULFATE 15 MG: 5 INJECTION INTRAVENOUS at 09:00

## 2024-06-11 RX ADMIN — ONDANSETRON 4 MG: 2 INJECTION INTRAMUSCULAR; INTRAVENOUS at 08:57

## 2024-06-11 RX ADMIN — LIDOCAINE HYDROCHLORIDE 60 MG: 20 INJECTION, SOLUTION EPIDURAL; INFILTRATION; INTRACAUDAL; PERINEURAL at 08:52

## 2024-06-11 RX ADMIN — AMIODARONE HYDROCHLORIDE 200 MG: 200 TABLET ORAL at 10:58

## 2024-06-11 RX ADMIN — FENTANYL CITRATE 50 MCG: 50 INJECTION INTRAMUSCULAR; INTRAVENOUS at 08:48

## 2024-06-11 ASSESSMENT — PAIN DESCRIPTION - DESCRIPTORS
DESCRIPTORS: ACHING
DESCRIPTORS: ACHING;DISCOMFORT

## 2024-06-11 ASSESSMENT — PAIN DESCRIPTION - LOCATION: LOCATION: FLANK

## 2024-06-11 ASSESSMENT — PAIN SCALES - GENERAL
PAINLEVEL_OUTOF10: 0
PAINLEVEL_OUTOF10: 1
PAINLEVEL_OUTOF10: 3
PAINLEVEL_OUTOF10: 3

## 2024-06-11 ASSESSMENT — ENCOUNTER SYMPTOMS: SHORTNESS OF BREATH: 0

## 2024-06-11 ASSESSMENT — PAIN DESCRIPTION - ORIENTATION: ORIENTATION: RIGHT

## 2024-06-11 ASSESSMENT — PAIN - FUNCTIONAL ASSESSMENT
PAIN_FUNCTIONAL_ASSESSMENT: 0-10
PAIN_FUNCTIONAL_ASSESSMENT: 0-10

## 2024-06-11 NOTE — ANESTHESIA PRE PROCEDURE
Department of Anesthesiology  Preprocedure Note       Name:  Yury Levin   Age:  74 y.o.  :  1950                                          MRN:  [id number]         Date:  2024      Surgeon: Surgeon(s):  Gary Gracia MD    Procedure: Procedure(s):  CYSTOSCOPY RIGHT DIAGNOSTIC URETEROSCOPY    Medications prior to admission:   Prior to Admission medications    Medication Sig Start Date End Date Taking? Authorizing Provider   loratadine (CLARITIN) 10 MG tablet Take 1 tablet by mouth daily   Yes Yazmin Bustillos MD   tamsulosin (FLOMAX) 0.4 MG capsule Take 1 capsule by mouth at bedtime   Yes Yazmin Bustillos MD   aspirin 81 MG EC tablet Take 1 tablet by mouth daily Hold until after Cystoscopy  Patient taking differently: Take 1 tablet by mouth daily 24   Bel Fry PA-C   amiodarone (CORDARONE) 200 MG tablet Take 1 tablet by mouth daily 23   Shelly, Jason SHEPPARD MD   ezetimibe (ZETIA) 10 MG tablet Take 1 tablet by mouth daily 10/20/23   Shelly, Jason SHEPPARD MD   rosuvastatin (CRESTOR) 40 MG tablet TAKE ONE TABLET BY MOUTH EVERY EVENING  Patient taking differently: Take 1 tablet by mouth nightly 10/2/23   Shelly, Jason SHEPPARD MD   ORGOVYX 120 MG TABS Take 1 tablet by mouth daily 22   Yazmin Bustillos MD   Multiple Minerals-Vitamins (PROSTEON PO) Take 4 tablets by mouth at bedtime    Yazmin Bustillos MD   predniSONE (DELTASONE) 5 MG tablet Take 1 tablet by mouth nightly    Yazmin Bustillos MD       Current medications:    Current Facility-Administered Medications   Medication Dose Route Frequency Provider Last Rate Last Admin   • 0.9 % sodium chloride infusion   IntraVENous PRN Uzma Samuel APRN - CNP       • sodium chloride flush 0.9 % injection 5-40 mL  5-40 mL IntraVENous 2 times per day Liliam Farrell MD       • sodium chloride flush 0.9 % injection 5-40 mL  5-40 mL IntraVENous PRN Liliam Farrell MD       • 0.9 % sodium chloride infusion   IntraVENous PRN Jami

## 2024-06-11 NOTE — PROGRESS NOTES
Labs     06/10/24  0944 06/11/24  0555    141   K 4.0 3.8    108   CO2 26 26   BUN 31* 28*   CREATININE 2.2* 2.2*   GLUCOSE 160* 77     Hepatic:   Recent Labs     06/10/24  0944   AST 16   ALT 19   BILITOT <0.2   ALKPHOS 89     Lipids:   Lab Results   Component Value Date/Time    CHOL 177 10/02/2020 09:29 AM    HDL 59 08/23/2022 11:53 AM    TRIG 96 10/02/2020 09:29 AM     Hemoglobin A1C:   Lab Results   Component Value Date/Time    LABA1C 5.7 12/14/2022 12:09 PM     TSH: No results found for: \"TSH\"  Troponin: No results found for: \"TROPONINT\"  Lactic Acid: No results for input(s): \"LACTA\" in the last 72 hours.  BNP: No results for input(s): \"PROBNP\" in the last 72 hours.  UA:  Lab Results   Component Value Date/Time    NITRU Negative 06/10/2024 11:01 AM    COLORU RED 06/10/2024 11:01 AM    PHUR 7.0 06/10/2024 11:01 AM    PHUR 6.5 04/17/2024 03:11 AM    WBCUA 6-9 06/10/2024 11:01 AM    RBCUA >100 06/10/2024 11:01 AM    BACTERIA None Seen 12/11/2023 10:14 AM    CLARITYU CLOUDY 06/10/2024 11:01 AM    SPECGRAV >=1.030 10/22/2018 04:11 PM    LEUKOCYTESUR MODERATE 06/10/2024 11:01 AM    UROBILINOGEN 0.2 06/10/2024 11:01 AM    BILIRUBINUR Negative 06/10/2024 11:01 AM    BLOODU LARGE 06/10/2024 11:01 AM    GLUCOSEU Negative 06/10/2024 11:01 AM    KETUA Negative 06/10/2024 11:01 AM     Urine Cultures:   Lab Results   Component Value Date/Time    LABURIN No growth at 18 to 36 hours 08/21/2023 06:59 PM     Blood Cultures:   Lab Results   Component Value Date/Time    BC No Growth after 4 days of incubation. 08/21/2023 06:59 PM     Lab Results   Component Value Date/Time    BLOODCULT2 No Growth after 4 days of incubation. 08/21/2023 06:59 PM     Organism: No results found for: \"ORG\"      Electronically signed by Liliam Farrell MD on 6/11/2024 at 2:27 PM  Comment: Please note this report has been produced using speech recognition software and may contain errors related to that system including errors in grammar,

## 2024-06-11 NOTE — PLAN OF CARE
4 Eyes Skin Assessment     NAME:  Yury Levin  YOB: 1950  MEDICAL RECORD NUMBER:  [id number]    The patient is being assessed for  Post-Op Surgical    I agree that at least one RN has performed a thorough Head to Toe Skin Assessment on the patient. ALL assessment sites listed below have been assessed.      Areas assessed by both nurses:    Head, Face, Ears, Shoulders, Back, Chest, Arms, Elbows, Hands, Sacrum. Buttock, Coccyx, Ischium, Legs. Feet and Heels, and Under Medical Devices         Does the Patient have a Wound? Yes wound(s) were present on assessment. LDA wound assessment was Initiated and completed by RN       Jaime Prevention initiated by RN: Yes  Wound Care Orders initiated by RN: No    Pressure Injury (Stage 3,4, Unstageable, DTI, NWPT, and Complex wounds) if present, place Wound referral order by RN under : No    New Ostomies, if present place, Ostomy referral order under : No     Nurse 1 eSignature: Electronically signed by Deedee Vazquez RN on 6/11/24 at 10:42 AM EDT    **SHARE this note so that the co-signing nurse can place an eSignature**    Nurse 2 eSignature: Electronically signed by Kim Kim RN on 6/11/24 at 1:53 PM EDT

## 2024-06-11 NOTE — PLAN OF CARE
Patient arrived back to room 5133. Assessment completed. 2 new skin tears noted to RFA. Cleaned, dressed, and documented. Snowden in place draining cherry colored urine. Medicated for pain. VSS. Will continue to monitor.

## 2024-06-11 NOTE — BRIEF OP NOTE
Brief Postoperative Note      Patient: Yury Levin  YOB: 1950  MRN: [id number]    Date of Procedure: 6/11/2024    Pre Op diagnosis: gross hematuria    Post-Op Diagnosis:  bleeding from right collecting system without overt source       Procedure(s):  CYSTOSCOPY RIGHT DIAGNOSTIC URETEROSCOPY, REMOVAL RIGHT RENAL PELVIS CLOT, RIGHT STENT EXHCANGE    Surgeon(s):  Gary Gracia MD    Assistant:  * No surgical staff found *    Anesthesia: General    Estimated Blood Loss (mL): less than 100     Complications: None    Specimens:   * No specimens in log *    Implants:  Implant Name Type Inv. Item Serial No.  Lot No. LRB No. Used Action   STENT URET 6FR L26CM PERCFLX HYDR+ TAPR TIP GRAD - [id number]  STENT URET 6FR L26CM PERCFLX HYDR+ TAPR TIP GRAD  [id number] 1 Implanted         Drains:   Ureteral Drain/Stent 06/11/24 Right Ureter (Active)       Urinary Catheter 06/10/24 (Active)   Catheter Indications Urinary retention (acute or chronic), continuous bladder irrigation or bladder outlet obstruction 06/11/24 0740   Site Assessment No urethral drainage 06/11/24 0740   Urine Color Cherry 06/11/24 0740   Urine Appearance Clear 06/11/24 0740   Collection Container Standard 06/11/24 0740   Securement Method Securing device (Describe) 06/11/24 0740   Catheter Care  Perineal wipes 06/11/24 0515   Catheter Best Practices  Drainage tube clipped to bed;Catheter secured to thigh;Tamper seal intact;Bag below bladder;Bag not on floor;Lack of dependent loop in tubing;Drainage bag less than half full 06/11/24 0740   Status Draining;Patent 06/11/24 0740   Output (mL) 275 mL 06/11/24 0746       Urinary Catheter 06/11/24 Snowden (Active)       Findings:  Infection Present At Time Of Surgery (PATOS) (choose all levels that have infection present):  No infection present  Other Findings: Significant right renal pelvis clot burden but without obvious tumor or malignancy.    Electronically

## 2024-06-11 NOTE — OP NOTE
[facility]          [address] [place] [address]                            OPERATIVE REPORT      PATIENT NAME: REX HESS               : 1950  MED REC NO: [id number]                      ROOM: [place] [address]  ACCOUNT NO: [id number]                       ADMIT DATE: 06/10/2024  PROVIDER: Gary Gracia MD      DATE OF PROCEDURE:  2024    SURGEON:  Gary Gracia MD    PREOPERATIVE DIAGNOSIS:  Gross hematuria.    POSTOPERATIVE DIAGNOSIS:  Hematuria seems to be originating from the right kidney with significant clot burden within the right renal pelvis and calyces without obvious source.    PROCEDURES:  Cystoscopy, diagnostic right ureteroscopy, basketing of clot within the right renal pelvis and right ureteral stent exchange.    ANESTHESIA:  General.    EBL:  Less than 50.    SPECIMEN:  None.    INDICATIONS:  This is a 74-year-old male, under the care of my partner, Dr. Hagen.  He has metastatic castrate-resistant prostate cancer, and has previously been on chemotherapy.  Over the past 2 months, he has had gross hematuria.  Prior imaging with contrast CT abdomen and pelvis shows hydronephrosis and clot burden within the right kidney, but without obvious source.  He has undergone 2 prior evaluations for hematuria including bilateral diagnostic ureteroscopies, selective washings that were unremarkable.  The bleeding appeared to be coming from the right kidney.  He has bilateral stents in place.  The patient recently has become symptomatic with anemia and blood count, hemoglobin dropping to 5.3.  He was admitted yesterday and transfused 1 unit of blood.  His hemoglobin is now 7.7.  He is hemodynamically stable.  We elected to pursue repeat endoscopic evaluation.    OPERATIVE COURSE:  He was brought to the operating room, where anesthesia was administered.  He was positioned in dorsal lithotomy and prepped and draped in sterile fashion.

## 2024-06-11 NOTE — PROGRESS NOTES
Pt Name: Yury Levin  Medical Record Number: [id number]  Date of Birth 1950   Today's Date: 6/11/2024      Subjective:  Patient not present at time of rounds due to transportation to OR for procedure.    ROS: Constitutional: No fever    Vitals:  Vitals:    06/10/24 2301 06/11/24 0515 06/11/24 0706 06/11/24 0754   BP: 121/68 (!) 154/71 117/69 127/77   Pulse: 70 68 72 67   Resp: 14 16 13 15   Temp: 98.5 °F (36.9 °C) 99.2 °F (37.3 °C) 98.2 °F (36.8 °C) 98.9 °F (37.2 °C)   TempSrc: Oral Oral Oral Oral   SpO2: 97% 96% 97% 98%   Weight:  83.1 kg (183 lb 3.2 oz)     Height:         I/O last 3 completed shifts:  In: 645 [Blood:645]  Out: 1850 [Urine:1850]    Exam:  Patient not available    CURRENT MEDICATIONS   Scheduled Meds:   sodium chloride flush  5-40 mL IntraVENous 2 times per day    cefTRIAXone (ROCEPHIN) IV  1,000 mg IntraVENous Q24H    amiodarone  200 mg Oral Daily    ezetimibe  10 mg Oral Daily    rosuvastatin  40 mg Oral Nightly     Continuous Infusions:   sodium chloride      sodium chloride      sodium chloride       PRN Meds:.sodium chloride, sodium chloride flush, sodium chloride, potassium chloride **OR** potassium alternative oral replacement **OR** potassium chloride, magnesium sulfate, ondansetron **OR** ondansetron, polyethylene glycol, acetaminophen **OR** acetaminophen, sodium chloride    LABS     Recent Labs     06/10/24  0944 06/10/24  1608 06/10/24  2150 06/11/24  0555   WBC 10.9  --  11.9* 10.7   HGB 5.3* 6.9* 7.4* 7.7*   HCT 16.7* 20.6* 21.8* 22.7*     --  135 144     --   --  141   K 4.0  --   --  3.8     --   --  108   CO2 26  --   --  26   BUN 31*  --   --  28*   CREATININE 2.2*  --   --  2.2*   CALCIUM 8.6  --   --  8.1*   AST 16  --   --   --    ALT 19  --   --   --    BILITOT <0.2  --   --   --    BILIDIR <0.2  --   --   --          ASSESSMENT   Hospital day # 1  75yo male w/ recurrent gross hematuria of unknown etiology, suspected to be from right kidney.

## 2024-06-11 NOTE — PROGRESS NOTES
ONCOLOGY HEMATOLOGY CARE PROGRESS NOTE      SUBJECTIVE:  Patient just got out of his cystoscopy.  He denies any shortness of breath or chest pain.  He is feeling better.    ROS:     Constitutional:  No weight loss, No fever, No chills, No night sweats.  Energy level good.  Eyes:  No impairment or change in vision  ENT / Mouth:  No pain, abnormal ulceration, bleeding, nasal drip or change in voice or hearing  Cardiovascular:  No chest pain, palpitations, new edema, or calf discomfort  Respiratory:  No pain, hemoptysis, change to breathing  Breast:  No pain, discharge, change in appearance or texture  Gastrointestinal:  No pain, cramping, jaundice, change to eating and bowel habits  Urinary:  No pain, bleeding or change in continence  Genitalia: No pain, bleeding or discharge  Musculoskeletal:  No redness, pain, edema or weakness  Skin:  No pruritus, rash, change to nodules or lesions  Neurologic:  No discomfort, change in mental status, speech, sensory or motor activity  Psychiatric:  No change in concentration or change to affect or mood  Endocrine:  No hot flashes, increased thirst, or change to urine production  Hematologic: No petechiae, ecchymosis or bleeding  Lymphatic:  No lymphadenopathy or lymphedema  Allergy / Immunologic:  No eczema, hives, frequent or recurrent infections    OBJECTIVE        Physical    VITALS:  Patient Vitals for the past 24 hrs:   BP Temp Temp src Pulse Resp SpO2 Weight   06/11/24 1037 106/63 98.1 °F (36.7 °C) Oral 88 18 93 % --   06/11/24 1015 120/66 -- -- 80 17 92 % --   06/11/24 1005 118/64 -- -- 80 22 100 % --   06/11/24 1000 116/64 -- -- 79 17 99 % --   06/11/24 0956 115/68 97.7 °F (36.5 °C) Oral 80 18 98 % --   06/11/24 0754 127/77 98.9 °F (37.2 °C) Oral 67 15 98 % --   06/11/24 0707 117/69 -- -- 71 29 -- --   06/11/24 0706 117/69 98.2 °F (36.8 °C) Oral 72 13 97 % --   06/11/24 0515 (!) 154/71 99.2 °F (37.3 °C) Oral 68 16 96 % 83.1 kg (183 lb  monitors  reattached when patient returns to room.  Reason for exam:->bilateral flank pain, hematuria  Reason for Exam: bilateral flank pain, hematuria  Additional signs and symptoms: Pt has low hemoglobin, hx of prostate cancer  and currently getting treatment.    FINDINGS:  Lower Chest: Post sternotomy changes are seen..  Bandlike opacities are seen  the bases.  A few scattered subpleural reticular opacities are seen, either  due to scarring or nonspecific fibrosis.    Organs: No splenomegaly.  No perisplenic fluid.    Mild right-sided hydronephrosis and hydroureter is seen.  Ureteral stent is  seen in its expected location..  Degree of hydronephrosis slightly decreased    Mild left-sided hydronephrosis and hydroureter is seen.  Left-sided ureteral  stent is seen in its expected location..  Degree of hydronephrosis is  slightly decreased.  2 mm stone is seen in left kidney    No pancreatic calcification noted.  No peripancreatic fluid.    No gallstones noted..  No biliary ductal dilatation noted.  Hypodense nodule  is seen in the liver, similar to prior    GI/Bowel: No significant small bowel distention noted.  Moderate stool load  seen in the colon.  No bowel obstruction noted.    Appendix is identified and normal in caliber.    Pelvis: No free fluid in pelvis.  No pelvic adenopathy.  Prominent fat seen  in the inguinal canals bilaterally.  Small hydrocele is seen on the right.    Peritoneum/Retroperitoneum: No aortic aneurysm.  No retroperitoneal  adenopathy.  No retroperitoneal hematoma.    Bones/Soft Tissues: Tiny periumbilical hernia containing fat is seen.    Spurring is seen in the spine.  Spurring is seen in the hips.  Healed pubic  ramus fracture is seen on the left  Impression: Persistent but decreased degree of hydronephrosis compared to prior, status  post ureteral stent insertion.  XR CHEST PORTABLE  Narrative: EXAMINATION:  ONE XRAY VIEW OF THE CHEST    6/10/2024 9:24 am    COMPARISON:  01/19/2018

## 2024-06-11 NOTE — ANESTHESIA POSTPROCEDURE EVALUATION
Department of Anesthesiology  Postprocedure Note    Patient: Yury Levin  MRN: [id number]  YOB: 1950  Date of evaluation: 6/11/2024    Procedure Summary       Date: 06/11/24 Room / Location: [address]    Anesthesia Start: 0847 Anesthesia Stop: 0958    Procedure: CYSTOSCOPY RIGHT DIAGNOSTIC URETEROSCOPY, REMOVAL RIGHT RENAL PELVIS CLOT, RIGHT STENT EXHCANGE (Right: Urethra) Diagnosis:       Hydronephrosis, unspecified hydronephrosis type      (Hydronephrosis, unspecified hydronephrosis type [N13.30])    Surgeons: Gary Gracia MD Responsible Provider: Osvaldo Baptiste MD    Anesthesia Type: General ASA Status: 3            Anesthesia Type: General    Maria Victoria Phase I: Maria Victoria Score: 8    Maria Victoria Phase II:      Anesthesia Post Evaluation    Patient location during evaluation: PACU  Patient participation: complete - patient participated  Level of consciousness: awake and alert  Pain score: 0  Airway patency: patent  Nausea & Vomiting: no nausea and no vomiting  Cardiovascular status: blood pressure returned to baseline  Respiratory status: acceptable  Hydration status: euvolemic  Pain management: adequate    No notable events documented.

## 2024-06-12 LAB
ANION GAP SERPL CALCULATED.3IONS-SCNC: 9 MMOL/L (ref 3–16)
BASOPHILS # BLD: 0 K/UL (ref 0–0.2)
BASOPHILS NFR BLD: 0.1 %
BUN SERPL-MCNC: 27 MG/DL (ref 7–20)
CALCIUM SERPL-MCNC: 8.2 MG/DL (ref 8.3–10.6)
CHLORIDE SERPL-SCNC: 104 MMOL/L (ref 99–110)
CO2 SERPL-SCNC: 25 MMOL/L (ref 21–32)
CREAT SERPL-MCNC: 2 MG/DL (ref 0.8–1.3)
DEPRECATED RDW RBC AUTO: 19.5 % (ref 12.4–15.4)
EOSINOPHIL # BLD: 0 K/UL (ref 0–0.6)
EOSINOPHIL NFR BLD: 0 %
GFR SERPLBLD CREATININE-BSD FMLA CKD-EPI: 34 ML/MIN/{1.73_M2}
GLUCOSE BLD-MCNC: 142 MG/DL (ref 70–99)
GLUCOSE SERPL-MCNC: 120 MG/DL (ref 70–99)
HCT VFR BLD AUTO: 22.4 % (ref 40.5–52.5)
HCT VFR BLD AUTO: 24.6 % (ref 40.5–52.5)
HGB BLD-MCNC: 7.7 G/DL (ref 13.5–17.5)
HGB BLD-MCNC: 8.1 G/DL (ref 13.5–17.5)
LYMPHOCYTES # BLD: 0.4 K/UL (ref 1–5.1)
LYMPHOCYTES NFR BLD: 3.8 %
MCH RBC QN AUTO: 32.1 PG (ref 26–34)
MCHC RBC AUTO-ENTMCNC: 34.3 G/DL (ref 31–36)
MCV RBC AUTO: 93.7 FL (ref 80–100)
MONOCYTES # BLD: 0.4 K/UL (ref 0–1.3)
MONOCYTES NFR BLD: 3.5 %
NEUTROPHILS # BLD: 10.8 K/UL (ref 1.7–7.7)
NEUTROPHILS NFR BLD: 92.6 %
PERFORMED ON: ABNORMAL
PLATELET # BLD AUTO: 136 K/UL (ref 135–450)
PMV BLD AUTO: 8.3 FL (ref 5–10.5)
POTASSIUM SERPL-SCNC: 4.2 MMOL/L (ref 3.5–5.1)
RBC # BLD AUTO: 2.39 M/UL (ref 4.2–5.9)
SODIUM SERPL-SCNC: 138 MMOL/L (ref 136–145)
WBC # BLD AUTO: 11.6 K/UL (ref 4–11)

## 2024-06-12 PROCEDURE — 2580000003 HC RX 258: Performed by: SURGERY

## 2024-06-12 PROCEDURE — 6360000002 HC RX W HCPCS: Performed by: INTERNAL MEDICINE

## 2024-06-12 PROCEDURE — 80048 BASIC METABOLIC PNL TOTAL CA: CPT

## 2024-06-12 PROCEDURE — 97530 THERAPEUTIC ACTIVITIES: CPT | Performed by: PHYSICAL THERAPIST

## 2024-06-12 PROCEDURE — 6370000000 HC RX 637 (ALT 250 FOR IP): Performed by: SURGERY

## 2024-06-12 PROCEDURE — 97530 THERAPEUTIC ACTIVITIES: CPT

## 2024-06-12 PROCEDURE — 1200000000 HC SEMI PRIVATE

## 2024-06-12 PROCEDURE — 97162 PT EVAL MOD COMPLEX 30 MIN: CPT | Performed by: PHYSICAL THERAPIST

## 2024-06-12 PROCEDURE — 85014 HEMATOCRIT: CPT

## 2024-06-12 PROCEDURE — 85025 COMPLETE CBC W/AUTO DIFF WBC: CPT

## 2024-06-12 PROCEDURE — 94760 N-INVAS EAR/PLS OXIMETRY 1: CPT

## 2024-06-12 PROCEDURE — 97166 OT EVAL MOD COMPLEX 45 MIN: CPT

## 2024-06-12 PROCEDURE — 97116 GAIT TRAINING THERAPY: CPT | Performed by: PHYSICAL THERAPIST

## 2024-06-12 PROCEDURE — 85018 HEMOGLOBIN: CPT

## 2024-06-12 PROCEDURE — 6360000002 HC RX W HCPCS: Performed by: SURGERY

## 2024-06-12 PROCEDURE — 51798 US URINE CAPACITY MEASURE: CPT

## 2024-06-12 RX ADMIN — AMIODARONE HYDROCHLORIDE 200 MG: 200 TABLET ORAL at 08:24

## 2024-06-12 RX ADMIN — IRON SUCROSE 200 MG: 20 INJECTION, SOLUTION INTRAVENOUS at 12:52

## 2024-06-12 RX ADMIN — EZETIMIBE 10 MG: 10 TABLET ORAL at 08:24

## 2024-06-12 RX ADMIN — SODIUM CHLORIDE, PRESERVATIVE FREE 10 ML: 5 INJECTION INTRAVENOUS at 21:08

## 2024-06-12 RX ADMIN — ROSUVASTATIN CALCIUM 40 MG: 40 TABLET, FILM COATED ORAL at 21:08

## 2024-06-12 RX ADMIN — SODIUM CHLORIDE, PRESERVATIVE FREE 10 ML: 5 INJECTION INTRAVENOUS at 08:24

## 2024-06-12 RX ADMIN — WATER 1000 MG: 1 INJECTION INTRAMUSCULAR; INTRAVENOUS; SUBCUTANEOUS at 12:54

## 2024-06-12 NOTE — PROGRESS NOTES
Comprehensive Nutrition Assessment    Type and Reason for Visit:  Initial, Positive Nutrition Screen    Nutrition Recommendations/Plan:   Continue regular diet, ensure plus BID, monitor for additional nutrition needs     Malnutrition Assessment:  Malnutrition Status:  At risk for malnutrition (Comment) (06/12/24 6948)    Context:  Chronic Illness       Nutrition Assessment:    MST 5. Pt presenting w/ anemia, hx of prostate cancer. Pt w/ significant wt loss pta. Pt reports that he is currently eating well. He is finishing his meals and drinking ensure ONS as he does at home. Currently receving ONS bid. Continue current dietary interventions.    Nutrition Related Findings:    142 glucose, 6/12 BM Wound Type: Skin Tears       Current Nutrition Intake & Therapies:    Average Meal Intake: %  Average Supplements Intake: %  ADULT DIET; Regular  ADULT ORAL NUTRITION SUPPLEMENT; Breakfast, Dinner; Standard High Calorie/High Protein Oral Supplement    Anthropometric Measures:  Height: 170.2 cm (5' 7\")  Ideal Body Weight (IBW): 148 lbs (67 kg)       Current Body Weight: 79 kg (174 lb 2.6 oz),   IBW.    Current BMI (kg/m2): 27.3                               Estimated Daily Nutrient Needs:  Energy Requirements Based On: Kcal/kg  Weight Used for Energy Requirements: Current  Energy (kcal/day): 1580 - 1975 (20 - 25 kcal/kg)  Weight Used for Protein Requirements: Ideal  Protein (g/day): 81 - 101 (1.2 - 1.5 g/kg)  Method Used for Fluid Requirements: 1 ml/kcal  Fluid (ml/day): or per provider    Nutrition Diagnosis:   Increased nutrient needs related to catabolic illness as evidenced by weight loss    Nutrition Interventions:   Food and/or Nutrient Delivery: Continue Current Diet, Continue Oral Nutrition Supplement  Nutrition Education/Counseling: Education not indicated  Coordination of Nutrition Care: Continue to monitor while inpatient       Goals:     Goals: Meet at least 75% of estimated needs, by next RD

## 2024-06-12 NOTE — CARE COORDINATION
ADLs/IADLs  Current functional level: Assistance with the following:, Mobility    PT AM-PAC: 19 /24  OT AM-PAC: 19 /24    Family can provide assistance at DC: Yes  Would you like Case Management to discuss the discharge plan with any other family members/significant others, and if so, who? Yes (wife)  Plans to Return to Present Housing: Yes  Other Identified Issues/Barriers to RETURNING to current housing: None  Potential Assistance needed at discharge: Home Care            Potential DME:  None  Patient expects to discharge to: House  Plan for transportation at discharge: Family    Financial    Payor: [employer] / Plan: [employer] PLU / Product Type: *No Product type* /     Does insurance require precert for SNF: Yes    Potential assistance Purchasing Medications: No  Meds-to-Beds request: Yes      [facility] [id number] [facility] [address] [facility] [phone] - F [phone] [address] [facility] [address]  Phone: [phone] Fax: [phone]      Notes:    Factors facilitating achievement of predicted outcomes: Family support, Motivated, Cooperative, Pleasant, Sense of humor, and Good insight into deficits    Barriers to discharge: Decreased endurance    Additional Case Management Notes:   DC plan to return home with spouse.  Family will transport.  Referral placed to [facility] for home care, they will follow at discharge.  Denied any further needs.    The Plan for Transition of Care is related to the following treatment goals of First degree AV block [I44.0]  Anemia [D64.9]  General weakness [R53.1]  Elevated troponin [R79.89]  Goals of care, counseling/discussion [Z71.89]  Full code status [Z78.9]  Encounter for blood transfusion [Z51.89]  Other hydronephrosis [N13.39]  Urinary tract infection with hematuria, site unspecified [N39.0, R31.9]  Anemia, unspecified type [D64.9]    The Patient and/or patient representative Yury and his family were provided with a  choice of provider and agrees with the discharge plan. Freedom of choice list with basic dialogue that supports the patient's individualized plan of care/goals and shares the quality data associated with the providers was provided to: Patient     The Patient and/or Patient Representative Agree with the Discharge Plan? Yes    Loreta Vines RN  Case Management Department  Ph: [phone]

## 2024-06-12 NOTE — PROGRESS NOTES
116/64 -- -- 79 17 99 % --   06/11/24 0956 115/68 97.7 °F (36.5 °C) Oral 80 18 98 % --       24HR INTAKE/OUTPUT:    Intake/Output Summary (Last 24 hours) at 6/12/2024 0931  Last data filed at 6/12/2024 0924  Gross per 24 hour   Intake 770 ml   Output 1605 ml   Net -835 ml       CONSTITUTIONAL: awake, alert, cooperative, no apparent distress   EYES: pupils equal, round and reactive to light, sclera clear and conjunctiva normal  ENT: Normocephalic, without obvious abnormality, atraumatic  NECK: supple, symmetrical, no jugular venous distension and no carotid bruits   HEMATOLOGIC/LYMPHATIC: no cervical, supraclavicular or axillary lymphadenopathy   LUNGS: no increased work of breathing and clear to auscultation   CARDIOVASCULAR: regular rate and rhythm, normal S1 and S2, no murmur noted  ABDOMEN: normal bowel sounds x 4, soft, non-distended, non-tender, no masses palpated, no hepatosplenomgaly   MUSCULOSKELETAL: full range of motion noted, tone is normal  NEUROLOGIC: awake, alert, oriented to name, place and time. Motor skills grossly intact.   SKIN: Normal skin color, texture, turgor and no jaundice. appears intact   EXTREMITIES: no LE edema     DATA:  CBC:    Recent Labs     06/12/24  0520 06/11/24  1745 06/11/24  0555 06/10/24  2150 06/10/24  1608 06/10/24  0944   WBC 11.6*  --  10.7 11.9*  --  10.9   NEUTROABS 10.8*  --  9.1* 10.2*  --  9.7*   LYMPHOPCT 3.8  --  7.8 6.6  --  4.3   RBC 2.39*  --  2.42* 2.35*  --  1.65*   HGB 7.7* 8.0* 7.7* 7.4*   < > 5.3*   HCT 22.4* 23.0* 22.7* 21.8*   < > 16.7*   MCV 93.7  --  93.7 93.0  --  101.2*   MCH 32.1  --  32.0 31.7  --  32.3   MCHC 34.3  --  34.1 34.0  --  31.9   RDW 19.5*  --  19.4* 19.2*  --  21.0*     --  144 135  --  163    < > = values in this interval not displayed.       PT/INR:  No results for input(s): \"PROT\", \"INR\" in the last 720 hours.  PTT:  No results for input(s): \"APTT\" in the last 720 hours.    CMP:    Recent Labs     06/12/24  0520 06/11/24  0555  PLAN:  1.  Stage IV prostate cancer.  He will resume palliative chemotherapy as an outpatient.    2.  Hematuria.  He had clots in the right renal pelvis.  The source is unclear.  I would probably watch him 1 more day to make sure his hemoglobin does not drop since he is still having hematuria and may need embolization. He did get IV iron and Retacrit yesterday.            ONCOLOGIC DISPOSITION:      Edgardo Wells Jr, MD  Please contact through [employer]

## 2024-06-12 NOTE — PROGRESS NOTES
Catheter was going being to removed and color change noted to urine from pink to dark cherry red with no clots noted. Before pulling pena, Urology was called and awaiting return call for any possible new orders.

## 2024-06-12 NOTE — PROGRESS NOTES
Occupational Therapy  Facility/Department: [address] PROGRESSIVE CARE  Occupational Therapy Initial Assessment    Name: Yury Levin  : 1950  MRN: [id number]  Date of Service: 2024    Discharge Recommendations:  Home with assist PRN, Continue to assess pending progress     Yury Levin scored a  on the [facility] ADL Inpatient form.  At this time, no further OT is recommended upon discharge due to patient functioning close to baseline and having wife to assist prn at home.  Recommend patient returns to prior setting with prior services.        Patient Diagnosis(es): The primary encounter diagnosis was Anemia, unspecified type. Diagnoses of General weakness, Urinary tract infection with hematuria, site unspecified, Other hydronephrosis, Encounter for blood transfusion, Full code status, Goals of care, counseling/discussion, First degree AV block, and Elevated troponin were also pertinent to this visit.  Past Medical History:  has a past medical history of CAD (coronary artery disease), Cancer of prostate (HCC), Chest pain, CHF (congestive heart failure) (HCC), and Unstable angina pectoris (HCC).  Past Surgical History:  has a past surgical history that includes Coronary artery bypass graft (2018); Skin cancer excision; IR PORT PLACEMENT > 5 YEARS (Right, 2023); IR PORT PLACEMENT > 5 YEARS (2023); Colonoscopy; left atrial appendage occluder device (2023); and Cystoscopy (Right, 2024).    Treatment Diagnosis: decreased fxl mobility/endurance/strength    Assessment   Performance deficits / Impairments: Decreased functional mobility ;Decreased balance;Decreased ADL status;Decreased endurance;Decreased strength  Assessment: Per H&P note on 6/10/24, \"Yury Levin is a 74 y.o. male with a PMH of Prostate cancer, CAD, status post CABG, paroxysmal A-fib, recurrent hematuria presented to hospital from Dr. Wells's office due to ongoing hematuria and near syncopal episode.   hydronephrosis compared to prior studies, status post ureteral stent insertion. Case discussed with urology here and patient recommended to be admitted.\"  Family / Caregiver Present: No (wife present at end of session)  Diagnosis: anemia  Subjective  Subjective: pt met bedside seated in recliner upon arrival to room, agreeable to therapy eval/tx along with PT. no complaints.     Social/Functional History  Social/Functional History  Lives With: Spouse (+dog (wife works during the day))  Type of Home: House  Home Layout: Two level, Bed/Bath upstairs, Able to Live on Main level with bedroom/bathroom, 1/2 bath on main level  Home Access: Stairs to enter with rails, Stairs to enter without rails  Entrance Stairs - Number of Steps: 2 from front entrance with railing and 1 JANET from garage without railing  Bathroom Shower/Tub: Walk-in shower  Bathroom Toilet: Standard  Bathroom Equipment: Grab bars in shower  Home Equipment: Walker - Rolling  Has the patient had two or more falls in the past year or any fall with injury in the past year?: Yes (2)  ADL Assistance: Independent  Homemaking Responsibilities: No (wife completes)  Ambulation Assistance: Independent (RW recently over last few weeks)  Transfer Assistance: Independent  Active : Yes  Occupation: Retired  Type of Occupation:   Leisure & Hobbies: playing golf       Objective      Safety Devices  Type of Devices: All fall risk precautions in place;Left in chair;Chair alarm in place;Gait belt;Patient at risk for falls;Call light within reach;Nurse notified  Restraints  Restraints Initially in Place: No        AROM: Within functional limits  Strength: Within functional limits  Coordination: Within functional limits  Tone: Normal  ADL  Functional Mobility: Stand by assistance  Functional Mobility Skilled Clinical Factors: SBA ambulating with RW and without device in room and in hallway; no overt LOB noted throughout  Additional Comments: Does not complete ADLs

## 2024-06-12 NOTE — PROGRESS NOTES
[place] irrigated. No clots noted. Urine was clear pink when syringe was pulled back. Pt tolerated well

## 2024-06-12 NOTE — ACP (ADVANCE CARE PLANNING)
Advance Care Planning     Advance Care Planning Activator (Inpatient)  Conversation Note      Date of ACP Conversation: 6/12/2024     Conversation Conducted with: Patient with Decision Making Capacity    ACP Activator: Loreta Vines RN    Health Care Decision Maker:     Current Designated Health Care Decision Maker:     Primary Decision Maker: Marilyn Levin - Spouse - [phone]    Secondary Decision Maker: Alejandro Levin - Child - [phone]    Today we documented Decision Maker(s) consistent with Legal Next of Kin hierarchy.    Care Preferences    Ventilation:  \"If you were in your present state of health and suddenly became very ill and were unable to breathe on your own, what would your preference be about the use of a ventilator (breathing machine) if it were available to you?\"      Would the patient desire the use of ventilator (breathing machine)?: yes    \"If your health worsens and it becomes clear that your chance of recovery is unlikely, what would your preference be about the use of a ventilator (breathing machine) if it were available to you?\"     Would the patient desire the use of ventilator (breathing machine)?: Yes      Resuscitation  \"CPR works best to restart the heart when there is a sudden event, like a heart attack, in someone who is otherwise healthy. Unfortunately, CPR does not typically restart the heart for people who have serious health conditions or who are very sick.\"    \"In the event your heart stopped as a result of an underlying serious health condition, would you want attempts to be made to restart your heart (answer \"yes\" for attempt to resuscitate) or would you prefer a natural death (answer \"no\" for do not attempt to resuscitate)?\" yes       [] Yes   [] No   Educated Patient / Decision Maker regarding differences between Advance Directives and portable DNR orders.    Length of ACP Conversation in minutes:      Conversation Outcomes:  ACP discussion completed    Follow-up plan:

## 2024-06-12 NOTE — PROGRESS NOTES
Physical Therapy  Facility/Department: [address] PROGRESSIVE CARE  Physical Therapy Initial Assessment    Name: Yury Levin  : 1950  MRN: [id number]  Date of Service: 2024    Discharge Recommendations:  Home with assist PRN, Home with Home health PT   PT Equipment Recommendations  Equipment Needed: No      Yury Levin scored a 19/24 on the AM-PAC short mobility form. Current research shows that an AM-PAC score of 18 or greater is typically associated with a discharge to the patient's home setting. Based on the patient's AM-PAC score and their current functional mobility deficits, it is recommended that the patient have 2-3 sessions per week of Physical Therapy at d/c to increase the patient's independence.  At this time, this patient demonstrates the endurance and safety to discharge home with Home PT and a follow up treatment frequency of 2-3x/wk.  Please see assessment section for further patient specific details.    If patient discharges prior to next session this note will serve as a discharge summary.  Please see below for the latest assessment towards goals.       Patient Diagnosis(es): The primary encounter diagnosis was Anemia, unspecified type. Diagnoses of General weakness, Urinary tract infection with hematuria, site unspecified, Other hydronephrosis, Encounter for blood transfusion, Full code status, Goals of care, counseling/discussion, First degree AV block, and Elevated troponin were also pertinent to this visit.  Past Medical History:  has a past medical history of CAD (coronary artery disease), Cancer of prostate (HCC), Chest pain, CHF (congestive heart failure) (HCC), and Unstable angina pectoris (HCC).  Past Surgical History:  has a past surgical history that includes Coronary artery bypass graft (2018); Skin cancer excision; IR PORT PLACEMENT > 5 YEARS (Right, 2023); IR PORT PLACEMENT > 5 YEARS (2023); Colonoscopy; left atrial appendage occluder device  syncopal episode.      Plan:   1. Anemia, due to chronic hematuria. S/p 2 units of pRBC transfusion, Gross hematuria with underlying prostate cancer.  Status post multiple urological procedures in the past. Patient seen by urology, underwent cystoscopy, diagnostic right ureteroscopy, cleaning blood clot within the right pelvis, and right ureteral stent exchange today.  Tolerated procedure well.  Response To Previous Treatment: Not applicable  Family / Caregiver Present: Yes (wife in room at end of session)  Referral Date : 06/12/24  Follows Commands: Within Functional Limits  Subjective  Subjective: pt very pleasant and agreeable to working with therapy         Social/Functional History  Social/Functional History  Lives With: Spouse (+dog (wife works during the day))  Type of Home: House  Home Layout: Two level, Bed/Bath upstairs, Able to Live on Main level with bedroom/bathroom, 1/2 bath on main level  Home Access: Stairs to enter with rails, Stairs to enter without rails  Entrance Stairs - Number of Steps: 2 from front entrance with railing and 1 JANET from garage without railing  Bathroom Shower/Tub: Walk-in shower  Bathroom Toilet: Standard  Bathroom Equipment: Grab bars in shower  Home Equipment: Walker - Rolling  Has the patient had two or more falls in the past year or any fall with injury in the past year?: Yes (2)  ADL Assistance: Independent  Homemaking Responsibilities: No (wife completes)  Ambulation Assistance: Independent (RW recently over last few weeks)  Transfer Assistance: Independent  Active : Yes  Occupation: Retired  Type of Occupation:   Leisure & Hobbies: playing golf  Vision/Hearing  Vision  Vision: Within Functional Limits  Hearing  Hearing: Within functional limits    Cognition   Orientation  Overall Orientation Status: Within Functional Limits  Cognition  Overall Cognitive Status: WFL  Cognition Comment: some word finding issues at times but can make needs known     Objective

## 2024-06-12 NOTE — PROGRESS NOTES
Pt voided dark, red urine. 175cc noted. Bladder scan done immediately afterwards and showed 145cc left in bladder. Pt has no pain or tenderness. MD was notified.

## 2024-06-12 NOTE — PROGRESS NOTES
Physician Progress Note      PATIENT:               REX HESS  [facility] #:                  [id number]  :                       1950  ADMIT DATE:       6/10/2024 9:14 AM  DISCH DATE:  RESPONDING  PROVIDER #:        Antonio Watkins MD          QUERY TEXT:    Pt admitted with near syncope and hypotension.  Symptomatic anemia due to   chronic, recurrent hematuria documented. If possible, please document in   progress notes and discharge summary further specificity regarding the acuity   and type of anemia:      The medical record reflects the following:  Risk Factors: 74 year old with history of recurrent , chronic hematuria,   prostate cancer with metastasis to bone, bilateral URS placement on 2024 for   bilateral hydronephrosis, CKD, afib not on anticoagulation  Clinical Indicators: Per Lab Results and Blood Administration flowsheet,   Hg=5.3 upon admission on 6/10.  Patient received 1u PRBCs in the ED and Hg   increased to 6.9.  Patient received another unit PRBCs and Hg increased to 7.4  Per VS Flowsheet, BP 89/57 upon admission.  SBPs 105- 154 and DBPs 55-77 after   500ml LR bolus.   Hg=7.7->8.0.   Hg=7.7  6/10 ED Note \"hypotensive on arrival but not tachycardic....half a liter of   lactated Ringer's and his systolic pressure came up to 110.\"  6/10 H/P \"intermittent episodes of hematuria, and he is seeing blood clots in   his urine. Bloody urine noted....low hemoglobin at 5.3.  Blood transfusions   ordered.  MCV high at 101. Urine with large blood.\"   Cystoscopy Report \"Hematuria seems to be originating from the right kidney   with significant clot burden within the right renal pelvis and calyces   without obvious source.\"   Urology Note \"recurrent gross hematuria of unknown etiology, suspected to   be from right kidney.\"   Hematology Consult \"clots in the right renal pelvis.  The source is   unclear....still having hematuria and may need embolization. He did get IV   iron and  Retacrit yesterday\"  Treatment: UA and Urine Culture, LR bolus 500ml for BP in the ED, PRBC   Transfusions x 2, IV Venofer 200mg x 1 and Retacrit 40,000u subq, Cystoscopy   with R URS exchange, supportive care  Options provided:  -- Anemia due to chronic blood loss only  -- Anemia due to acute on chronic blood loss  -- Other - I will add my own diagnosis  -- Disagree - Not applicable / Not valid  -- Disagree - Clinically unable to determine / Unknown  -- Refer to Clinical Documentation Reviewer    PROVIDER RESPONSE TEXT:    This patient has acute on chronic blood loss anemia.    Query created by: Letitia Nelson on 6/12/2024 10:23 AM      QUERY TEXT:    Patient admitted with gross hematuria and anemia, noted to have CKD. If   possible, please document in progress notes and discharge summary if you are   evaluating and/or treating any of the following:      The medical record reflects the following:  Risk Factors: 74 year old with hx of prostate cancer with bone metastasis,   CAD< s/p CABD x 4, paroxysmal A-fib, bilateral hydronephrosis with bilateral   URS presenting with recurrent hematuria and anemia.    Clinical Indicators: Per VS Flowsheet during admission BUN 31->28->27,   Creatinine 2.2->2.0, GFR 30->34  6/10 H/P \"Patient has CKD, creatinine at baseline at 2.2\"    Treatment: trend creatinine, cystoscopy with R URS exchange, supportive care,   labs, LR bolus 500ml, indwelling pena catheter, strict I&Os, Urology consult,   UA and Urine Culture    Thank you,    Letitia Nelson BSN RN CRCR CCS  CDI Specialist  [email or web address]  Options provided:  -- CKD Stage 1 GFR>90  -- CKD Stage 2 GFR 60-90  -- CKD Stage 3a GFR 45-59  -- CKD Stage 3b GFR 30-44  -- CKD Stage 4 GFR 15-29  -- Other - I will add my own diagnosis  -- Disagree - Not applicable / Not valid  -- Disagree - Clinically unable to determine / Unknown  -- Refer to Clinical Documentation Reviewer    PROVIDER RESPONSE TEXT:    This patient has CKD Stage 3b.    Query

## 2024-06-12 NOTE — PROGRESS NOTES
V2.0    [facility] Progress Note      Name:  Yury Levin /Age/Sex: 1950  (74 y.o. male)   MRN & CSN:  [id number] & [id number] Encounter Date/Time: 2024 2:27 PM EDT   Location:  [id number]/5133-01 PCP: Jhoan Guevara MD     Attending:Antonio Mccrary MD       Hospital Day: 3    Assessment and Recommendations   Yury Levin is a 74 y.o. male with a PMH of Prostate cancer, CAD, status post CABG, paroxysmal A-fib, recurrent hematuria presented to hospital from Dr. Wells's office due to ongoing hematuria and near syncopal episode.     Plan:   Anemia, due to chronic hematuria. S/p 2 units of pRBC transfusion, hemoglobin 7.7, H&H monitoring every 12 hours. Procrit and IV iron per oncology.   Gross hematuria with underlying prostate cancer, on chemotherapy, known history of bilateral hydronephrosis.  Status post multiple urological procedures in the past. Patient seen by urology, underwent cystoscopy, diagnostic right ureteroscopy, cleaning blood clot within the right pelvis, and right ureteral stent exchange today.  Tolerated procedure well.  No signs of infection seen.  Patient given ceftriaxone empirically after admission, now given negative cultures.  As per urology, plan for voiding trial today however still having some cherry red urine in await their further recommendations.  Trend hemoglobins.  CKD, creatinine 2.2 at baseline  Elevated troponin, troponin 26>29. EKG no acute ischemic changes.  Likely demand ischemia.  Patient denies any chest pain.  No concern for ACS at this point.  Paroxysmal A-fib, continue amiodarone, not on anticoagulation  Hyperlipidemia, continue home medications      Diet ADULT DIET; Regular  ADULT ORAL NUTRITION SUPPLEMENT; Breakfast, Dinner; Standard High Calorie/High Protein Oral Supplement   DVT Prophylaxis [] Lovenox, []  Heparin, [x] SCDs, [] Ambulation,  [] Eliquis, [] Xarelto  [] Coumadin   Code Status Full Code   Disposition From: From home  Expected  144  --  136       BMP:    Recent Labs     06/10/24  0944 06/11/24  0555 06/12/24  0520    141 138   K 4.0 3.8 4.2    108 104   CO2 26 26 25   BUN 31* 28* 27*   CREATININE 2.2* 2.2* 2.0*   GLUCOSE 160* 77 120*       Hepatic:   Recent Labs     06/10/24  0944   AST 16   ALT 19   BILITOT <0.2   ALKPHOS 89       Lipids:   Lab Results   Component Value Date/Time    CHOL 177 10/02/2020 09:29 AM    HDL 59 08/23/2022 11:53 AM    TRIG 96 10/02/2020 09:29 AM     Hemoglobin A1C:   Lab Results   Component Value Date/Time    LABA1C 5.7 12/14/2022 12:09 PM     TSH: No results found for: \"TSH\"  Troponin: No results found for: \"TROPONINT\"  Lactic Acid: No results for input(s): \"LACTA\" in the last 72 hours.  BNP: No results for input(s): \"PROBNP\" in the last 72 hours.  UA:  Lab Results   Component Value Date/Time    NITRU Negative 06/10/2024 11:01 AM    COLORU RED 06/10/2024 11:01 AM    PHUR 7.0 06/10/2024 11:01 AM    PHUR 6.5 04/17/2024 03:11 AM    WBCUA 6-9 06/10/2024 11:01 AM    RBCUA >100 06/10/2024 11:01 AM    BACTERIA None Seen 12/11/2023 10:14 AM    CLARITYU CLOUDY 06/10/2024 11:01 AM    SPECGRAV >=1.030 10/22/2018 04:11 PM    LEUKOCYTESUR MODERATE 06/10/2024 11:01 AM    UROBILINOGEN 0.2 06/10/2024 11:01 AM    BILIRUBINUR Negative 06/10/2024 11:01 AM    BLOODU LARGE 06/10/2024 11:01 AM    GLUCOSEU Negative 06/10/2024 11:01 AM    KETUA Negative 06/10/2024 11:01 AM     Urine Cultures:   Lab Results   Component Value Date/Time    LABURIN No growth at 18 to 36 hours 08/21/2023 06:59 PM     Blood Cultures:   Lab Results   Component Value Date/Time    BC  06/10/2024 12:49 PM     No Growth to date.  Any change in status will be called.     Lab Results   Component Value Date/Time    BLOODCULT2  06/10/2024 12:49 PM     No Growth to date.  Any change in status will be called.     Organism: No results found for: \"ORG\"      Electronically signed by Antonio Mccrary MD on 6/12/2024 at 10:34 AM  Comment: Please note this report

## 2024-06-12 NOTE — PROGRESS NOTES
Pt Name: Yury Levin  Medical Record Number: [id number]  Date of Birth 1950   Today's Date: 6/12/2024      Subjective:  Patient overall well today, GH significantly improved, UOP pink. He has not required further blood transfusions. Would like catheter taken out.    ROS: Constitutional: No fever    Vitals:  Vitals:    06/11/24 2331 06/12/24 0510 06/12/24 0711 06/12/24 0824   BP: 106/64 114/69 116/70 117/70   Pulse: 69 73 65 79   Resp: 15 20 13    Temp: 98.6 °F (37 °C) 98.3 °F (36.8 °C) 98.4 °F (36.9 °C)    TempSrc: Oral Oral Oral    SpO2: 96% 97% 98%    Weight:  79 kg (174 lb 2.6 oz)     Height:         I/O last 3 completed shifts:  In: 992.5 [P.O.:240; I.V.:400; Blood:352.5]  Out: 3730 [Urine:3730]    Exam:  General Appearance: Alert and oriented, cooperative, no distress, appears stated age  Head: Normocephalic, without obvious abnormality, atraumatic  Back: right CVA tenderness  Abdomen: Soft, non-tender, non-distended, no masses  Skin: Skin color, texture, turgor normal, no rashes or lesions  Neurologic: no gross deficits   Male :  Nonpalpable bladder  right CVA tenderness  Snowden catheter w/ pink UOP  TOMMIE Not indicated    CURRENT MEDICATIONS   Scheduled Meds:   iron sucrose  200 mg IntraVENous Q24H    sodium chloride flush  5-40 mL IntraVENous 2 times per day    cefTRIAXone (ROCEPHIN) IV  1,000 mg IntraVENous Q24H    amiodarone  200 mg Oral Daily    ezetimibe  10 mg Oral Daily    rosuvastatin  40 mg Oral Nightly     Continuous Infusions:   sodium chloride      sodium chloride      sodium chloride       PRN Meds:.sodium chloride, sodium chloride flush, sodium chloride, potassium chloride **OR** potassium alternative oral replacement **OR** potassium chloride, magnesium sulfate, ondansetron **OR** ondansetron, polyethylene glycol, acetaminophen **OR** acetaminophen, sodium chloride    LABS     Recent Labs     06/10/24  0944 06/10/24  1608 06/10/24  2150 06/11/24  0555 06/11/24  1745 06/12/24  0520    WBC 10.9  --  11.9* 10.7  --  11.6*   HGB 5.3*   < > 7.4* 7.7* 8.0* 7.7*   HCT 16.7*   < > 21.8* 22.7* 23.0* 22.4*     --  135 144  --  136     --   --  141  --  138   K 4.0  --   --  3.8  --  4.2     --   --  108  --  104   CO2 26  --   --  26  --  25   BUN 31*  --   --  28*  --  27*   CREATININE 2.2*  --   --  2.2*  --  2.0*   CALCIUM 8.6  --   --  8.1*  --  8.2*   AST 16  --   --   --   --   --    ALT 19  --   --   --   --   --    BILITOT <0.2  --   --   --   --   --    BILIDIR <0.2  --   --   --   --   --     < > = values in this interval not displayed.         ASSESSMENT   Hospital day # 2  73yo male w/ recurrent gross hematuria of unknown etiology, suspected to be from right kidney. Having associated anemia  Hgb 7.7 today, has received 2u PRBC total  Renal function c/w baseline Cr 2.0 today  VSS, GH much improved today  PLAN      Patient has not required further blood transfusions, Hgb stable over past 24 hr.   Voiding trial today  If Hgb remains stable for remainder of day, ok for patient to discharge from urology standpoint and FU outpatient in 1-2 weeks w/ Dr. Hagen.  If hematuria persists, we will need to consider angiography and possible embolization versus a radical nephrectomy in the future.     Emily Lowe, BRIAN - CNP 6/12/2024 8:56 AM

## 2024-06-12 NOTE — DISCHARGE INSTR - COC
Continuity of Care Form    Patient Name: Yury Levin   :  1950  MRN:  [id number]    Admit date:  6/10/2024  Discharge date:  2024    Code Status Order: Full Code   Advance Directives:   Advance Care Flowsheet Documentation       Date/Time Healthcare Directive Type of Healthcare Directive Copy in Chart Healthcare Agent Appointed Healthcare Agent's Name Healthcare Agent's Phone Number    [phone] No, patient does not have an advance directive for healthcare treatment -- -- -- -- --            Admitting Physician:  Liliam Farrell MD  PCP: Jhoan Guevara MD    Discharging Nurse: ALBERT Lorenzo  Discharging Hospital Unit/Room#: J3B-6324/5133-01  Discharging Unit Phone Number: [phone]    Emergency Contact:   Extended Emergency Contact Information  Primary Emergency Contact: Marilyn Levin   [facility]  Home Phone: [phone]  Mobile Phone: [phone]  Relation: Spouse  Secondary Emergency Contact: Alejandro Levin  Home Phone: [phone]  Relation: Child    Past Surgical History:  Past Surgical History:   Procedure Laterality Date    COLONOSCOPY      CORONARY ARTERY BYPASS GRAFT  2018    [id number] (Mccrary)    CYSTOSCOPY Right 2024    CYSTOSCOPY RIGHT DIAGNOSTIC URETEROSCOPY, REMOVAL RIGHT RENAL PELVIS CLOT, RIGHT STENT EXHCANGE performed by Gary Gracia MD at [facility] OR    IR PORT PLACEMENT EQUAL OR GREATER THAN 5 YEARS Right 2023    Power Port; RIJ access; 24cm; Dr. Jones    IR PORT PLACEMENT EQUAL OR GREATER THAN 5 YEARS  2023    IR PORT PLACEMENT EQUAL OR GREATER THAN 5 YEARS 1/3/2023 [facility] SPECIAL PROCEDURES    LEFT ATRIAL APPENDAGE OCCLUDER DEVICE  2023    21 mm    SKIN CANCER EXCISION         Immunization History:   Immunization History   Administered Date(s) Administered    COVID-19, PFIZER PURPLE top, DILUTE for use, (age 12 y+), 30mcg/0.3mL 02/10/2021, 2021, 2021    Influenza, FLUAD, (age 65 y+), Adjuvanted, 0.5mL 10/02/2020,  edges 06/12/24 1024   Number of days: 2       Wound 06/10/24 Radial Left;Anterior (Active)   Wound Etiology Skin Tear 06/11/24 2045   Dressing Status Clean;Dry;Intact 06/11/24 2045   Dressing/Treatment Non adherent;Gauze dressing/dressing sponge 06/11/24 2045   Wound Assessment Bleeding 06/10/24 1838   Drainage Amount Scant (moist but unmeasurable) 06/10/24 1838   Odor None 06/11/24 2045   Lyudmila-wound Assessment Intact 06/10/24 1838   Margins Undefined edges;Unattached edges 06/10/24 1838   Number of days: 2       Wound 06/10/24 Arm Left;Proximal;Lower (Active)   Number of days: 2       Wound 06/11/24 Arm Lower;Posterior;Right 2 skin tears noted (Active)   Wound Etiology Skin Tear 06/11/24 2045   Dressing/Treatment Dry dressing 06/11/24 2045   Wound Assessment Bleeding 06/11/24 1037   Drainage Amount Small (< 25%) 06/11/24 1037   Drainage Description Serosanguinous 06/11/24 1037   Odor None 06/11/24 1037   Lyudmila-wound Assessment Fragile 06/11/24 1037   Margins Attached edges 06/11/24 1037   Number of days: 1        Elimination:  Continence:   Bowel: Yes  Bladder: Yes  Urinary Catheter: Removal Date 6/12/24    Colostomy/Ileostomy/Ileal Conduit: No       Date of Last BM: 6/12/24    Intake/Output Summary (Last 24 hours) at 6/12/2024 1602  Last data filed at 6/12/2024 0924  Gross per 24 hour   Intake 130 ml   Output 1205 ml   Net -1075 ml     I/O last 3 completed shifts:  In: 992.5 [P.O.:240; I.V.:400; Blood:352.5]  Out: 3730 [Urine:3730]    Safety Concerns:     At Risk for Falls, bleeding risk    Impairments/Disabilities:      Vision and Hearing    Nutrition Therapy:  Current Nutrition Therapy:   - Oral Diet:  General    Routes of Feeding: Oral  Liquids: Thin Liquids  Daily Fluid Restriction: no  Last Modified Barium Swallow with Video (Video Swallowing Test): not done    Treatments at the Time of Hospital Discharge:   Respiratory Treatments: n/a  Oxygen Therapy:  is not on home oxygen therapy.  Ventilator:    - No

## 2024-06-13 VITALS
SYSTOLIC BLOOD PRESSURE: 122 MMHG | RESPIRATION RATE: 16 BRPM | TEMPERATURE: 98.3 F | HEIGHT: 67 IN | BODY MASS INDEX: 27.68 KG/M2 | WEIGHT: 176.37 LBS | OXYGEN SATURATION: 97 % | HEART RATE: 73 BPM | DIASTOLIC BLOOD PRESSURE: 70 MMHG

## 2024-06-13 LAB
ANION GAP SERPL CALCULATED.3IONS-SCNC: 8 MMOL/L (ref 3–16)
BASOPHILS # BLD: 0 K/UL (ref 0–0.2)
BASOPHILS NFR BLD: 0.1 %
BUN SERPL-MCNC: 28 MG/DL (ref 7–20)
CALCIUM SERPL-MCNC: 8.3 MG/DL (ref 8.3–10.6)
CHLORIDE SERPL-SCNC: 106 MMOL/L (ref 99–110)
CO2 SERPL-SCNC: 28 MMOL/L (ref 21–32)
CREAT SERPL-MCNC: 1.8 MG/DL (ref 0.8–1.3)
DEPRECATED RDW RBC AUTO: 19.8 % (ref 12.4–15.4)
EOSINOPHIL # BLD: 0 K/UL (ref 0–0.6)
EOSINOPHIL NFR BLD: 0.3 %
GFR SERPLBLD CREATININE-BSD FMLA CKD-EPI: 39 ML/MIN/{1.73_M2}
GLUCOSE SERPL-MCNC: 97 MG/DL (ref 70–99)
HCT VFR BLD AUTO: 24.6 % (ref 40.5–52.5)
HGB BLD-MCNC: 8.2 G/DL (ref 13.5–17.5)
LYMPHOCYTES # BLD: 0.8 K/UL (ref 1–5.1)
LYMPHOCYTES NFR BLD: 6.3 %
MCH RBC QN AUTO: 31.6 PG (ref 26–34)
MCHC RBC AUTO-ENTMCNC: 33.4 G/DL (ref 31–36)
MCV RBC AUTO: 94.7 FL (ref 80–100)
MONOCYTES # BLD: 1 K/UL (ref 0–1.3)
MONOCYTES NFR BLD: 7.1 %
NEUTROPHILS # BLD: 11.5 K/UL (ref 1.7–7.7)
NEUTROPHILS NFR BLD: 86.2 %
PLATELET # BLD AUTO: 162 K/UL (ref 135–450)
PMV BLD AUTO: 8.6 FL (ref 5–10.5)
POTASSIUM SERPL-SCNC: 3.8 MMOL/L (ref 3.5–5.1)
RBC # BLD AUTO: 2.6 M/UL (ref 4.2–5.9)
SODIUM SERPL-SCNC: 142 MMOL/L (ref 136–145)
WBC # BLD AUTO: 13.3 K/UL (ref 4–11)

## 2024-06-13 PROCEDURE — 6370000000 HC RX 637 (ALT 250 FOR IP): Performed by: SURGERY

## 2024-06-13 PROCEDURE — 80048 BASIC METABOLIC PNL TOTAL CA: CPT

## 2024-06-13 PROCEDURE — 94760 N-INVAS EAR/PLS OXIMETRY 1: CPT

## 2024-06-13 PROCEDURE — 51798 US URINE CAPACITY MEASURE: CPT

## 2024-06-13 PROCEDURE — 36591 DRAW BLOOD OFF VENOUS DEVICE: CPT

## 2024-06-13 PROCEDURE — 2580000003 HC RX 258: Performed by: SURGERY

## 2024-06-13 PROCEDURE — 85025 COMPLETE CBC W/AUTO DIFF WBC: CPT

## 2024-06-13 RX ADMIN — AMIODARONE HYDROCHLORIDE 200 MG: 200 TABLET ORAL at 07:53

## 2024-06-13 RX ADMIN — SODIUM CHLORIDE, PRESERVATIVE FREE 10 ML: 5 INJECTION INTRAVENOUS at 07:53

## 2024-06-13 RX ADMIN — EZETIMIBE 10 MG: 10 TABLET ORAL at 07:53

## 2024-06-13 NOTE — PROGRESS NOTES
ONCOLOGY HEMATOLOGY CARE PROGRESS NOTE      SUBJECTIVE:  Overall he is overall he is doing well.  His urine is somewhat pinkish.  He denies any shortness of breath or chest pain.    ROS:     Constitutional:  No weight loss, No fever, No chills, No night sweats.  Energy level good.  Eyes:  No impairment or change in vision  ENT / Mouth:  No pain, abnormal ulceration, bleeding, nasal drip or change in voice or hearing  Cardiovascular:  No chest pain, palpitations, new edema, or calf discomfort  Respiratory:  No pain, hemoptysis, change to breathing  Breast:  No pain, discharge, change in appearance or texture  Gastrointestinal:  No pain, cramping, jaundice, change to eating and bowel habits  Urinary:  No pain, bleeding or change in continence  Genitalia: No pain, bleeding or discharge  Musculoskeletal:  No redness, pain, edema or weakness  Skin:  No pruritus, rash, change to nodules or lesions  Neurologic:  No discomfort, change in mental status, speech, sensory or motor activity  Psychiatric:  No change in concentration or change to affect or mood  Endocrine:  No hot flashes, increased thirst, or change to urine production  Hematologic: No petechiae, ecchymosis or bleeding  Lymphatic:  No lymphadenopathy or lymphedema  Allergy / Immunologic:  No eczema, hives, frequent or recurrent infections    OBJECTIVE        Physical    VITALS:  Patient Vitals for the past 24 hrs:   BP Temp Temp src Pulse Resp SpO2 Weight   06/13/24 0846 -- -- -- 73 16 97 % --   06/13/24 0753 122/70 -- -- 78 -- -- --   06/13/24 0715 120/70 98.3 °F (36.8 °C) Oral 82 17 97 % --   06/13/24 0332 (!) 146/77 98.1 °F (36.7 °C) Oral 72 19 97 % 80 kg (176 lb 5.9 oz)   06/12/24 2310 (!) 155/74 98 °F (36.7 °C) -- 88 23 96 % --   06/12/24 1923 122/70 97.1 °F (36.2 °C) Oral 71 14 99 % --   06/12/24 1545 116/64 98.3 °F (36.8 °C) Oral 67 17 98 % --   06/12/24 1230 113/64 98.3 °F (36.8 °C) Oral 66 16 99 % --       24HR  hydronephrosis is  slightly decreased.  2 mm stone is seen in left kidney    No pancreatic calcification noted.  No peripancreatic fluid.    No gallstones noted..  No biliary ductal dilatation noted.  Hypodense nodule  is seen in the liver, similar to prior    GI/Bowel: No significant small bowel distention noted.  Moderate stool load  seen in the colon.  No bowel obstruction noted.    Appendix is identified and normal in caliber.    Pelvis: No free fluid in pelvis.  No pelvic adenopathy.  Prominent fat seen  in the inguinal canals bilaterally.  Small hydrocele is seen on the right.    Peritoneum/Retroperitoneum: No aortic aneurysm.  No retroperitoneal  adenopathy.  No retroperitoneal hematoma.    Bones/Soft Tissues: Tiny periumbilical hernia containing fat is seen.    Spurring is seen in the spine.  Spurring is seen in the hips.  Healed pubic  ramus fracture is seen on the left  Impression: Persistent but decreased degree of hydronephrosis compared to prior, status  post ureteral stent insertion.  XR CHEST PORTABLE  Narrative: EXAMINATION:  ONE XRAY VIEW OF THE CHEST    6/10/2024 9:24 am    COMPARISON:  01/19/2018 radiograph    HISTORY:  ORDERING SYSTEM PROVIDED HISTORY: fatigue  TECHNOLOGIST PROVIDED HISTORY:  Reason for exam:->fatigue  Reason for Exam: fatigue    FINDINGS:  Cardiomegaly with remote prior surgical changes of CABG.  A right port  catheter is in place.  Pulmonary vascular markings are normal.  Minimal  bibasilar atelectasis.  The lungs are otherwise clear.  No significant  skeletal finding.  Impression: Cardiomegaly with no acute finding.      Problem List  Patient Active Problem List   Diagnosis    NSTEMI (non-ST elevated myocardial infarction) (HCC)    S/P CABG x 4    Mixed hearing loss, bilateral    ERIBERTO (acute kidney injury) (HCC)    PAF (paroxysmal atrial fibrillation) (HCC)    Gross hematuria    Anemia       ASSESSMENT AND PLAN:  1.  Stage IV prostate cancer.  Will resume chemo next week as an

## 2024-06-13 NOTE — PROGRESS NOTES
Pt. A&o x4, education completed, patient discharged with all belongings with home care, AVS reviewed and questions answered, no complications, patient transported off unit via   W/c.

## 2024-06-13 NOTE — DISCHARGE SUMMARY
V2.0  Discharge Summary    Name:  Yury Levin /Age/Sex: 1950 (74 y.o. male)   Admit Date: 6/10/2024  Discharge Date: 24    MRN & CSN:  [id number] & [id number] Encounter Date and Time 24 9:52 AM EDT    Attending:  No att. providers found Discharging Provider: Antonio Mccrary MD       Hospital Course:     Brief HPI: Yury Levin is a 74 y.o. male with a PMH of Prostate cancer, CAD, status post CABG, paroxysmal A-fib, recurrent hematuria presented to hospital from Dr. Wells's office due to ongoing hematuria and near syncopal episode.  Patient is on chemotherapy and next chemotherapy scheduled for Thursday.  Patient is supposed to get an outpatient cystoscopy next week at [facility] by Dr. Hagen for intermittent hematuria. But when pt was in Dr. Wells's office he did have a near syncopal episode and sent to emergency room for further evaluation.  Patient reports feeling tired in last few days.  And he has been having intermittent episodes of hematuria, and he is seeing blood clots in his urine.  Patient denies any fever/chills/chest pain/shortness of breath at home.  Reports bilateral flank pain. Snowden placed in ER.  Bloody urine noted.  Workup in ER included CBC, CMP.  Noted to have low hemoglobin at 5.3.  Blood transfusions ordered.  MCV high at 101.  Patient has CKD, creatinine at baseline at 2.2.  Blood culture sent.  Urine with large blood, moderate leukocyte esterase, nitrate negative.  Started on empiric antibiotics with ceftriaxone.  CT abdomen and pelvis showed persistent but decreased degree of hydronephrosis compared to prior studies, status post ureteral stent insertion     Brief Problem Based Course:     Acute blood loss anemia, due to chronic hematuria. S/p 2 units of pRBC transfusion, got Procrit and IV iron.  Improved and trending up prior to discharge.  Gross hematuria with underlying prostate cancer, on chemotherapy, known history of bilateral hydronephrosis.   11:01 AM    GLUCOSEU Negative 06/10/2024 11:01 AM    KETUA Negative 06/10/2024 11:01 AM     Urine Cultures:   Lab Results   Component Value Date/Time    LABURIN No growth at 18 to 36 hours 08/21/2023 06:59 PM     Blood Cultures:   Lab Results   Component Value Date/Time    BC  06/10/2024 12:49 PM     No Growth to date.  Any change in status will be called.     Lab Results   Component Value Date/Time    BLOODCULT2  06/10/2024 12:49 PM     No Growth to date.  Any change in status will be called.         Time Spent Discharging patient 40 minutes    Electronically signed by Antonio Mccrary MD on 6/13/2024 at 9:52 AM

## 2024-06-13 NOTE — DISCHARGE INSTRUCTIONS
Please do not take aspirin for now until cleared by your urologist.  Please monitor for signs of worsening bleeding in your repeat urine.  Please monitor your urine output and if it decreases that may be a sign of obstruction and call your urologist immediately.

## 2024-06-13 NOTE — PLAN OF CARE
Problem: Safety - Adult  Goal: Free from fall injury  Outcome: Adequate for Discharge     Problem: Chronic Conditions and Co-morbidities  Goal: Patient's chronic conditions and co-morbidity symptoms are monitored and maintained or improved  Outcome: Adequate for Discharge     Problem: Nutrition Deficit:  Goal: Optimize nutritional status  Outcome: Adequate for Discharge

## 2024-06-14 LAB
BACTERIA BLD CULT ORG #2: NORMAL
BACTERIA BLD CULT: NORMAL

## 2024-06-21 ENCOUNTER — OFFICE VISIT (OUTPATIENT)
Dept: CARDIOLOGY CLINIC | Age: 74
End: 2024-06-21
Payer: COMMERCIAL

## 2024-06-21 VITALS
SYSTOLIC BLOOD PRESSURE: 102 MMHG | WEIGHT: 174.4 LBS | BODY MASS INDEX: 27.31 KG/M2 | OXYGEN SATURATION: 94 % | HEART RATE: 74 BPM | DIASTOLIC BLOOD PRESSURE: 60 MMHG

## 2024-06-21 DIAGNOSIS — I48.0 PAROXYSMAL A-FIB (HCC): Primary | ICD-10-CM

## 2024-06-21 PROCEDURE — 99214 OFFICE O/P EST MOD 30 MIN: CPT | Performed by: INTERNAL MEDICINE

## 2024-06-21 PROCEDURE — 1036F TOBACCO NON-USER: CPT | Performed by: INTERNAL MEDICINE

## 2024-06-21 PROCEDURE — 1123F ACP DISCUSS/DSCN MKR DOCD: CPT | Performed by: INTERNAL MEDICINE

## 2024-06-21 PROCEDURE — G8427 DOCREV CUR MEDS BY ELIG CLIN: HCPCS | Performed by: INTERNAL MEDICINE

## 2024-06-21 PROCEDURE — 93000 ELECTROCARDIOGRAM COMPLETE: CPT | Performed by: INTERNAL MEDICINE

## 2024-06-21 PROCEDURE — 3017F COLORECTAL CA SCREEN DOC REV: CPT | Performed by: INTERNAL MEDICINE

## 2024-06-21 PROCEDURE — 1111F DSCHRG MED/CURRENT MED MERGE: CPT | Performed by: INTERNAL MEDICINE

## 2024-06-21 PROCEDURE — G8417 CALC BMI ABV UP PARAM F/U: HCPCS | Performed by: INTERNAL MEDICINE

## 2024-06-21 RX ORDER — PREDNISONE 5 MG/1
5 TABLET ORAL DAILY
COMMUNITY

## 2024-07-22 ENCOUNTER — COMMUNITY OUTREACH (OUTPATIENT)
Dept: INTERNAL MEDICINE CLINIC | Age: 74
End: 2024-07-22

## 2024-08-29 ENCOUNTER — HOSPITAL ENCOUNTER (OUTPATIENT)
Dept: INFUSION THERAPY | Age: 74
Setting detail: INFUSION SERIES
Discharge: HOME | End: 2024-08-29
Payer: COMMERCIAL

## 2024-08-29 VITALS
RESPIRATION RATE: 20 BRPM | HEART RATE: 74 BPM | SYSTOLIC BLOOD PRESSURE: 147 MMHG | DIASTOLIC BLOOD PRESSURE: 80 MMHG | OXYGEN SATURATION: 95 % | TEMPERATURE: 97 F

## 2024-08-29 DIAGNOSIS — D50.8 OTHER IRON DEFICIENCY ANEMIA: Primary | ICD-10-CM

## 2024-08-29 LAB
ABO + RH BLD: NORMAL
BLD GP AB SCN SERPL QL: NORMAL
BLOOD BANK DISPENSE STATUS: NORMAL
BLOOD BANK PRODUCT CODE: NORMAL
BPU ID: NORMAL
DESCRIPTION BLOOD BANK: NORMAL

## 2024-08-29 PROCEDURE — 36430 TRANSFUSION BLD/BLD COMPNT: CPT

## 2024-08-29 PROCEDURE — 36415 COLL VENOUS BLD VENIPUNCTURE: CPT

## 2024-08-29 PROCEDURE — P9016 RBC LEUKOCYTES REDUCED: HCPCS

## 2024-08-29 PROCEDURE — 86900 BLOOD TYPING SEROLOGIC ABO: CPT

## 2024-08-29 PROCEDURE — 86901 BLOOD TYPING SEROLOGIC RH(D): CPT

## 2024-08-29 PROCEDURE — 6370000000 HC RX 637 (ALT 250 FOR IP): Performed by: INTERNAL MEDICINE

## 2024-08-29 PROCEDURE — 86923 COMPATIBILITY TEST ELECTRIC: CPT

## 2024-08-29 PROCEDURE — 86850 RBC ANTIBODY SCREEN: CPT

## 2024-08-29 PROCEDURE — 2580000003 HC RX 258

## 2024-08-29 RX ORDER — SODIUM CHLORIDE 9 MG/ML
INJECTION, SOLUTION INTRAVENOUS CONTINUOUS
OUTPATIENT
Start: 2024-08-29

## 2024-08-29 RX ORDER — ACETAMINOPHEN 325 MG/1
650 TABLET ORAL
OUTPATIENT
Start: 2024-08-29

## 2024-08-29 RX ORDER — SODIUM CHLORIDE 9 MG/ML
20 INJECTION, SOLUTION INTRAVENOUS CONTINUOUS
OUTPATIENT
Start: 2024-08-29

## 2024-08-29 RX ORDER — SODIUM CHLORIDE 9 MG/ML
20 INJECTION, SOLUTION INTRAVENOUS CONTINUOUS
Status: DISCONTINUED | OUTPATIENT
Start: 2024-08-29 | End: 2024-08-30 | Stop reason: HOSPADM

## 2024-08-29 RX ORDER — DIPHENHYDRAMINE HCL 25 MG
25 TABLET ORAL ONCE
OUTPATIENT
Start: 2024-08-29 | End: 2024-08-29

## 2024-08-29 RX ORDER — ONDANSETRON 2 MG/ML
8 INJECTION INTRAMUSCULAR; INTRAVENOUS
OUTPATIENT
Start: 2024-08-29

## 2024-08-29 RX ORDER — EPINEPHRINE 1 MG/ML
0.3 INJECTION, SOLUTION, CONCENTRATE INTRAVENOUS PRN
OUTPATIENT
Start: 2024-08-29

## 2024-08-29 RX ORDER — SODIUM CHLORIDE 9 MG/ML
25 INJECTION, SOLUTION INTRAVENOUS PRN
OUTPATIENT
Start: 2024-08-29

## 2024-08-29 RX ORDER — SODIUM CHLORIDE 0.9 % (FLUSH) 0.9 %
5-40 SYRINGE (ML) INJECTION PRN
OUTPATIENT
Start: 2024-08-29

## 2024-08-29 RX ORDER — ACETAMINOPHEN 325 MG/1
650 TABLET ORAL ONCE
Status: COMPLETED | OUTPATIENT
Start: 2024-08-29 | End: 2024-08-29

## 2024-08-29 RX ORDER — DIPHENHYDRAMINE HCL 25 MG
25 TABLET ORAL ONCE
Status: COMPLETED | OUTPATIENT
Start: 2024-08-29 | End: 2024-08-29

## 2024-08-29 RX ORDER — ALBUTEROL SULFATE 90 UG/1
4 AEROSOL, METERED RESPIRATORY (INHALATION) PRN
OUTPATIENT
Start: 2024-08-29

## 2024-08-29 RX ORDER — SODIUM CHLORIDE 9 MG/ML
INJECTION, SOLUTION INTRAVENOUS
Status: COMPLETED
Start: 2024-08-29 | End: 2024-08-29

## 2024-08-29 RX ORDER — DIPHENHYDRAMINE HYDROCHLORIDE 50 MG/ML
50 INJECTION INTRAMUSCULAR; INTRAVENOUS
OUTPATIENT
Start: 2024-08-29

## 2024-08-29 RX ORDER — ACETAMINOPHEN 325 MG/1
650 TABLET ORAL ONCE
OUTPATIENT
Start: 2024-08-29 | End: 2024-08-29

## 2024-08-29 RX ADMIN — DIPHENHYDRAMINE HCL 25 MG: 25 TABLET ORAL at 11:06

## 2024-08-29 RX ADMIN — ACETAMINOPHEN 650 MG: 325 TABLET ORAL at 11:06

## 2024-08-29 RX ADMIN — SODIUM CHLORIDE 250 ML: 9 INJECTION, SOLUTION INTRAVENOUS at 11:10

## 2024-08-29 NOTE — PROGRESS NOTES
Outpatient Infusion Center  [facility]    Blood Transfusion    NAME:  Yury Levin  YOB: 1950  MEDICAL RECORD NUMBER:  [id number]  DATE:  8/29/2024     Patient arrived to Outpatient Infusion Center   [x] per wheelchair   [] ambulatory         Consent Obtained: Yes  Is this the patient's first Transfusion? No    Did the patient experience any adverse reactions to previous Transfusion? No  Patient Active Problem List   Diagnosis    NSTEMI (non-ST elevated myocardial infarction) ([facility])    S/P CABG x 4    Mixed hearing loss, bilateral    ERIBERTO (acute kidney injury) ([facility])    PAF (paroxysmal atrial fibrillation) ([facility])    Gross hematuria    Anemia     Patient with Bone Marrow Suppression due to chemotherapy? Yes    Patient with history of GI bleeding? No  Patient with history of CHF? No  Patient with history of COPD? No    Hemoglobin/Hematocrit:    Lab Results   Component Value Date/Time    HGB 8.2 06/13/2024 05:30 AM    HCT 24.6 06/13/2024 05:30 AM       Special Transfusion Products Requested: No  Blood was:  []  Irradiated    [] Washed    [] Other    Is the patient experiencing any:  Fatigue:   []   None  [x]   Increase over baseline but not altering normal activities  []   Moderate of causing difficulty performing some activities  []   Severe or loss of ability to perform some activities  []   Bedridden or disabling    Dizziness or Lightheadedness:   [x]   None  []   No Interference  []   Interferes with functioning but not activities of daily living  []   Interferes with daily activies  []   Bedridden or disabling    Shortness of Breath:   []   None   [x]   Dyspneic on exertion   []   Dyspnea with normal activities  []   Dyspnea at rest    Breath Sounds: No increased work of breathing, Breath sounds clear to auscultation bilaterally, and Good air exchange    Edema: 2+ bilateral ankles    Tachycardia: No    Heart Palpitations: No      Chest Pain: No    Premedicated:  [] Tylenol 325 mg

## 2024-09-13 ENCOUNTER — TELEPHONE (OUTPATIENT)
Dept: INTERNAL MEDICINE CLINIC | Age: 74
End: 2024-09-13

## 2024-09-29 DIAGNOSIS — E78.5 HYPERLIPIDEMIA LDL GOAL <70: ICD-10-CM

## 2024-10-01 RX ORDER — ROSUVASTATIN CALCIUM 40 MG/1
TABLET, COATED ORAL
Qty: 90 TABLET | Refills: 3 | Status: SHIPPED | OUTPATIENT
Start: 2024-10-01

## 2024-10-17 ENCOUNTER — HOSPITAL ENCOUNTER (EMERGENCY)
Age: 74
Discharge: HOME | End: 2024-10-17
Attending: EMERGENCY MEDICINE
Payer: COMMERCIAL

## 2024-10-17 ENCOUNTER — TELEPHONE (OUTPATIENT)
Dept: INTERNAL MEDICINE CLINIC | Age: 74
End: 2024-10-17

## 2024-10-17 VITALS
RESPIRATION RATE: 18 BRPM | BODY MASS INDEX: 27.31 KG/M2 | TEMPERATURE: 98.4 F | SYSTOLIC BLOOD PRESSURE: 97 MMHG | DIASTOLIC BLOOD PRESSURE: 64 MMHG | OXYGEN SATURATION: 96 % | HEART RATE: 94 BPM | HEIGHT: 67 IN

## 2024-10-17 DIAGNOSIS — S51.012A SKIN TEAR OF LEFT ELBOW WITHOUT COMPLICATION, INITIAL ENCOUNTER: Primary | ICD-10-CM

## 2024-10-17 PROCEDURE — 90471 IMMUNIZATION ADMIN: CPT | Performed by: EMERGENCY MEDICINE

## 2024-10-17 PROCEDURE — 99284 EMERGENCY DEPT VISIT MOD MDM: CPT

## 2024-10-17 PROCEDURE — 6360000002 HC RX W HCPCS: Performed by: EMERGENCY MEDICINE

## 2024-10-17 PROCEDURE — 90715 TDAP VACCINE 7 YRS/> IM: CPT | Performed by: EMERGENCY MEDICINE

## 2024-10-17 PROCEDURE — 97597 DBRDMT OPN WND 1ST 20 CM/<: CPT

## 2024-10-17 RX ORDER — CEPHALEXIN 500 MG/1
500 CAPSULE ORAL 4 TIMES DAILY
Qty: 40 CAPSULE | Refills: 0 | Status: SHIPPED | OUTPATIENT
Start: 2024-10-17 | End: 2024-10-27

## 2024-10-17 RX ADMIN — TETANUS TOXOID, REDUCED DIPHTHERIA TOXOID AND ACELLULAR PERTUSSIS VACCINE, ADSORBED 0.5 ML: 5; 2.5; 8; 8; 2.5 SUSPENSION INTRAMUSCULAR at 11:01

## 2024-10-17 ASSESSMENT — PAIN DESCRIPTION - LOCATION: LOCATION: ELBOW

## 2024-10-17 ASSESSMENT — LIFESTYLE VARIABLES
HOW MANY STANDARD DRINKS CONTAINING ALCOHOL DO YOU HAVE ON A TYPICAL DAY: 1 OR 2
HOW OFTEN DO YOU HAVE A DRINK CONTAINING ALCOHOL: 2-4 TIMES A MONTH

## 2024-10-17 ASSESSMENT — PAIN - FUNCTIONAL ASSESSMENT
PAIN_FUNCTIONAL_ASSESSMENT: PREVENTS OR INTERFERES SOME ACTIVE ACTIVITIES AND ADLS
PAIN_FUNCTIONAL_ASSESSMENT: 0-10

## 2024-10-17 ASSESSMENT — PAIN DESCRIPTION - DESCRIPTORS: DESCRIPTORS: ACHING

## 2024-10-17 ASSESSMENT — PAIN DESCRIPTION - PAIN TYPE: TYPE: ACUTE PAIN

## 2024-10-17 ASSESSMENT — PAIN DESCRIPTION - ORIENTATION: ORIENTATION: LEFT

## 2024-10-17 ASSESSMENT — PAIN SCALES - GENERAL: PAINLEVEL_OUTOF10: 5

## 2024-10-17 NOTE — TELEPHONE ENCOUNTER
Pt wife called, Jesse was seen in the ER for a skin tear on his left elbow. Per the ER he needs to see someone Monday to have the dressing changes and tear looked at.    Please advise    Thank you

## 2024-10-17 NOTE — DISCHARGE INSTRUCTIONS
Recommend follow-up with your primary care physician and 2-3 business days for reexamination.    Leave dressing in place until seen by primary care physician.    Watch for increased pain redness, swelling, fever, chills, drainage, or odor.    If unable to see your primary care physician in 2 to 3 days, return to the emergency department for wound check.    If condition worsens or new symptoms develop, return immediately to the emergency department.

## 2024-10-17 NOTE — TELEPHONE ENCOUNTER
Patient was seeing Dr Guevara, has new to provider patient on 11/1/24 with Dr Robles.      We have nothing open on Monday

## 2024-10-17 NOTE — ED PROVIDER NOTES
[facility] EMERGENCY DEPARTMENT  EMERGENCY DEPARTMENT ENCOUNTER      Pt Name: Yury Levin  MRN: [id number]  Birthdate 1950  Date of evaluation: 10/17/2024  Provider: CHRISTINA OCASIO DO    CHIEF COMPLAINT       Chief Complaint   Patient presents with    Abrasion     Fall 4 days ago going up steps. Pt scraped elbow. Today dressing on skinned elbow is stinking to wound and pt wants evaluated/dressing changed. Pt is on chemo for prostate cancer.          HISTORY OF PRESENT ILLNESS   (Location/Symptom, Timing/Onset, Context/Setting, Quality, Duration, Modifying Factors, Severity)  Note limiting factors.   Yury Levin is a 74 y.o. male who presents to the emergency department with a complaint of a skin tear to the left elbow that occurred 2 days ago on Tuesday.  He reports that he had just walked up the steps with his cane, stumbled on the last step, fell forward and his left elbow scooted on the carpet sustaining an abrasion.  He did not seek care at the time of injury.  Tetanus immunization is unknown.    He reports that there is some dead skin that he believes needs to be trimmed off.  He states that the dressing has been sticking to the skin and would like to have it cared for.    He denies any fever, chills, nausea vomiting, drainage or discharge.  He denies any elbow shoulder or wrist pain.  No focal weakness or numbness.  He denies any preceding dizziness or lightheadedness.  He did not hit his head.  He does not take any anticoagulants.  He denies any neck or back pain.  There was no loss of consciousness.  He states that he otherwise feels fine.    He does have a history of prostate cancer and is currently undergoing chemotherapy.  Last chemo was approximately 2 weeks ago.  He is scheduled for chemo next week.  He has stage IV cancer but reports that it is \"stable\".    Medical history significant for NSTEMI, CABG, bilateral hearing loss, paroxysmal atrial fibrillation, CHF,

## 2024-10-17 NOTE — ED TRIAGE NOTES
Fall 4 days ago going up steps. Pt scraped elbow. Today dressing on skinned elbow is stinking to wound and pt wants evaluated/dressing changed. Pt is on chemo for prostate cancer.

## 2024-10-21 ENCOUNTER — OFFICE VISIT (OUTPATIENT)
Dept: INTERNAL MEDICINE CLINIC | Age: 74
End: 2024-10-21
Payer: COMMERCIAL

## 2024-10-21 VITALS
BODY MASS INDEX: 26.25 KG/M2 | DIASTOLIC BLOOD PRESSURE: 57 MMHG | WEIGHT: 167.6 LBS | OXYGEN SATURATION: 96 % | SYSTOLIC BLOOD PRESSURE: 128 MMHG | HEART RATE: 97 BPM

## 2024-10-21 DIAGNOSIS — Z91.81 AT HIGH RISK FOR FALLS: ICD-10-CM

## 2024-10-21 DIAGNOSIS — S51.012D SKIN TEAR OF LEFT ELBOW WITHOUT COMPLICATION, SUBSEQUENT ENCOUNTER: Primary | ICD-10-CM

## 2024-10-21 DIAGNOSIS — T14.8XXA WOUND INFECTION: ICD-10-CM

## 2024-10-21 DIAGNOSIS — L08.9 WOUND INFECTION: ICD-10-CM

## 2024-10-21 PROCEDURE — G8484 FLU IMMUNIZE NO ADMIN: HCPCS | Performed by: INTERNAL MEDICINE

## 2024-10-21 PROCEDURE — 1123F ACP DISCUSS/DSCN MKR DOCD: CPT | Performed by: INTERNAL MEDICINE

## 2024-10-21 PROCEDURE — 1036F TOBACCO NON-USER: CPT | Performed by: INTERNAL MEDICINE

## 2024-10-21 PROCEDURE — G8427 DOCREV CUR MEDS BY ELIG CLIN: HCPCS | Performed by: INTERNAL MEDICINE

## 2024-10-21 PROCEDURE — 99215 OFFICE O/P EST HI 40 MIN: CPT | Performed by: INTERNAL MEDICINE

## 2024-10-21 PROCEDURE — 3017F COLORECTAL CA SCREEN DOC REV: CPT | Performed by: INTERNAL MEDICINE

## 2024-10-21 PROCEDURE — G2211 COMPLEX E/M VISIT ADD ON: HCPCS | Performed by: INTERNAL MEDICINE

## 2024-10-21 PROCEDURE — G8417 CALC BMI ABV UP PARAM F/U: HCPCS | Performed by: INTERNAL MEDICINE

## 2024-10-21 RX ORDER — MUPIROCIN 20 MG/G
OINTMENT TOPICAL
Qty: 30 G | Refills: 1 | Status: SHIPPED | OUTPATIENT
Start: 2024-10-21 | End: 2024-10-28

## 2024-10-21 SDOH — ECONOMIC STABILITY: FOOD INSECURITY: WITHIN THE PAST 12 MONTHS, YOU WORRIED THAT YOUR FOOD WOULD RUN OUT BEFORE YOU GOT MONEY TO BUY MORE.: NEVER TRUE

## 2024-10-21 SDOH — ECONOMIC STABILITY: INCOME INSECURITY: HOW HARD IS IT FOR YOU TO PAY FOR THE VERY BASICS LIKE FOOD, HOUSING, MEDICAL CARE, AND HEATING?: NOT HARD AT ALL

## 2024-10-21 SDOH — ECONOMIC STABILITY: FOOD INSECURITY: WITHIN THE PAST 12 MONTHS, THE FOOD YOU BOUGHT JUST DIDN'T LAST AND YOU DIDN'T HAVE MONEY TO GET MORE.: NEVER TRUE

## 2024-10-21 ASSESSMENT — PATIENT HEALTH QUESTIONNAIRE - PHQ9
1. LITTLE INTEREST OR PLEASURE IN DOING THINGS: NOT AT ALL
SUM OF ALL RESPONSES TO PHQ QUESTIONS 1-9: 0
2. FEELING DOWN, DEPRESSED OR HOPELESS: NOT AT ALL
SUM OF ALL RESPONSES TO PHQ QUESTIONS 1-9: 0
SUM OF ALL RESPONSES TO PHQ9 QUESTIONS 1 & 2: 0

## 2024-10-21 NOTE — PROGRESS NOTES
Yury Levin (:  1950) is a 74 y.o. male, Established patient, here for evaluation of the following chief complaint(s):  Follow-up (ED f/u pt went to the ED on 10/17/2024 @ the /[facility] Emergency Department./Skin tear of left elbow without complication, initial encounter /)      Assessment & Plan   ASSESSMENT/PLAN:  1. Skin tear of left elbow without complication, subsequent encounter  Patient was seen in the emergency for his wound on the left elbow a few days ago after a fall.  Wound covered with dressing, dressing changed today, according to patient wound appears to be healing.  Pus present over the wound, minimal bleeding while cleaning.  Applied antibiotic cream and used nonadhesive gauze for dressing the wound today  Advised patient and caregiver to keep the wound clean and dry and use home wound care nurse to change the dressings, referral provided to home health.  Prescribed mupirocin to apply over the wound when dressing.  -     Non [facility] - External Referral To Home Health  -     mupirocin (BACTROBAN) 2 % ointment; Apply topically daily during dressing changes, Disp-30 g, R-1, Normal  2. At high risk for falls  On the basis of positive falls risk screening, assessment and plan is as follows: home safety tips provided.  3. Wound infection  Acute, improving.  Continue current dose of Keflex prescribed by the emergency.  Prescribed mupirocin topical cream to use while dressing changes of the wound over left elbow.  Antibiotic cream applied during dressing today.  -     mupirocin (BACTROBAN) 2 % ointment; Apply topically daily during dressing changes, Disp-30 g, R-1, Normal      Return if symptoms worsen or fail to improve.         Subjective   SUBJECTIVE/OBJECTIVE:  Wound Check  He was originally treated 3 to 5 days ago. Previous treatment included oral antibiotics and wound cleansing or irrigation. There has been colored discharge from the wound. The redness has improved. The

## 2024-10-22 ENCOUNTER — TELEPHONE (OUTPATIENT)
Dept: INTERNAL MEDICINE CLINIC | Age: 74
End: 2024-10-22

## 2024-10-22 ASSESSMENT — ENCOUNTER SYMPTOMS
SORE THROAT: 0
SHORTNESS OF BREATH: 0
NAUSEA: 0
VOMITING: 0
BLOOD IN STOOL: 0
COUGH: 0
ABDOMINAL PAIN: 0

## 2024-10-22 NOTE — TELEPHONE ENCOUNTER
Please call patient and advise him to check with his insurance about a different home health agency that can help with his wound care at home.

## 2024-10-22 NOTE — TELEPHONE ENCOUNTER
Spoke to wife and left msg to call insurance to get options for us to send wound care referral.     Patient's wife will call us back if she finds another location.

## 2024-10-22 NOTE — TELEPHONE ENCOUNTER
[facility]  in regards to the referral for wound care they received. Per Nazia they do not have enough staff within the patient's area to treat for wound care and are recommending patient be referred elsewhere that does.     For any questions feel free to call nazia at P# [phone]

## 2024-10-23 NOTE — TELEPHONE ENCOUNTER
Joan from [facility] nursing is calling in for  to give alternative locations for wound care near patient's area.    Lu Mcknight with [facility]  P# [phone]  F# Lvm to obtain     Yvette with [facility]  P# [phone]  F# [phone] (please add attention to yvette)    Lenka with [facility]  P# [phone]  F# LVM to obtain

## 2024-10-23 NOTE — TELEPHONE ENCOUNTER
Spoke With Marilyn (Spouse) and she stated she has secured help with [facility] from [facility]. Marilyn gave me a list of things the facility would need and I assured her I will be sending them to [facility] today. Fax # [phone]

## 2024-10-27 DIAGNOSIS — E78.5 HYPERLIPIDEMIA LDL GOAL <70: Primary | ICD-10-CM

## 2024-10-28 RX ORDER — EZETIMIBE 10 MG/1
10 TABLET ORAL DAILY
Qty: 30 TABLET | Refills: 0 | Status: SHIPPED | OUTPATIENT
Start: 2024-10-28

## 2024-10-28 NOTE — TELEPHONE ENCOUNTER
Patient needs to have fasting labs done, last drawn 8/22. Please phone eliz make aware, orders in system.  30 day supply given

## 2024-11-05 ENCOUNTER — TELEPHONE (OUTPATIENT)
Dept: INTERNAL MEDICINE CLINIC | Age: 74
End: 2024-11-05

## 2024-11-05 NOTE — TELEPHONE ENCOUNTER
BRO Mei- [facility] [employer] is calling in for  to inform him patient has been DC since yesterday 11.4.24 , arm is healed and wife is okay with healing process.     For any questions feel free to call nitin at P# [phone]

## 2024-11-07 DIAGNOSIS — S51.012D SKIN TEAR OF LEFT ELBOW WITHOUT COMPLICATION, SUBSEQUENT ENCOUNTER: Primary | ICD-10-CM

## 2024-11-16 SDOH — HEALTH STABILITY: PHYSICAL HEALTH: ON AVERAGE, HOW MANY DAYS PER WEEK DO YOU ENGAGE IN MODERATE TO STRENUOUS EXERCISE (LIKE A BRISK WALK)?: 0 DAYS

## 2024-11-18 ASSESSMENT — ENCOUNTER SYMPTOMS
SORE THROAT: 0
VOMITING: 0
NAUSEA: 0
CHEST TIGHTNESS: 0
COUGH: 0
ABDOMINAL PAIN: 0
BLOOD IN STOOL: 0
SHORTNESS OF BREATH: 0

## 2024-11-18 NOTE — PROGRESS NOTES
abdominal pain, fever, nausea or vomiting. He has tried nothing for the symptoms.       Review of Systems   Constitutional:  Negative for fatigue and fever.   HENT:  Negative for nosebleeds and sore throat.    Respiratory:  Negative for cough, chest tightness and shortness of breath.    Cardiovascular:  Negative for chest pain, palpitations and leg swelling.   Gastrointestinal:  Negative for abdominal pain, blood in stool, nausea and vomiting.   Neurological:  Negative for dizziness and weakness.          Objective   Physical Exam  Constitutional:       Appearance: Normal appearance.   HENT:      Head: Normocephalic and atraumatic.   Eyes:      General: No scleral icterus.     Conjunctiva/sclera: Conjunctivae normal.   Cardiovascular:      Rate and Rhythm: Normal rate and regular rhythm.      Pulses: Normal pulses.      Heart sounds: Normal heart sounds.   Pulmonary:      Effort: Pulmonary effort is normal.      Breath sounds: Normal breath sounds.   Musculoskeletal:         General: No swelling.   Skin:     General: Skin is warm and dry.   Neurological:      Mental Status: He is alert and oriented to person, place, and time. Mental status is at baseline.   Psychiatric:         Mood and Affect: Mood normal.         Behavior: Behavior normal.            During this encounter more than 45 minutes spent in patient care involving a thorough chart review during the patient's previous notes, test results, medications.  We also had a face-to-face encounter and discussed in detail regarding the treatment plan.    An electronic signature was used to authenticate this note.    --Diamond Robles MD

## 2024-11-19 ENCOUNTER — OFFICE VISIT (OUTPATIENT)
Dept: INTERNAL MEDICINE CLINIC | Age: 74
End: 2024-11-19

## 2024-11-19 VITALS
HEIGHT: 67 IN | BODY MASS INDEX: 25.58 KG/M2 | HEART RATE: 93 BPM | WEIGHT: 163 LBS | SYSTOLIC BLOOD PRESSURE: 88 MMHG | DIASTOLIC BLOOD PRESSURE: 72 MMHG | OXYGEN SATURATION: 98 %

## 2024-11-19 DIAGNOSIS — Z95.1 S/P CABG X 4: ICD-10-CM

## 2024-11-19 DIAGNOSIS — Z76.89 ENCOUNTER TO ESTABLISH CARE WITH NEW DOCTOR: ICD-10-CM

## 2024-11-19 DIAGNOSIS — S51.012A SKIN TEAR OF ELBOW WITHOUT COMPLICATION, LEFT, INITIAL ENCOUNTER: ICD-10-CM
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
## 2024-11-19 DIAGNOSIS — C61 PROSTATE CANCER (HCC): ICD-10-CM

## 2024-11-19 DIAGNOSIS — N18.4 STAGE 4 CHRONIC KIDNEY DISEASE (HCC): ICD-10-CM

## 2024-11-19 DIAGNOSIS — I95.9 HYPOTENSION, UNSPECIFIED HYPOTENSION TYPE: ICD-10-CM

## 2024-11-19 DIAGNOSIS — S81.811A SKIN TEAR OF RIGHT LOWER LEG WITHOUT COMPLICATION, INITIAL ENCOUNTER: ICD-10-CM

## 2024-11-19 RX ORDER — MUPIROCIN 20 MG/G
OINTMENT TOPICAL
Qty: 1 EACH | Refills: 0 | Status: SHIPPED | OUTPATIENT
Start: 2024-11-19 | End: 2024-11-26

## 2024-11-19 RX ORDER — CEPHALEXIN 500 MG/1
500 CAPSULE ORAL 4 TIMES DAILY
Qty: 20 CAPSULE | Refills: 0 | Status: SHIPPED | OUTPATIENT
Start: 2024-11-19 | End: 2024-11-24

## 2024-11-27 RX ORDER — EZETIMIBE 10 MG/1
10 TABLET ORAL DAILY
Qty: 30 TABLET | Refills: 0 | Status: SHIPPED | OUTPATIENT
Start: 2024-11-27 | End: 2024-11-29

## 2024-11-27 NOTE — TELEPHONE ENCOUNTER
Last OV: 24 - Shelly  Next OV: 24 - Shelly  Last Labs: 24  Last EK24   Last Filled:    Disp Refills Start End    ezetimibe (ZETIA) 10 MG tablet 30 tablet 0 10/28/2024 --    Sig - Route: TAKE 1 TABLET BY MOUTH DAILY - Oral    Sent to pharmacy as: Ezetimibe 10 MG Oral Tablet (ZETIA)    Cosign for Ordering: Accepted by Jason Bravo MD on 10/28/2024  4:43 PM    E-Prescribing Status: Receipt confirmed by pharmacy (10/28/2024 12:13 PM EDT)

## 2024-11-29 RX ORDER — EZETIMIBE 10 MG/1
10 TABLET ORAL DAILY
Qty: 90 TABLET | Refills: 3 | Status: SHIPPED | OUTPATIENT
Start: 2024-11-29

## 2024-11-29 RX ORDER — AMIODARONE HYDROCHLORIDE 200 MG/1
200 TABLET ORAL DAILY
Qty: 30 TABLET | Refills: 1 | Status: SHIPPED | OUTPATIENT
Start: 2024-11-29

## 2024-11-29 NOTE — TELEPHONE ENCOUNTER
Last OV: 6/21/24  Next OV: 12/18/24  Last refill: 11/27/24 #30, 12/5/23 #90 3 R/F  Most recent Labs: 6/13/24  Last EKG (if needed): 6/21/24    Called spoke with patient's wife Marilyn, states patient will get lab's done. Due for TSH lab. Order's placed.

## 2024-11-30 DIAGNOSIS — I48.0 PAROXYSMAL A-FIB (HCC): ICD-10-CM

## 2024-11-30 DIAGNOSIS — E78.5 HYPERLIPIDEMIA LDL GOAL <70: ICD-10-CM

## 2024-11-30 LAB
ALBUMIN SERPL-MCNC: 3.8 G/DL (ref 3.4–5)
ALBUMIN/GLOB SERPL: 2.1 {RATIO} (ref 1.1–2.2)
ALP SERPL-CCNC: 98 U/L (ref 40–129)
ALT SERPL-CCNC: 25 U/L (ref 10–40)
ANION GAP SERPL CALCULATED.3IONS-SCNC: 11 MMOL/L (ref 3–16)
AST SERPL-CCNC: 23 U/L (ref 15–37)
BILIRUB SERPL-MCNC: <0.2 MG/DL (ref 0–1)
BUN SERPL-MCNC: 33 MG/DL (ref 7–20)
CALCIUM SERPL-MCNC: 9.6 MG/DL (ref 8.3–10.6)
CHLORIDE SERPL-SCNC: 103 MMOL/L (ref 99–110)
CHOLEST SERPL-MCNC: 135 MG/DL (ref 0–199)
CO2 SERPL-SCNC: 25 MMOL/L (ref 21–32)
CREAT SERPL-MCNC: 1.6 MG/DL (ref 0.8–1.3)
GFR SERPLBLD CREATININE-BSD FMLA CKD-EPI: 45 ML/MIN/{1.73_M2}
GLUCOSE SERPL-MCNC: 91 MG/DL (ref 70–99)
HDLC SERPL-MCNC: 48 MG/DL (ref 40–60)
LDL CHOLESTEROL: 56 MG/DL
POTASSIUM SERPL-SCNC: 4.9 MMOL/L (ref 3.5–5.1)
PROT SERPL-MCNC: 5.6 G/DL (ref 6.4–8.2)
SODIUM SERPL-SCNC: 139 MMOL/L (ref 136–145)
TRIGL SERPL-MCNC: 154 MG/DL (ref 0–150)
TSH SERPL DL<=0.005 MIU/L-ACNC: 3.7 UIU/ML (ref 0.27–4.2)
VLDLC SERPL CALC-MCNC: 31 MG/DL

## 2024-12-05 RX ORDER — AMIODARONE HYDROCHLORIDE 200 MG/1
200 TABLET ORAL DAILY
Qty: 90 TABLET | OUTPATIENT
Start: 2024-12-05

## 2024-12-17 NOTE — PROGRESS NOTES
(LIMA-LAD, SVG-D1, SVG-OM1-PLV).         ASSESSMENT AND PLAN:      Atrial fibrillation  ECG today shows A-fib.  He is on amiodarone as not working  Since patient is asymptomatic I will discontinue amiodarone and switch to Toprol-XL  [id number] score that (age, CABG, hypertension) equals 3  Has a watchman now.      Status post CABG ×4  for multi-vessel coronary artery disease  CABGX4 (LIMA-LAD, SVG-D1, SVG-OM1-PLV) with Dr. Mccrary 1/17/18  Denies any angina  Continue medical therapy; aspirin, statin and Zetia     Prostate cancer:  Metastatic  Responding well to therapy     Hyperlipidemia  LDL 56  11/30/24    Continue high intensity statin and Zetia      Return to office for follow up in 1 year      Thank you very much for allowing me to participate in the care of your patient. Please do not hesitate to contact me if you have any questions.      Sincerely,        CHASIDY Bravo M.D  [facility]    12/18/24

## 2024-12-18 ENCOUNTER — OFFICE VISIT (OUTPATIENT)
Dept: CARDIOLOGY CLINIC | Age: 74
End: 2024-12-18
Payer: COMMERCIAL

## 2024-12-18 VITALS
SYSTOLIC BLOOD PRESSURE: 106 MMHG | OXYGEN SATURATION: 97 % | HEART RATE: 92 BPM | BODY MASS INDEX: 25.11 KG/M2 | WEIGHT: 160 LBS | HEIGHT: 67 IN | DIASTOLIC BLOOD PRESSURE: 60 MMHG

## 2024-12-18 DIAGNOSIS — I48.0 PAF (PAROXYSMAL ATRIAL FIBRILLATION) (HCC): Primary | ICD-10-CM

## 2024-12-18 DIAGNOSIS — E78.5 HYPERLIPIDEMIA LDL GOAL <70: ICD-10-CM

## 2024-12-18 PROCEDURE — 1123F ACP DISCUSS/DSCN MKR DOCD: CPT | Performed by: INTERNAL MEDICINE

## 2024-12-18 PROCEDURE — 99214 OFFICE O/P EST MOD 30 MIN: CPT | Performed by: INTERNAL MEDICINE

## 2024-12-18 PROCEDURE — 3017F COLORECTAL CA SCREEN DOC REV: CPT | Performed by: INTERNAL MEDICINE

## 2024-12-18 PROCEDURE — G8427 DOCREV CUR MEDS BY ELIG CLIN: HCPCS | Performed by: INTERNAL MEDICINE

## 2024-12-18 PROCEDURE — G8417 CALC BMI ABV UP PARAM F/U: HCPCS | Performed by: INTERNAL MEDICINE

## 2024-12-18 PROCEDURE — 1036F TOBACCO NON-USER: CPT | Performed by: INTERNAL MEDICINE

## 2024-12-18 PROCEDURE — 93000 ELECTROCARDIOGRAM COMPLETE: CPT | Performed by: INTERNAL MEDICINE

## 2024-12-18 PROCEDURE — G8484 FLU IMMUNIZE NO ADMIN: HCPCS | Performed by: INTERNAL MEDICINE

## 2024-12-18 RX ORDER — AMIODARONE HYDROCHLORIDE 200 MG/1
200 TABLET ORAL DAILY
Qty: 90 TABLET | Refills: 4 | Status: CANCELLED | OUTPATIENT
Start: 2024-12-18

## 2024-12-18 RX ORDER — METOPROLOL SUCCINATE 50 MG/1
50 TABLET, EXTENDED RELEASE ORAL DAILY
Qty: 30 TABLET | Refills: 3 | Status: SHIPPED | OUTPATIENT
Start: 2024-12-18

## 2024-12-18 RX ORDER — ROSUVASTATIN CALCIUM 40 MG/1
20 TABLET, COATED ORAL NIGHTLY
Qty: 90 TABLET | Refills: 3 | Status: SHIPPED | OUTPATIENT
Start: 2024-12-18

## 2025-01-09 ENCOUNTER — TELEPHONE (OUTPATIENT)
Dept: CARDIOLOGY CLINIC | Age: 75
End: 2025-01-09

## 2025-01-09 NOTE — TELEPHONE ENCOUNTER
JANENE Jett    Not sure if you was able to speak with patient when he stopped in.     12/18/24 office visit notes-Dr Bravo discontinued amiodarone and switch to Toprol-XL

## 2025-02-16 SDOH — ECONOMIC STABILITY: FOOD INSECURITY: WITHIN THE PAST 12 MONTHS, YOU WORRIED THAT YOUR FOOD WOULD RUN OUT BEFORE YOU GOT MONEY TO BUY MORE.: NEVER TRUE

## 2025-02-16 SDOH — ECONOMIC STABILITY: FOOD INSECURITY: WITHIN THE PAST 12 MONTHS, THE FOOD YOU BOUGHT JUST DIDN'T LAST AND YOU DIDN'T HAVE MONEY TO GET MORE.: NEVER TRUE

## 2025-02-16 SDOH — ECONOMIC STABILITY: INCOME INSECURITY: IN THE LAST 12 MONTHS, WAS THERE A TIME WHEN YOU WERE NOT ABLE TO PAY THE MORTGAGE OR RENT ON TIME?: NO

## 2025-02-16 ASSESSMENT — PATIENT HEALTH QUESTIONNAIRE - PHQ9
1. LITTLE INTEREST OR PLEASURE IN DOING THINGS: SEVERAL DAYS
SUM OF ALL RESPONSES TO PHQ9 QUESTIONS 1 & 2: 2
SUM OF ALL RESPONSES TO PHQ QUESTIONS 1-9: 2
2. FEELING DOWN, DEPRESSED OR HOPELESS: SEVERAL DAYS
SUM OF ALL RESPONSES TO PHQ9 QUESTIONS 1 & 2: 2
SUM OF ALL RESPONSES TO PHQ QUESTIONS 1-9: 2
SUM OF ALL RESPONSES TO PHQ QUESTIONS 1-9: 2
1. LITTLE INTEREST OR PLEASURE IN DOING THINGS: SEVERAL DAYS
SUM OF ALL RESPONSES TO PHQ QUESTIONS 1-9: 2
2. FEELING DOWN, DEPRESSED OR HOPELESS: SEVERAL DAYS

## 2025-02-18 ASSESSMENT — ENCOUNTER SYMPTOMS
CHEST TIGHTNESS: 0
VOMITING: 0
COUGH: 0
BLOOD IN STOOL: 0
NAUSEA: 0
ABDOMINAL PAIN: 0
SHORTNESS OF BREATH: 0
SORE THROAT: 0

## 2025-02-18 NOTE — PROGRESS NOTES
Yury Levin (:  1950) is a 74 y.o. male, New patient, here for evaluation of the following chief complaint(s):  Check-Up (Fasting/Cut right hand on car door this morning after getting here)    Patient with a history of coronary artery disease status post CABG in 2018, stage IV prostate cancer, CKD, has watchman device.    Not on Plavix given bleeding, status post Watchman device.  Following oncologist.  Lives with his wife.  Retired .    Started to see nephrologist-  Has physical therpaist and OT- comes twice a week.    Needs helps with ADLS and ADLS- only able to go to restroom. Needs help with cook, cleaning, bathing, transportation.    Assessment & Plan     1. Pressure injury of buttock, stage 2, unspecified laterality (HCC)  Worsening  Educated to change position, has recently was question.  Recommended by the relative is a physical therapist.  Local wound care discussed  Educated on high nutrition diet.  Start on antibiotic ointment.  Referral for home health for wound care and aide.  - mupirocin (BACTROBAN) 2 % ointment; Apply topically 3 times daily.  Dispense: 1 each; Refill: 2  - Non [facility] - External Referral To Home Health    2. Fall (on) (from) other stairs and steps, sequela  Fell on  and - in the stairway, climbing up and down. No fall since then.  Home safety discussed.  Currently getting physical therapy and Occupational Therapy at home.  Continue.  - Non [facility] - External Referral To Home Health    3. Limited mobility  Due to his multiple comorbidities including his cardiac condition and prostate cancer.  Uses cane at home, getting physical therapy and Occupational Therapy.  Referrals made.  - Non [facility] - External Referral To Home Health    4. Skin tear of right hand without complication, initial encounter  Lacerated wound, treated with local wound care.  Use of antibiotic.    5. S/P CABG x 4  Being monitored by cardiologist, defer management.    6.

## 2025-02-19 ENCOUNTER — OFFICE VISIT (OUTPATIENT)
Dept: INTERNAL MEDICINE CLINIC | Age: 75
End: 2025-02-19
Payer: COMMERCIAL

## 2025-02-19 VITALS
OXYGEN SATURATION: 98 % | HEIGHT: 67 IN | DIASTOLIC BLOOD PRESSURE: 78 MMHG | HEART RATE: 90 BPM | TEMPERATURE: 97.7 F | WEIGHT: 156 LBS | SYSTOLIC BLOOD PRESSURE: 110 MMHG | BODY MASS INDEX: 24.48 KG/M2

## 2025-02-19 DIAGNOSIS — Z74.09 LIMITED MOBILITY: ICD-10-CM

## 2025-02-19 DIAGNOSIS — R73.03 PREDIABETES: ICD-10-CM

## 2025-02-19 DIAGNOSIS — Z95.1 S/P CABG X 4: ICD-10-CM

## 2025-02-19 DIAGNOSIS — L89.302 PRESSURE INJURY OF BUTTOCK, STAGE 2, UNSPECIFIED LATERALITY (HCC): Primary | ICD-10-CM

## 2025-02-19 DIAGNOSIS — Z71.89 ACP (ADVANCE CARE PLANNING): ICD-10-CM
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
## 2025-02-19 DIAGNOSIS — S61.411A SKIN TEAR OF RIGHT HAND WITHOUT COMPLICATION, INITIAL ENCOUNTER: ICD-10-CM

## 2025-02-19 DIAGNOSIS — E78.2 MIXED HYPERLIPIDEMIA: ICD-10-CM

## 2025-02-19 DIAGNOSIS — C61 PROSTATE CANCER (HCC): ICD-10-CM

## 2025-02-19 LAB
EST. AVERAGE GLUCOSE BLD GHB EST-MCNC: 102.5 MG/DL
HBA1C MFR BLD: 5.2 %

## 2025-02-19 PROCEDURE — G8427 DOCREV CUR MEDS BY ELIG CLIN: HCPCS | Performed by: STUDENT IN AN ORGANIZED HEALTH CARE EDUCATION/TRAINING PROGRAM

## 2025-02-19 PROCEDURE — G2211 COMPLEX E/M VISIT ADD ON: HCPCS | Performed by: STUDENT IN AN ORGANIZED HEALTH CARE EDUCATION/TRAINING PROGRAM

## 2025-02-19 PROCEDURE — 1036F TOBACCO NON-USER: CPT | Performed by: STUDENT IN AN ORGANIZED HEALTH CARE EDUCATION/TRAINING PROGRAM

## 2025-02-19 PROCEDURE — 1123F ACP DISCUSS/DSCN MKR DOCD: CPT | Performed by: STUDENT IN AN ORGANIZED HEALTH CARE EDUCATION/TRAINING PROGRAM

## 2025-02-19 PROCEDURE — 3017F COLORECTAL CA SCREEN DOC REV: CPT | Performed by: STUDENT IN AN ORGANIZED HEALTH CARE EDUCATION/TRAINING PROGRAM

## 2025-02-19 PROCEDURE — G8420 CALC BMI NORM PARAMETERS: HCPCS | Performed by: STUDENT IN AN ORGANIZED HEALTH CARE EDUCATION/TRAINING PROGRAM

## 2025-02-19 PROCEDURE — 99215 OFFICE O/P EST HI 40 MIN: CPT | Performed by: STUDENT IN AN ORGANIZED HEALTH CARE EDUCATION/TRAINING PROGRAM

## 2025-02-19 RX ORDER — MUPIROCIN 20 MG/G
OINTMENT TOPICAL
Qty: 1 EACH | Refills: 2 | Status: SHIPPED | OUTPATIENT
Start: 2025-02-19 | End: 2025-02-26

## 2025-02-19 NOTE — PATIENT INSTRUCTIONS

## 2025-02-26 ENCOUNTER — OFFICE VISIT (OUTPATIENT)
Dept: INTERNAL MEDICINE CLINIC | Age: 75
End: 2025-02-26
Payer: COMMERCIAL

## 2025-02-26 VITALS
OXYGEN SATURATION: 91 % | BODY MASS INDEX: 24.43 KG/M2 | SYSTOLIC BLOOD PRESSURE: 128 MMHG | HEART RATE: 59 BPM | WEIGHT: 156 LBS | DIASTOLIC BLOOD PRESSURE: 80 MMHG

## 2025-02-26 DIAGNOSIS — J34.89 RHINORRHEA: ICD-10-CM

## 2025-02-26 DIAGNOSIS — U07.1 COVID-19: Primary | ICD-10-CM

## 2025-02-26 DIAGNOSIS — R50.9 FEVER, UNSPECIFIED FEVER CAUSE: ICD-10-CM

## 2025-02-26 DIAGNOSIS — R05.1 ACUTE COUGH: ICD-10-CM

## 2025-02-26 PROCEDURE — G8427 DOCREV CUR MEDS BY ELIG CLIN: HCPCS | Performed by: STUDENT IN AN ORGANIZED HEALTH CARE EDUCATION/TRAINING PROGRAM

## 2025-02-26 PROCEDURE — G2211 COMPLEX E/M VISIT ADD ON: HCPCS | Performed by: STUDENT IN AN ORGANIZED HEALTH CARE EDUCATION/TRAINING PROGRAM

## 2025-02-26 PROCEDURE — 1036F TOBACCO NON-USER: CPT | Performed by: STUDENT IN AN ORGANIZED HEALTH CARE EDUCATION/TRAINING PROGRAM

## 2025-02-26 PROCEDURE — G8420 CALC BMI NORM PARAMETERS: HCPCS | Performed by: STUDENT IN AN ORGANIZED HEALTH CARE EDUCATION/TRAINING PROGRAM

## 2025-02-26 PROCEDURE — 99215 OFFICE O/P EST HI 40 MIN: CPT | Performed by: STUDENT IN AN ORGANIZED HEALTH CARE EDUCATION/TRAINING PROGRAM

## 2025-02-26 PROCEDURE — 1123F ACP DISCUSS/DSCN MKR DOCD: CPT | Performed by: STUDENT IN AN ORGANIZED HEALTH CARE EDUCATION/TRAINING PROGRAM

## 2025-02-26 PROCEDURE — 3017F COLORECTAL CA SCREEN DOC REV: CPT | Performed by: STUDENT IN AN ORGANIZED HEALTH CARE EDUCATION/TRAINING PROGRAM

## 2025-02-26 RX ORDER — CETIRIZINE HYDROCHLORIDE 10 MG/1
10 TABLET ORAL DAILY
Qty: 20 TABLET | Refills: 0 | Status: SHIPPED | OUTPATIENT
Start: 2025-02-26

## 2025-02-26 ASSESSMENT — ENCOUNTER SYMPTOMS
COUGH: 1
RHINORRHEA: 1

## 2025-02-26 NOTE — PROGRESS NOTES
Yury Levin (:  1950) is a 74 y.o. male,, here for evaluation of the following chief complaint(s):  Fever, Cough, and Congestion    Patient with a history of coronary artery disease status post CABG in 2018, stage IV prostate cancer, CKD, has watchman device.    Not on Plavix given bleeding, status post Watchman device.  Following oncologist.  Lives with his wife.  Retired .    Started to see nephrologist-  Has physical therpaist and OT- comes twice a week.    Needs helps with ADLS and ADLS- only able to go to restroom. Needs help with cook, cleaning, bathing, transportation.     Assessment & Plan  COVID-19   Acute condition, new, Supportive care with appropriate antipyretics and fluids.  Antivirals per orders.  Hold cholesterol medication while being on Paxlovid  Orders:    nirmatrelvir/ritonavir (PAXLOVID) 10 x 150 MG & 10 x 100MG TBPK; Take 2 tablets (one 150 mg nirmatrelvir and one 100 mg ritonavir tablets) by mouth every 12 hours for 5 days.    cetirizine (ZYRTEC) 10 MG tablet; Take 1 tablet by mouth daily    Fever, unspecified fever cause   Acute condition, new, Supportive care with appropriate antipyretics and fluids.  Obtain labs per orders.    Orders:    cetirizine (ZYRTEC) 10 MG tablet; Take 1 tablet by mouth daily    Acute cough   Acute condition, new, Supportive care with appropriate antipyretics and fluids.  Obtain labs per orders.    Orders:    cetirizine (ZYRTEC) 10 MG tablet; Take 1 tablet by mouth daily    Rhinorrhea   Acute condition, new, Supportive care with appropriate antipyretics and fluids.  Obtain labs per orders.    Orders:    cetirizine (ZYRTEC) 10 MG tablet; Take 1 tablet by mouth daily      No follow-ups on file.       Subjective   Since Monday evening- had a fever and cough.  Temperature above 100- 101 yesterday.      Cough  This is a new problem. The cough is Non-productive. Associated symptoms include rhinorrhea.       Review of Systems   HENT:

## 2025-03-02 ENCOUNTER — HOSPITAL ENCOUNTER (INPATIENT)
Age: 75
LOS: 10 days | Discharge: NURSING, REHAB OR LONG-TERM CARE | DRG: 871 | End: 2025-03-13
Attending: EMERGENCY MEDICINE | Admitting: INTERNAL MEDICINE
Payer: COMMERCIAL

## 2025-03-02 ENCOUNTER — APPOINTMENT (OUTPATIENT)
Dept: CT IMAGING | Age: 75
DRG: 871 | End: 2025-03-02
Payer: COMMERCIAL

## 2025-03-02 DIAGNOSIS — R09.02 HYPOXEMIA: ICD-10-CM

## 2025-03-02 DIAGNOSIS — U07.1 COVID-19: ICD-10-CM

## 2025-03-02 DIAGNOSIS — R06.02 SHORTNESS OF BREATH: ICD-10-CM

## 2025-03-02 DIAGNOSIS — R41.82 ALTERED MENTAL STATUS, UNSPECIFIED ALTERED MENTAL STATUS TYPE: ICD-10-CM

## 2025-03-02 DIAGNOSIS — J10.1 INFLUENZA A: Primary | ICD-10-CM

## 2025-03-02 DIAGNOSIS — J18.9 MULTIFOCAL PNEUMONIA: ICD-10-CM

## 2025-03-02 LAB
ALBUMIN SERPL-MCNC: 3.2 G/DL (ref 3.4–5)
ALBUMIN/GLOB SERPL: 1.5 {RATIO} (ref 1.1–2.2)
ALP SERPL-CCNC: 77 U/L (ref 40–129)
ALT SERPL-CCNC: 26 U/L (ref 10–40)
ANION GAP SERPL CALCULATED.3IONS-SCNC: 15 MMOL/L (ref 3–16)
AST SERPL-CCNC: 36 U/L (ref 15–37)
BASE EXCESS BLDV CALC-SCNC: 3 MMOL/L
BASOPHILS # BLD: 0 K/UL (ref 0–0.2)
BASOPHILS NFR BLD: 0.2 %
BILIRUB SERPL-MCNC: 0.3 MG/DL (ref 0–1)
BUN SERPL-MCNC: 37 MG/DL (ref 7–20)
CALCIUM SERPL-MCNC: 8.9 MG/DL (ref 8.3–10.6)
CHLORIDE SERPL-SCNC: 102 MMOL/L (ref 99–110)
CO2 BLDV-SCNC: 29 MMOL/L
CO2 SERPL-SCNC: 23 MMOL/L (ref 21–32)
COHGB MFR BLDV: 1.7 %
CREAT SERPL-MCNC: 1.4 MG/DL (ref 0.8–1.3)
DEPRECATED RDW RBC AUTO: 17.3 % (ref 12.4–15.4)
EOSINOPHIL # BLD: 0 K/UL (ref 0–0.6)
EOSINOPHIL NFR BLD: 0 %
FLUAV + FLUBV AG NOSE IA.RAPID: DETECTED
FLUAV + FLUBV AG NOSE IA.RAPID: NOT DETECTED
GFR SERPLBLD CREATININE-BSD FMLA CKD-EPI: 52 ML/MIN/{1.73_M2}
GLUCOSE SERPL-MCNC: 130 MG/DL (ref 70–99)
HCO3 BLDV-SCNC: 28 MMOL/L (ref 23–29)
HCT VFR BLD AUTO: 25.8 % (ref 40.5–52.5)
HGB BLD-MCNC: 8.4 G/DL (ref 13.5–17.5)
LACTATE BLDV-SCNC: 4.8 MMOL/L (ref 0.4–1.9)
LYMPHOCYTES # BLD: 0.3 K/UL (ref 1–5.1)
LYMPHOCYTES NFR BLD: 5 %
MCH RBC QN AUTO: 31.3 PG (ref 26–34)
MCHC RBC AUTO-ENTMCNC: 32.5 G/DL (ref 31–36)
MCV RBC AUTO: 96.5 FL (ref 80–100)
METHGB MFR BLDV: 0.6 %
MONOCYTES # BLD: 0.2 K/UL (ref 0–1.3)
MONOCYTES NFR BLD: 3.6 %
NEUTROPHILS # BLD: 6 K/UL (ref 1.7–7.7)
NEUTROPHILS NFR BLD: 91.2 %
NT-PROBNP SERPL-MCNC: 1778 PG/ML (ref 0–449)
O2 THERAPY: NORMAL
PCO2 BLDV: 43.8 MMHG (ref 40–50)
PH BLDV: 7.41 [PH] (ref 7.35–7.45)
PLATELET # BLD AUTO: 124 K/UL (ref 135–450)
PMV BLD AUTO: 8.5 FL (ref 5–10.5)
PO2 BLDV: <30 MMHG
POTASSIUM SERPL-SCNC: 3.6 MMOL/L (ref 3.5–5.1)
PROT SERPL-MCNC: 5.4 G/DL (ref 6.4–8.2)
RBC # BLD AUTO: 2.68 M/UL (ref 4.2–5.9)
SAO2 % BLDV: 53 %
SODIUM SERPL-SCNC: 140 MMOL/L (ref 136–145)
TROPONIN, HIGH SENSITIVITY: 43 NG/L (ref 0–22)
WBC # BLD AUTO: 6.6 K/UL (ref 4–11)

## 2025-03-02 PROCEDURE — 83880 ASSAY OF NATRIURETIC PEPTIDE: CPT

## 2025-03-02 PROCEDURE — 87040 BLOOD CULTURE FOR BACTERIA: CPT

## 2025-03-02 PROCEDURE — 6370000000 HC RX 637 (ALT 250 FOR IP): Performed by: EMERGENCY MEDICINE

## 2025-03-02 PROCEDURE — 82803 BLOOD GASES ANY COMBINATION: CPT

## 2025-03-02 PROCEDURE — 83605 ASSAY OF LACTIC ACID: CPT

## 2025-03-02 PROCEDURE — 71260 CT THORAX DX C+: CPT

## 2025-03-02 PROCEDURE — 80053 COMPREHEN METABOLIC PANEL: CPT

## 2025-03-02 PROCEDURE — 2700000000 HC OXYGEN THERAPY PER DAY

## 2025-03-02 PROCEDURE — 70450 CT HEAD/BRAIN W/O DYE: CPT

## 2025-03-02 PROCEDURE — 84484 ASSAY OF TROPONIN QUANT: CPT

## 2025-03-02 PROCEDURE — 99285 EMERGENCY DEPT VISIT HI MDM: CPT

## 2025-03-02 PROCEDURE — 87502 INFLUENZA DNA AMP PROBE: CPT

## 2025-03-02 PROCEDURE — 93005 ELECTROCARDIOGRAM TRACING: CPT | Performed by: EMERGENCY MEDICINE

## 2025-03-02 PROCEDURE — 85025 COMPLETE CBC W/AUTO DIFF WBC: CPT

## 2025-03-02 PROCEDURE — 36415 COLL VENOUS BLD VENIPUNCTURE: CPT

## 2025-03-02 PROCEDURE — 6360000004 HC RX CONTRAST MEDICATION: Performed by: EMERGENCY MEDICINE

## 2025-03-02 PROCEDURE — 94640 AIRWAY INHALATION TREATMENT: CPT

## 2025-03-02 PROCEDURE — 94760 N-INVAS EAR/PLS OXIMETRY 1: CPT

## 2025-03-02 RX ORDER — 0.9 % SODIUM CHLORIDE 0.9 %
1000 INTRAVENOUS SOLUTION INTRAVENOUS ONCE
Status: COMPLETED | OUTPATIENT
Start: 2025-03-03 | End: 2025-03-03

## 2025-03-02 RX ORDER — IOPAMIDOL 755 MG/ML
75 INJECTION, SOLUTION INTRAVASCULAR
Status: COMPLETED | OUTPATIENT
Start: 2025-03-02 | End: 2025-03-02

## 2025-03-02 RX ORDER — ALBUTEROL SULFATE 90 UG/1
2 INHALANT RESPIRATORY (INHALATION) ONCE
Status: COMPLETED | OUTPATIENT
Start: 2025-03-02 | End: 2025-03-02

## 2025-03-02 RX ORDER — IPRATROPIUM BROMIDE AND ALBUTEROL SULFATE 2.5; .5 MG/3ML; MG/3ML
1 SOLUTION RESPIRATORY (INHALATION) ONCE
Status: DISCONTINUED | OUTPATIENT
Start: 2025-03-02 | End: 2025-03-02

## 2025-03-02 RX ADMIN — IOPAMIDOL 75 ML: 755 INJECTION, SOLUTION INTRAVENOUS at 23:20

## 2025-03-02 RX ADMIN — ALBUTEROL SULFATE 2 PUFF: 90 AEROSOL, METERED RESPIRATORY (INHALATION) at 22:58

## 2025-03-02 ASSESSMENT — PAIN - FUNCTIONAL ASSESSMENT: PAIN_FUNCTIONAL_ASSESSMENT: NONE - DENIES PAIN

## 2025-03-03 PROBLEM — A41.9 SEPSIS (HCC): Status: ACTIVE | Noted: 2025-03-03

## 2025-03-03 PROBLEM — L89.309 PRESSURE INJURY OF SKIN OF BUTTOCK: Status: ACTIVE | Noted: 2025-03-03

## 2025-03-03 PROBLEM — J96.01 ACUTE RESPIRATORY FAILURE WITH HYPOXIA: Status: ACTIVE | Noted: 2025-03-03

## 2025-03-03 PROBLEM — J10.1 INFLUENZA A: Status: ACTIVE | Noted: 2025-03-03

## 2025-03-03 PROBLEM — N18.31 STAGE 3A CHRONIC KIDNEY DISEASE (HCC): Status: ACTIVE | Noted: 2025-03-03

## 2025-03-03 PROBLEM — J18.9 MULTIFOCAL PNEUMONIA: Status: ACTIVE | Noted: 2025-03-03

## 2025-03-03 PROBLEM — U07.1 COVID: Status: ACTIVE | Noted: 2025-03-03

## 2025-03-03 LAB
ALBUMIN SERPL-MCNC: 3 G/DL (ref 3.4–5)
ALBUMIN/GLOB SERPL: 1.7 {RATIO} (ref 1.1–2.2)
ALP SERPL-CCNC: 63 U/L (ref 40–129)
ALT SERPL-CCNC: 28 U/L (ref 10–40)
ANION GAP SERPL CALCULATED.3IONS-SCNC: 11 MMOL/L (ref 3–16)
AST SERPL-CCNC: 29 U/L (ref 15–37)
BACTERIA URNS QL MICRO: ABNORMAL /HPF
BASE EXCESS BLDV CALC-SCNC: 4.2 MMOL/L
BILIRUB SERPL-MCNC: <0.2 MG/DL (ref 0–1)
BILIRUB UR QL STRIP.AUTO: NEGATIVE
BUN SERPL-MCNC: 37 MG/DL (ref 7–20)
CALCIUM SERPL-MCNC: 7.9 MG/DL (ref 8.3–10.6)
CHLORIDE SERPL-SCNC: 104 MMOL/L (ref 99–110)
CLARITY UR: ABNORMAL
CO2 BLDV-SCNC: 31 MMOL/L
CO2 SERPL-SCNC: 26 MMOL/L (ref 21–32)
COHGB MFR BLDV: 1.3 %
COLOR UR: ABNORMAL
CREAT SERPL-MCNC: 1.2 MG/DL (ref 0.8–1.3)
CRP SERPL-MCNC: 139 MG/L (ref 0–5.1)
EKG DIAGNOSIS: NORMAL
EKG P-R INTERVAL: 200 MS
EKG Q-T INTERVAL: 340 MS
EKG QRS DURATION: 86 MS
EKG QTC CALCULATION (BAZETT): 440 MS
EKG R AXIS: 4 DEGREES
EKG T AXIS: 3 DEGREES
EKG VENTRICULAR RATE: 101 BPM
EPI CELLS #/AREA URNS AUTO: 0 /HPF (ref 0–5)
FERRITIN SERPL IA-MCNC: 1166 NG/ML (ref 30–400)
FOLATE SERPL-MCNC: 7.58 NG/ML (ref 4.78–24.2)
GFR SERPLBLD CREATININE-BSD FMLA CKD-EPI: 63 ML/MIN/{1.73_M2}
GLUCOSE BLD-MCNC: 103 MG/DL (ref 70–99)
GLUCOSE SERPL-MCNC: 100 MG/DL (ref 70–99)
GLUCOSE UR STRIP.AUTO-MCNC: NEGATIVE MG/DL
HCO3 BLDV-SCNC: 29 MMOL/L (ref 23–29)
HGB UR QL STRIP.AUTO: ABNORMAL
HYALINE CASTS #/AREA URNS AUTO: 0 /LPF (ref 0–8)
IRON SATN MFR SERPL: 4 % (ref 20–50)
IRON SERPL-MCNC: <6 UG/DL (ref 59–158)
KETONES UR STRIP.AUTO-MCNC: NEGATIVE MG/DL
LACTATE BLDV-SCNC: 2 MMOL/L (ref 0.4–2)
LACTATE BLDV-SCNC: 2.1 MMOL/L (ref 0.4–1.9)
LEUKOCYTE ESTERASE UR QL STRIP.AUTO: ABNORMAL
MAGNESIUM SERPL-MCNC: 1.94 MG/DL (ref 1.8–2.4)
METHGB MFR BLDV: 0.6 %
NITRITE UR QL STRIP.AUTO: NEGATIVE
O2 THERAPY: NORMAL
ORGANISM: ABNORMAL
PCO2 BLDV: 44.8 MMHG (ref 40–50)
PERFORMED ON: ABNORMAL
PH BLDV: 7.42 [PH] (ref 7.35–7.45)
PH UR STRIP.AUTO: 7 [PH] (ref 5–8)
PHOSPHATE SERPL-MCNC: 2.3 MG/DL (ref 2.5–4.9)
PO2 BLDV: 38 MMHG
POTASSIUM SERPL-SCNC: 3.5 MMOL/L (ref 3.5–5.1)
POTASSIUM SERPL-SCNC: 4.1 MMOL/L (ref 3.5–5.1)
PROCALCITONIN SERPL IA-MCNC: 3.12 NG/ML (ref 0–0.15)
PROT SERPL-MCNC: 4.8 G/DL (ref 6.4–8.2)
PROT UR STRIP.AUTO-MCNC: 30 MG/DL
RBC CLUMPS #/AREA URNS AUTO: 957 /HPF (ref 0–4)
REPORT: NORMAL
RESP PATH DNA+RNA PNL NPH NAA+NON-PROBE: ABNORMAL
SAO2 % BLDV: 71 %
SODIUM SERPL-SCNC: 141 MMOL/L (ref 136–145)
SP GR UR STRIP.AUTO: 1.03 (ref 1–1.03)
TIBC SERPL-MCNC: 160 UG/DL (ref 260–445)
TROPONIN, HIGH SENSITIVITY: 42 NG/L (ref 0–22)
TROPONIN, HIGH SENSITIVITY: 42 NG/L (ref 0–22)
UA COMPLETE W REFLEX CULTURE PNL UR: YES
UA DIPSTICK W REFLEX MICRO PNL UR: YES
URN SPEC COLLECT METH UR: ABNORMAL
UROBILINOGEN UR STRIP-ACNC: 0.2 E.U./DL
VIT B12 SERPL-MCNC: 505 PG/ML (ref 211–911)
WBC #/AREA URNS AUTO: 35 /HPF (ref 0–5)

## 2025-03-03 PROCEDURE — 83605 ASSAY OF LACTIC ACID: CPT

## 2025-03-03 PROCEDURE — 6360000002 HC RX W HCPCS: Performed by: INTERNAL MEDICINE

## 2025-03-03 PROCEDURE — 94640 AIRWAY INHALATION TREATMENT: CPT

## 2025-03-03 PROCEDURE — 82607 VITAMIN B-12: CPT

## 2025-03-03 PROCEDURE — 83735 ASSAY OF MAGNESIUM: CPT

## 2025-03-03 PROCEDURE — 94660 CPAP INITIATION&MGMT: CPT

## 2025-03-03 PROCEDURE — 2500000003 HC RX 250 WO HCPCS: Performed by: NURSE PRACTITIONER

## 2025-03-03 PROCEDURE — 2580000003 HC RX 258: Performed by: EMERGENCY MEDICINE

## 2025-03-03 PROCEDURE — 84132 ASSAY OF SERUM POTASSIUM: CPT

## 2025-03-03 PROCEDURE — 3E033XZ INTRODUCTION OF VASOPRESSOR INTO PERIPHERAL VEIN, PERCUTANEOUS APPROACH: ICD-10-PCS | Performed by: STUDENT IN AN ORGANIZED HEALTH CARE EDUCATION/TRAINING PROGRAM

## 2025-03-03 PROCEDURE — 36415 COLL VENOUS BLD VENIPUNCTURE: CPT

## 2025-03-03 PROCEDURE — 0202U NFCT DS 22 TRGT SARS-COV-2: CPT

## 2025-03-03 PROCEDURE — 2700000000 HC OXYGEN THERAPY PER DAY

## 2025-03-03 PROCEDURE — 2500000003 HC RX 250 WO HCPCS

## 2025-03-03 PROCEDURE — 84145 PROCALCITONIN (PCT): CPT

## 2025-03-03 PROCEDURE — 2000000000 HC ICU R&B

## 2025-03-03 PROCEDURE — 86140 C-REACTIVE PROTEIN: CPT

## 2025-03-03 PROCEDURE — XW0DXF5 INTRODUCTION OF OTHER NEW TECHNOLOGY THERAPEUTIC SUBSTANCE INTO MOUTH AND PHARYNX, EXTERNAL APPROACH, NEW TECHNOLOGY GROUP 5: ICD-10-PCS | Performed by: STUDENT IN AN ORGANIZED HEALTH CARE EDUCATION/TRAINING PROGRAM

## 2025-03-03 PROCEDURE — 82803 BLOOD GASES ANY COMBINATION: CPT

## 2025-03-03 PROCEDURE — 94761 N-INVAS EAR/PLS OXIMETRY MLT: CPT

## 2025-03-03 PROCEDURE — APPNB15 APP NON BILLABLE TIME 0-15 MINS: Performed by: NURSE PRACTITIONER

## 2025-03-03 PROCEDURE — 96374 THER/PROPH/DIAG INJ IV PUSH: CPT

## 2025-03-03 PROCEDURE — 6360000002 HC RX W HCPCS: Performed by: NURSE PRACTITIONER

## 2025-03-03 PROCEDURE — 82746 ASSAY OF FOLIC ACID SERUM: CPT

## 2025-03-03 PROCEDURE — 6360000002 HC RX W HCPCS: Performed by: EMERGENCY MEDICINE

## 2025-03-03 PROCEDURE — 93010 ELECTROCARDIOGRAM REPORT: CPT | Performed by: INTERNAL MEDICINE

## 2025-03-03 PROCEDURE — 99291 CRITICAL CARE FIRST HOUR: CPT | Performed by: INTERNAL MEDICINE

## 2025-03-03 PROCEDURE — 6370000000 HC RX 637 (ALT 250 FOR IP): Performed by: INTERNAL MEDICINE

## 2025-03-03 PROCEDURE — 2500000003 HC RX 250 WO HCPCS: Performed by: EMERGENCY MEDICINE

## 2025-03-03 PROCEDURE — 84100 ASSAY OF PHOSPHORUS: CPT

## 2025-03-03 PROCEDURE — 6370000000 HC RX 637 (ALT 250 FOR IP): Performed by: STUDENT IN AN ORGANIZED HEALTH CARE EDUCATION/TRAINING PROGRAM

## 2025-03-03 PROCEDURE — 6370000000 HC RX 637 (ALT 250 FOR IP): Performed by: NURSE PRACTITIONER

## 2025-03-03 PROCEDURE — 83550 IRON BINDING TEST: CPT

## 2025-03-03 PROCEDURE — 94669 MECHANICAL CHEST WALL OSCILL: CPT

## 2025-03-03 PROCEDURE — 81001 URINALYSIS AUTO W/SCOPE: CPT

## 2025-03-03 PROCEDURE — 87449 NOS EACH ORGANISM AG IA: CPT

## 2025-03-03 PROCEDURE — 84484 ASSAY OF TROPONIN QUANT: CPT

## 2025-03-03 PROCEDURE — 83540 ASSAY OF IRON: CPT

## 2025-03-03 PROCEDURE — 36591 DRAW BLOOD OFF VENOUS DEVICE: CPT

## 2025-03-03 PROCEDURE — 5A09357 ASSISTANCE WITH RESPIRATORY VENTILATION, LESS THAN 24 CONSECUTIVE HOURS, CONTINUOUS POSITIVE AIRWAY PRESSURE: ICD-10-PCS | Performed by: STUDENT IN AN ORGANIZED HEALTH CARE EDUCATION/TRAINING PROGRAM

## 2025-03-03 PROCEDURE — 96375 TX/PRO/DX INJ NEW DRUG ADDON: CPT

## 2025-03-03 PROCEDURE — 80053 COMPREHEN METABOLIC PANEL: CPT

## 2025-03-03 PROCEDURE — 82728 ASSAY OF FERRITIN: CPT

## 2025-03-03 PROCEDURE — 87086 URINE CULTURE/COLONY COUNT: CPT

## 2025-03-03 PROCEDURE — 2580000003 HC RX 258: Performed by: NURSE PRACTITIONER

## 2025-03-03 RX ORDER — SODIUM CHLORIDE 9 MG/ML
INJECTION, SOLUTION INTRAVENOUS CONTINUOUS
Status: DISCONTINUED | OUTPATIENT
Start: 2025-03-03 | End: 2025-03-05

## 2025-03-03 RX ORDER — ACETAMINOPHEN 650 MG/1
650 SUPPOSITORY RECTAL EVERY 6 HOURS PRN
Status: DISCONTINUED | OUTPATIENT
Start: 2025-03-03 | End: 2025-03-13 | Stop reason: HOSPADM

## 2025-03-03 RX ORDER — SODIUM CHLORIDE FOR INHALATION 3 %
4 VIAL, NEBULIZER (ML) INHALATION 3 TIMES DAILY
Status: DISCONTINUED | OUTPATIENT
Start: 2025-03-03 | End: 2025-03-13 | Stop reason: HOSPADM

## 2025-03-03 RX ORDER — POTASSIUM CHLORIDE 7.45 MG/ML
10 INJECTION INTRAVENOUS PRN
Status: DISCONTINUED | OUTPATIENT
Start: 2025-03-03 | End: 2025-03-13 | Stop reason: HOSPADM

## 2025-03-03 RX ORDER — METHYLPREDNISOLONE SODIUM SUCCINATE 40 MG/ML
40 INJECTION INTRAMUSCULAR; INTRAVENOUS DAILY
Status: DISCONTINUED | OUTPATIENT
Start: 2025-03-03 | End: 2025-03-09

## 2025-03-03 RX ORDER — ACETAMINOPHEN 325 MG/1
650 TABLET ORAL EVERY 6 HOURS PRN
Status: DISCONTINUED | OUTPATIENT
Start: 2025-03-03 | End: 2025-03-13 | Stop reason: HOSPADM

## 2025-03-03 RX ORDER — ALBUTEROL SULFATE 90 UG/1
2 INHALANT RESPIRATORY (INHALATION)
Status: DISCONTINUED | OUTPATIENT
Start: 2025-03-03 | End: 2025-03-03

## 2025-03-03 RX ORDER — SODIUM CHLORIDE 9 MG/ML
INJECTION, SOLUTION INTRAVENOUS PRN
Status: DISCONTINUED | OUTPATIENT
Start: 2025-03-03 | End: 2025-03-13 | Stop reason: HOSPADM

## 2025-03-03 RX ORDER — OSELTAMIVIR PHOSPHATE 30 MG/1
30 CAPSULE ORAL 2 TIMES DAILY
Status: DISCONTINUED | OUTPATIENT
Start: 2025-03-03 | End: 2025-03-03

## 2025-03-03 RX ORDER — ENOXAPARIN SODIUM 100 MG/ML
40 INJECTION SUBCUTANEOUS DAILY
Status: DISCONTINUED | OUTPATIENT
Start: 2025-03-03 | End: 2025-03-13 | Stop reason: HOSPADM

## 2025-03-03 RX ORDER — ALBUTEROL SULFATE 90 UG/1
2 INHALANT RESPIRATORY (INHALATION)
Status: DISCONTINUED | OUTPATIENT
Start: 2025-03-03 | End: 2025-03-05

## 2025-03-03 RX ORDER — ALBUTEROL SULFATE 90 UG/1
2 INHALANT RESPIRATORY (INHALATION) EVERY 6 HOURS PRN
Status: DISCONTINUED | OUTPATIENT
Start: 2025-03-03 | End: 2025-03-03

## 2025-03-03 RX ORDER — GUAIFENESIN 600 MG/1
600 TABLET, EXTENDED RELEASE ORAL 2 TIMES DAILY
Status: DISCONTINUED | OUTPATIENT
Start: 2025-03-03 | End: 2025-03-13 | Stop reason: HOSPADM

## 2025-03-03 RX ORDER — SODIUM CHLORIDE 0.9 % (FLUSH) 0.9 %
5-40 SYRINGE (ML) INJECTION PRN
Status: DISCONTINUED | OUTPATIENT
Start: 2025-03-03 | End: 2025-03-13 | Stop reason: HOSPADM

## 2025-03-03 RX ORDER — MAGNESIUM SULFATE IN WATER 40 MG/ML
2000 INJECTION, SOLUTION INTRAVENOUS PRN
Status: DISCONTINUED | OUTPATIENT
Start: 2025-03-03 | End: 2025-03-13 | Stop reason: HOSPADM

## 2025-03-03 RX ORDER — ROSUVASTATIN CALCIUM 20 MG/1
20 TABLET, COATED ORAL NIGHTLY
Status: DISCONTINUED | OUTPATIENT
Start: 2025-03-04 | End: 2025-03-13 | Stop reason: HOSPADM

## 2025-03-03 RX ORDER — EZETIMIBE 10 MG/1
10 TABLET ORAL DAILY
Status: DISCONTINUED | OUTPATIENT
Start: 2025-03-03 | End: 2025-03-13 | Stop reason: HOSPADM

## 2025-03-03 RX ORDER — PHENYLEPHRINE HCL IN 0.9% NACL 50MG/250ML
10-300 PLASTIC BAG, INJECTION (ML) INTRAVENOUS CONTINUOUS
Status: DISCONTINUED | OUTPATIENT
Start: 2025-03-03 | End: 2025-03-04

## 2025-03-03 RX ORDER — SODIUM CHLORIDE 0.9 % (FLUSH) 0.9 %
5-40 SYRINGE (ML) INJECTION EVERY 12 HOURS SCHEDULED
Status: DISCONTINUED | OUTPATIENT
Start: 2025-03-03 | End: 2025-03-13 | Stop reason: HOSPADM

## 2025-03-03 RX ORDER — POTASSIUM CHLORIDE 29.8 MG/ML
20 INJECTION INTRAVENOUS PRN
Status: DISCONTINUED | OUTPATIENT
Start: 2025-03-03 | End: 2025-03-13 | Stop reason: HOSPADM

## 2025-03-03 RX ORDER — MAGNESIUM SULFATE IN WATER 40 MG/ML
2000 INJECTION, SOLUTION INTRAVENOUS ONCE
Status: COMPLETED | OUTPATIENT
Start: 2025-03-03 | End: 2025-03-03

## 2025-03-03 RX ORDER — ONDANSETRON 4 MG/1
4 TABLET, ORALLY DISINTEGRATING ORAL EVERY 8 HOURS PRN
Status: DISCONTINUED | OUTPATIENT
Start: 2025-03-03 | End: 2025-03-13 | Stop reason: HOSPADM

## 2025-03-03 RX ORDER — POLYETHYLENE GLYCOL 3350 17 G/17G
17 POWDER, FOR SOLUTION ORAL DAILY PRN
Status: DISCONTINUED | OUTPATIENT
Start: 2025-03-03 | End: 2025-03-13 | Stop reason: HOSPADM

## 2025-03-03 RX ORDER — ONDANSETRON 2 MG/ML
4 INJECTION INTRAMUSCULAR; INTRAVENOUS EVERY 6 HOURS PRN
Status: DISCONTINUED | OUTPATIENT
Start: 2025-03-03 | End: 2025-03-13 | Stop reason: HOSPADM

## 2025-03-03 RX ORDER — METOPROLOL SUCCINATE 25 MG/1
25 TABLET, EXTENDED RELEASE ORAL DAILY
Status: DISCONTINUED | OUTPATIENT
Start: 2025-03-03 | End: 2025-03-13 | Stop reason: HOSPADM

## 2025-03-03 RX ORDER — WATER 10 ML/10ML
INJECTION INTRAMUSCULAR; INTRAVENOUS; SUBCUTANEOUS
Status: COMPLETED
Start: 2025-03-03 | End: 2025-03-03

## 2025-03-03 RX ORDER — IPRATROPIUM BROMIDE AND ALBUTEROL SULFATE 2.5; .5 MG/3ML; MG/3ML
1 SOLUTION RESPIRATORY (INHALATION)
Status: DISCONTINUED | OUTPATIENT
Start: 2025-03-03 | End: 2025-03-04

## 2025-03-03 RX ORDER — CALCIUM GLUCONATE 20 MG/ML
1000 INJECTION, SOLUTION INTRAVENOUS ONCE
Status: COMPLETED | OUTPATIENT
Start: 2025-03-03 | End: 2025-03-03

## 2025-03-03 RX ORDER — ALBUTEROL SULFATE 0.83 MG/ML
2.5 SOLUTION RESPIRATORY (INHALATION) EVERY 4 HOURS PRN
Status: DISCONTINUED | OUTPATIENT
Start: 2025-03-03 | End: 2025-03-13 | Stop reason: HOSPADM

## 2025-03-03 RX ADMIN — CEFEPIME 2000 MG: 2 INJECTION, POWDER, FOR SOLUTION INTRAVENOUS at 21:35

## 2025-03-03 RX ADMIN — POTASSIUM CHLORIDE 20 MEQ: 29.8 INJECTION, SOLUTION INTRAVENOUS at 08:35

## 2025-03-03 RX ADMIN — CEFEPIME 2000 MG: 2 INJECTION, POWDER, FOR SOLUTION INTRAVENOUS at 10:11

## 2025-03-03 RX ADMIN — SODIUM CHLORIDE 1000 ML: 0.9 INJECTION, SOLUTION INTRAVENOUS at 00:28

## 2025-03-03 RX ADMIN — ALBUTEROL SULFATE 2.5 MG: 0.83 SOLUTION RESPIRATORY (INHALATION) at 03:44

## 2025-03-03 RX ADMIN — SODIUM CHLORIDE: 9 INJECTION, SOLUTION INTRAVENOUS at 11:18

## 2025-03-03 RX ADMIN — ENOXAPARIN SODIUM 40 MG: 100 INJECTION SUBCUTANEOUS at 08:30

## 2025-03-03 RX ADMIN — Medication 4 ML: at 20:35

## 2025-03-03 RX ADMIN — IPRATROPIUM BROMIDE AND ALBUTEROL SULFATE 1 DOSE: .5; 2.5 SOLUTION RESPIRATORY (INHALATION) at 16:26

## 2025-03-03 RX ADMIN — CALCIUM GLUCONATE 1000 MG: 20 INJECTION, SOLUTION INTRAVENOUS at 11:24

## 2025-03-03 RX ADMIN — WATER 1000 MG: 1 INJECTION INTRAMUSCULAR; INTRAVENOUS; SUBCUTANEOUS at 00:12

## 2025-03-03 RX ADMIN — POTASSIUM CHLORIDE 20 MEQ: 29.8 INJECTION, SOLUTION INTRAVENOUS at 10:00

## 2025-03-03 RX ADMIN — SODIUM CHLORIDE, PRESERVATIVE FREE 10 ML: 5 INJECTION INTRAVENOUS at 21:36

## 2025-03-03 RX ADMIN — AZITHROMYCIN MONOHYDRATE 500 MG: 500 INJECTION, POWDER, LYOPHILIZED, FOR SOLUTION INTRAVENOUS at 00:13

## 2025-03-03 RX ADMIN — POTASSIUM PHOSPHATE, MONOBASIC POTASSIUM PHOSPHATE, DIBASIC 20 MMOL: 224; 236 INJECTION, SOLUTION, CONCENTRATE INTRAVENOUS at 12:56

## 2025-03-03 RX ADMIN — IPRATROPIUM BROMIDE AND ALBUTEROL SULFATE 1 DOSE: .5; 2.5 SOLUTION RESPIRATORY (INHALATION) at 11:52

## 2025-03-03 RX ADMIN — GUAIFENESIN 600 MG: 600 TABLET ORAL at 20:05

## 2025-03-03 RX ADMIN — GUAIFENESIN 600 MG: 600 TABLET ORAL at 08:33

## 2025-03-03 RX ADMIN — IPRATROPIUM BROMIDE AND ALBUTEROL SULFATE 1 DOSE: .5; 2.5 SOLUTION RESPIRATORY (INHALATION) at 20:32

## 2025-03-03 RX ADMIN — EZETIMIBE 10 MG: 10 TABLET ORAL at 08:33

## 2025-03-03 RX ADMIN — SODIUM CHLORIDE, PRESERVATIVE FREE 10 ML: 5 INJECTION INTRAVENOUS at 08:30

## 2025-03-03 RX ADMIN — IPRATROPIUM BROMIDE AND ALBUTEROL SULFATE 1 DOSE: .5; 2.5 SOLUTION RESPIRATORY (INHALATION) at 08:13

## 2025-03-03 RX ADMIN — Medication 4 ML: at 09:16

## 2025-03-03 RX ADMIN — Medication 4 ML: at 11:55

## 2025-03-03 RX ADMIN — METHYLPREDNISOLONE SODIUM SUCCINATE 40 MG: 40 INJECTION, POWDER, LYOPHILIZED, FOR SOLUTION INTRAMUSCULAR; INTRAVENOUS at 10:13

## 2025-03-03 RX ADMIN — MAGNESIUM SULFATE HEPTAHYDRATE 2000 MG: 40 INJECTION, SOLUTION INTRAVENOUS at 11:08

## 2025-03-03 RX ADMIN — WATER 1 ML: 1 INJECTION INTRAMUSCULAR; INTRAVENOUS; SUBCUTANEOUS at 10:13

## 2025-03-03 ASSESSMENT — PAIN SCALES - GENERAL
PAINLEVEL_OUTOF10: 0

## 2025-03-03 NOTE — PLAN OF CARE
Problem: Chronic Conditions and Co-morbidities  Goal: Patient's chronic conditions and co-morbidity symptoms are monitored and maintained or improved  Outcome: Progressing  Flowsheets  Taken 3/3/2025 0800 by Princess García RN  Care Plan - Patient's Chronic Conditions and Co-Morbidity Symptoms are Monitored and Maintained or Improved:   Monitor and assess patient's chronic conditions and comorbid symptoms for stability, deterioration, or improvement   Collaborate with multidisciplinary team to address chronic and comorbid conditions and prevent exacerbation or deterioration   Update acute care plan with appropriate goals if chronic or comorbid symptoms are exacerbated and prevent overall improvement and discharge     Problem: Discharge Planning  Goal: Discharge to home or other facility with appropriate resources  Outcome: Progressing  Flowsheets  Taken 3/3/2025 0800 by Princess García RN  Discharge to home or other facility with appropriate resources:   Identify barriers to discharge with patient and caregiver   Arrange for needed discharge resources and transportation as appropriate     Problem: Pain  Goal: Verbalizes/displays adequate comfort level or baseline comfort level  Outcome: Progressing     Problem: Skin/Tissue Integrity  Goal: Skin integrity remains intact  Description: 1.  Monitor for areas of redness and/or skin breakdown  2.  Assess vascular access sites hourly  3.  Every 4-6 hours minimum:  Change oxygen saturation probe site  4.  Every 4-6 hours:  If on nasal continuous positive airway pressure, respiratory therapy assess nares and determine need for appliance change or resting period  Outcome: Progressing  Flowsheets  Taken 3/3/2025 0946  Skin Integrity Remains Intact:   Monitor for areas of redness and/or skin breakdown   Assess vascular access sites hourly   Every 4-6 hours minimum: Change oxygen saturation probe site  Taken 3/3/2025 0800  Skin Integrity Remains Intact:   Monitor for areas of

## 2025-03-03 NOTE — CONSULTS
Pulmonary Consult Note     Patient's name:  Yury Levin  Medical Record Number: [id number]  Patient's account/billing number: [id number]  Patient's YOB: 1950  Age: 74 y.o.  Date of Admission: 3/2/2025  9:38 PM  Date of Consult: 3/3/2025      Primary Care Physician: Diamond Robles MD      Code Status: Full Code    Reason for consult: Acute respiratory failure with hypoxia    Assessment and Plan     Acute respiratory with hypoxia less than 90% on room air with respiratory distress requiring BiPAP  Multifocal pneumonia  Influenza A pneumonia  Recent COVID infection  History of prostate cancer metastatic  History of A-fib  ERIBERTO      Plan:  Titrate oxygen to keep sats more than 90%, use BiPAP for increased work of breathing.  Aspiration precautions.  Cefepime.  Solu-Medrol daily.  Status post Paxlovid.  Albuterol and 3%.  Gentle hydration  Due to the immediate potential for life-threatening deterioration due to above , I spent 33 minutes providing critical care.  This time is excluding time spent performing procedures.  This note was transcribed using Dragon Dictation software. Please disregard any translational errors.        HISTORY OF PRESENT ILLNESS:   Mr./Ms. Yury Levin is a 74 y.o. gentleman with past medical history stated below significant for metastatic prostate cancer on Orgovyx, history of A-fib status post watchman, diagnosed with COVID infection on Tuesday started on Paxlovid presenting with worsening cough shortness of breath admitted to the ICU with hypoxic respiratory failure requiring BiPAP and high flow oxygen.            Past Medical History:        Diagnosis Date    CAD (coronary artery disease) 01/17/2018    NSTEMI - CABG x4 1/17/18    Cancer of prostate (HCC)     Chest pain 01/17/2018    CHF (congestive heart failure) (HCC)     Unstable angina pectoris (HCC)        Past Surgical History:        Procedure Laterality Date     CABG.  A right port  catheter is in place.  Pulmonary vascular markings are normal.  Minimal  bibasilar atelectasis.  The lungs are otherwise clear.  No significant  skeletal finding.    Impression  Cardiomegaly with no acute finding.      Cait Cabrera MD, MCHANDAN.            3/3/2025, 11:12 AM

## 2025-03-03 NOTE — PROGRESS NOTES
NAME:  Yury Levin  YOB: 1950  MEDICAL RECORD NUMBER:  [id number]    Shift Summary: pt transferred to ICU with acute respiratory failure, AMS. +COVID, Influenza, multifocal pneumonia. Soft BP order for Reuben just in case.     Family updated: Yes:  wife Marilyn    Rhythm: Normal Sinus Rhythm     Most recent vitals:   Visit Vitals  BP (!) 84/51   Pulse 74   Temp 98 °F (36.7 °C) (Oral)   Resp (!) 33   Ht 1.753 m (5' 9\")   Wt 70.9 kg (156 lb 4.9 oz)   SpO2 95%   BMI 23.08 kg/m²           No data found.    No data found.      Respiratory support needed (if any):  - BiPAP   IPAP: 14 cmH20, CPAP/EPAP: 6 cmH2O continuous    Admission weight Weight - Scale: 72.2 kg (159 lb 1.6 oz) (03/02/25 2143)    Today's weight    Wt Readings from Last 1 Encounters:   03/03/25 70.9 kg (156 lb 4.9 oz)        Pena need assessed each shift: N/A - no pena present purewick  UOP >30ml/hr: YES  Last documented BM (in last 48 hrs):  No data found.             Restraints (in use currently or dc'd in last 12 hrs): No    Order current and documentation up to date? Yes    Lines/Drains reviewed @ bedside.  Implantable Port 08/29/24 Right Chest (Active)   Number of days: 185         Drip rates at handoff:    sodium chloride      Phenylephrine         Lab Data:   CBC:   Recent Labs     03/02/25 2225   WBC 6.6   HGB 8.4*   HCT 25.8*   MCV 96.5   *     BMP:    Recent Labs     03/02/25 2225 03/03/25  0405    141   K 3.6 3.5   CO2 23 26   BUN 37* 37*   CREATININE 1.4* 1.2     LIVR:   Recent Labs     03/02/25 2225 03/03/25  0405   AST 36 29   ALT 26 28     PT/INR: No results for input(s): \"INR\" in the last 72 hours.    Invalid input(s): \"PROT\"  APTT: No results for input(s): \"APTT\" in the last 72 hours.  ABG: No results for input(s): \"PHART\", \"[id number]\", \"PO2ART\" in the last 72 hours.    Any consults during the shift? No    Any signed and held orders to be released?  No        4 Eyes Skin Assessment       The patient is being  assessed for  Shift Handoff    I agree that at least one RN has performed a thorough Head to Toe Skin Assessment on the patient. ALL assessment sites listed below have been assessed.      Areas assessed by both nurses: Head, Face, Ears, Shoulders, Back, Chest, Arms, Elbows, Hands, Sacrum. Buttock, Coccyx, Ischium, Legs. Feet and Heels, and Under Medical Devices         Does the Patient have a Wound? No noted wound(s)    Wound Care Orders initiated by RN: No       Jaime Prevention initiated by RN: Yes    Pressure Injury (Stage 3,4, Unstageable, DTI, NWPT, and Complex wounds) if present, place Wound referral order by RN under : Yes    New Ostomies, if present place, Ostomy referral order under : No     Nurse 1 eSignature: Electronically signed by Pam Silveira RN on 3/3/25 at 7:25 AM EST    **SHARE this note so that the co-signing nurse can place an eSignature**    Nurse 2 eSignature: Electronically signed by Princess García RN on 3/3/25 at 7:25 AM EST

## 2025-03-03 NOTE — PROGRESS NOTES
NAME:  Yury Levin  YOB: 1950  MEDICAL RECORD NUMBER:  [id number]    Shift Summary: Wound care to bedside to evaluate wounds. IVF started. BP labile, not requiring pressors. Pt remains confused throughout shift.     Family updated: Yes:  wife at bedside    Rhythm: Normal Sinus Rhythm     Most recent vitals:   Visit Vitals  BP (!) 116/58   Pulse 74   Temp 98.4 °F (36.9 °C) (Axillary)   Resp 19   Ht 1.753 m (5' 9\")   Wt 70.9 kg (156 lb 4.9 oz)   SpO2 98%   BMI 23.08 kg/m²           No data found.    No data found.      Respiratory support needed (if any):  - O2 - NC - 4 lpm    Admission weight Weight - Scale: 72.2 kg (159 lb 1.6 oz) (03/02/25 2143)    Today's weight    Wt Readings from Last 1 Encounters:   03/03/25 70.9 kg (156 lb 4.9 oz)        Pena need assessed each shift: N/A - no pena present  UOP >30ml/hr: YES  Last documented BM (in last 48 hrs):  Patient Vitals for the past 48 hrs:   Last BM (including prior to admit) Stool Occurrence   03/03/25 1215 03/03/25 1                Restraints (in use currently or dc'd in last 12 hrs): No      Lines/Drains reviewed @ bedside.  Implantable Port 08/29/24 Right Chest (Active)   Number of days: 186         Drip rates at handoff:    sodium chloride      Phenylephrine      sodium chloride 50 mL/hr at 03/03/25 1648       Lab Data:   CBC:   Recent Labs     03/02/25 2225   WBC 6.6   HGB 8.4*   HCT 25.8*   MCV 96.5   *     BMP:    Recent Labs     03/02/25 2225 03/03/25  0405 03/03/25  1323    141  --    K 3.6 3.5 4.1   CO2 23 26  --    BUN 37* 37*  --    CREATININE 1.4* 1.2  --      LIVR:   Recent Labs     03/02/25 2225 03/03/25  0405   AST 36 29   ALT 26 28     PT/INR: No results for input(s): \"INR\" in the last 72 hours.    Invalid input(s): \"PROT\"  APTT: No results for input(s): \"APTT\" in the last 72 hours.  ABG: No results for input(s): \"PHART\", \"[id number]\", \"PO2ART\" in the last 72 hours.    Any consults during the shift? No    Any signed

## 2025-03-03 NOTE — PROGRESS NOTES
V2.0  [facility] [place]      Name:  Yury Levin /Age/Sex: 1950  (74 y.o. male)   MRN & CSN:  [id number] & [id number] Encounter Date/Time: 3/3/2025 9:18 AM EST    Location:  [id number] PCP: Diamond Robles MD       Hospital Day: 2    Assessment and Plan:   Yury Levin is a 74 y.o. male presenting with shortness of breath      Plan:  Multifocal pneumonia  -Covering with antibiotics with cefepime and azithromycin  -Influenza A positive in ED. Tamiflu  -molecular pneumonia panel, viral panel, urine strep and legionella ordered  COVID  -diagnosed as outpatient last week Tuesday  -Would be completing paxlovid course tomorrow  Acute hypoxic respiratory failure  -BiPAP weaned, currently on 5L NC  -Discussed with pulm 3/3: aspiration precautions, solumedrol, albuterol and 3% nebulizers.   -wean o2 as tolerated  Metastatic prostate cancer  -follows with [facility]  -consult regarding patient's orgovyx therapy in setting of infection  -Oncology note reviewed 3/3: holding chemo due to deconditioning. Sending nutritional studies  Afib s/p watchman  -continue beta-blocker    Ppx: Lovenox  Dispo: TBD    Subjective:     Chief Complaint: Altered Mental Status (Pt is being treated for COVID since Tuesday and the patient has been hsaving spikes in fever and has been acting off, SpO2 79% RA pt doesn't normally wear oxygen )     Interval Hx:  Patient states feeling much better than when he came in.  Reports still having some shortness of breath and overall weakness.  Denies chest pain, productive cough but feels congested.  Denies fever/chills    Review of Systems:    Review of Systems    10 point ROS negative except as stated above in \"subjective\" section    Objective:     Intake/Output Summary (Last 24 hours) at 3/3/2025 0918  Last data filed at 3/3/2025 0835  Gross per 24 hour   Intake --   Output 600 ml   Net -600 ml        Vitals:   Vitals:    25 0916   BP:    Pulse: 76   Resp: 21   Temp:    SpO2:  (Bazett) 440 ms    R Axis 4 degrees    T Axis 3 degrees    Diagnosis       Accelerated Junctional rhythmMinimal voltage criteria for LVH, may be normal variant ( R in aVL )Abnormal ECGWhen compared with ECG of 10-JOSE-2024 09:31,Junctional rhythm has replaced Sinus rhythmNonspecific T wave abnormality, improved in Anterolateral leadsQT has lengthened     Blood Gas, Venous    Collection Time: 03/02/25 10:19 PM   Result Value Ref Range    pH, Hesham 7.413 7.350 - 7.450    pCO2, Hesham 43.8 40.0 - 50.0 mmHg    PO2, Hesham <30 Not Established mmHg    HCO3, Venous 28 23 - 29 mmol/L    Base Excess, Hesham 3.0 Not Established mmol/L    O2 Sat, Hesham 53 Not Established %    Carboxyhemoglobin 1.7 %    MetHgb, Hesham 0.6 <1.5 %    TC02 (Calc), Hesham 29 Not Established mmol/L    O2 Therapy Unknown    Rapid influenza A/B antigens    Collection Time: 03/02/25 10:19 PM    Specimen: Nasopharyngeal   Result Value Ref Range    Influenza A, TAMIKO DETECTED (A) Not Detected    Influenza B, TAMIKO Not Detected Not Detected   Lactate, Sepsis    Collection Time: 03/02/25 10:25 PM   Result Value Ref Range    Lactic Acid, Sepsis 4.8 (HH) 0.4 - 1.9 mmol/L   Troponin    Collection Time: 03/02/25 10:25 PM   Result Value Ref Range    Troponin, High Sensitivity 43 (H) 0 - 22 ng/L   BNP    Collection Time: 03/02/25 10:25 PM   Result Value Ref Range    NT Pro-BNP 1,778 (H) 0 - 449 pg/mL   CBC with Auto Differential    Collection Time: 03/02/25 10:25 PM   Result Value Ref Range    WBC 6.6 4.0 - 11.0 K/uL    RBC 2.68 (L) 4.20 - 5.90 M/uL    Hemoglobin 8.4 (L) 13.5 - 17.5 g/dL    Hematocrit 25.8 (L) 40.5 - 52.5 %    MCV 96.5 80.0 - 100.0 fL    MCH 31.3 26.0 - 34.0 pg    MCHC 32.5 31.0 - 36.0 g/dL    RDW 17.3 (H) 12.4 - 15.4 %    Platelets 124 (L) 135 - 450 K/uL    MPV 8.5 5.0 - 10.5 fL    Neutrophils % 91.2 %    Lymphocytes % 5.0 %    Monocytes % 3.6 %    Eosinophils % 0.0 %    Basophils % 0.2 %    Neutrophils Absolute 6.0 1.7 - 7.7 K/uL    Lymphocytes Absolute 0.3 (L) 1.0 -

## 2025-03-03 NOTE — PROGRESS NOTES
[facility]  Respiratory Therapy  Patient Education      Name:  Yury Levin  Medical Record Number:  [id number]  Age: 74 y.o.   : 1950  Today's Date:  3/2/2025  Room:  [address] [place] [address]         Assessment      /67   Pulse 88   Temp 99.2 °F (37.3 °C) (Oral)   Resp 30   Ht 1.753 m (5' 9\")   Wt 72.2 kg (159 lb 1.6 oz)   SpO2 96%   BMI 23.49 kg/m²     Patient Active Problem List   Diagnosis    NSTEMI (non-ST elevated myocardial infarction) (formerly [facility])    S/P CABG x 4    Mixed hearing loss, bilateral    ERIBERTO (acute kidney injury)    PAF (paroxysmal atrial fibrillation) (formerly [facility])    Gross hematuria    Anemia       Social History  Social History     Tobacco Use    Smoking status: Never    Smokeless tobacco: Never    Tobacco comments:     occasional cigar years ago   Vaping Use    Vaping status: Never Used   Substance Use Topics    Alcohol use: No     Comment: 2-3 beers, infrequent    Drug use: No     Comment: never       Modality:     MDI      Education       Patient/caregiver was educated on the proper method of use:  Yes        Level of patient/caregiver understanding able to:  Verbalize understanding     Education status:   Provided instructions on medications, Provided instructions on technique and indications, and Provided instructions on adverse reactions      Response to education:    Very Good     Teaching Time: 5  minutes         Electronically signed by Edwige Jama RCP on 3/2/2025 at 11:03 PM

## 2025-03-03 NOTE — PROGRESS NOTES
Pt's O2 desated to 87% despite increasing his O2 to 5L, RT at bedside doing a breathing treatment/ BIPAP applied

## 2025-03-03 NOTE — PROGRESS NOTES
Wound Referral Progress Note       NAME:  Yury Levin  MEDICAL RECORD NUMBER:  [id number]  AGE: 74 y.o.   GENDER: male  : 1950  TODAY'S DATE:  3/3/2025    Subjective   Reason for WOCN Evaluation and Assessment: buttock wounds POA      Yury Levin is a 74 y.o. male referred by:   Nursing    Wound Identification:  Wound Type: pressure  Contributing Factors: chronic pressure and decreased mobility    Wound History: Wounds POA. Currently in COVID isolation in ICU.   Current Wound Care Treatment:  n/a    Patient Care Goal:  Wound Healing        PAST MEDICAL HISTORY        Diagnosis Date    CAD (coronary artery disease) 2018    NSTEMI - CABG x4 18    Cancer of prostate (HCC)     Chest pain 2018    CHF (congestive heart failure) (HCC)     Unstable angina pectoris (HCC)        PAST SURGICAL HISTORY    Past Surgical History:   Procedure Laterality Date    COLONOSCOPY      CORONARY ARTERY BYPASS GRAFT  2018    cabgx4 ([facility])    CYSTOSCOPY Right 2024    CYSTOSCOPY RIGHT DIAGNOSTIC URETEROSCOPY, REMOVAL RIGHT RENAL PELVIS CLOT, RIGHT STENT EXHCANGE performed by Gary Gracia MD at [facility] OR    IR PORT PLACEMENT > 5 YEARS Right 2023    Power Port; RIJ access; 24cm; Dr. Jones    IR PORT PLACEMENT > 5 YEARS  2023    IR PORT PLACEMENT EQUAL OR GREATER THAN 5 YEARS 1/3/2023 [facility] SPECIAL PROCEDURES    LEFT ATRIAL APPENDAGE OCCLUDER DEVICE  2023    21 mm    SKIN CANCER EXCISION         FAMILY HISTORY    Family History   Problem Relation Age of Onset    No Known Problems Mother         still living, age 95    Heart Attack Father         noted at an advanced age, silent. lived to age 87.       SOCIAL HISTORY    Social History     Tobacco Use    Smoking status: Never    Smokeless tobacco: Never    Tobacco comments:     occasional cigar years ago   Vaping Use    Vaping status: Never Used   Substance Use Topics    Alcohol use: No     Comment: 2-3 beers, infrequent    Drug

## 2025-03-03 NOTE — PROGRESS NOTES
03/03/25 0416   NIV Type   $NIV $Daily Charge   Suction Setup and Functional Yes   NIV Started/Stopped On   Equipment Type V60   Mode Bilevel   Mask Type Full face mask   Mask Size Large   Assessment   Heart Rate Source Monitor   Level of Consciousness 0   Comfort Level Good   Using Accessory Muscles No   Mask Compliance Good   Skin Assessment Clean, dry, & intact   Skin Protection for O2 Device Yes   Orientation Middle   Location Nose   Intervention(s) Skin Barrier   Breath Sounds   Right Upper Lobe Crackles   Right Middle Lobe Crackles   Right Lower Lobe Crackles   Left Upper Lobe Crackles   Left Lower Lobe Crackles   Settings/Measurements   PIP Observed 14 cm H20   IPAP 14 cmH20   CPAP/EPAP 6 cmH2O   Vt (Measured) 516 mL   Rate Ordered 12   Insp Rise Time (%) 3 %   FiO2  30 %   I Time/ I Time % 1 s   Minute Volume (L/min) 10.6 Liters   Mask Leak (lpm) 0 lpm   Patient's Home Machine No   Alarm Settings   Alarms On Y   Low Pressure (cmH2O) 4 cmH2O   High Pressure (cmH2O) 40 cmH2O   Delay Alarm 20 sec(s)   Apnea (secs) 20 secs   RR High (bpm) 40 br/min

## 2025-03-03 NOTE — H&P
History and Physical      Name:  Yury Levin /Age/Sex: 1950  (74 y.o. male)   MRN & CSN:  [id number] & [id number] Admission Date/Time: 3/2/2025  9:38 PM   Location:   PCP: Diamond Robles MD       Hospital Day 1    Seen and evaluated per myself and NP student Julianna Arreguin RN    Assessment and Plan:   Yury Levin is a 74 y.o.  male  who presents with Acute respiratory failure with hypoxia (HCC)    Hospital Problems             Last Modified POA    * (Principal) Acute respiratory failure with hypoxia (HCC) 3/3/2025 Yes    Anemia 3/3/2025 Yes    Sepsis (HCC) 3/3/2025 Yes    Multifocal pneumonia 3/3/2025 Yes    COVID 3/3/2025 Yes    Influenza A 3/3/2025 Yes    Stage 3a chronic kidney disease (HCC) 3/3/2025 Yes    Pressure injury of skin of buttock 3/3/2025 Yes   Lives at home  CODE STATUS: Full  Anticoagulation therapy: None  Social determinants: Uses a walker, some physical debility due to comorbid conditions  H POA: Wife Marilyn    Acute respiratory failure with hypoxia: 2-> #3 and #4-> requiring oxygen therapy: Plan: Oxygen therapy to maintain saturation greater than 90%, RT consult, incentive spirometry, NIPPV, follow-up blood gas, albuterol and ipratropium inhaler, Acapella  Sepsis: Likely viral, with high risk for bacterial conversion: lactic: 4.8->2.1, pH: 7.4, WBC: 6.6-> blood cultures x 2, serial labs, CBC, metabolic panel, procalcitonin and serial lactic acid: Antibiotic therapy: Azithromycin and Rocephin.  I believe patient is at high risk for easy volume overload therefore maintenance fluids and pressor therapy for mild hypotension: Neosynephrine as he is already intermittently mildly tachycardic  COVID: Diagnosed outpatient on Tuesday: Started on Paxlovid-> last dose tomorrow.   Influenza A: Positive per ED  Multifocal pneumonia-> viral most likely. Pt at high risk for bacterial conversion w/ age, prostate cancer and co-infections w/ covid and influenza. Continue Rocephin and  unless as noted above    Objective:   No intake or output data in the 24 hours ending 03/03/25 0205   Vitals:   Vitals:    03/03/25 0200   BP: 90/62   Pulse: 71   Resp: 18   Temp:    SpO2: 93%     Physical Exam:   GEN Awake male, laying in bed. Ill-appearing  EYES Pupils are equally round.  No scleral erythema, discharge, or conjunctivitis.  HENT Mucous membranes are moist. Oral pharynx without exudates, no evidence of thrush.  NECK Supple, no apparent thyromegaly or masses.  RESP +rales or rhonchi.  Audible congested rattle on 4L NC @ 92%  CARDIO/VASC S1/S2 auscultated. Regular rate without appreciable murmurs, rubs, or gallops. No JVD or carotid bruits. Peripheral pulses equal bilaterally and palpable. B/L lower extremity edema +3 (baseline) periorbital edema   GI Abdomen is soft without significant tenderness, masses, or guarding. Bowel sounds are normoactive. Rectal exam deferred.    No costovertebral angle tenderness. . Snowden catheter is not present.  HEME/LYMPH No palpable cervical lymphadenopathy and no hepatosplenomegaly. No petechiae or ecchymoses.  MSK No gross joint deformities.  SKIN warm, dry, pale, scattered lesions and ecchymosis   NEURO Cranial nerves appear grossly intact, normal speech, no lateralizing weakness.  PSYCH Awake, alert, oriented x 4.  Affect appropriate.    Past Medical History:      Past Medical History:   Diagnosis Date   • CAD (coronary artery disease) 01/17/2018    NSTEMI - CABG x4 1/17/18   • Cancer of prostate (HCC)    • Chest pain 01/17/2018   • CHF (congestive heart failure) (HCC)    • Unstable angina pectoris (HCC)      PSHX:  has a past surgical history that includes Coronary artery bypass graft (01/17/2018); Skin cancer excision; IR PORT PLACEMENT > 5 YEARS (Right, 01/03/2023); IR PORT PLACEMENT > 5 YEARS (01/03/2023); Colonoscopy; left atrial appendage occluder device (12/18/2023); and Cystoscopy (Right, 6/11/2024).    Allergies:   Allergies   Allergen Reactions   •

## 2025-03-03 NOTE — PROGRESS NOTES
4 Eyes Skin Assessment     NAME:  Yury Levin  YOB: 1950  MEDICAL RECORD NUMBER:  [id number]    The patient is being assessed for  Shift Handoff    I agree that at least one RN has performed a thorough Head to Toe Skin Assessment on the patient. ALL assessment sites listed below have been assessed.      Areas assessed by both nurses:    Head, Face, Ears, Shoulders, Back, Chest, Arms, Elbows, Hands, Sacrum. Buttock, Coccyx, Ischium, Legs. Feet and Heels, and Under Medical Devices         Does the Patient have a Wound? Yes wound(s) were present on assessment. LDA wound assessment was Initiated and completed by RN       Jaime Prevention initiated by RN: Yes  Wound Care Orders initiated by RN: No    Pressure Injury (Stage 3,4, Unstageable, DTI, NWPT, and Complex wounds) if present, place Wound referral order by RN under : Yes    New Ostomies, if present place, Ostomy referral order under : No     Nurse 1 eSignature: Electronically signed by Pam Silveira RN on 3/3/25 at 3:20 AM EST    **SHARE this note so that the co-signing nurse can place an eSignature**    Nurse 2 eSignature: Electronically signed by Andi Small RN on 3/3/25 at 4:35 AM EST

## 2025-03-03 NOTE — PROGRESS NOTES
Pt arrive to ICU via stretcher from ED. Bedside report received from ED RN. Pt attached to ICU Bedside monitor. Pt alert to self and time/ disoriented at times. Rhythm at time of arrival was normal sinus. Bed alarm active and call light within reach. Educated pt on use of call light and importance of calling RN before attempting to get out of bed. Pt resting comfortably at this time, will continue to monitor.

## 2025-03-03 NOTE — ED PROVIDER NOTES
[facility] EMERGENCY DEPARTMENT  EMERGENCY DEPARTMENT ENCOUNTER      Pt Name: Yury Levin  MRN: [id number]  Birthdate 1950  Date of evaluation: 3/2/2025  Provider: CHRISTINA OCASIO DO    CHIEF COMPLAINT       Chief Complaint   Patient presents with    Altered Mental Status     Pt is being treated for COVID since Tuesday and the patient has been hsaving spikes in fever and has been acting off, SpO2 79% RA pt doesn't normally wear oxygen          HISTORY OF PRESENT ILLNESS   (Location/Symptom, Timing/Onset, Context/Setting, Quality, Duration, Modifying Factors, Severity)  Note limiting factors.   Yury Levin is a 74 y.o. male who presents to the emergency department with a complaint of shortness of breath and shaking chills today.  The patient initially became ill on Monday, 6 days ago with dry nonproductive cough.  His wife contacted the primary care physician and they did a flu and COVID test on Tuesday.  He did test positive for COVID but was negative for flu.  He was prescribed Paxlovid and has 1 remaining dose.  She reports that he has progressively worsened throughout the week.  Appetite has been decreased and he has not really been eating or drinking much.     He does have a history of chronic kidney disease and saw the nephrologist on Friday and apparently was doing well.  Urine output is decreased but he is urinating.  He does report some slightly increased lower extremity edema.     He is still coughing but denies any productive sputum.  He does report shortness of breath and dyspnea on exertion as well as extreme fatigue.  He states that he has no strength.  He normally ambulates with a walker.  He was able to walk to the car tonight with some assistance to come to the hospital.     He denies any history of asthma or COPD.  He normally does not wear oxygen.     Family does report that he has been a little bit more confused today with difficulty recalling things.  He normally has    Vaping Use    Vaping status: Never Used   Substance and Sexual Activity    Alcohol use: No     Comment: 2-3 beers, infrequent    Drug use: No     Comment: never     Social Determinants of Health     Financial Resource Strain: Low Risk  (10/21/2024)    Overall Financial Resource Strain (CARDIA)     Difficulty of Paying Living Expenses: Not hard at all   Transportation Needs: No Transportation Needs (10/21/2024)    PRAPARE - Transportation     Lack of Transportation (Medical): No     Lack of Transportation (Non-Medical): No   Physical Activity: Unknown (11/16/2024)    Exercise Vital Sign     Days of Exercise per Week: 0 days    Received from [employer] [employer] [facility] [employer]    Interpersonal Safety   Housing Stability: Low Risk  (2/16/2025)    Housing Stability Vital Sign     Unable to Pay for Housing in the Last Year: No     Number of Times Moved in the Last Year: 0     Homeless in the Last Year: No       SCREENINGS    Louis Coma Scale  Eye Opening: Spontaneous  Best Verbal Response: Oriented  Best Motor Response: Obeys commands  Louis Coma Scale Score: 15        PHYSICAL EXAM    (up to 7 for level 4, 8 or more for level 5)     ED Triage Vitals   BP Systolic BP Percentile Diastolic BP Percentile Temp Temp Source Pulse Respirations SpO2   03/02/25 2143 -- -- 03/02/25 2143 03/02/25 2143 03/02/25 2143 03/02/25 2142 03/02/25 2142   107/67   99.2 °F (37.3 °C) Oral (!) 106 26 (!) 79 %      Height Weight - Scale         03/02/25 2143 03/02/25 2143         1.753 m (5' 9\") 72.2 kg (159 lb 1.6 oz)                 Physical Exam   Constitutional: Awake and alert.  Frail.  Chronically ill-appearing.  Pale.  Head: No visible evidence of trauma.  Normocephalic.  Eyes: Pupils equal and reactive.  Pupils 3 mm and reactive.  Sclera white.  Conjunctive pink.  No photophobia.  Conjunctiva normal.    HENT: Oral mucosa moist.  Airway patent.  Pharynx without erythema.  Nares were  components:    Glucose 130 (*)     BUN 37 (*)     Creatinine 1.4 (*)     Est, Glom Filt Rate 52 (*)     Total Protein 5.4 (*)     Albumin 3.2 (*)     All other components within normal limits   CULTURE, BLOOD 1   CULTURE, BLOOD 2   BLOOD GAS, VENOUS   LACTATE, SEPSIS   TROPONIN   URINALYSIS WITH REFLEX TO CULTURE       All other labs were within normal range or not returned as of this dictation.    EMERGENCY DEPARTMENT COURSE and DIFFERENTIAL DIAGNOSIS/ MEDICAL DECISION MAKING:   Vitals:    Vitals:    03/02/25 2142 03/02/25 2143 03/02/25 2258   BP:  107/67    Pulse:  (!) 106 88   Resp: 26 28 30   Temp:  99.2 °F (37.3 °C)    TempSrc:  Oral    SpO2: (!) 79% 93% 96%   Weight:  72.2 kg (159 lb 1.6 oz)    Height:  1.753 m (5' 9\")          MDM    Expand All Collapse All                   [facility] EMERGENCY DEPARTMENT  EMERGENCY DEPARTMENT ENCOUNTER       Pt Name: Yury Levin  MRN: [id number]  Birthdate 1950  Date of evaluation: 3/2/2025  Provider: CHRISTINA OCASIO DO     CHIEF COMPLAINT             Chief Complaint   Patient presents with    Altered Mental Status       Pt is being treated for COVID since Tuesday and the patient has been hsaving spikes in fever and has been acting off, SpO2 79% RA pt doesn't normally wear oxygen             HISTORY OF PRESENT ILLNESS   (Location/Symptom, Timing/Onset, Context/Setting, Quality, Duration, Modifying Factors, Severity)  Note limiting factors.   Yury Levin is a 74 y.o. male who presents to the emergency department with a complaint of shortness of breath and shaking chills today.  The patient initially became ill on Monday, 6 days ago with dry nonproductive cough.  His wife contacted the primary care physician and they did a flu and COVID test on Tuesday.  He did test positive for COVID but was negative for flu.  He was prescribed Paxlovid and has 1 remaining dose.  She reports that he has progressively worsened throughout the week.  Appetite has been

## 2025-03-03 NOTE — CONSULTS
[facility]    Consult Note      Requesting Physician:      CHIEF COMPLAINT:  metastatic prostate cancer       HISTORY OF PRESENT ILLNESS:    Mr. Levin  is a 74 y.o. male we are seeing in consultation for metastatic prostate cancer. Current treatment-Jevtanta has been on hold since Dec 2024. Currently admitted in the ICU for resp failure, ams, recent covid 19 infection & now + influenza . Multifocal pneumonia. Feeling a little bit better since admission. Still has a cough. Requiring oxygen no resp distress. BP soft, no pressors.     ICD-10-CM    1. Influenza A  J10.1       2. COVID-19  U07.1       3. Altered mental status, unspecified altered mental status type  R41.82       4. Hypoxemia  R09.02       5. Multifocal pneumonia  J18.9              Past Medical History:  Past Medical History:   Diagnosis Date    CAD (coronary artery disease) 01/17/2018    NSTEMI - CABG x4 1/17/18    Cancer of prostate (HCC)     Chest pain 01/17/2018    CHF (congestive heart failure) (HCC)     Unstable angina pectoris (HCC)        Past Surgical History:  Past Surgical History:   Procedure Laterality Date    COLONOSCOPY      CORONARY ARTERY BYPASS GRAFT  01/17/2018    [email or web address] ([facility])    CYSTOSCOPY Right 6/11/2024    CYSTOSCOPY RIGHT DIAGNOSTIC URETEROSCOPY, REMOVAL RIGHT RENAL PELVIS CLOT, RIGHT STENT EXHCANGE performed by Gary Gracia MD at [facility] OR    IR PORT PLACEMENT > 5 YEARS Right 01/03/2023    Power Port; RIJ access; 24cm; Dr. Jones    IR PORT PLACEMENT > 5 YEARS  01/03/2023    IR PORT PLACEMENT EQUAL OR GREATER THAN 5 YEARS 1/3/2023 [facility] SPECIAL PROCEDURES    LEFT ATRIAL APPENDAGE OCCLUDER DEVICE  12/18/2023    21 mm    SKIN CANCER EXCISION         Current Medications:  Current Facility-Administered Medications   Medication Dose Route Frequency Provider Last Rate Last Admin    ezetimibe

## 2025-03-03 NOTE — PROGRESS NOTES
Comprehensive Nutrition Assessment    Type and Reason for Visit:  Initial, Wound    Nutrition Recommendations/Plan:   Continue easy to chew low sodium diet, add rock BID to order     Malnutrition Assessment:  Malnutrition Status:  At risk for malnutrition (03/03/25 1129)    Context:  Chronic Illness     Findings of the 6 clinical characteristics of malnutrition:  Energy Intake:  Mild decrease in energy intake  Weight Loss:  Greater than 20% over 1 year     Body Fat Loss:  No body fat loss     Muscle Mass Loss:  No muscle mass loss    Fluid Accumulation:  Mild     Strength:  Not Performed    Nutrition Assessment:    Pt w/ acute respiratory failure, COVID. Pt w/ significant wt loss over past year. Multiple stage 2 pressure injuries to buttock. No intakes on file. Currently on easy to chew low sodium diet. Adding wound healing ONS BID to order.    Nutrition Related Findings:    2.3 phos, 100 glucose         Current Nutrition Intake & Therapies:    Average Meal Intake: Unable to assess  Average Supplements Intake: None Ordered  ADULT DIET; Easy to Chew; Low Sodium (2 gm)    Anthropometric Measures:  Height: 175.3 cm (5' 9\")  Ideal Body Weight (IBW): 160 lbs (73 kg)       Current Body Weight: 70.9 kg (156 lb 4.9 oz),   IBW.    Current BMI (kg/m2): 23.1                                  Estimated Daily Nutrient Needs:  Energy Requirements Based On: Kcal/kg  Weight Used for Energy Requirements: Current  Energy (kcal/day): 1775 - 2125 (25 - 30 kcal/kg)  Weight Used for Protein Requirements: Current  Protein (g/day): 71 - 85 (1-1.2 g/kg)  Method Used for Fluid Requirements: 1 ml/kcal  Fluid (ml/day): or per provider    Nutrition Diagnosis:   Increased nutrient needs related to increase demand for energy/nutrients as evidenced by wounds  Inadequate oral intake related to impaired respiratory function as evidenced by weight loss    Nutrition Interventions:   Food and/or Nutrient Delivery: Continue Current Diet, Start Oral

## 2025-03-04 LAB
ALBUMIN SERPL-MCNC: 2.6 G/DL (ref 3.4–5)
ALBUMIN/GLOB SERPL: 1.2 {RATIO} (ref 1.1–2.2)
ALP SERPL-CCNC: 51 U/L (ref 40–129)
ALT SERPL-CCNC: 21 U/L (ref 10–40)
ANION GAP SERPL CALCULATED.3IONS-SCNC: 9 MMOL/L (ref 3–16)
AST SERPL-CCNC: 22 U/L (ref 15–37)
BACTERIA UR CULT: NORMAL
BASOPHILS # BLD: 0 K/UL (ref 0–0.2)
BASOPHILS NFR BLD: 0 %
BILIRUB SERPL-MCNC: <0.2 MG/DL (ref 0–1)
BUN SERPL-MCNC: 25 MG/DL (ref 7–20)
CALCIUM SERPL-MCNC: 7.9 MG/DL (ref 8.3–10.6)
CHLORIDE SERPL-SCNC: 108 MMOL/L (ref 99–110)
CO2 SERPL-SCNC: 26 MMOL/L (ref 21–32)
CREAT SERPL-MCNC: 0.9 MG/DL (ref 0.8–1.3)
DEPRECATED RDW RBC AUTO: 17.6 % (ref 12.4–15.4)
EOSINOPHIL # BLD: 0 K/UL (ref 0–0.6)
EOSINOPHIL NFR BLD: 0 %
GFR SERPLBLD CREATININE-BSD FMLA CKD-EPI: 89 ML/MIN/{1.73_M2}
GLUCOSE SERPL-MCNC: 134 MG/DL (ref 70–99)
HCT VFR BLD AUTO: 21.8 % (ref 40.5–52.5)
HGB BLD-MCNC: 7.3 G/DL (ref 13.5–17.5)
LEGIONELLA AG UR QL: NORMAL
LYMPHOCYTES # BLD: 0.2 K/UL (ref 1–5.1)
LYMPHOCYTES NFR BLD: 3.3 %
MAGNESIUM SERPL-MCNC: 2.3 MG/DL (ref 1.8–2.4)
MCH RBC QN AUTO: 31.8 PG (ref 26–34)
MCHC RBC AUTO-ENTMCNC: 33.4 G/DL (ref 31–36)
MCV RBC AUTO: 95.2 FL (ref 80–100)
MONOCYTES # BLD: 0.2 K/UL (ref 0–1.3)
MONOCYTES NFR BLD: 2.5 %
NEUTROPHILS # BLD: 6.3 K/UL (ref 1.7–7.7)
NEUTROPHILS NFR BLD: 94.2 %
PHOSPHATE SERPL-MCNC: 2.5 MG/DL (ref 2.5–4.9)
PLATELET # BLD AUTO: 101 K/UL (ref 135–450)
PMV BLD AUTO: 8.7 FL (ref 5–10.5)
POTASSIUM SERPL-SCNC: 4.2 MMOL/L (ref 3.5–5.1)
PROT SERPL-MCNC: 4.7 G/DL (ref 6.4–8.2)
RBC # BLD AUTO: 2.29 M/UL (ref 4.2–5.9)
S PNEUM AG UR QL: ABNORMAL
SODIUM SERPL-SCNC: 143 MMOL/L (ref 136–145)
WBC # BLD AUTO: 6.7 K/UL (ref 4–11)

## 2025-03-04 PROCEDURE — 85025 COMPLETE CBC W/AUTO DIFF WBC: CPT

## 2025-03-04 PROCEDURE — 2580000003 HC RX 258: Performed by: NURSE PRACTITIONER

## 2025-03-04 PROCEDURE — 94669 MECHANICAL CHEST WALL OSCILL: CPT

## 2025-03-04 PROCEDURE — 6370000000 HC RX 637 (ALT 250 FOR IP): Performed by: STUDENT IN AN ORGANIZED HEALTH CARE EDUCATION/TRAINING PROGRAM

## 2025-03-04 PROCEDURE — 6360000002 HC RX W HCPCS: Performed by: INTERNAL MEDICINE

## 2025-03-04 PROCEDURE — 6370000000 HC RX 637 (ALT 250 FOR IP): Performed by: NURSE PRACTITIONER

## 2025-03-04 PROCEDURE — 2500000003 HC RX 250 WO HCPCS: Performed by: NURSE PRACTITIONER

## 2025-03-04 PROCEDURE — 6360000002 HC RX W HCPCS: Performed by: NURSE PRACTITIONER

## 2025-03-04 PROCEDURE — 83735 ASSAY OF MAGNESIUM: CPT

## 2025-03-04 PROCEDURE — 9990000010 HC NO CHARGE VISIT: Performed by: PHYSICAL THERAPIST

## 2025-03-04 PROCEDURE — 94640 AIRWAY INHALATION TREATMENT: CPT

## 2025-03-04 PROCEDURE — 6370000000 HC RX 637 (ALT 250 FOR IP): Performed by: INTERNAL MEDICINE

## 2025-03-04 PROCEDURE — 94761 N-INVAS EAR/PLS OXIMETRY MLT: CPT

## 2025-03-04 PROCEDURE — 2060000000 HC ICU INTERMEDIATE R&B

## 2025-03-04 PROCEDURE — 2700000000 HC OXYGEN THERAPY PER DAY

## 2025-03-04 PROCEDURE — APPNB15 APP NON BILLABLE TIME 0-15 MINS: Performed by: NURSE PRACTITIONER

## 2025-03-04 PROCEDURE — 99233 SBSQ HOSP IP/OBS HIGH 50: CPT | Performed by: INTERNAL MEDICINE

## 2025-03-04 PROCEDURE — 84100 ASSAY OF PHOSPHORUS: CPT

## 2025-03-04 PROCEDURE — 80053 COMPREHEN METABOLIC PANEL: CPT

## 2025-03-04 PROCEDURE — 2500000003 HC RX 250 WO HCPCS

## 2025-03-04 RX ORDER — WATER 10 ML/10ML
INJECTION INTRAMUSCULAR; INTRAVENOUS; SUBCUTANEOUS
Status: COMPLETED
Start: 2025-03-04 | End: 2025-03-04

## 2025-03-04 RX ORDER — BUDESONIDE AND FORMOTEROL FUMARATE DIHYDRATE 160; 4.5 UG/1; UG/1
2 AEROSOL RESPIRATORY (INHALATION)
Status: DISCONTINUED | OUTPATIENT
Start: 2025-03-04 | End: 2025-03-13 | Stop reason: HOSPADM

## 2025-03-04 RX ORDER — CALCIUM GLUCONATE 20 MG/ML
1000 INJECTION, SOLUTION INTRAVENOUS ONCE
Status: COMPLETED | OUTPATIENT
Start: 2025-03-04 | End: 2025-03-04

## 2025-03-04 RX ADMIN — CEFEPIME 2000 MG: 2 INJECTION, POWDER, FOR SOLUTION INTRAVENOUS at 21:30

## 2025-03-04 RX ADMIN — IPRATROPIUM BROMIDE AND ALBUTEROL SULFATE 1 DOSE: .5; 2.5 SOLUTION RESPIRATORY (INHALATION) at 07:59

## 2025-03-04 RX ADMIN — METHYLPREDNISOLONE SODIUM SUCCINATE 40 MG: 40 INJECTION, POWDER, LYOPHILIZED, FOR SOLUTION INTRAMUSCULAR; INTRAVENOUS at 08:27

## 2025-03-04 RX ADMIN — CALCIUM GLUCONATE 1000 MG: 20 INJECTION, SOLUTION INTRAVENOUS at 09:21

## 2025-03-04 RX ADMIN — EPOETIN ALFA-EPBX 40000 UNITS: 40000 INJECTION, SOLUTION INTRAVENOUS; SUBCUTANEOUS at 13:23

## 2025-03-04 RX ADMIN — WATER 1 ML: 1 INJECTION INTRAMUSCULAR; INTRAVENOUS; SUBCUTANEOUS at 08:27

## 2025-03-04 RX ADMIN — ROSUVASTATIN CALCIUM 20 MG: 20 TABLET, FILM COATED ORAL at 21:44

## 2025-03-04 RX ADMIN — ALBUTEROL SULFATE 2 PUFF: 90 AEROSOL, METERED RESPIRATORY (INHALATION) at 20:53

## 2025-03-04 RX ADMIN — SODIUM CHLORIDE: 9 INJECTION, SOLUTION INTRAVENOUS at 07:01

## 2025-03-04 RX ADMIN — Medication 4 ML: at 20:52

## 2025-03-04 RX ADMIN — GUAIFENESIN 600 MG: 600 TABLET ORAL at 21:44

## 2025-03-04 RX ADMIN — SODIUM CHLORIDE, PRESERVATIVE FREE 10 ML: 5 INJECTION INTRAVENOUS at 21:45

## 2025-03-04 RX ADMIN — Medication 2 PUFF: at 09:46

## 2025-03-04 RX ADMIN — Medication 2 PUFF: at 20:53

## 2025-03-04 RX ADMIN — Medication 4 ML: at 07:59

## 2025-03-04 RX ADMIN — Medication 4 ML: at 12:32

## 2025-03-04 RX ADMIN — EZETIMIBE 10 MG: 10 TABLET ORAL at 08:22

## 2025-03-04 RX ADMIN — SODIUM CHLORIDE, PRESERVATIVE FREE 10 ML: 5 INJECTION INTRAVENOUS at 08:27

## 2025-03-04 RX ADMIN — GUAIFENESIN 600 MG: 600 TABLET ORAL at 08:22

## 2025-03-04 RX ADMIN — CEFEPIME 2000 MG: 2 INJECTION, POWDER, FOR SOLUTION INTRAVENOUS at 08:31

## 2025-03-04 ASSESSMENT — PAIN SCALES - GENERAL
PAINLEVEL_OUTOF10: 0

## 2025-03-04 NOTE — PLAN OF CARE
Problem: Chronic Conditions and Co-morbidities  Goal: Patient's chronic conditions and co-morbidity symptoms are monitored and maintained or improved  3/4/2025 0844 by Princess García, RN  Outcome: Progressing  Flowsheets (Taken 3/4/2025 0800)  Care Plan - Patient's Chronic Conditions and Co-Morbidity Symptoms are Monitored and Maintained or Improved:   Monitor and assess patient's chronic conditions and comorbid symptoms for stability, deterioration, or improvement   Collaborate with multidisciplinary team to address chronic and comorbid conditions and prevent exacerbation or deterioration   Update acute care plan with appropriate goals if chronic or comorbid symptoms are exacerbated and prevent overall improvement and discharge     Problem: Discharge Planning  Goal: Discharge to home or other facility with appropriate resources  3/4/2025 0844 by Princess García, RN  Outcome: Progressing  Flowsheets (Taken 3/4/2025 0800)  Discharge to home or other facility with appropriate resources:   Identify barriers to discharge with patient and caregiver   Arrange for needed discharge resources and transportation as appropriate     Problem: Pain  Goal: Verbalizes/displays adequate comfort level or baseline comfort level  3/4/2025 0844 by Princess García, RN  Outcome: Progressing     Problem: Skin/Tissue Integrity  Goal: Skin integrity remains intact  Description: 1.  Monitor for areas of redness and/or skin breakdown  2.  Assess vascular access sites hourly  3.  Every 4-6 hours minimum:  Change oxygen saturation probe site  4.  Every 4-6 hours:  If on nasal continuous positive airway pressure, respiratory therapy assess nares and determine need for appliance change or resting period  3/4/2025 0844 by Princess García, RN  Outcome: Progressing  Flowsheets  Taken 3/4/2025 0843  Skin Integrity Remains Intact:   Monitor for areas of redness and/or skin breakdown   Assess vascular access sites hourly   Every 4-6 hours minimum: Change

## 2025-03-04 NOTE — PROGRESS NOTES
Pulmonary Critical Care Note     Patient's name:  Yury Levin  Medical Record Number: [id number]  Patient's account/billing number: [id number]  Patient's YOB: 1950  Age: 74 y.o.  Date of Admission: 3/2/2025  9:38 PM  Date of Consult: 3/4/2025      Primary Care Physician: Diamond Robles MD      Code Status: Full Code    Reason for consult: Acute respiratory failure with hypoxia    Assessment and Plan     Acute respiratory with hypoxia less than 90% on room air with respiratory distress requiring BiPAP  Multifocal pneumonia  Influenza A pneumonia  Recent COVID infection  History of prostate cancer metastatic  History of A-fib  ERIBERTO      Plan:  Titrate oxygen to keep sats more than 90%, use BiPAP for increased work of breathing.  Aspiration precautions.  Cefepime.  Solu-Medrol daily.  Status post Paxlovid.  Albuterol and 3%.  Gentle hydration  Stable to transfer out of ICU       HISTORY OF PRESENT ILLNESS:   Mr./Ms. Yury Levin is a 74 y.o. gentleman with past medical history stated below significant for metastatic prostate cancer on Orgovyx, history of A-fib status post watchman, diagnosed with COVID infection on Tuesday started on Paxlovid presenting with worsening cough shortness of breath admitted to the ICU with hypoxic respiratory failure requiring BiPAP and high flow oxygen.      REVIEW OF SYSTEMS:  Review of Systems -   General ROS: Generalized weak  Psychological ROS: negative  Ophthalmic ROS: negative  ENT ROS: negative  Allergy and Immunology ROS: negative  Hematological and Lymphatic ROS: negative  Endocrine ROS: negative  Breast ROS: negative  Respiratory ROS: Shortness of breath, cough, sputum.  Cardiovascular ROS: no chest pain or dyspnea on exertion  Gastrointestinal ROS:negative  Genito-Urinary ROS: negative  Musculoskeletal ROS: negative  Neurological ROS: negative  Dermatological ROS: negative        Physical Exam:    Vitals: BP  Severe  coronary artery atherosclerosis. Status post CABG. The aorta and arch  branches are normal in course and caliber.    No pathologic lymphadenopathy.    Lungs/pleura: The lungs demonstrate bilateral airspace opacities involving  all lobes.  No significant pleural effusions.  The central airways are  patent.  No bronchial thickening or bronchiectasis.    Upper Abdomen: Cholelithiasis is present.    Soft Tissues/Bones: No acute bone or soft tissue abnormality.  Degenerative  changes are seen throughout the cervical spine.  Status post sternotomy.    Impression  1. No pulmonary embolus.  2. Multifocal pneumonia.  3. Cholelithiasis.      CXR PA/LAT: No results found for this or any previous visit.      CXR portable: Results for orders placed during the hospital encounter of 06/10/24    XR CHEST PORTABLE    Narrative  EXAMINATION:  ONE XRAY VIEW OF THE CHEST    6/10/2024 9:24 am    COMPARISON:  01/19/2018 radiograph    HISTORY:  ORDERING SYSTEM PROVIDED HISTORY: fatigue  TECHNOLOGIST PROVIDED HISTORY:  Reason for exam:->fatigue  Reason for Exam: fatigue    FINDINGS:  Cardiomegaly with remote prior surgical changes of CABG.  A right port  catheter is in place.  Pulmonary vascular markings are normal.  Minimal  bibasilar atelectasis.  The lungs are otherwise clear.  No significant  skeletal finding.    Impression  Cardiomegaly with no acute finding.      Cait Cabrera MD, MCHANDAN.            3/4/2025, 11:57 AM           Patient tolerated procedure well.

## 2025-03-04 NOTE — PROGRESS NOTES
ONCOLOGY HEMATOLOGY CARE PROGRESS NOTE      SUBJECTIVE:  Patient is still quite weak but slowly getting better.  He denies any shortness of breath or chest pain.    ROS:     Constitutional:  No weight loss, No fever, No chills, No night sweats.  Energy level good.  Eyes:  No impairment or change in vision  ENT / Mouth:  No pain, abnormal ulceration, bleeding, nasal drip or change in voice or hearing  Cardiovascular:  No chest pain, palpitations, new edema, or calf discomfort  Respiratory:  No pain, hemoptysis, change to breathing  Breast:  No pain, discharge, change in appearance or texture  Gastrointestinal:  No pain, cramping, jaundice, change to eating and bowel habits  Urinary:  No pain, bleeding or change in continence  Genitalia: No pain, bleeding or discharge  Musculoskeletal:  No redness, pain, edema or weakness  Skin:  No pruritus, rash, change to nodules or lesions  Neurologic:  No discomfort, change in mental status, speech, sensory or motor activity  Psychiatric:  No change in concentration or change to affect or mood  Endocrine:  No hot flashes, increased thirst, or change to urine production  Hematologic: No petechiae, ecchymosis or bleeding  Lymphatic:  No lymphadenopathy or lymphedema  Allergy / Immunologic:  No eczema, hives, frequent or recurrent infections    OBJECTIVE        Physical    VITALS:  Patient Vitals for the past 24 hrs:   BP Temp Temp src Pulse Resp SpO2 Weight   03/04/25 1000 113/63 -- -- 71 16 98 % --   03/04/25 0945 -- -- -- 68 16 96 % --   03/04/25 0900 106/62 -- -- 64 15 100 % --   03/04/25 0803 -- -- -- 66 -- -- --   03/04/25 0801 -- -- -- -- -- 100 % --   03/04/25 0800 117/79 98 °F (36.7 °C) Oral 66 29 100 % --   03/04/25 0700 120/61 -- -- 65 26 100 % --   03/04/25 0406 -- -- -- 64 18 97 % --   03/04/25 0400 116/68 97.9 °F (36.6 °C) Oral 65 19 95 % 75.5 kg (166 lb 7.2 oz)   03/04/25 0330 105/68 -- -- 64 24 97 % --   03/04/25 0300 119/60 --

## 2025-03-04 NOTE — PROGRESS NOTES
Physical Therapy    Yury Levin  [id number]  [id number]    Attempted to see for PT eval however pt just transferred up to PCU from ICU and is too fatigued to participate.  He requested that therapist come back tomorrow  Electronically signed by TONIA ROSAS, PT on 3/4/2025 at 2:56 PM

## 2025-03-04 NOTE — PROGRESS NOTES
NAME:  Yury Levin  YOB: 1950  MEDICAL RECORD NUMBER:  [id number]    Shift Summary: Pt transferred to room 5124 on 5W. Report called to ALBERT Ontiveros. Bedside handoff completed with ALBERT Ontiveros.    Family updated: Yes:  wife at bedside    Rhythm: Normal Sinus Rhythm     Most recent vitals:   Visit Vitals  /65   Pulse 88   Temp 98.1 °F (36.7 °C) (Oral)   Resp 24   Ht 1.753 m (5' 9\")   Wt 75.5 kg (166 lb 7.2 oz)   SpO2 98%   BMI 24.58 kg/m²           No data found.    No data found.      Respiratory support needed (if any):  - O2 - NC - 3 lpm    Admission weight Weight - Scale: 72.2 kg (159 lb 1.6 oz) (03/02/25 2143)    Today's weight    Wt Readings from Last 1 Encounters:   03/04/25 75.5 kg (166 lb 7.2 oz)        Pena need assessed each shift: N/A - no pena present  UOP >30ml/hr: YES  Last documented BM (in last 48 hrs):  Patient Vitals for the past 48 hrs:   Last BM (including prior to admit) Stool Occurrence   03/03/25 1215 03/03/25 1                Restraints (in use currently or dc'd in last 12 hrs): No      Lines/Drains reviewed @ bedside.  Implantable Port 08/29/24 Right Chest (Active)   Number of days: 187         Drip rates at handoff:    sodium chloride      sodium chloride 50 mL/hr at 03/04/25 1329       Lab Data:   CBC:   Recent Labs     03/02/25  2225 03/04/25  0326   WBC 6.6 6.7   HGB 8.4* 7.3*   HCT 25.8* 21.8*   MCV 96.5 95.2   * 101*     BMP:    Recent Labs     03/03/25  0405 03/03/25  1323 03/04/25  0323     --  143   K 3.5 4.1 4.2   CO2 26  --  26   BUN 37*  --  25*   CREATININE 1.2  --  0.9     LIVR:   Recent Labs     03/03/25  0405 03/04/25  0323   AST 29 22   ALT 28 21     PT/INR: No results for input(s): \"INR\" in the last 72 hours.    Invalid input(s): \"PROT\"  APTT: No results for input(s): \"APTT\" in the last 72 hours.  ABG: No results for input(s): \"PHART\", \"[id number]\", \"PO2ART\" in the last 72 hours.    Any consults during the shift? No    Any signed and held

## 2025-03-04 NOTE — PROGRESS NOTES
Report called to ALBERT Ontiveros. Pt transferred to room 5124 on 5W. Bedside handoff and 4 eyes completed with ALBERT Ontiveros. All questions answered at this time.

## 2025-03-04 NOTE — FLOWSHEET NOTE
Pt to PCU from ICU. Pt hooked up to bedside tele monitor and verified with [facility]. Pt in bed in lowest position, bed alarm on, and call light within reach. Pt wife is at bedside. Pt is resting in bed with no expressed needs at this time.    Electronically signed by Ragini Figueroa RN on 3/4/25 at 2:24 PM EST    03/04/25 1421   Vital Signs   Temp 98.1 °F (36.7 °C)   Temp Source Oral   Pulse 88   Heart Rate Source Monitor   Respirations 24   /65   MAP (Calculated) 84   BP Location Right lower arm   BP Method Automatic   Pain Assessment   Pain Assessment None - Denies Pain   Oxygen Therapy   SpO2 98 %   O2 Device Nasal cannula   O2 Flow Rate (L/min) 3 L/min

## 2025-03-04 NOTE — PROGRESS NOTES
V2.0  [facility] Hospitalist Progress Note      Name:  Yury Levin /Age/Sex: 1950  (74 y.o. male)   MRN & CSN:  [id number] & [id number] Encounter Date/Time: 3/4/2025 9:18 AM EST    Location:  [id number] PCP: Diamond Robles MD       Hospital Day: 3    Assessment and Plan:   Yury Levin is a 74 y.o. male presenting with shortness of breath      Plan:  Multifocal pneumonia  -Covering with antibiotics with cefepime and azithromycin  -Influenza A positive in ED. Tamiflu  -Strep Pneumo antigen positive  COVID  -diagnosed as outpatient last week Tuesday  -s/p paxlovid  Acute hypoxic respiratory failure  -BiPAP weaned, currently on 4L NC  -Discussed with pulm 3/3: aspiration precautions, solumedrol, albuterol and 3% nebulizers.   -wean o2 as tolerated  -Pulm note 3/4 reviewed: Continue antibiotics, steroids. Stable to transfer out of ICU  Metastatic prostate cancer  -follows with [facility]  -consult regarding patient's orgovyx therapy in setting of infection  -Oncology note reviewed 3/3: holding chemo due to deconditioning. Sending nutritional studies  Afib s/p watchman  -continue beta-blocker    Ppx: Lovenox  Dispo: anticipate SNF    Subjective:     Chief Complaint: Altered Mental Status (Pt is being treated for COVID since Tuesday and the patient has been hsaving spikes in fever and has been acting off, SpO2 79% RA pt doesn't normally wear oxygen )     Interval Hx:  Patient denies shortness of breath today. Denies chest pain. Feels improving, hopeful that he will be able to leave the hospiatl I nt near future. Denies chest pain, productive cough but feels congested.  Denies fever/chills    Review of Systems:    Review of Systems    10 point ROS negative except as stated above in \"subjective\" section    Objective:     Intake/Output Summary (Last 24 hours) at 3/4/2025 0759  Last data filed at 3/4/2025 0400  Gross per 24 hour   Intake 2991.32 ml   Output 1700 ml   Net 1291.32 ml        Vitals:   Vitals:     34.0 pg    MCHC 33.4 31.0 - 36.0 g/dL    RDW 17.6 (H) 12.4 - 15.4 %    Platelets 101 (L) 135 - 450 K/uL    MPV 8.7 5.0 - 10.5 fL    Neutrophils % 94.2 %    Lymphocytes % 3.3 %    Monocytes % 2.5 %    Eosinophils % 0.0 %    Basophils % 0.0 %    Neutrophils Absolute 6.3 1.7 - 7.7 K/uL    Lymphocytes Absolute 0.2 (L) 1.0 - 5.1 K/uL    Monocytes Absolute 0.2 0.0 - 1.3 K/uL    Eosinophils Absolute 0.0 0.0 - 0.6 K/uL    Basophils Absolute 0.0 0.0 - 0.2 K/uL        Imaging/Diagnostics Last 24 Hours   CT CHEST PULMONARY EMBOLISM W CONTRAST    Result Date: 3/3/2025  EXAMINATION: CTA OF THE CHEST 3/2/2025 11:02 pm TECHNIQUE: CTA of the chest was performed after the administration of intravenous contrast.  Multiplanar reformatted images are provided for review.  MIP images are provided for review. Automated exposure control, iterative reconstruction, and/or weight based adjustment of the mA/kV was utilized to reduce the radiation dose to as low as reasonably achievable. COMPARISON: Cristy 10, 2024.  August 21, 2023. HISTORY: ORDERING SYSTEM PROVIDED HISTORY: hypoxia covid TECHNOLOGIST PROVIDED HISTORY: Reason for exam:->hypoxia covid Additional Contrast?->1 Reason for Exam: hypoxia covid FINDINGS: Pulmonary Arteries: Pulmonary arteries are adequately opacified for evaluation.  No evidence of intraluminal filling defect to suggest pulmonary embolism.  Main pulmonary artery is normal in caliber. Mediastinum: The heart is enlarged without a pericardial effusion. Severe coronary artery atherosclerosis. Status post CABG. The aorta and arch branches are normal in course and caliber. No pathologic lymphadenopathy. Lungs/pleura: The lungs demonstrate bilateral airspace opacities involving all lobes.  No significant pleural effusions.  The central airways are patent.  No bronchial thickening or bronchiectasis. Upper Abdomen: Cholelithiasis is present. Soft Tissues/Bones: No acute bone or soft tissue abnormality.  Degenerative changes are

## 2025-03-04 NOTE — PROGRESS NOTES
4 Eyes Skin Assessment     NAME:  Yury Levin  YOB: 1950  MEDICAL RECORD NUMBER:  [id number]    The patient is being assessed for  Shift Handoff    I agree that at least one RN has performed a thorough Head to Toe Skin Assessment on the patient. ALL assessment sites listed below have been assessed.      Areas assessed by both nurses:    Head, Face, Ears, Shoulders, Back, Chest, Arms, Elbows, Hands, Sacrum. Buttock, Coccyx, Ischium, Legs. Feet and Heels, and Under Medical Devices         Does the Patient have a Wound? Yes wound(s) were present on assessment. LDA wound assessment was Initiated and completed by RN       Jaime Prevention initiated by RN: Yes  Wound Care Orders initiated by RN: Yes    Pressure Injury (Stage 3,4, Unstageable, DTI, NWPT, and Complex wounds) if present, place Wound referral order by RN under : Yes    New Ostomies, if present place, Ostomy referral order under : No     Nurse 1 eSignature: Electronically signed by Princess García RN on 3/4/25 at 7:55 AM EST    **SHARE this note so that the co-signing nurse can place an eSignature**    Nurse 2 eSignature: Electronically signed by Beatriz Jones RN on 3/4/25 at 7:57 AM EST

## 2025-03-04 NOTE — PLAN OF CARE
Problem: Chronic Conditions and Co-morbidities  Goal: Patient's chronic conditions and co-morbidity symptoms are monitored and maintained or improved  Outcome: Progressing  Flowsheets (Taken 3/3/2025 2000)  Care Plan - Patient's Chronic Conditions and Co-Morbidity Symptoms are Monitored and Maintained or Improved: Monitor and assess patient's chronic conditions and comorbid symptoms for stability, deterioration, or improvement     Problem: Discharge Planning  Goal: Discharge to home or other facility with appropriate resources  Outcome: Progressing  Flowsheets (Taken 3/3/2025 2000)  Discharge to home or other facility with appropriate resources:   Identify barriers to discharge with patient and caregiver   Arrange for needed discharge resources and transportation as appropriate   Identify discharge learning needs (meds, wound care, etc)   Refer to discharge planning if patient needs post-hospital services based on physician order or complex needs related to functional status, cognitive ability or social support system     Problem: Pain  Goal: Verbalizes/displays adequate comfort level or baseline comfort level  Outcome: Progressing  Flowsheets (Taken 3/3/2025 2000)  Verbalizes/displays adequate comfort level or baseline comfort level:   Encourage patient to monitor pain and request assistance   Assess pain using appropriate pain scale     Problem: Skin/Tissue Integrity  Goal: Skin integrity remains intact  Description: 1.  Monitor for areas of redness and/or skin breakdown  2.  Assess vascular access sites hourly  3.  Every 4-6 hours minimum:  Change oxygen saturation probe site  4.  Every 4-6 hours:  If on nasal continuous positive airway pressure, respiratory therapy assess nares and determine need for appliance change or resting period  Outcome: Progressing  Flowsheets (Taken 3/3/2025 2000)  Skin Integrity Remains Intact: Monitor for areas of redness and/or skin breakdown     Problem: Safety - Adult  Goal: Free

## 2025-03-05 ENCOUNTER — APPOINTMENT (OUTPATIENT)
Dept: GENERAL RADIOLOGY | Age: 75
DRG: 871 | End: 2025-03-05
Payer: COMMERCIAL

## 2025-03-05 LAB
ALBUMIN SERPL-MCNC: 2.7 G/DL (ref 3.4–5)
ALBUMIN/GLOB SERPL: 1.2 {RATIO} (ref 1.1–2.2)
ALP SERPL-CCNC: 57 U/L (ref 40–129)
ALT SERPL-CCNC: 19 U/L (ref 10–40)
ANION GAP SERPL CALCULATED.3IONS-SCNC: 10 MMOL/L (ref 3–16)
AST SERPL-CCNC: 21 U/L (ref 15–37)
BASE EXCESS BLDA CALC-SCNC: 1.5 MMOL/L (ref -3–3)
BASOPHILS # BLD: 0 K/UL (ref 0–0.2)
BASOPHILS NFR BLD: 0 %
BILIRUB SERPL-MCNC: <0.2 MG/DL (ref 0–1)
BUN SERPL-MCNC: 23 MG/DL (ref 7–20)
CALCIUM SERPL-MCNC: 8.1 MG/DL (ref 8.3–10.6)
CHLORIDE SERPL-SCNC: 109 MMOL/L (ref 99–110)
CO2 BLDA-SCNC: 26.7 MMOL/L
CO2 SERPL-SCNC: 24 MMOL/L (ref 21–32)
COHGB MFR BLDA: 1.3 % (ref 0–1.5)
CREAT SERPL-MCNC: 0.8 MG/DL (ref 0.8–1.3)
DEPRECATED RDW RBC AUTO: 18.1 % (ref 12.4–15.4)
EOSINOPHIL # BLD: 0 K/UL (ref 0–0.6)
EOSINOPHIL NFR BLD: 0 %
GFR SERPLBLD CREATININE-BSD FMLA CKD-EPI: >90 ML/MIN/{1.73_M2}
GLUCOSE SERPL-MCNC: 112 MG/DL (ref 70–99)
HCO3 BLDA-SCNC: 25.6 MMOL/L (ref 21–29)
HCT VFR BLD AUTO: 22.7 % (ref 40.5–52.5)
HGB BLD-MCNC: 7.5 G/DL (ref 13.5–17.5)
HGB BLDA-MCNC: 6 G/DL (ref 13.5–17.5)
LYMPHOCYTES # BLD: 0.2 K/UL (ref 1–5.1)
LYMPHOCYTES NFR BLD: 3.1 %
MAGNESIUM SERPL-MCNC: 2.06 MG/DL (ref 1.8–2.4)
MCH RBC QN AUTO: 31.7 PG (ref 26–34)
MCHC RBC AUTO-ENTMCNC: 33.1 G/DL (ref 31–36)
MCV RBC AUTO: 95.8 FL (ref 80–100)
METHGB MFR BLDA: 1 %
MONOCYTES # BLD: 0.2 K/UL (ref 0–1.3)
MONOCYTES NFR BLD: 2.7 %
NEUTROPHILS # BLD: 7.7 K/UL (ref 1.7–7.7)
NEUTROPHILS NFR BLD: 94.2 %
NT-PROBNP SERPL-MCNC: 4587 PG/ML (ref 0–449)
O2 THERAPY: ABNORMAL
PCO2 BLDA: 36.4 MMHG (ref 35–45)
PH BLDA: 7.46 [PH] (ref 7.35–7.45)
PHOSPHATE SERPL-MCNC: 1.8 MG/DL (ref 2.5–4.9)
PLATELET # BLD AUTO: 118 K/UL (ref 135–450)
PMV BLD AUTO: 9 FL (ref 5–10.5)
PO2 BLDA: 80.9 MMHG (ref 75–108)
POTASSIUM SERPL-SCNC: 4.1 MMOL/L (ref 3.5–5.1)
PROCALCITONIN SERPL IA-MCNC: 1.58 NG/ML (ref 0–0.15)
PROT SERPL-MCNC: 4.9 G/DL (ref 6.4–8.2)
RBC # BLD AUTO: 2.37 M/UL (ref 4.2–5.9)
SAO2 % BLDA: 97.2 %
SODIUM SERPL-SCNC: 143 MMOL/L (ref 136–145)
WBC # BLD AUTO: 8.1 K/UL (ref 4–11)

## 2025-03-05 PROCEDURE — 83735 ASSAY OF MAGNESIUM: CPT

## 2025-03-05 PROCEDURE — 6360000002 HC RX W HCPCS: Performed by: INTERNAL MEDICINE

## 2025-03-05 PROCEDURE — 9990000010 HC NO CHARGE VISIT: Performed by: PHYSICAL THERAPIST

## 2025-03-05 PROCEDURE — 99233 SBSQ HOSP IP/OBS HIGH 50: CPT | Performed by: INTERNAL MEDICINE

## 2025-03-05 PROCEDURE — 6360000002 HC RX W HCPCS: Performed by: STUDENT IN AN ORGANIZED HEALTH CARE EDUCATION/TRAINING PROGRAM

## 2025-03-05 PROCEDURE — 83880 ASSAY OF NATRIURETIC PEPTIDE: CPT

## 2025-03-05 PROCEDURE — 2500000003 HC RX 250 WO HCPCS: Performed by: NURSE PRACTITIONER

## 2025-03-05 PROCEDURE — 5A0945A ASSISTANCE WITH RESPIRATORY VENTILATION, 24-96 CONSECUTIVE HOURS, HIGH NASAL FLOW/VELOCITY: ICD-10-PCS | Performed by: STUDENT IN AN ORGANIZED HEALTH CARE EDUCATION/TRAINING PROGRAM

## 2025-03-05 PROCEDURE — 85025 COMPLETE CBC W/AUTO DIFF WBC: CPT

## 2025-03-05 PROCEDURE — 94669 MECHANICAL CHEST WALL OSCILL: CPT

## 2025-03-05 PROCEDURE — 6360000002 HC RX W HCPCS: Performed by: NURSE PRACTITIONER

## 2025-03-05 PROCEDURE — 71045 X-RAY EXAM CHEST 1 VIEW: CPT

## 2025-03-05 PROCEDURE — 2500000003 HC RX 250 WO HCPCS: Performed by: INTERNAL MEDICINE

## 2025-03-05 PROCEDURE — 2500000003 HC RX 250 WO HCPCS

## 2025-03-05 PROCEDURE — 84145 PROCALCITONIN (PCT): CPT

## 2025-03-05 PROCEDURE — 6370000000 HC RX 637 (ALT 250 FOR IP): Performed by: NURSE PRACTITIONER

## 2025-03-05 PROCEDURE — 82803 BLOOD GASES ANY COMBINATION: CPT

## 2025-03-05 PROCEDURE — 36600 WITHDRAWAL OF ARTERIAL BLOOD: CPT

## 2025-03-05 PROCEDURE — 94761 N-INVAS EAR/PLS OXIMETRY MLT: CPT

## 2025-03-05 PROCEDURE — 94640 AIRWAY INHALATION TREATMENT: CPT

## 2025-03-05 PROCEDURE — 84100 ASSAY OF PHOSPHORUS: CPT

## 2025-03-05 PROCEDURE — 6370000000 HC RX 637 (ALT 250 FOR IP): Performed by: INTERNAL MEDICINE

## 2025-03-05 PROCEDURE — 2700000000 HC OXYGEN THERAPY PER DAY

## 2025-03-05 PROCEDURE — 80053 COMPREHEN METABOLIC PANEL: CPT

## 2025-03-05 PROCEDURE — 2580000003 HC RX 258: Performed by: INTERNAL MEDICINE

## 2025-03-05 PROCEDURE — 2060000000 HC ICU INTERMEDIATE R&B

## 2025-03-05 PROCEDURE — 2580000003 HC RX 258: Performed by: NURSE PRACTITIONER

## 2025-03-05 RX ORDER — LORAZEPAM 2 MG/ML
0.5 INJECTION INTRAMUSCULAR
Status: COMPLETED | OUTPATIENT
Start: 2025-03-05 | End: 2025-03-05

## 2025-03-05 RX ORDER — WATER 10 ML/10ML
INJECTION INTRAMUSCULAR; INTRAVENOUS; SUBCUTANEOUS
Status: COMPLETED
Start: 2025-03-05 | End: 2025-03-05

## 2025-03-05 RX ORDER — ALBUTEROL SULFATE 0.83 MG/ML
2.5 SOLUTION RESPIRATORY (INHALATION)
Status: DISCONTINUED | OUTPATIENT
Start: 2025-03-05 | End: 2025-03-13 | Stop reason: HOSPADM

## 2025-03-05 RX ADMIN — ALBUTEROL SULFATE 2.5 MG: 0.83 SOLUTION RESPIRATORY (INHALATION) at 15:53

## 2025-03-05 RX ADMIN — ALBUTEROL SULFATE 2 PUFF: 90 AEROSOL, METERED RESPIRATORY (INHALATION) at 08:28

## 2025-03-05 RX ADMIN — Medication 4 ML: at 14:02

## 2025-03-05 RX ADMIN — Medication 2 PUFF: at 08:28

## 2025-03-05 RX ADMIN — GUAIFENESIN 600 MG: 600 TABLET ORAL at 20:11

## 2025-03-05 RX ADMIN — POLYETHYLENE GLYCOL 3350 17 G: 17 POWDER, FOR SOLUTION ORAL at 13:54

## 2025-03-05 RX ADMIN — LORAZEPAM 0.5 MG: 2 INJECTION INTRAMUSCULAR; INTRAVENOUS at 23:03

## 2025-03-05 RX ADMIN — ROSUVASTATIN CALCIUM 20 MG: 20 TABLET, FILM COATED ORAL at 20:10

## 2025-03-05 RX ADMIN — CEFEPIME 2000 MG: 2 INJECTION, POWDER, FOR SOLUTION INTRAVENOUS at 09:25

## 2025-03-05 RX ADMIN — ALBUTEROL SULFATE 2.5 MG: 2.5 SOLUTION RESPIRATORY (INHALATION) at 20:25

## 2025-03-05 RX ADMIN — Medication 4 ML: at 20:26

## 2025-03-05 RX ADMIN — CEFEPIME 2000 MG: 2 INJECTION, POWDER, FOR SOLUTION INTRAVENOUS at 20:12

## 2025-03-05 RX ADMIN — ALBUTEROL SULFATE 2.5 MG: 2.5 SOLUTION RESPIRATORY (INHALATION) at 14:01

## 2025-03-05 RX ADMIN — Medication 2 PUFF: at 20:26

## 2025-03-05 RX ADMIN — Medication 4 ML: at 08:28

## 2025-03-05 RX ADMIN — SODIUM PHOSPHATE, MONOBASIC, MONOHYDRATE 30 MMOL: 276; 142 INJECTION, SOLUTION INTRAVENOUS at 11:26

## 2025-03-05 RX ADMIN — SODIUM CHLORIDE, PRESERVATIVE FREE 10 ML: 5 INJECTION INTRAVENOUS at 09:25

## 2025-03-05 RX ADMIN — EZETIMIBE 10 MG: 10 TABLET ORAL at 12:19

## 2025-03-05 RX ADMIN — METHYLPREDNISOLONE SODIUM SUCCINATE 40 MG: 40 INJECTION, POWDER, LYOPHILIZED, FOR SOLUTION INTRAMUSCULAR; INTRAVENOUS at 09:18

## 2025-03-05 RX ADMIN — WATER 10 ML: 1 INJECTION INTRAMUSCULAR; INTRAVENOUS; SUBCUTANEOUS at 09:18

## 2025-03-05 RX ADMIN — SODIUM CHLORIDE, PRESERVATIVE FREE 10 ML: 5 INJECTION INTRAVENOUS at 20:12

## 2025-03-05 RX ADMIN — METOPROLOL SUCCINATE 25 MG: 25 TABLET, EXTENDED RELEASE ORAL at 12:19

## 2025-03-05 NOTE — PROGRESS NOTES
Occupational Therapy      OT order received and chart reviewed. Pt with inc oxygen demands and now on bipap. Will defer therapy at this time.    Electronically signed by Dilcia Méndez OT on 3/5/2025 at 9:26 AM

## 2025-03-05 NOTE — PROGRESS NOTES
V2.0  [facility] Hospitalist Progress Note      Name:  Yury Levin /Age/Sex: 1950  (74 y.o. male)   MRN & CSN:  [id number] & [id number] Encounter Date/Time: 3/5/2025 9:18 AM EST    Location:  [id number] PCP: Diamond Robles MD       Hospital Day: 4    Assessment and Plan:   Yury Levin is a 74 y.o. male presenting with shortness of breath      Plan:  Multifocal pneumonia  -Covering with antibiotics with cefepime and azithromycin  -Influenza A positive in ED. Tamiflu  -Strep Pneumo antigen positive  -Discussed with pulm 3/5: patient with increased work of breathing today. Placed on bipap. Monitor closely. Continue on antibiotics. Feel pulmonary issues are still likely due to infection  COVID  -diagnosed as outpatient last week Tuesday. Negative on inpatient testing  -s/p paxlovid  Acute hypoxic respiratory failure  -BiPAP weaned, currently on 4L NC  -Discussed with pulm 3/3: aspiration precautions, solumedrol, albuterol and 3% nebulizers.   -wean o2 as tolerated  -Pulm note 3/5 reviewed: Continue antibiotics, steroids. Saline and albuterol nebulizers.  Metastatic prostate cancer  -follows with [facility]  -consult regarding patient's orgovyx therapy in setting of infection  -Discussed with Oncology 3/5: off chemo for several weeks due to deconditioning. If patient's cancer was progressing would be recommending hospice due to his poor functional status.  Anemia of chronic disease  -s/p dose of retacrit; heme/onc following.  Afib s/p watchman  -continue beta-blocker    Ppx: Lovenox  Dispo: anticipate SNF    Subjective:     Chief Complaint: Altered Mental Status (Pt is being treated for COVID since Tuesday and the patient has been hsaving spikes in fever and has been acting off, SpO2 79% RA pt doesn't normally wear oxygen )     Interval Hx:  Pt more tachypneic and shallow breathing overnight. O2 turned up to 9L overnight; Seen with pulm at bedside. Patient placed on bipap, moving good air with this. Still

## 2025-03-05 NOTE — PROGRESS NOTES
Physical Therapy    Yury Levin  [id number]  [id number]    Attempted to see for PT eval however pt now on Bipap and over night increased from 4 liters to 9 liters O2.  Will follow and attempt when pt is medically stable  Electronically signed by TONIA ROSAS, PT on 3/5/2025 at 9:27 AM

## 2025-03-05 NOTE — PROGRESS NOTES
Pulmonary Progress Note    Date of Admission: 3/2/2025   LOS: 2 days       CC:  Chief Complaint   Patient presents with    Altered Mental Status     Pt is being treated for COVID since Tuesday and the patient has been hsaving spikes in fever and has been acting off, SpO2 79% RA pt doesn't normally wear oxygen         Subjective:  Worsening hypoxemia     Assessment:          Plan:     This note may have been transcribed using Dragon Dictation software. Please disregard any translational errors.       Hospital Day: 2     COVID  Influenza A  Multifocal pneumonia  + Strep pneumonia antigen  Acute hypoxemia / bipap   Increased work of breathing.  Placed on bipap  chest X-ray with multifocal disease.   Solumedrol daily  status post Paxlovid  albuterol   3% saline  Cefepime   Procal improved.    Discussed work of breathing with RT / RN / Arben Art MD.    Monitor closely.     Hypophos    Replace           Data:        PHYSICAL EXAM:   Blood pressure 133/86, pulse 93, temperature 98.3 °F (36.8 °C), temperature source Axillary, resp. rate 26, height 1.753 m (5' 9\"), weight 75.4 kg (166 lb 3.6 oz), SpO2 96%.'  Body mass index is 24.55 kg/m².   Gen: No distress.    ENT:   Resp: No accessory muscle use. No crackles. No wheezes. No rhonchi.  Increased work of breathing .    CV: Regular rate. Regular rhythm. No murmur or rub. No edema.   Skin: Warm, dry, normal texture and turgor. No nodule on exposed extremities.   M/S: No cyanosis. No clubbing. No joint deformity.  Psych: wake. On bipap      Medications:    Scheduled Meds:   sodium phosphate IVPB (PERIPHERAL line)  30 mmol IntraVENous Once    budesonide-formoterol  2 puff Inhalation BID RT    ezetimibe  10 mg Oral Daily    metoprolol succinate  25 mg Oral Daily    relugolix  120 mg Oral Daily    rosuvastatin  20 mg Oral Nightly    sodium chloride flush  5-40 mL IntraVENous 2 times per day    enoxaparin  40 mg SubCUTAneous Daily    guaiFENesin  600 mg Oral BID    cefepime

## 2025-03-05 NOTE — FLOWSHEET NOTE
03/05/25 0440   Assessment   Charting Type Reassessment   Respiratory   Respiratory (WDL) X  (NC)   Respiratory Interventions H.O.B. elevated;Cough & deep breathe   Respiratory Pattern Tachypneic   Respiratory Depth Shallow   Respiratory Quality/Effort Labored;Dyspnea at rest   Chest Assessment Chest expansion symmetrical   L Breath Sounds Crackles   R Breath Sounds Crackles   Level of Activity/Mobility 2   Subcutaneous Air/Crepitus None   Treatment Team Notification   Reason for Communication Evaluate   Name of Team Member Notified DORA Carrington M.D.   Treatment Team Role Attending Provider   Method of Communication Secure Message   Response See orders   Notification Time 0518     Patient had increase O2 demands from NC/4L to HFNC 8L and tachypneic, IVF 50cc/hr paused while awaiting for CXR STAT.    Electronically signed by Bj Tatum RN on 3/5/2025 at 5:24 AM

## 2025-03-05 NOTE — PROGRESS NOTES
Cmu called this RN about Oxygen saturation being in the 70s on nasal cannula. Vickie PRICE and respiratory therapy arrived in room. Pt placed on bipap and is breathing much better. ABG was drawn stat. Pulm and MD notified. Pt on 100% on bipap now. Lovenox held per MD. Pt denies any needs. Care ongoing.     Electronically signed by Kavitha Hopkins RN on 3/5/2025 at 10:51 AM

## 2025-03-05 NOTE — PROGRESS NOTES
Pt desating to 74% on 8lpm while on bed pan. Pt placed on bipap 12/7/100% FIO2, Spo2 immediately recovered to 100%. ABG drawn on bipap 60% fio2 after being on for about 10 minutes. Tidal vol's good 600-800ml, RR is high 30-40bpm, unclear if this is anxiety, pt slows breathing when asked to slow down     03/05/25 0859   Oxygen Therapy/Pulse Ox   O2 Therapy Oxygen   O2 Device High flow nasal cannula   O2 Flow Rate (L/min) 8 L/min   Pulse (!) 148   Respirations 30   SpO2 (!) 74 %

## 2025-03-05 NOTE — PROGRESS NOTES
Respiratory therapy switched pt to high flow nasal cannula at 10 L. Pt up in bed. All PO pills given and pt able to drink some fluids. Pt going to eat lunch and then try to go back on Bipap. Denies any needs. Care ongoing.    Electronically signed by Kavitha Hopkins RN on 3/5/2025 at 12:28 PM

## 2025-03-05 NOTE — PLAN OF CARE
Problem: Chronic Conditions and Co-morbidities  Goal: Patient's chronic conditions and co-morbidity symptoms are monitored and maintained or improved  Outcome: Progressing  Flowsheets (Taken 3/5/2025 0945)  Care Plan - Patient's Chronic Conditions and Co-Morbidity Symptoms are Monitored and Maintained or Improved: Monitor and assess patient's chronic conditions and comorbid symptoms for stability, deterioration, or improvement     Problem: Discharge Planning  Goal: Discharge to home or other facility with appropriate resources  Outcome: Progressing  Flowsheets (Taken 3/5/2025 0945)  Discharge to home or other facility with appropriate resources: Identify barriers to discharge with patient and caregiver     Problem: Pain  Goal: Verbalizes/displays adequate comfort level or baseline comfort level  Outcome: Progressing     Problem: Skin/Tissue Integrity  Goal: Skin integrity remains intact  Description: 1.  Monitor for areas of redness and/or skin breakdown  2.  Assess vascular access sites hourly  3.  Every 4-6 hours minimum:  Change oxygen saturation probe site  4.  Every 4-6 hours:  If on nasal continuous positive airway pressure, respiratory therapy assess nares and determine need for appliance change or resting period  Outcome: Progressing  Flowsheets (Taken 3/5/2025 0945)  Skin Integrity Remains Intact: Monitor for areas of redness and/or skin breakdown     Problem: Safety - Adult  Goal: Free from fall injury  Outcome: Progressing     Problem: ABCDS Injury Assessment  Goal: Absence of physical injury  Outcome: Progressing     Problem: Nutrition Deficit:  Goal: Optimize nutritional status  Outcome: Progressing     Problem: Neurosensory - Adult  Goal: Achieves stable or improved neurological status  Outcome: Progressing  Flowsheets (Taken 3/5/2025 0945)  Achieves stable or improved neurological status: Assess for and report changes in neurological status     Problem: Respiratory - Adult  Goal: Achieves optimal

## 2025-03-05 NOTE — PROGRESS NOTES
ONCOLOGY HEMATOLOGY CARE PROGRESS NOTE      SUBJECTIVE:  Patient slowly getting stronger.  He denies any shortness of breath or chest pain.    ROS:     Constitutional:  No weight loss, No fever, No chills, No night sweats.  Energy level good.  Eyes:  No impairment or change in vision  ENT / Mouth:  No pain, abnormal ulceration, bleeding, nasal drip or change in voice or hearing  Cardiovascular:  No chest pain, palpitations, new edema, or calf discomfort  Respiratory:  No pain, hemoptysis, change to breathing  Breast:  No pain, discharge, change in appearance or texture  Gastrointestinal:  No pain, cramping, jaundice, change to eating and bowel habits  Urinary:  No pain, bleeding or change in continence  Genitalia: No pain, bleeding or discharge  Musculoskeletal:  No redness, pain, edema or weakness  Skin:  No pruritus, rash, change to nodules or lesions  Neurologic:  No discomfort, change in mental status, speech, sensory or motor activity  Psychiatric:  No change in concentration or change to affect or mood  Endocrine:  No hot flashes, increased thirst, or change to urine production  Hematologic: No petechiae, ecchymosis or bleeding  Lymphatic:  No lymphadenopathy or lymphedema  Allergy / Immunologic:  No eczema, hives, frequent or recurrent infections    OBJECTIVE        Physical    VITALS:  Patient Vitals for the past 24 hrs:   BP Temp Temp src Pulse Resp SpO2 Weight   03/05/25 0705 134/68 98.1 °F (36.7 °C) Oral 86 26 93 % --   03/05/25 0626 -- -- -- 77 23 95 % --   03/05/25 0515 -- -- -- 94 29 (!) 88 % --   03/05/25 0403 -- -- -- 89 19 91 % --   03/05/25 0325 117/74 97.7 °F (36.5 °C) -- 86 22 90 % --   03/05/25 0319 -- -- -- -- -- -- 75.4 kg (166 lb 3.6 oz)   03/05/25 0300 -- -- -- 84 24 94 % --   03/04/25 2310 115/69 98.9 °F (37.2 °C) Oral (!) 104 25 91 % --   03/04/25 2053 123/72 98.4 °F (36.9 °C) Oral 77 26 94 % --   03/04/25 1901 130/66 97.7 °F (36.5 °C) Oral -- -- 96 %

## 2025-03-05 NOTE — PLAN OF CARE
Problem: Chronic Conditions and Co-morbidities  Goal: Patient's chronic conditions and co-morbidity symptoms are monitored and maintained or improved  3/4/2025 2001 by Rama Yadav RN  Outcome: Progressing     Problem: Discharge Planning  Goal: Discharge to home or other facility with appropriate resources  3/4/2025 2001 by Rama Yadav RN  Outcome: Progressing     Problem: Pain  Goal: Verbalizes/displays adequate comfort level or baseline comfort level  3/4/2025 2001 by Rama Yadav RN  Outcome: Progressing     Problem: Skin/Tissue Integrity  Goal: Skin integrity remains intact  Description: 1.  Monitor for areas of redness and/or skin breakdown  2.  Assess vascular access sites hourly  3.  Every 4-6 hours minimum:  Change oxygen saturation probe site  4.  Every 4-6 hours:  If on nasal continuous positive airway pressure, respiratory therapy assess nares and determine need for appliance change or resting period  3/4/2025 2001 by Rama Yadav RN  Outcome: Progressing     Problem: Safety - Adult  Goal: Free from fall injury  3/4/2025 2001 by Rama Yadav RN  Outcome: Progressing     Problem: ABCDS Injury Assessment  Goal: Absence of physical injury  3/4/2025 2001 by Rama Yadav RN  Outcome: Progressing     Problem: Nutrition Deficit:  Goal: Optimize nutritional status  3/4/2025 2001 by Rama Yadav RN  Outcome: Progressing     Problem: Neurosensory - Adult  Goal: Achieves stable or improved neurological status  3/4/2025 2001 by Rama Yadav RN  Outcome: Progressing     Problem: Respiratory - Adult  Goal: Achieves optimal ventilation and oxygenation  3/4/2025 2001 by Rama Yadav RN  Outcome: Progressing     Problem: Cardiovascular - Adult  Goal: Maintains optimal cardiac output and hemodynamic stability  3/4/2025 2001 by Rama Yadav RN  Outcome: Progressing     Problem: Skin/Tissue Integrity - Adult  Goal: Skin integrity remains intact  Description: 1.  Monitor for areas of redness and/or skin

## 2025-03-06 ENCOUNTER — APPOINTMENT (OUTPATIENT)
Dept: GENERAL RADIOLOGY | Age: 75
DRG: 871 | End: 2025-03-06
Payer: COMMERCIAL

## 2025-03-06 LAB
BACTERIA BLD CULT: NORMAL
BASE EXCESS BLDV CALC-SCNC: 2.2 MMOL/L
BASOPHILS # BLD: 0 K/UL (ref 0–0.2)
BASOPHILS NFR BLD: 0.1 %
CO2 BLDV-SCNC: 28 MMOL/L
COHGB MFR BLDV: 1.6 %
DEPRECATED RDW RBC AUTO: 17.6 % (ref 12.4–15.4)
EOSINOPHIL # BLD: 0 K/UL (ref 0–0.6)
EOSINOPHIL NFR BLD: 0 %
HCO3 BLDV-SCNC: 27 MMOL/L (ref 23–29)
HCT VFR BLD AUTO: 24.2 % (ref 40.5–52.5)
HGB BLD-MCNC: 7.8 G/DL (ref 13.5–17.5)
LYMPHOCYTES # BLD: 0.2 K/UL (ref 1–5.1)
LYMPHOCYTES NFR BLD: 2.6 %
MAGNESIUM SERPL-MCNC: 1.96 MG/DL (ref 1.8–2.4)
MCH RBC QN AUTO: 31.1 PG (ref 26–34)
MCHC RBC AUTO-ENTMCNC: 32.4 G/DL (ref 31–36)
MCV RBC AUTO: 96 FL (ref 80–100)
METHGB MFR BLDV: 0.6 %
MONOCYTES # BLD: 0.3 K/UL (ref 0–1.3)
MONOCYTES NFR BLD: 3.7 %
NEUTROPHILS # BLD: 7 K/UL (ref 1.7–7.7)
NEUTROPHILS NFR BLD: 93.6 %
NT-PROBNP SERPL-MCNC: ABNORMAL PG/ML (ref 0–449)
O2 THERAPY: NORMAL
PCO2 BLDV: 41.7 MMHG (ref 40–50)
PH BLDV: 7.42 [PH] (ref 7.35–7.45)
PHOSPHATE SERPL-MCNC: 2.6 MG/DL (ref 2.5–4.9)
PLATELET # BLD AUTO: 125 K/UL (ref 135–450)
PMV BLD AUTO: 8.6 FL (ref 5–10.5)
PO2 BLDV: 36 MMHG
PROCALCITONIN SERPL IA-MCNC: 0.83 NG/ML (ref 0–0.15)
RBC # BLD AUTO: 2.52 M/UL (ref 4.2–5.9)
SAO2 % BLDV: 67 %
WBC # BLD AUTO: 7.5 K/UL (ref 4–11)

## 2025-03-06 PROCEDURE — 2700000000 HC OXYGEN THERAPY PER DAY

## 2025-03-06 PROCEDURE — 6370000000 HC RX 637 (ALT 250 FOR IP): Performed by: NURSE PRACTITIONER

## 2025-03-06 PROCEDURE — 2000000000 HC ICU R&B

## 2025-03-06 PROCEDURE — 2500000003 HC RX 250 WO HCPCS: Performed by: NURSE PRACTITIONER

## 2025-03-06 PROCEDURE — 9990000010 HC NO CHARGE VISIT: Performed by: PHYSICAL THERAPIST

## 2025-03-06 PROCEDURE — 6370000000 HC RX 637 (ALT 250 FOR IP): Performed by: INTERNAL MEDICINE

## 2025-03-06 PROCEDURE — 2500000003 HC RX 250 WO HCPCS

## 2025-03-06 PROCEDURE — 2580000003 HC RX 258: Performed by: NURSE PRACTITIONER

## 2025-03-06 PROCEDURE — 82803 BLOOD GASES ANY COMBINATION: CPT

## 2025-03-06 PROCEDURE — 84100 ASSAY OF PHOSPHORUS: CPT

## 2025-03-06 PROCEDURE — 6360000002 HC RX W HCPCS: Performed by: NURSE PRACTITIONER

## 2025-03-06 PROCEDURE — 94761 N-INVAS EAR/PLS OXIMETRY MLT: CPT

## 2025-03-06 PROCEDURE — 71045 X-RAY EXAM CHEST 1 VIEW: CPT

## 2025-03-06 PROCEDURE — 9990000010 HC NO CHARGE VISIT

## 2025-03-06 PROCEDURE — 6360000002 HC RX W HCPCS: Performed by: INTERNAL MEDICINE

## 2025-03-06 PROCEDURE — 94640 AIRWAY INHALATION TREATMENT: CPT

## 2025-03-06 PROCEDURE — 85025 COMPLETE CBC W/AUTO DIFF WBC: CPT

## 2025-03-06 PROCEDURE — 84145 PROCALCITONIN (PCT): CPT

## 2025-03-06 PROCEDURE — 99291 CRITICAL CARE FIRST HOUR: CPT | Performed by: INTERNAL MEDICINE

## 2025-03-06 PROCEDURE — 83880 ASSAY OF NATRIURETIC PEPTIDE: CPT

## 2025-03-06 PROCEDURE — 83735 ASSAY OF MAGNESIUM: CPT

## 2025-03-06 RX ORDER — WATER 10 ML/10ML
INJECTION INTRAMUSCULAR; INTRAVENOUS; SUBCUTANEOUS
Status: COMPLETED
Start: 2025-03-06 | End: 2025-03-06

## 2025-03-06 RX ORDER — OXYMETAZOLINE HYDROCHLORIDE 0.05 G/100ML
2 SPRAY NASAL 2 TIMES DAILY
Status: DISPENSED | OUTPATIENT
Start: 2025-03-06 | End: 2025-03-09

## 2025-03-06 RX ORDER — FUROSEMIDE 10 MG/ML
40 INJECTION INTRAMUSCULAR; INTRAVENOUS ONCE
Status: COMPLETED | OUTPATIENT
Start: 2025-03-06 | End: 2025-03-06

## 2025-03-06 RX ORDER — HYDROXYZINE HYDROCHLORIDE 10 MG/1
10 TABLET, FILM COATED ORAL ONCE
Status: COMPLETED | OUTPATIENT
Start: 2025-03-06 | End: 2025-03-06

## 2025-03-06 RX ADMIN — GUAIFENESIN 600 MG: 600 TABLET ORAL at 09:02

## 2025-03-06 RX ADMIN — Medication 2 PUFF: at 20:14

## 2025-03-06 RX ADMIN — CEFEPIME 2000 MG: 2 INJECTION, POWDER, FOR SOLUTION INTRAVENOUS at 21:28

## 2025-03-06 RX ADMIN — WATER 10 ML: 1 INJECTION INTRAMUSCULAR; INTRAVENOUS; SUBCUTANEOUS at 09:02

## 2025-03-06 RX ADMIN — Medication 4 ML: at 08:24

## 2025-03-06 RX ADMIN — GUAIFENESIN 600 MG: 600 TABLET ORAL at 21:14

## 2025-03-06 RX ADMIN — ALBUTEROL SULFATE 2.5 MG: 2.5 SOLUTION RESPIRATORY (INHALATION) at 14:08

## 2025-03-06 RX ADMIN — METHYLPREDNISOLONE SODIUM SUCCINATE 40 MG: 40 INJECTION, POWDER, LYOPHILIZED, FOR SOLUTION INTRAMUSCULAR; INTRAVENOUS at 09:02

## 2025-03-06 RX ADMIN — ROSUVASTATIN CALCIUM 20 MG: 20 TABLET, FILM COATED ORAL at 21:14

## 2025-03-06 RX ADMIN — Medication 4 ML: at 14:08

## 2025-03-06 RX ADMIN — SODIUM CHLORIDE, PRESERVATIVE FREE 10 ML: 5 INJECTION INTRAVENOUS at 09:02

## 2025-03-06 RX ADMIN — ALBUTEROL SULFATE 2.5 MG: 2.5 SOLUTION RESPIRATORY (INHALATION) at 20:14

## 2025-03-06 RX ADMIN — SODIUM CHLORIDE, PRESERVATIVE FREE 10 ML: 5 INJECTION INTRAVENOUS at 21:14

## 2025-03-06 RX ADMIN — CEFEPIME 2000 MG: 2 INJECTION, POWDER, FOR SOLUTION INTRAVENOUS at 09:15

## 2025-03-06 RX ADMIN — HYDROXYZINE HYDROCHLORIDE 10 MG: 10 TABLET, FILM COATED ORAL at 02:38

## 2025-03-06 RX ADMIN — FUROSEMIDE 40 MG: 10 INJECTION, SOLUTION INTRAMUSCULAR; INTRAVENOUS at 14:15

## 2025-03-06 RX ADMIN — ALBUTEROL SULFATE 2.5 MG: 2.5 SOLUTION RESPIRATORY (INHALATION) at 08:23

## 2025-03-06 RX ADMIN — EZETIMIBE 10 MG: 10 TABLET ORAL at 09:02

## 2025-03-06 RX ADMIN — Medication 4 ML: at 20:14

## 2025-03-06 RX ADMIN — Medication 2 PUFF: at 08:23

## 2025-03-06 RX ADMIN — METOPROLOL SUCCINATE 25 MG: 25 TABLET, EXTENDED RELEASE ORAL at 09:02

## 2025-03-06 ASSESSMENT — PAIN SCALES - GENERAL
PAINLEVEL_OUTOF10: 0

## 2025-03-06 NOTE — PROGRESS NOTES
Patient transferred from 5124 PCU to 2114 ICU. Report given to Jose Daniel PRICE and all questions answered.

## 2025-03-06 NOTE — PROGRESS NOTES
Occupational Therapy  Yuryanibal Levin  1950  [id number]    OT orders received and pt chart reviewed. OT attempted to see for eval/tx, however pt is on 35L heated high flow O2 and RN requesting to defer therapy this date. Will attempt back as medical status improves.     Electronically signed by ZEE Willoughby/L on 3/6/2025 at 10:09 AM

## 2025-03-06 NOTE — PROGRESS NOTES
Patient arrived to unit via bed, is alert and confused but pleasant. Bilat soft wrist restraints applied. He is currently on vapotherm 30L, other VSS. Has stage 2 pressure injuries on his buttocks that appear improved from admission images. Patient oriented to room, family member in room.

## 2025-03-06 NOTE — PROGRESS NOTES
Physical Therapy      Yury Levin  [id number]  [id number]    Attempted to see for PT eval however pt on 35 liters heated high flow O2 and nursing requesting to defer therapy this date.  Will continue to follow  Electronically signed by TONIA ROSAS, PT on 3/6/2025 at 10:08 AM

## 2025-03-06 NOTE — PROGRESS NOTES
Called to bedside by RN due increased WOB and O2 demand. Placed patient on bipap. Per RN, patient continuously took bipap off, pt. appears anxious. NP placed orders for xray and VBG. VBG results sent to NP, NP placed order for vapotherm. Patient seems to be more relaxed on vapo. No new respiratory orders at this time.

## 2025-03-06 NOTE — PROGRESS NOTES
NAME:  Yury Levin  YOB: 1950  MEDICAL RECORD NUMBER:  [id number]    Shift Summary: Patient transferred to unit, able to wean to 20L vapotherm. Lasix IV effective.    Family updated: Yes:  wife in the room.    Rhythm: Atrial Fibrillation     Most recent vitals:   Visit Vitals  /74   Pulse 75   Temp 97.7 °F (36.5 °C) (Oral)   Resp 23   Ht 1.753 m (5' 9\")   Wt 71.4 kg (157 lb 6.5 oz)   SpO2 95%   BMI 23.25 kg/m²           No data found.    No data found.      Respiratory support needed (if any):  - O2 - Vapotherm - settings 20L 100%    Admission weight Weight - Scale: 72.2 kg (159 lb 1.6 oz) (03/02/25 2143)    Today's weight    Wt Readings from Last 1 Encounters:   03/06/25 71.4 kg (157 lb 6.5 oz)        Pena need assessed each shift: N/A - no pena present  UOP >30ml/hr: YES  Last documented BM (in last 48 hrs):  Patient Vitals for the past 48 hrs:   Last BM (including prior to admit) Stool Occurrence   03/04/25 2344 03/03/25 --   03/05/25 0945 03/03/25 --   03/05/25 1405 03/05/25 1                Restraints (in use currently or dc'd in last 12 hrs): No    Order current and documentation up to date? No    Lines/Drains reviewed @ bedside.  Implantable Port 08/29/24 Right Chest (Active)   Number of days: 189         Drip rates at handoff:    sodium chloride         Lab Data:   CBC:   Recent Labs     03/05/25  0516 03/06/25  0805   WBC 8.1 7.5   HGB 7.5* 7.8*   HCT 22.7* 24.2*   MCV 95.8 96.0   * 125*     BMP:    Recent Labs     03/04/25  0323 03/05/25  0511    143   K 4.2 4.1   CO2 26 24   BUN 25* 23*   CREATININE 0.9 0.8     LIVR:   Recent Labs     03/04/25  0323 03/05/25  0511   AST 22 21   ALT 21 19     PT/INR: No results for input(s): \"INR\" in the last 72 hours.    Invalid input(s): \"PROT\"  APTT: No results for input(s): \"APTT\" in the last 72 hours.  ABG:   Recent Labs     03/05/25  0925   PHART 7.455*   [id number] 36.4   PO2ART 80.9       Any consults during the shift? Yes:

## 2025-03-06 NOTE — PROGRESS NOTES
CROSS COVER NOTE       Call initiated by:    Nursing staff  RN  Call addressed around: 3/6/2025 2:34 AM      Reason for call: Increased agitation/confusion, pulling Bipap off      Vitals:    03/05/25 2225 03/05/25 2230 03/05/25 2309 03/05/25 2326   BP:    (!) 146/90   Pulse: (!) 110 (!) 101 95 100   Resp: (!) 32 (!) 32 21 30   Temp:    98 °F (36.7 °C)   TempSrc:    Axillary   SpO2: (!) 88% 95% 97% 93%   Weight:       Height:         Patient seen and examined at bedside. Patient appears anxious, trying to get out of bed and pulling off Bipap continuously. He denies chest pain or shortness of breath. Able to state name and followed simple commands.         Orders placed:   VBG obtained- normal. Will try Vapotherm. Discussed with Respiratory. Monitor closely for increased work of breathing  Will obtain CXR   Atarax 10 mg orally x1- appears anxious  Discussed plan with RN at bedside        BRIAN Leonard - CNP

## 2025-03-06 NOTE — PROGRESS NOTES
V2.0  [facility] [place]      Name:  Yury Levin /Age/Sex: 1950  (74 y.o. male)   MRN & CSN:  [id number] & [id number] Encounter Date/Time: 3/6/2025 9:18 AM EST    Location:  [id number]/5124-01 PCP: Diamond Robles MD       Hospital Day: 5    Assessment and Plan:   Yury Levin is a 74 y.o. male presenting with shortness of breath      Plan:  Multifocal pneumonia  -Covering with antibiotics with cefepime and azithromycin  -Influenza A positive in ED. Tamiflu  -Strep Pneumo antigen positive  -Transferred back to ICU  -Pulm note reviewed 3/6: patient with worsening shortness of breath and oxygen requirements.  Giving dose of Lasix.  Continue Solu-Medrol, cefepime, albuterol and 3% nebulizers.  Chest x-ray not significantly changed.  Risk of requiring intubation  COVID  -diagnosed as outpatient last week Tuesday. Negative on inpatient testing  -s/p paxlovid  Acute hypoxic respiratory failure  -BiPAP weaned, currently on 4L NC  -Discussed with pulm 3/3: aspiration precautions, solumedrol, albuterol and 3% nebulizers.   -wean o2 as tolerated  -Pulm note 3/6 reviewed as noted above  -Discussed with patient and he is okay with intubation if indicated  Metastatic prostate cancer  -follows with [facility]  -consult regarding patient's orgovyx therapy in setting of infection  -Discussed with Oncology 3/5: off chemo for several weeks due to deconditioning. If patient's cancer was progressing would be recommending hospice due to his poor functional status.  Anemia of chronic disease  -s/p dose of retacrit; heme/onc following.  Afib s/p watchman  -continue beta-blocker    Ongoing threat to bodily function without continued treatment of acute hypoxic respiratory failure and multifocal pneumonia    Ppx: Lovenox  Dispo: anticipate SNF    Subjective:     Chief Complaint: Altered Mental Status (Pt is being treated for COVID since Tuesday and the patient has been hsaving spikes in fever and has been acting off, SpO2

## 2025-03-06 NOTE — PROGRESS NOTES
Pulmonary Critical Care Note     Patient's name:  Yury Levin  Medical Record Number: [id number]  Patient's account/billing number: [id number]  Patient's YOB: 1950  Age: 74 y.o.  Date of Admission: 3/2/2025  9:38 PM  Date of Consult: 3/6/2025      Primary Care Physician: Diamond Robles MD      Code Status: Full Code    Reason for consult: Acute respiratory failure with hypoxia    Assessment and Plan     Acute respiratory with hypoxia less than 90% on room air with respiratory distress requiring BiPAP  ARDS   Multifocal pneumonia  Influenza A pneumonia  Recent COVID infection  History of prostate cancer metastatic  History of A-fib  ERIBERTO      Plan:  Titrate oxygen to keep sats more than 90%, use BiPAP for increased work of breathing. High risk for intubation  Lasix x one  Aspiration precautions.  Cefepime.  Solu-Medrol daily.  Status post Paxlovid.  Albuterol and 3%.      Subjective:  Transferred back with worsening shortness of breath and increasing oxygen requirement  Chest x-ray with no major change    HISTORY OF PRESENT ILLNESS:   Mr./Ms. Yury Levin is a 74 y.o. gentleman with past medical history stated below significant for metastatic prostate cancer on Orgovyx, history of A-fib status post watchman, diagnosed with COVID infection on Tuesday started on Paxlovid presenting with worsening cough shortness of breath admitted to the ICU with hypoxic respiratory failure requiring BiPAP and high flow oxygen.      REVIEW OF SYSTEMS:  Review of Systems -   General ROS: Generalized weak  Psychological ROS: negative  Ophthalmic ROS: negative  ENT ROS: negative  Allergy and Immunology ROS: negative  Hematological and Lymphatic ROS: negative  Endocrine ROS: negative  Breast ROS: negative  Respiratory ROS: Shortness of breath, cough, sputum.  Cardiovascular ROS: no chest pain or dyspnea on exertion  Gastrointestinal ROS:negative  Genito-Urinary ROS:  Arteries: Pulmonary arteries are adequately opacified for  evaluation.  No evidence of intraluminal filling defect to suggest pulmonary  embolism.  Main pulmonary artery is normal in caliber.    Mediastinum: The heart is enlarged without a pericardial effusion. Severe  coronary artery atherosclerosis. Status post CABG. The aorta and arch  branches are normal in course and caliber.    No pathologic lymphadenopathy.    Lungs/pleura: The lungs demonstrate bilateral airspace opacities involving  all lobes.  No significant pleural effusions.  The central airways are  patent.  No bronchial thickening or bronchiectasis.    Upper Abdomen: Cholelithiasis is present.    Soft Tissues/Bones: No acute bone or soft tissue abnormality.  Degenerative  changes are seen throughout the cervical spine.  Status post sternotomy.    Impression  1. No pulmonary embolus.  2. Multifocal pneumonia.  3. Cholelithiasis.      CXR PA/LAT: No results found for this or any previous visit.      CXR portable: Results for orders placed during the hospital encounter of 06/10/24    XR CHEST PORTABLE    Narrative  EXAMINATION:  ONE XRAY VIEW OF THE CHEST    6/10/2024 9:24 am    COMPARISON:  01/19/2018 radiograph    HISTORY:  ORDERING SYSTEM PROVIDED HISTORY: fatigue  TECHNOLOGIST PROVIDED HISTORY:  Reason for exam:->fatigue  Reason for Exam: fatigue    FINDINGS:  Cardiomegaly with remote prior surgical changes of CABG.  A right port  catheter is in place.  Pulmonary vascular markings are normal.  Minimal  bibasilar atelectasis.  The lungs are otherwise clear.  No significant  skeletal finding.    Impression  Cardiomegaly with no acute finding.      Cait Cabrera MD, M.D.            3/6/2025, 2:07 PM

## 2025-03-06 NOTE — PROGRESS NOTES
Physician Progress Note      PATIENT:               REX HESS  [facility] #:                  [id number]  :                       1950  ADMIT DATE:       3/2/2025 9:38 PM  DISCH DATE:  RESPONDING  PROVIDER #:        Arben Art MD          QUERY TEXT:    Patient admitted with Flu and covid. Noted documentation of sepsis in H&P   . In order to support the diagnosis of sepsis, please include additional   clinical indicators in your documentation.  Or please document if the   diagnosis of sepsis has been ruled out after further study    The medical record reflects the following:  Risk Factors: Covid, Flu  Clinical Indicators: , RR 20-30, T 99.2, WBC 6.6, LA 4.8-2.1, ,   PCT 3.12  H&P -  \"Sepsis: Likely viral, with high risk for bacterial conversion: lactic:   4.8->2.1, pH: 7.4, WBC: 6.6-> blood cultures x 2, serial labs, CBC, metabolic   panel, procalcitonin and serial lactic acid: Antibiotic therapy: Azithromycin   and Rocephin.  I believe patient is at high risk for easy volume overload   therefore maintenance fluids and pressor therapy for mild hypotension:   Neosynephrine as he is already intermittently mildly tachycardic  COVID: Diagnosed outpatient on Tuesday: Started on Paxlovid-> last dose   tomorrow.  Influenza A: Positive per ED\"  Treatment: zithromax, rocephin, cefepime, NS Bolus, albuterol, BCx, BiPAP, LA,    serial labs, and supportive care    Thank you,  Lona Bustos RN, BSN, ARLEY, CCDS  Options provided:  -- Sepsis present as evidenced by, Please document evidence.  -- Sepsis was ruled out after study  -- Other - I will add my own diagnosis  -- Disagree - Not applicable / Not valid  -- Disagree - Clinically unable to determine / Unknown  -- Refer to Clinical Documentation Reviewer    PROVIDER RESPONSE TEXT:    Sepsis is present as evidenced by tachycardia and tachypnea    Query created by: Lona Bustos on 3/5/2025 9:05 AM      Electronically signed by:  Arben Art MD  3/5/2025 10:50 PM

## 2025-03-07 ENCOUNTER — HOSPITAL ENCOUNTER (INPATIENT)
Age: 75
Discharge: HOME | DRG: 871 | End: 2025-03-09
Attending: INTERNAL MEDICINE
Payer: COMMERCIAL

## 2025-03-07 VITALS — BODY MASS INDEX: 23.25 KG/M2 | HEIGHT: 69 IN | WEIGHT: 156.97 LBS

## 2025-03-07 LAB
ANION GAP SERPL CALCULATED.3IONS-SCNC: 9 MMOL/L (ref 3–16)
BACTERIA BLD CULT ORG #2: NORMAL
BASOPHILS # BLD: 0 K/UL (ref 0–0.2)
BASOPHILS NFR BLD: 0.1 %
BUN SERPL-MCNC: 25 MG/DL (ref 7–20)
CALCIUM SERPL-MCNC: 8.1 MG/DL (ref 8.3–10.6)
CHLORIDE SERPL-SCNC: 108 MMOL/L (ref 99–110)
CO2 SERPL-SCNC: 29 MMOL/L (ref 21–32)
CREAT SERPL-MCNC: 0.8 MG/DL (ref 0.8–1.3)
DEPRECATED RDW RBC AUTO: 17.6 % (ref 12.4–15.4)
ECHO AO ROOT DIAM: 2.6 CM
ECHO AO ROOT INDEX: 1.4 CM/M2
ECHO AR MAX VEL PISA: 4.6 M/S
ECHO AV PEAK GRADIENT: 9 MMHG
ECHO AV PEAK VELOCITY: 1.5 M/S
ECHO AV REGURGITANT PHT: 532 MS
ECHO BSA: 1.86 M2
ECHO LA AREA 2C: 21.7 CM2
ECHO LA AREA 4C: 13.2 CM2
ECHO LA DIAMETER INDEX: 1.67 CM/M2
ECHO LA DIAMETER: 3.1 CM
ECHO LA MAJOR AXIS: 5.5 CM
ECHO LA MINOR AXIS: 6.5 CM
ECHO LA TO AORTIC ROOT RATIO: 1.19
ECHO LA VOL BP: 43 ML (ref 18–58)
ECHO LA VOL MOD A2C: 60 ML (ref 18–58)
ECHO LA VOL MOD A4C: 27 ML (ref 18–58)
ECHO LA VOL/BSA BIPLANE: 23 ML/M2 (ref 16–34)
ECHO LA VOLUME INDEX MOD A2C: 32 ML/M2 (ref 16–34)
ECHO LA VOLUME INDEX MOD A4C: 15 ML/M2 (ref 16–34)
ECHO LV E' LATERAL VELOCITY: 9.46 CM/S
ECHO LV E' SEPTAL VELOCITY: 4.35 CM/S
ECHO LV EDV A2C: 69 ML
ECHO LV EDV A4C: 96 ML
ECHO LV EDV INDEX A4C: 52 ML/M2
ECHO LV EDV NDEX A2C: 37 ML/M2
ECHO LV EF PHYSICIAN: 60 %
ECHO LV EJECTION FRACTION A2C: 64 %
ECHO LV EJECTION FRACTION A4C: 71 %
ECHO LV EJECTION FRACTION BIPLANE: 69 % (ref 55–100)
ECHO LV ESV A2C: 25 ML
ECHO LV ESV A4C: 28 ML
ECHO LV ESV INDEX A2C: 13 ML/M2
ECHO LV ESV INDEX A4C: 15 ML/M2
ECHO LV FRACTIONAL SHORTENING: 40 % (ref 28–44)
ECHO LV INTERNAL DIMENSION DIASTOLE INDEX: 2.15 CM/M2
ECHO LV INTERNAL DIMENSION DIASTOLIC: 4 CM (ref 4.2–5.9)
ECHO LV INTERNAL DIMENSION SYSTOLIC INDEX: 1.29 CM/M2
ECHO LV INTERNAL DIMENSION SYSTOLIC: 2.4 CM
ECHO LV IVSD: 0.9 CM (ref 0.6–1)
ECHO LV MASS 2D: 93.5 G (ref 88–224)
ECHO LV MASS INDEX 2D: 50.2 G/M2 (ref 49–115)
ECHO LV POSTERIOR WALL DIASTOLIC: 0.7 CM (ref 0.6–1)
ECHO LV RELATIVE WALL THICKNESS RATIO: 0.35
ECHO MV A VELOCITY: 0.85 M/S
ECHO MV E DECELERATION TIME (DT): 239 MS
ECHO MV E VELOCITY: 0.72 M/S
ECHO MV E/A RATIO: 0.85
ECHO MV E/E' LATERAL: 7.61
ECHO MV E/E' RATIO (AVERAGED): 12.08
ECHO MV E/E' SEPTAL: 16.55
ECHO PULMONARY ARTERY END DIASTOLIC PRESSURE: 6 MMHG
ECHO PV MAX VELOCITY: 0.9 M/S
ECHO PV PEAK GRADIENT: 3 MMHG
ECHO PV REGURGITANT MAX VELOCITY: 1.2 M/S
ECHO RA AREA 4C: 8.9 CM2
ECHO RA END SYSTOLIC VOLUME APICAL 4 CHAMBER INDEX BSA: 6 ML/M2
ECHO RA VOLUME: 11 ML
ECHO RV FREE WALL PEAK S': 13.5 CM/S
ECHO RV INTERNAL DIMENSION: 2.8 CM
ECHO RV TAPSE: 1.3 CM (ref 1.7–?)
ECHO TV REGURGITANT MAX VELOCITY: 2.9 M/S
ECHO TV REGURGITANT PEAK GRADIENT: 34 MMHG
EOSINOPHIL # BLD: 0 K/UL (ref 0–0.6)
EOSINOPHIL NFR BLD: 0 %
GFR SERPLBLD CREATININE-BSD FMLA CKD-EPI: >90 ML/MIN/{1.73_M2}
GLUCOSE SERPL-MCNC: 101 MG/DL (ref 70–99)
HCT VFR BLD AUTO: 24.7 % (ref 40.5–52.5)
HGB BLD-MCNC: 8.2 G/DL (ref 13.5–17.5)
LYMPHOCYTES # BLD: 0.3 K/UL (ref 1–5.1)
LYMPHOCYTES NFR BLD: 3.8 %
MAGNESIUM SERPL-MCNC: 2.05 MG/DL (ref 1.8–2.4)
MCH RBC QN AUTO: 31.6 PG (ref 26–34)
MCHC RBC AUTO-ENTMCNC: 33.3 G/DL (ref 31–36)
MCV RBC AUTO: 94.7 FL (ref 80–100)
MONOCYTES # BLD: 0.3 K/UL (ref 0–1.3)
MONOCYTES NFR BLD: 4.8 %
NEUTROPHILS # BLD: 6.4 K/UL (ref 1.7–7.7)
NEUTROPHILS NFR BLD: 91.3 %
PHOSPHATE SERPL-MCNC: 3 MG/DL (ref 2.5–4.9)
PLATELET # BLD AUTO: 129 K/UL (ref 135–450)
PMV BLD AUTO: 8.6 FL (ref 5–10.5)
POTASSIUM SERPL-SCNC: 3.2 MMOL/L (ref 3.5–5.1)
POTASSIUM SERPL-SCNC: 3.8 MMOL/L (ref 3.5–5.1)
PROCALCITONIN SERPL IA-MCNC: 0.54 NG/ML (ref 0–0.15)
RBC # BLD AUTO: 2.61 M/UL (ref 4.2–5.9)
SODIUM SERPL-SCNC: 146 MMOL/L (ref 136–145)
WBC # BLD AUTO: 7 K/UL (ref 4–11)

## 2025-03-07 PROCEDURE — 2500000003 HC RX 250 WO HCPCS: Performed by: NURSE PRACTITIONER

## 2025-03-07 PROCEDURE — 84132 ASSAY OF SERUM POTASSIUM: CPT

## 2025-03-07 PROCEDURE — 6370000000 HC RX 637 (ALT 250 FOR IP): Performed by: INTERNAL MEDICINE

## 2025-03-07 PROCEDURE — 36591 DRAW BLOOD OFF VENOUS DEVICE: CPT

## 2025-03-07 PROCEDURE — 2700000000 HC OXYGEN THERAPY PER DAY

## 2025-03-07 PROCEDURE — 94640 AIRWAY INHALATION TREATMENT: CPT

## 2025-03-07 PROCEDURE — 6360000004 HC RX CONTRAST MEDICATION: Performed by: INTERNAL MEDICINE

## 2025-03-07 PROCEDURE — 84145 PROCALCITONIN (PCT): CPT

## 2025-03-07 PROCEDURE — 99291 CRITICAL CARE FIRST HOUR: CPT | Performed by: INTERNAL MEDICINE

## 2025-03-07 PROCEDURE — 6370000000 HC RX 637 (ALT 250 FOR IP): Performed by: NURSE PRACTITIONER

## 2025-03-07 PROCEDURE — 6360000002 HC RX W HCPCS: Performed by: NURSE PRACTITIONER

## 2025-03-07 PROCEDURE — 83735 ASSAY OF MAGNESIUM: CPT

## 2025-03-07 PROCEDURE — 84100 ASSAY OF PHOSPHORUS: CPT

## 2025-03-07 PROCEDURE — 2000000000 HC ICU R&B

## 2025-03-07 PROCEDURE — 2580000003 HC RX 258: Performed by: INTERNAL MEDICINE

## 2025-03-07 PROCEDURE — 97162 PT EVAL MOD COMPLEX 30 MIN: CPT

## 2025-03-07 PROCEDURE — 94669 MECHANICAL CHEST WALL OSCILL: CPT

## 2025-03-07 PROCEDURE — 80048 BASIC METABOLIC PNL TOTAL CA: CPT

## 2025-03-07 PROCEDURE — 85025 COMPLETE CBC W/AUTO DIFF WBC: CPT

## 2025-03-07 PROCEDURE — 6360000002 HC RX W HCPCS: Performed by: INTERNAL MEDICINE

## 2025-03-07 PROCEDURE — 93306 TTE W/DOPPLER COMPLETE: CPT | Performed by: INTERNAL MEDICINE

## 2025-03-07 PROCEDURE — 97530 THERAPEUTIC ACTIVITIES: CPT

## 2025-03-07 PROCEDURE — 2580000003 HC RX 258: Performed by: NURSE PRACTITIONER

## 2025-03-07 PROCEDURE — C8929 TTE W OR WO FOL WCON,DOPPLER: HCPCS

## 2025-03-07 PROCEDURE — 94761 N-INVAS EAR/PLS OXIMETRY MLT: CPT

## 2025-03-07 RX ORDER — FUROSEMIDE 10 MG/ML
40 INJECTION INTRAMUSCULAR; INTRAVENOUS ONCE
Status: COMPLETED | OUTPATIENT
Start: 2025-03-07 | End: 2025-03-07

## 2025-03-07 RX ADMIN — Medication 4 ML: at 12:45

## 2025-03-07 RX ADMIN — CEFTRIAXONE SODIUM 2000 MG: 2 INJECTION, POWDER, FOR SOLUTION INTRAMUSCULAR; INTRAVENOUS at 14:05

## 2025-03-07 RX ADMIN — METHYLPREDNISOLONE SODIUM SUCCINATE 40 MG: 40 INJECTION, POWDER, LYOPHILIZED, FOR SOLUTION INTRAMUSCULAR; INTRAVENOUS at 08:09

## 2025-03-07 RX ADMIN — EZETIMIBE 10 MG: 10 TABLET ORAL at 08:08

## 2025-03-07 RX ADMIN — CEFEPIME 2000 MG: 2 INJECTION, POWDER, FOR SOLUTION INTRAVENOUS at 06:00

## 2025-03-07 RX ADMIN — Medication 2 PUFF: at 08:29

## 2025-03-07 RX ADMIN — OXYMETAZOLINE HYDROCHLORIDE 2 SPRAY: 0.5 SPRAY NASAL at 20:41

## 2025-03-07 RX ADMIN — METOPROLOL SUCCINATE 25 MG: 25 TABLET, EXTENDED RELEASE ORAL at 08:08

## 2025-03-07 RX ADMIN — ALBUTEROL SULFATE 2.5 MG: 2.5 SOLUTION RESPIRATORY (INHALATION) at 20:05

## 2025-03-07 RX ADMIN — ROSUVASTATIN CALCIUM 20 MG: 20 TABLET, FILM COATED ORAL at 20:38

## 2025-03-07 RX ADMIN — SODIUM CHLORIDE, PRESERVATIVE FREE 10 ML: 5 INJECTION INTRAVENOUS at 20:41

## 2025-03-07 RX ADMIN — GUAIFENESIN 600 MG: 600 TABLET ORAL at 08:08

## 2025-03-07 RX ADMIN — FUROSEMIDE 40 MG: 10 INJECTION, SOLUTION INTRAMUSCULAR; INTRAVENOUS at 08:25

## 2025-03-07 RX ADMIN — ALBUTEROL SULFATE 2.5 MG: 2.5 SOLUTION RESPIRATORY (INHALATION) at 12:45

## 2025-03-07 RX ADMIN — GUAIFENESIN 600 MG: 600 TABLET ORAL at 20:38

## 2025-03-07 RX ADMIN — ALBUTEROL SULFATE 2.5 MG: 2.5 SOLUTION RESPIRATORY (INHALATION) at 08:29

## 2025-03-07 RX ADMIN — POTASSIUM CHLORIDE 20 MEQ: 29.8 INJECTION, SOLUTION INTRAVENOUS at 07:57

## 2025-03-07 RX ADMIN — Medication 4 ML: at 20:05

## 2025-03-07 RX ADMIN — SODIUM CHLORIDE, PRESERVATIVE FREE 10 ML: 5 INJECTION INTRAVENOUS at 08:29

## 2025-03-07 RX ADMIN — POTASSIUM CHLORIDE 20 MEQ: 29.8 INJECTION, SOLUTION INTRAVENOUS at 06:05

## 2025-03-07 RX ADMIN — Medication 4 ML: at 08:29

## 2025-03-07 RX ADMIN — Medication 2 PUFF: at 20:05

## 2025-03-07 RX ADMIN — SULFUR HEXAFLUORIDE 5 ML: 60.7; .19; .19 INJECTION, POWDER, LYOPHILIZED, FOR SUSPENSION INTRAVENOUS; INTRAVESICAL at 09:59

## 2025-03-07 RX ADMIN — OXYMETAZOLINE HYDROCHLORIDE 2 SPRAY: 0.5 SPRAY NASAL at 08:20

## 2025-03-07 ASSESSMENT — PAIN SCALES - GENERAL
PAINLEVEL_OUTOF10: 0

## 2025-03-07 NOTE — PROGRESS NOTES
Pulmonary Critical Care Note     Patient's name:  Yury Levin  Medical Record Number: [id number]  Patient's account/billing number: [id number]  Patient's YOB: 1950  Age: 74 y.o.  Date of Admission: 3/2/2025  9:38 PM  Date of Consult: 3/7/2025      Primary Care Physician: Diamond Robles MD      Code Status: Full Code    Reason for consult: Acute respiratory failure with hypoxia    Assessment and Plan     Acute respiratory with hypoxia less than 90% on room air with respiratory distress requiring BiPAP  ARDS   Multifocal pneumonia, pneumococcal  Influenza A pneumonia  Recent COVID infection  History of prostate cancer metastatic  History of A-fib  ERIBERTO      Plan:  Titrate oxygen to keep sats more than 90%, use BiPAP for increased work of breathing. High risk for intubation  Lasix x one  Check echocardiogram  Aspiration precautions.  Ceftriaxone to finish the course of antibiotic  Solu-Medrol daily.  Status post Paxlovid.  Albuterol and 3%.  Due to the immediate potential for life-threatening deterioration due to above , I spent 35 minutes providing critical care.  This time is excluding time spent performing procedures.  This note was transcribed using Dragon Dictation software. Please disregard any translational errors.        Subjective:  Remains on Vapotherm    HISTORY OF PRESENT ILLNESS:   Mr./Ms. Yury Levin is a 74 y.o. gentleman with past medical history stated below significant for metastatic prostate cancer on Orgovyx, history of A-fib status post watchman, diagnosed with COVID infection on Tuesday started on Paxlovid presenting with worsening cough shortness of breath admitted to the ICU with hypoxic respiratory failure requiring BiPAP and high flow oxygen.      REVIEW OF SYSTEMS:  Review of Systems -   General ROS: Generalized weak  Psychological ROS: negative  Ophthalmic ROS: negative  ENT ROS: negative  Allergy and Immunology ROS:

## 2025-03-07 NOTE — PROGRESS NOTES
NAME:  Yury Levin  YOB: 1950  MEDICAL RECORD NUMBER:  [id number]    Shift Summary: Patient remains confused overnight but easily redirectable. Vapotherm weaned, no problems with WOB.    Family updated: Yes:  Wife and son at bedside    Rhythm: Atrial Fibrillation     Most recent vitals:   Visit Vitals  /65   Pulse 74   Temp 97.9 °F (36.6 °C) (Oral)   Resp 20   Ht 1.753 m (5' 9\")   Wt 71.4 kg (157 lb 6.5 oz)   SpO2 91%   BMI 23.25 kg/m²           No data found.    No data found.      Respiratory support needed (if any):  - O2 - Vapotherm - settings 100% 15L    Admission weight Weight - Scale: 72.2 kg (159 lb 1.6 oz) (03/02/25 2143)    Today's weight    Wt Readings from Last 1 Encounters:   03/06/25 71.4 kg (157 lb 6.5 oz)        Pena need assessed each shift: N/A - no pena present  UOP >30ml/hr: YES  Last documented BM (in last 48 hrs):  Patient Vitals for the past 48 hrs:   Last BM (including prior to admit) Stool Occurrence   03/05/25 0945 03/03/25 --   03/05/25 1405 03/05/25 1                Restraints (in use currently or dc'd in last 12 hrs): No    Order current and documentation up to date? Yes    Lines/Drains reviewed @ bedside.  Implantable Port 08/29/24 Right Chest (Active)   Number of days: 189         Drip rates at handoff:    sodium chloride         Lab Data:   CBC:   Recent Labs     03/05/25  0516 03/06/25  0805   WBC 8.1 7.5   HGB 7.5* 7.8*   HCT 22.7* 24.2*   MCV 95.8 96.0   * 125*     BMP:    Recent Labs     03/04/25  0323 03/05/25  0511    143   K 4.2 4.1   CO2 26 24   BUN 25* 23*   CREATININE 0.9 0.8     LIVR:   Recent Labs     03/04/25  0323 03/05/25  0511   AST 22 21   ALT 21 19     PT/INR: No results for input(s): \"INR\" in the last 72 hours.    Invalid input(s): \"PROT\"  APTT: No results for input(s): \"APTT\" in the last 72 hours.  ABG:   Recent Labs     03/05/25  0925   PHART 7.455*   KCR2VAH 36.4   PO2ART 80.9       Any consults during the shift? No    Any

## 2025-03-07 NOTE — PROGRESS NOTES
03/07/25 0019   NIV Type   NIV Started/Stopped   (bipap for increased WOB. pt's RR 16; will monitor if bipap is needed)   Assessment   Pulse 70   Respirations 16   SpO2 100 %   Skin Assessment Clean, dry, & intact   Skin Protection for O2 Device Yes   Orientation Bilateral   Location Ear   Intervention(s) Ear Cushion   Settings/Measurements   O2 Flow Rate (L/min) 20 L/min   FiO2  100 %   Oxygen Therapy/Pulse Ox   O2 Therapy Oxygen humidified   O2 Device Heated high flow cannula   Humidification Source Heated wire   Humidification Temp 33   Humidification Temp Measured 33   Pulse Oximeter Device Mode Continuous   Pulse Oximeter Device Location Finger

## 2025-03-07 NOTE — PROGRESS NOTES
ONCOLOGY HEMATOLOGY CARE PROGRESS NOTE      SUBJECTIVE: back in icu d/t resp failure. On vapotherm. Oxygen requirments going back down now.       ROS:     Constitutional:  No weight loss, No fever, No chills, No night sweats.  +fatigue  Eyes:  No impairment or change in vision  ENT / Mouth:  No pain, abnormal ulceration, bleeding, nasal drip or change in voice or hearing  Cardiovascular:  No chest pain, palpitations, new edema, or calf discomfort  Respiratory:  No pain, hemoptysis, change to breathing  Breast:  No pain, discharge, change in appearance or texture  Gastrointestinal:  No pain, cramping, jaundice, change to eating and bowel habits  Urinary:  No pain, bleeding or change in continence  Genitalia: No pain, bleeding or discharge  Musculoskeletal:  No redness, pain, edema or +general weakness  Skin:  No pruritus, rash, change to nodules or lesions  Neurologic:  No discomfort, change in mental status, speech, sensory or motor activity  Psychiatric:  No change in concentration or change to affect or mood  Endocrine:  No hot flashes, increased thirst, or change to urine production  Hematologic: No petechiae, ecchymosis or bleeding  Lymphatic:  No lymphadenopathy or lymphedema  Allergy / Immunologic:  No eczema, hives, frequent or recurrent infections    OBJECTIVE        Physical    VITALS:  Patient Vitals for the past 24 hrs:   BP Temp Temp src Pulse Resp SpO2 Weight   03/07/25 0900 121/63 -- -- 71 19 96 % --   03/07/25 0833 -- -- -- 71 24 98 % --   03/07/25 0829 -- -- -- 64 18 99 % --   03/07/25 0800 118/88 97.4 °F (36.3 °C) Oral 75 20 100 % --   03/07/25 0700 (!) 125/57 -- -- 76 17 98 % --   03/07/25 0600 118/77 -- -- 73 16 100 % --   03/07/25 0552 -- -- -- -- -- -- 71.2 kg (156 lb 15.5 oz)   03/07/25 0500 104/65 -- -- 81 27 95 % --   03/07/25 0422 -- -- -- 69 20 97 % --   03/07/25 0400 120/84 97.7 °F (36.5 °C) Axillary 84 (!) 31 98 % --   03/07/25 0300 105/66 -- -- 70 15

## 2025-03-07 NOTE — PROGRESS NOTES
Physical Therapy  Pt transfer to ICU; please reconsult when approp to resume PT Services.  Daisy Lebron, PT

## 2025-03-07 NOTE — PROGRESS NOTES
Physical Therapy  Facility/Department: [address] ICU  Physical Therapy Initial Assessment    Name: Yury Levin  : 1950  MRN: [id number]  Date of Service: 3/7/2025    Discharge Recommendations:  Continue to assess pending progress, Subacute/Skilled Nursing Facility   PT Equipment Recommendations  Other: Defer to next level of care.      Yury Levin scored a 10/ on the AM-PAC short mobility form. Current research shows that an AM-PAC score of 17 or less is typically not associated with a discharge to the patient's home setting. Based on the patient's AM-PAC score and their current functional mobility deficits, it is recommended that the patient have 3-5 sessions per week of Physical Therapy at d/c to increase the patient's independence.  Please see assessment section for further patient specific details.    If patient discharges prior to next session this note will serve as a discharge summary.  Please see below for the latest assessment towards goals.      Assessment  Body Structures, Functions, Activity Limitations Requiring Skilled Therapeutic Intervention: Decreased functional mobility ;Decreased strength;Decreased safe awareness;Decreased cognition;Decreased endurance;Decreased balance  Assessment: 75 y/o male admit 3/2/2025 with Acute Respiratory, Pneumonia, Sepsis, Influenza + and Recent COVID +. PMH as noted including CAD, NSTEMI, CABG (2018), CHF, Prostate Ca.   PTA pt living with wife in multi level home with few steps to enter and bed/bath on 2nd floor.  Pt reports independent daily care and functional mobility (with Walker).  Currently, pt does appear somewhat confused; requiring 15L O2.  Pt requiring Mod/Max assist to eob, Transfers via Stedy with Min assist; however, endurance limited and unable to attempt use of Walker today.  Brief desat mid 80's although quickly recovers.  At this time would recommend cont PT (3-5) upon d/c.  Will cont to monitor pt's progress.  Therapy Prognosis:  Walker - Rolling  Has the patient had two or more falls in the past year or any fall with injury in the past year?: Yes  Prior Level of Assist for ADLs: Independent  Prior Level of Assist for Homemaking:  (Wife assists with homemaking needs.)  Prior Level of Assist for Ambulation: Independent household ambulator, with or without device (With Walker.)  Prior Level of Assist for Transfers: Independent  Active : Yes  Type of Occupation: Retired : .  Vision/Hearing  Vision  Vision: Within Functional Limits  Hearing  Hearing: Exceptions to WFL  Hearing Exceptions: Hard of hearing/hearing concerns    Cognition   Orientation  Orientation Level: Oriented to person (Pt alert to self, hospital setting; somewhat confused to recent events/situation.)  Cognition  Overall Cognitive Status: Exceptions  Arousal/Alertness: Appears intact  Following Commands: Follows one step commands with increased time  Attention Span: Attends with cues to redirect  Memory: Decreased recall of recent events  Safety Judgement: Impaired  Insights: Decreased awareness of deficits  Initiation: Requires cues for some  Sequencing: Requires cues for some    Objective          Gross Assessment  AROM: Generally decreased, functional  Strength: Generally decreased, functional (Generalized weak throughout; grossly 4-/5.)                      Bed mobility  Supine to Sit: Substantial/Maximal assistance (HOB elevated.)  Transfers  Sit to Stand: Minimal Assistance  Stand to Sit: Minimal Assistance  Bed to Chair: Dependent/Total (Via Stedy.)  Comment: Transfers via Stedy from eob : Min assist.  Unable to merari any further functional activities due to fatigue.        Balance  Comments: Pt able to maintain brief stand during use of Stedy; unable to attempt lift either foot from footplate of Stedy (due to fatigue)            AM-PAC - Mobility    AM-PAC Basic Mobility - Inpatient   How much help is needed turning from your back to your side while in a flat

## 2025-03-07 NOTE — PROGRESS NOTES
Occupational Therapy  Pt transferred to ICU. Will need new orders when medically appropriate.     Geovanni Castro, OTR/L   Detail Level: Simple Detail Level: Zone

## 2025-03-07 NOTE — PLAN OF CARE
Problem: Chronic Conditions and Co-morbidities  Goal: Patient's chronic conditions and co-morbidity symptoms are monitored and maintained or improved  3/6/2025 2156 by Angel Abdullaih RN  Outcome: Progressing  Flowsheets (Taken 3/6/2025 2020)  Care Plan - Patient's Chronic Conditions and Co-Morbidity Symptoms are Monitored and Maintained or Improved:   Monitor and assess patient's chronic conditions and comorbid symptoms for stability, deterioration, or improvement   Collaborate with multidisciplinary team to address chronic and comorbid conditions and prevent exacerbation or deterioration     Problem: Discharge Planning  Goal: Discharge to home or other facility with appropriate resources  3/6/2025 2156 by Angel Abdullahi RN  Outcome: Progressing  Flowsheets (Taken 3/6/2025 2020)  Discharge to home or other facility with appropriate resources:   Identify barriers to discharge with patient and caregiver   Arrange for needed discharge resources and transportation as appropriate   Identify discharge learning needs (meds, wound care, etc)     Problem: Pain  Goal: Verbalizes/displays adequate comfort level or baseline comfort level  3/6/2025 2156 by Angel Abdullahi RN  Outcome: Progressing  Flowsheets  Taken 3/6/2025 2000 by Angel Abdullahi RN  Verbalizes/displays adequate comfort level or baseline comfort level:   Encourage patient to monitor pain and request assistance   Assess pain using appropriate pain scale   Administer analgesics based on type and severity of pain and evaluate response   Implement non-pharmacological measures as appropriate and evaluate response    Problem: Skin/Tissue Integrity  Goal: Skin integrity remains intact  Description: 1.  Monitor for areas of redness and/or skin breakdown  2.  Assess vascular access sites hourly  3.  Every 4-6 hours minimum:  Change oxygen saturation probe site  4.  Every 4-6 hours:  If on nasal continuous positive airway pressure, respiratory therapy assess nares and

## 2025-03-07 NOTE — PROGRESS NOTES
V2.0  [facility] [place] Hospitalist Progress Note      Name:  Yury Levin /Age/Sex: 1950  (74 y.o. male)   MRN & CSN:  [id number] & [id number] Encounter Date/Time: 3/7/2025 8:05 AM EST    Location:  [id number] PCP: Diamond Robles MD       Hospital Day: 6    Assessment and Plan:   Yury Levin is a 74 y.o. male presenting with shortness of breath      Plan:  Multifocal pneumonia  -Covering with antibiotics with cefepime and azithromycin  -Influenza A positive in ED. Tamiflu  -Strep Pneumo antigen positive  -Transferred back to ICU 3/5  -Pulm note reviewed 3/7: Remains on Vapotherm.  Continue Solu-Medrol.  Complete course of antibiotics.  Continue albuterol and 3% nebulizers.   COVID  -diagnosed as outpatient last week Tuesday. Negative on inpatient testing  -s/p paxlovid  Acute hypoxic respiratory failure  -Currently on vapotherm.  Weaned to 15LPM overnight  -Discussed with pulm 3/3: aspiration precautions, solumedrol, albuterol and 3% nebulizers.   -wean o2 as tolerated  -Respiratory status improved again today.  Weaned to 15 L Vapotherm  -Giving additional dose of Lasix  -Pulm note 3/7 reviewed: Check echocardiogram.  Complete antibiotic course ceftriaxone.  -Discussed with patient and he is okay with intubation if indicated  Metastatic prostate cancer  -follows with [facility]  -consult regarding patient's orgovyx therapy in setting of infection  -Discussed with Oncology 3/5: off chemo for several weeks due to deconditioning. If patient's cancer was progressing would be recommending hospice due to his poor functional status.  Anemia of chronic disease  -s/p dose of retacrit; heme/onc following.  Afib s/p watchman  -continue beta-blocker    Ongoing threat to bodily function without continued treatment of acute hypoxic respiratory failure and multifocal pneumonia    Ppx: Lovenox  Dispo: anticipate SNF    Subjective:     Chief Complaint: Altered Mental Status (Pt is being treated for COVID since Tuesday and the  3/2/2025 11:02 pm TECHNIQUE: CTA of the chest was performed after the administration of intravenous contrast.  Multiplanar reformatted images are provided for review.  MIP images are provided for review. Automated exposure control, iterative reconstruction, and/or weight based adjustment of the mA/kV was utilized to reduce the radiation dose to as low as reasonably achievable. COMPARISON: Cristy 10, 2024.  August 21, 2023. HISTORY: ORDERING SYSTEM PROVIDED HISTORY: hypoxia covid TECHNOLOGIST PROVIDED HISTORY: Reason for exam:->hypoxia covid Additional Contrast?->1 Reason for Exam: hypoxia covid FINDINGS: Pulmonary Arteries: Pulmonary arteries are adequately opacified for evaluation.  No evidence of intraluminal filling defect to suggest pulmonary embolism.  Main pulmonary artery is normal in caliber. Mediastinum: The heart is enlarged without a pericardial effusion. Severe coronary artery atherosclerosis. Status post CABG. The aorta and arch branches are normal in course and caliber. No pathologic lymphadenopathy. Lungs/pleura: The lungs demonstrate bilateral airspace opacities involving all lobes.  No significant pleural effusions.  The central airways are patent.  No bronchial thickening or bronchiectasis. Upper Abdomen: Cholelithiasis is present. Soft Tissues/Bones: No acute bone or soft tissue abnormality.  Degenerative changes are seen throughout the cervical spine.  Status post sternotomy.     1. No pulmonary embolus. 2. Multifocal pneumonia. 3. Cholelithiasis.     CT HEAD WO CONTRAST    Result Date: 3/2/2025  EXAMINATION: CT OF THE HEAD WITHOUT CONTRAST  3/2/2025 11:03 pm TECHNIQUE: CT of the head was performed without the administration of intravenous contrast. Automated exposure control, iterative reconstruction, and/or weight based adjustment of the mA/kV was utilized to reduce the radiation dose to as low as reasonably achievable. COMPARISON: 08/21/2023 HISTORY: ORDERING SYSTEM PROVIDED HISTORY: altered

## 2025-03-07 NOTE — CARE COORDINATION
Wound Follow-up Progress Note       NAME:  Yury Levin  MEDICAL RECORD NUMBER:  [id number]  AGE:  74 y.o.   GENDER:  male  :  1950  TODAY'S DATE:  3/7/2025    Subjective  Follow up for stage 3 x2 to R and L buttock. Pt now in ICU. Using zinc barrier. Wounds have improved.     Objective        Patient Goal of Care:  Wound Healing     /78   Pulse 82   Temp 98.8 °F (37.1 °C) (Oral)   Resp 25   Ht 1.753 m (5' 9\")   Wt 71.2 kg (156 lb 15.5 oz)   SpO2 99%   BMI 23.18 kg/m²   Jaime Risk Score: Jaime Scale Score: 13    Assessment    Measurements:  Wound 25 Buttocks Left (Active)   Wound Image   25 131   Wound Etiology Pressure Stage 3 25 131   Dressing Status Other (Comment) 25 0800   Wound Cleansed Not Cleansed 25   Dressing/Treatment Triad hydro/zinc oxide-based hydrophilic paste 25 1316   Wound Length (cm) 0.75 cm 25 1415   Wound Width (cm) 0.75 cm 25 141   Wound Surface Area (cm^2) 0.5625 cm^2 25 141   Wound Assessment Pink/red;Subcutaneous 25 1316   Drainage Amount None (dry) 25 08   Drainage Description Serosanguinous 25 2344   Odor None 25   Lyudmila-wound Assessment Blanchable erythema 25 1316   Margins Defined edges 25 131   Wound Thickness Description not for Pressure Injury Full thickness 25 131   Number of days: 4       Wound 25 Buttocks Right (Active)   Wound Etiology Pressure Stage 3 25 131   Dressing Status Other (Comment) 25 0800   Wound Cleansed Not Cleansed 25   Dressing/Treatment Triad hydro/zinc oxide-based hydrophilic paste 25 1316   Wound Width (cm) 2 cm 25 1415   Wound Depth (cm) 0.75 cm 25 141   Wound Assessment Slough;Pink/red;Subcutaneous 25 1316   Drainage Amount None (dry) 25 0800   Lyudmila-wound Assessment Blanchable erythema 25 1316   Margins Defined edges 25  1316   Wound Thickness Description not for Pressure Injury Full thickness 03/07/25 1316   Number of days: 4     Pt awake and alert in bed. Daughter in room. Educated on plan of care.   Pt continues with generalized bruising. Concerned over L chest bruise. Will monitor for any progression. Did instruct bedside RN to place blue lotion. Skin is fragile and pt is high risk for medical adhesive related skin injury and skin tears. Use caution with turning or transfers.    Buttock wounds improving. Still stage 3. Triad supplied to ICU. Will use triad for debridement. Less periwound redness.             Plan    Plan of Care:   Buttock area- Triad barrier 2x daily, blue barrier wipes for cleansing. No briefs.   Will continue to follow.     Specialty Bed Required : Yes   Low air loss     Current Diet: ADULT DIET; Easy to Chew; Low Sodium (2 gm)  ADULT ORAL NUTRITION SUPPLEMENT; Breakfast, Dinner; Wound Healing Oral Supplement  Dietician consult:  Yes    Discharge Plan:  Placement for patient upon discharge: To be determined TBD    Patient appropriate for Outpatient Wound Care Center: N/A    Patient/Caregiver Teaching: daughter also in room.   Level of patient/caregiver understanding able to:   Verbal understanding       Electronically signed by Luna Garner RN, CWOCN on 3/7/2025 at 1:19 PM

## 2025-03-07 NOTE — PROGRESS NOTES
Physical Therapy  Chart Review and Discharge    25    Name: Yury Levin   : 1950    MRN: [id number]    PT chart review. Pt transferred to ICU on 3/6/25 secondary to respiratory demands. Will need new orders to resume therapy when deemed medically appropriate. PT signing off.     Electronically signed by Gary Ortiz PT on 3/7/2025 at 6:20 AM

## 2025-03-07 NOTE — PROGRESS NOTES
Comprehensive Nutrition Assessment    Type and Reason for Visit:  Reassess    Nutrition Recommendations/Plan:   Encourage oral diet as medically able  Offer Ensure plus with meals due to po intake decrease  Offer Geo bid due to increased protein needs for wound healing     Malnutrition Assessment:  Malnutrition Status:  At risk for malnutrition (03/03/25 1129)    Context:  Chronic Illness     Findings of the 6 clinical characteristics of malnutrition:  Energy Intake:  Mild decrease in energy intake  Weight Loss:  Greater than 20% over 1 year     Body Fat Loss:  No body fat loss     Muscle Mass Loss:  No muscle mass loss    Fluid Accumulation:  Mild     Strength:  Not Performed    Nutrition Assessment:    Follow up:  Patient continues on an easy to chew textures, low sodium diet with Geo as tolerated bid.  Patient transferred to ICU earlier today requiring 35 liters of Vapotherm earlier today.    Nutrition Related Findings:    2.3 phos, 100 glucose Wound Type: Stage II (left buttocks)       Current Nutrition Intake & Therapies:    Average Meal Intake: Unable to assess  Average Supplements Intake: None Ordered  ADULT DIET; Easy to Chew; Low Sodium (2 gm)  ADULT ORAL NUTRITION SUPPLEMENT; Breakfast, Dinner; Wound Healing Oral Supplement  ADULT ORAL NUTRITION SUPPLEMENT; Breakfast, Lunch, Dinner; Standard High Calorie/High Protein Oral Supplement    Anthropometric Measures:  Height: 175.3 cm (5' 9\")  Ideal Body Weight (IBW): 160 lbs (73 kg)       Current Body Weight: 71.4 kg (157 lb 6.5 oz),   IBW. Weight Source: Bed scale  Current BMI (kg/m2): 23.2                             BMI Categories: Normal Weight (BMI 18.5-24.9)    Estimated Daily Nutrient Needs:  Energy Requirements Based On: Kcal/kg  Weight Used for Energy Requirements: Current  Energy (kcal/day): 1775 - 2125 (25 - 30 kcal/kg)  Weight Used for Protein Requirements: Current  Protein (g/day): 71 - 85 (1-1.2 g/kg)  Method Used for Fluid Requirements: 1  ml/kcal  Fluid (ml/day): or per provider    Nutrition Diagnosis:   Increased nutrient needs related to increase demand for energy/nutrients as evidenced by wounds    Nutrition Interventions:   Food and/or Nutrient Delivery: Continue Current Diet, Start Oral Nutrition Supplement  Nutrition Education/Counseling: No recommendation at this time  Coordination of Nutrition Care: No recommendation at this time       Goals:  Goals: Meet at least 75% of estimated needs, by next RD assessment  Type of Goal: New goal  Previous Goal Met: New Goal    Nutrition Monitoring and Evaluation:   Behavioral-Environmental Outcomes: None Identified  Food/Nutrient Intake Outcomes: Food and Nutrient Intake, Supplement Intake  Physical Signs/Symptoms Outcomes: Biochemical Data, Weight, Nutrition Focused Physical Findings, Skin    Discharge Planning:    No discharge needs at this time     ABDIEL MERCADO RD, LD  Contact: Office: [phone]; [place]: [phone]

## 2025-03-07 NOTE — PLAN OF CARE
Problem: Chronic Conditions and Co-morbidities  Goal: Patient's chronic conditions and co-morbidity symptoms are monitored and maintained or improved  3/7/2025 1038 by Joyce Richardson RN  Outcome: Progressing  Flowsheets (Taken 3/7/2025 0800)  Care Plan - Patient's Chronic Conditions and Co-Morbidity Symptoms are Monitored and Maintained or Improved:   Monitor and assess patient's chronic conditions and comorbid symptoms for stability, deterioration, or improvement   Collaborate with multidisciplinary team to address chronic and comorbid conditions and prevent exacerbation or deterioration   Update acute care plan with appropriate goals if chronic or comorbid symptoms are exacerbated and prevent overall improvement and discharge  3/6/2025 2156 by Angel Abdullahi RN  Outcome: Progressing  Flowsheets (Taken 3/6/2025 2020)  Care Plan - Patient's Chronic Conditions and Co-Morbidity Symptoms are Monitored and Maintained or Improved:   Monitor and assess patient's chronic conditions and comorbid symptoms for stability, deterioration, or improvement   Collaborate with multidisciplinary team to address chronic and comorbid conditions and prevent exacerbation or deterioration     Problem: Discharge Planning  Goal: Discharge to home or other facility with appropriate resources  3/7/2025 1038 by Joyce Richardson RN  Outcome: Progressing  Flowsheets (Taken 3/7/2025 0800)  Discharge to home or other facility with appropriate resources:   Identify barriers to discharge with patient and caregiver   Arrange for needed discharge resources and transportation as appropriate   Identify discharge learning needs (meds, wound care, etc)   Refer to discharge planning if patient needs post-hospital services based on physician order or complex needs related to functional status, cognitive ability or social support system  3/6/2025 2156 by Angel Abdullahi RN  Outcome: Progressing  Flowsheets (Taken 3/6/2025 2020)  Discharge to home or

## 2025-03-08 LAB
ANION GAP SERPL CALCULATED.3IONS-SCNC: 9 MMOL/L (ref 3–16)
BASOPHILS # BLD: 0 K/UL (ref 0–0.2)
BASOPHILS NFR BLD: 0.1 %
BUN SERPL-MCNC: 25 MG/DL (ref 7–20)
CALCIUM SERPL-MCNC: 8.4 MG/DL (ref 8.3–10.6)
CHLORIDE SERPL-SCNC: 106 MMOL/L (ref 99–110)
CO2 SERPL-SCNC: 29 MMOL/L (ref 21–32)
CREAT SERPL-MCNC: 0.9 MG/DL (ref 0.8–1.3)
DEPRECATED RDW RBC AUTO: 17.7 % (ref 12.4–15.4)
EOSINOPHIL # BLD: 0 K/UL (ref 0–0.6)
EOSINOPHIL NFR BLD: 0.5 %
GFR SERPLBLD CREATININE-BSD FMLA CKD-EPI: 89 ML/MIN/{1.73_M2}
GLUCOSE SERPL-MCNC: 81 MG/DL (ref 70–99)
HCT VFR BLD AUTO: 26.3 % (ref 40.5–52.5)
HGB BLD-MCNC: 8.8 G/DL (ref 13.5–17.5)
LYMPHOCYTES # BLD: 0.5 K/UL (ref 1–5.1)
LYMPHOCYTES NFR BLD: 6.1 %
MCH RBC QN AUTO: 31.7 PG (ref 26–34)
MCHC RBC AUTO-ENTMCNC: 33.4 G/DL (ref 31–36)
MCV RBC AUTO: 95 FL (ref 80–100)
MONOCYTES # BLD: 0.2 K/UL (ref 0–1.3)
MONOCYTES NFR BLD: 2.7 %
NEUTROPHILS # BLD: 7 K/UL (ref 1.7–7.7)
NEUTROPHILS NFR BLD: 90.6 %
PHOSPHATE SERPL-MCNC: 2.4 MG/DL (ref 2.5–4.9)
PLATELET # BLD AUTO: 136 K/UL (ref 135–450)
PMV BLD AUTO: 8.9 FL (ref 5–10.5)
POTASSIUM SERPL-SCNC: 3.5 MMOL/L (ref 3.5–5.1)
POTASSIUM SERPL-SCNC: 4.7 MMOL/L (ref 3.5–5.1)
RBC # BLD AUTO: 2.77 M/UL (ref 4.2–5.9)
SODIUM SERPL-SCNC: 144 MMOL/L (ref 136–145)
WBC # BLD AUTO: 7.7 K/UL (ref 4–11)

## 2025-03-08 PROCEDURE — 2060000000 HC ICU INTERMEDIATE R&B

## 2025-03-08 PROCEDURE — 84132 ASSAY OF SERUM POTASSIUM: CPT

## 2025-03-08 PROCEDURE — 36591 DRAW BLOOD OFF VENOUS DEVICE: CPT

## 2025-03-08 PROCEDURE — 2700000000 HC OXYGEN THERAPY PER DAY

## 2025-03-08 PROCEDURE — 6370000000 HC RX 637 (ALT 250 FOR IP): Performed by: INTERNAL MEDICINE

## 2025-03-08 PROCEDURE — 94669 MECHANICAL CHEST WALL OSCILL: CPT

## 2025-03-08 PROCEDURE — 2580000003 HC RX 258: Performed by: INTERNAL MEDICINE

## 2025-03-08 PROCEDURE — 2580000003 HC RX 258: Performed by: NURSE PRACTITIONER

## 2025-03-08 PROCEDURE — 6360000002 HC RX W HCPCS: Performed by: NURSE PRACTITIONER

## 2025-03-08 PROCEDURE — 6360000002 HC RX W HCPCS: Performed by: INTERNAL MEDICINE

## 2025-03-08 PROCEDURE — 80048 BASIC METABOLIC PNL TOTAL CA: CPT

## 2025-03-08 PROCEDURE — 94640 AIRWAY INHALATION TREATMENT: CPT

## 2025-03-08 PROCEDURE — 84100 ASSAY OF PHOSPHORUS: CPT

## 2025-03-08 PROCEDURE — 85025 COMPLETE CBC W/AUTO DIFF WBC: CPT

## 2025-03-08 PROCEDURE — 2500000003 HC RX 250 WO HCPCS: Performed by: NURSE PRACTITIONER

## 2025-03-08 PROCEDURE — 6370000000 HC RX 637 (ALT 250 FOR IP): Performed by: NURSE PRACTITIONER

## 2025-03-08 PROCEDURE — 94761 N-INVAS EAR/PLS OXIMETRY MLT: CPT

## 2025-03-08 PROCEDURE — 99291 CRITICAL CARE FIRST HOUR: CPT | Performed by: INTERNAL MEDICINE

## 2025-03-08 RX ORDER — FUROSEMIDE 10 MG/ML
40 INJECTION INTRAMUSCULAR; INTRAVENOUS ONCE
Status: COMPLETED | OUTPATIENT
Start: 2025-03-08 | End: 2025-03-08

## 2025-03-08 RX ADMIN — GUAIFENESIN 600 MG: 600 TABLET ORAL at 22:48

## 2025-03-08 RX ADMIN — ALBUTEROL SULFATE 2.5 MG: 2.5 SOLUTION RESPIRATORY (INHALATION) at 12:32

## 2025-03-08 RX ADMIN — METHYLPREDNISOLONE SODIUM SUCCINATE 40 MG: 40 INJECTION, POWDER, LYOPHILIZED, FOR SOLUTION INTRAMUSCULAR; INTRAVENOUS at 07:41

## 2025-03-08 RX ADMIN — Medication 4 ML: at 07:45

## 2025-03-08 RX ADMIN — ENOXAPARIN SODIUM 40 MG: 100 INJECTION SUBCUTANEOUS at 09:45

## 2025-03-08 RX ADMIN — OXYMETAZOLINE HYDROCHLORIDE 2 SPRAY: 0.5 SPRAY NASAL at 22:49

## 2025-03-08 RX ADMIN — CEFTRIAXONE SODIUM 2000 MG: 2 INJECTION, POWDER, FOR SOLUTION INTRAMUSCULAR; INTRAVENOUS at 13:25

## 2025-03-08 RX ADMIN — SODIUM CHLORIDE, PRESERVATIVE FREE 10 ML: 5 INJECTION INTRAVENOUS at 20:40

## 2025-03-08 RX ADMIN — METOPROLOL SUCCINATE 25 MG: 25 TABLET, EXTENDED RELEASE ORAL at 07:41

## 2025-03-08 RX ADMIN — Medication 4 ML: at 20:15

## 2025-03-08 RX ADMIN — Medication 4 ML: at 12:32

## 2025-03-08 RX ADMIN — FUROSEMIDE 40 MG: 10 INJECTION, SOLUTION INTRAMUSCULAR; INTRAVENOUS at 09:45

## 2025-03-08 RX ADMIN — POTASSIUM CHLORIDE 20 MEQ: 29.8 INJECTION, SOLUTION INTRAVENOUS at 06:14

## 2025-03-08 RX ADMIN — ROSUVASTATIN CALCIUM 20 MG: 20 TABLET, FILM COATED ORAL at 22:48

## 2025-03-08 RX ADMIN — Medication 2 PUFF: at 07:45

## 2025-03-08 RX ADMIN — ALBUTEROL SULFATE 2.5 MG: 2.5 SOLUTION RESPIRATORY (INHALATION) at 20:15

## 2025-03-08 RX ADMIN — ALBUTEROL SULFATE 2.5 MG: 2.5 SOLUTION RESPIRATORY (INHALATION) at 07:45

## 2025-03-08 RX ADMIN — POTASSIUM CHLORIDE 20 MEQ: 29.8 INJECTION, SOLUTION INTRAVENOUS at 07:15

## 2025-03-08 RX ADMIN — OXYMETAZOLINE HYDROCHLORIDE 2 SPRAY: 0.5 SPRAY NASAL at 07:42

## 2025-03-08 RX ADMIN — SODIUM CHLORIDE, PRESERVATIVE FREE 10 ML: 5 INJECTION INTRAVENOUS at 07:41

## 2025-03-08 RX ADMIN — Medication 2 PUFF: at 20:16

## 2025-03-08 RX ADMIN — EZETIMIBE 10 MG: 10 TABLET ORAL at 10:29

## 2025-03-08 RX ADMIN — GUAIFENESIN 600 MG: 600 TABLET ORAL at 07:41

## 2025-03-08 ASSESSMENT — PAIN SCALES - GENERAL
PAINLEVEL_OUTOF10: 0

## 2025-03-08 NOTE — PLAN OF CARE
Problem: Chronic Conditions and Co-morbidities  Goal: Patient's chronic conditions and co-morbidity symptoms are monitored and maintained or improved  3/7/2025 2133 by Ruth Burrell RN  Outcome: Progressing  Flowsheets (Taken 3/7/2025 2000)  Care Plan - Patient's Chronic Conditions and Co-Morbidity Symptoms are Monitored and Maintained or Improved: Monitor and assess patient's chronic conditions and comorbid symptoms for stability, deterioration, or improvement     Problem: Discharge Planning  Goal: Discharge to home or other facility with appropriate resources  3/7/2025 2133 by Ruth Burrell RN  Outcome: Progressing     Problem: Pain  Goal: Verbalizes/displays adequate comfort level or baseline comfort level  3/7/2025 2133 by Ruth Burrell RN  Outcome: Progressing  Flowsheets (Taken 3/7/2025 2000)  Verbalizes/displays adequate comfort level or baseline comfort level:   Encourage patient to monitor pain and request assistance   Assess pain using appropriate pain scale   Administer analgesics based on type and severity of pain and evaluate response   Implement non-pharmacological measures as appropriate and evaluate response     Problem: Skin/Tissue Integrity  Goal: Skin integrity remains intact  Description: 1.  Monitor for areas of redness and/or skin breakdown  2.  Assess vascular access sites hourly  3.  Every 4-6 hours minimum:  Change oxygen saturation probe site  4.  Every 4-6 hours:  If on nasal continuous positive airway pressure, respiratory therapy assess nares and determine need for appliance change or resting period  3/7/2025 2133 by Ruth Burrell, RN  Outcome: Progressing  Flowsheets (Taken 3/7/2025 2000)  Skin Integrity Remains Intact:   Monitor for areas of redness and/or skin breakdown   Assess vascular access sites hourly     Problem: Safety - Adult  Goal: Free from fall injury  3/7/2025 2133 by Ruth Burrell, RN  Outcome: Progressing     Problem: ABCDS Injury Assessment  Goal: Absence of

## 2025-03-08 NOTE — PROGRESS NOTES
NAME:  Yury Levin  YOB: 1950  MEDICAL RECORD NUMBER:  [id number]    Shift Summary: Oxygen weaned from 15L to 8L overnite.  Still pleasantly confused. Only alert to self and place this time. Calm and cooperative.  Prevent cream applied to bruises as per rec by Wound care.  Skin fragile.  Care when handling skin.  Bathed and changed. Kept warm and comfortable.  Call light within reach.    K replacement started.    Family updated: Yes:  Wife at bedside change of shift    Rhythm: Atrial Fibrillation     Most recent vitals:   Visit Vitals  /65   Pulse 89   Temp 98.1 °F (36.7 °C) (Oral)   Resp 22   Ht 1.753 m (5' 9\")   Wt 65.6 kg (144 lb 10 oz)   SpO2 95%   BMI 21.36 kg/m²           No data found.    No data found.      Respiratory support needed (if any):  - O2 - HFNC 8 lpm    Admission weight Weight - Scale: 72.2 kg (159 lb 1.6 oz) (03/02/25 2143)    Today's weight    Wt Readings from Last 1 Encounters:   03/08/25 65.6 kg (144 lb 10 oz)        Pena need assessed each shift: N/A - no pena present  UOP >30ml/hr: YES  Last documented BM (in last 48 hrs):  Patient Vitals for the past 48 hrs:   Last BM (including prior to admit)   03/07/25 0800 03/05/25 03/07/25 2000 03/05/25                Restraints (in use currently or dc'd in last 12 hrs): No    Order current and documentation up to date? No    Lines/Drains reviewed @ bedside.  Implantable Port 08/29/24 Right Chest (Active)   Number of days: 190         Drip rates at handoff:    sodium chloride         Lab Data:   CBC:   Recent Labs     03/06/25  0805 03/07/25  0349   WBC 7.5 7.0   HGB 7.8* 8.2*   HCT 24.2* 24.7*   MCV 96.0 94.7   * 129*     BMP:    Recent Labs     03/05/25  0511 03/07/25  0343 03/07/25  1025    146*  --    K 4.1 3.2* 3.8   CO2 24 29  --    BUN 23* 25*  --    CREATININE 0.8 0.8  --      LIVR:   Recent Labs     03/05/25  0511   AST 21   ALT 19     PT/INR: No results for input(s): \"INR\" in the last 72

## 2025-03-08 NOTE — PROGRESS NOTES
NAME:  Yury Levin  YOB: 1950  MEDICAL RECORD NUMBER:  [id number]    Shift Summary: OOB to chair with max assist 2 RNs. Pt very weak. Used pablo lift to get back to bed with assist of 2 RNs. Replaced potassium and recheck no additional potassium required. Oxygen decreased from 8L/min HFNC to 2l/m NC. No SOB noted. No pain. New skin tear noted to Right lateral AC/FA area.     Family updated: Yes:  Marilyn wife at bedside all day and updated by RN and MD hospitalist and Oncology.     Rhythm: Atrial Fibrillation vs SR with PAC    Most recent vitals:   Visit Vitals  BP (!) 108/54   Pulse (!) 108   Temp 98.7 °F (37.1 °C) (Oral)   Resp 23   Ht 1.753 m (5' 9\")   Wt 65.6 kg (144 lb 10 oz)   SpO2 95%   BMI 21.36 kg/m²           No data found.    No data found.      Respiratory support needed (if any):  - O2 - NC - 2 lpm    Admission weight Weight - Scale: 72.2 kg (159 lb 1.6 oz) (03/02/25 2143)    Today's weight    Wt Readings from Last 1 Encounters:   03/08/25 65.6 kg (144 lb 10 oz)        Pena need assessed each shift: No pena present   UOP >30ml/hr: YES  Last documented BM (in last 48 hrs):  Patient Vitals for the past 48 hrs:   Last BM (including prior to admit) Stool Occurrence   03/07/25 0800 03/05/25 --   03/07/25 2000 03/05/25 --   03/08/25 1610 03/08/25 1                Restraints (in use currently or dc'd in last 12 hrs): No    Order current and documentation up to date? Yes    Lines/Drains reviewed @ bedside.  Implantable Port 08/29/24 Right Chest (Active)   Number of days: 191         Drip rates at handoff:    sodium chloride         Lab Data:   CBC:   Recent Labs     03/07/25  0349 03/08/25  0435   WBC 7.0 7.7   HGB 8.2* 8.8*   HCT 24.7* 26.3*   MCV 94.7 95.0   * 136     BMP:    Recent Labs     03/07/25  0343 03/07/25  1025 03/08/25  0435 03/08/25  0950   *  --  144  --    K 3.2*   < > 3.5 4.7   CO2 29  --  29  --    BUN 25*  --  25*  --    CREATININE 0.8  --  0.9  --     < > = values  in this interval not displayed.     LIVR: No results for input(s): \"AST\", \"ALT\" in the last 72 hours.  PT/INR: No results for input(s): \"INR\" in the last 72 hours.    Invalid input(s): \"PROT\"  APTT: No results for input(s): \"APTT\" in the last 72 hours.  ABG: No results for input(s): \"PHART\", \"[id number]\", \"PO2ART\" in the last 72 hours.    Any consults during the shift? No    Any signed and held orders to be released?  No        4 Eyes Skin Assessment       The patient is being assessed for  Shift Handoff    I agree that at least one RN has performed a thorough Head to Toe Skin Assessment on the patient. ALL assessment sites listed below have been assessed.      Areas assessed by both nurses: Head, Face, Ears, Shoulders, Back, Chest, Arms, Elbows, Hands, Sacrum. Buttock, Coccyx, Ischium, Legs. Feet and Heels, and Under Medical Devices         Does the Patient have a Wound? Yes wound(s) were present on assessment. LDA wound assessment was Initiated and completed by RN    Wound Care Orders initiated by RN: Yes       Jaime Prevention initiated by RN: Yes    Pressure Injury (Stage 3,4, Unstageable, DTI, NWPT, and Complex wounds) if present, place Wound referral order by RN under : Yes    New Ostomies, if present place, Ostomy referral order under : No     Nurse 1 eSignature: Electronically signed by Odalis Woo RN on 3/8/25 at 6:37 PM EST    **SHARE this note so that the co-signing nurse can place an eSignature**    Nurse 2 eSignature: Electronically signed by Abe Mcneal RN on 3/8/25 at 7:43 PM EST

## 2025-03-08 NOTE — PROGRESS NOTES
ONCOLOGY HEMATOLOGY CARE PROGRESS NOTE      SUBJECTIVE:  Patient's breathing is slowly improving.  He denies any chest pain recently.    ROS:     Constitutional:  No weight loss, No fever, No chills, No night sweats.  Energy level good.  Eyes:  No impairment or change in vision  ENT / Mouth:  No pain, abnormal ulceration, bleeding, nasal drip or change in voice or hearing  Cardiovascular:  No chest pain, palpitations, new edema, or calf discomfort  Respiratory:  No pain, hemoptysis, change to breathing  Breast:  No pain, discharge, change in appearance or texture  Gastrointestinal:  No pain, cramping, jaundice, change to eating and bowel habits  Urinary:  No pain, bleeding or change in continence  Genitalia: No pain, bleeding or discharge  Musculoskeletal:  No redness, pain, edema or weakness  Skin:  No pruritus, rash, change to nodules or lesions  Neurologic:  No discomfort, change in mental status, speech, sensory or motor activity  Psychiatric:  No change in concentration or change to affect or mood  Endocrine:  No hot flashes, increased thirst, or change to urine production  Hematologic: No petechiae, ecchymosis or bleeding  Lymphatic:  No lymphadenopathy or lymphedema  Allergy / Immunologic:  No eczema, hives, frequent or recurrent infections    OBJECTIVE        Physical    VITALS:  Patient Vitals for the past 24 hrs:   BP Temp Temp src Pulse Resp SpO2 Weight   03/08/25 1236 -- -- -- 82 27 100 % --   03/08/25 1235 -- -- -- 88 26 98 % --   03/08/25 1215 -- -- -- 84 28 98 % --   03/08/25 1211 -- -- -- 91 21 98 % --   03/08/25 1200 (!) 130/95 -- -- (!) 102 19 98 % --   03/08/25 1100 104/68 -- -- 81 21 100 % --   03/08/25 1000 109/67 -- -- 78 17 99 % --   03/08/25 0939 -- -- -- 79 27 96 % --   03/08/25 0938 -- -- -- 80 24 98 % --   03/08/25 0900 100/64 -- -- 70 22 97 % --   03/08/25 0800 121/64 -- -- 79 28 100 % --   03/08/25 0753 -- 97.5 °F (36.4 °C) Axillary 72 21 100 % --

## 2025-03-08 NOTE — PROGRESS NOTES
NAME:  Yury Levin  YOB: 1950  MEDICAL RECORD NUMBER:  [id number]    Shift Summary: Patient weaned from 15L 100% vapotherm to 15L HFNC. Received one dose lasix, diuresed well. Up to chair with stedy with PT/OT. Patient confused through entire shift, alert and oriented to only self and month but pleasant and not pulling off equipment.    Family updated: Yes:  wife, Marilyn, at bedside throughout entire shift    Rhythm: Atrial Fibrillation     Most recent vitals:   Visit Vitals  /80   Pulse 82   Temp 98.6 °F (37 °C) (Oral)   Resp 19   Ht 1.753 m (5' 9\")   Wt 71.2 kg (156 lb 15.5 oz)   SpO2 99%   BMI 23.18 kg/m²           No data found.    No data found.      Respiratory support needed (if any):  - O2 - HFNC 15 lpm    Admission weight Weight - Scale: 72.2 kg (159 lb 1.6 oz) (03/02/25 2143)    Today's weight    Wt Readings from Last 1 Encounters:   03/07/25 71.2 kg (156 lb 15.5 oz)        Pena need assessed each shift: N/A - no pena present  UOP >30ml/hr: YES  Last documented BM (in last 48 hrs):  Patient Vitals for the past 48 hrs:   Last BM (including prior to admit)   03/07/25 0800 03/05/25                Restraints (in use currently or dc'd in last 12 hrs): No    Order current and documentation up to date? N/a    Lines/Drains reviewed @ bedside.  Implantable Port 08/29/24 Right Chest (Active)   Number of days: 190         Drip rates at handoff:    sodium chloride         Lab Data:   CBC:   Recent Labs     03/06/25  0805 03/07/25  0349   WBC 7.5 7.0   HGB 7.8* 8.2*   HCT 24.2* 24.7*   MCV 96.0 94.7   * 129*     BMP:    Recent Labs     03/05/25  0511 03/07/25  0343 03/07/25  1025    146*  --    K 4.1 3.2* 3.8   CO2 24 29  --    BUN 23* 25*  --    CREATININE 0.8 0.8  --      LIVR:   Recent Labs     03/05/25  0511   AST 21   ALT 19     PT/INR: No results for input(s): \"INR\" in the last 72 hours.    Invalid input(s): \"PROT\"  APTT: No results for input(s): \"APTT\" in the last 72

## 2025-03-08 NOTE — PROGRESS NOTES
Pulmonary Critical Care Note     Patient's name:  Yury Levin  Medical Record Number: [id number]  Patient's account/billing number: [id number]  Patient's YOB: 1950  Age: 74 y.o.  Date of Admission: 3/2/2025  9:38 PM  Date of Consult: 3/8/2025      Primary Care Physician: Diamond Robles MD      Code Status: Full Code    Reason for consult: Acute respiratory failure with hypoxia    Assessment and Plan     Acute respiratory with hypoxia less than 90% on room air with respiratory distress requiring BiPAP  ARDS   Multifocal pneumonia, pneumococcal  Influenza A pneumonia  Recent COVID infection  History of prostate cancer metastatic  History of A-fib  ERIBERTO      Plan:  Titrate oxygen to keep sats more than 90%, use BiPAP for increased work of breathing. High risk for intubation  Lasix x one  Echo results reviewed  Aspiration precautions.  Ceftriaxone to finish the course of antibiotic  Solu-Medrol daily.  Status post Paxlovid.  Albuterol and 3%.  Due to the immediate potential for life-threatening deterioration due to above , I spent 35 minutes providing critical care.  This time is excluding time spent performing procedures.  This note was transcribed using Dragon Dictation software. Please disregard any translational errors.        Subjective:  Remains on high flow    HISTORY OF PRESENT ILLNESS:   Mr./Ms. Yury Levin is a 74 y.o. gentleman with past medical history stated below significant for metastatic prostate cancer on Orgovyx, history of A-fib status post watchman, diagnosed with COVID infection on Tuesday started on Paxlovid presenting with worsening cough shortness of breath admitted to the ICU with hypoxic respiratory failure requiring BiPAP and high flow oxygen.      REVIEW OF SYSTEMS:  Review of Systems -   General ROS: Generalized weak  Psychological ROS: negative  Ophthalmic ROS: negative  ENT ROS: negative  Allergy and Immunology ROS:

## 2025-03-08 NOTE — PROGRESS NOTES
Pt finished breakfast. Ate 100% breakfast including waffles, oikos 15 g protein yogurt and fruit. Pt also had 100% of vanilla ensure and curretly mixed Geo packet with 8 oz water for patient. Wife at bedside to encourage oral intake.

## 2025-03-08 NOTE — PROGRESS NOTES
Pt assisted OOB to chair with stedy with max assist of 2 RNs. Pt stated \"I'm having a hard time\" Pt was very weak and struggled with stedy, struggled to maintain upper body and head upright. Pt agreed with return to bed we would assist patient back to bed using the lift in the room for safety. Attending Rubens at bedside and new orders obtained to re-consult  PT/OT

## 2025-03-08 NOTE — PROGRESS NOTES
[place]2.[place]  [facility] [place]      Name:  Yury Levin /Age/Sex: 1950  (74 y.o. male)   MRN & CSN:  [id number] & [id number] Encounter Date/Time: 3/8/2025 9:08 AM EST    Location:  [id number] PCP: Diamond Robles MD       Hospital Day: 7    Assessment and Plan:   Yury Levin is a 74 y.o. male presenting with shortness of breath      Plan:  Multifocal pneumonia  -Covering with antibiotics with cefepime and azithromycin  -Influenza A positive in ED.  -Strep Pneumo antigen positive  -Transferred back to ICU 3/5  COVID  -diagnosed as outpatient last week Tuesday. Negative on inpatient testing  -s/p paxlovid  Acute hypoxic respiratory failure  -Weaned to high flow nasal cannula overnight, down to 8L this a.m.  -wean o2 as tolerated  -Pulm note 3/8 reviewed: Additional dose of Lasix.  Continue ceftriaxone to finish course of antibiotic, daily Solu-Medrol, albuterol and 3% nebulizers  Metastatic prostate cancer  -follows with [facility]  -consult regarding patient's orgovyx therapy in setting of infection  -Oncology note 3/8 reviewed: off chemo for several weeks due to deconditioning.  PSA remains stable.  Patient declining hospice and wants to continue to be aggressive if possible  Anemia of chronic disease  -s/p dose of retacrit; heme/onc following.  Afib s/p watchman  -continue beta-blocker    Ongoing threat to bodily function without continued treatment of acute hypoxic respiratory failure and multifocal pneumonia    Ppx: Lovenox  Dispo: anticipate SNF    Subjective:     Chief Complaint: Altered Mental Status (Pt is being treated for COVID since Tuesday and the patient has been hsaving spikes in fever and has been acting off, SpO2 79% RA pt doesn't normally wear oxygen )     Interval Hx:  Good urine output with Lasix dose.  Respiratory status improving..  Patient denies chest pain and shortness of breath.  Once again states that he feels he is doing better.  Very weak physically.  Denies fevers or  Additional Contrast?->1 Reason for Exam: hypoxia covid FINDINGS: Pulmonary Arteries: Pulmonary arteries are adequately opacified for evaluation.  No evidence of intraluminal filling defect to suggest pulmonary embolism.  Main pulmonary artery is normal in caliber. Mediastinum: The heart is enlarged without a pericardial effusion. Severe coronary artery atherosclerosis. Status post CABG. The aorta and arch branches are normal in course and caliber. No pathologic lymphadenopathy. Lungs/pleura: The lungs demonstrate bilateral airspace opacities involving all lobes.  No significant pleural effusions.  The central airways are patent.  No bronchial thickening or bronchiectasis. Upper Abdomen: Cholelithiasis is present. Soft Tissues/Bones: No acute bone or soft tissue abnormality.  Degenerative changes are seen throughout the cervical spine.  Status post sternotomy.     1. No pulmonary embolus. 2. Multifocal pneumonia. 3. Cholelithiasis.     CT HEAD WO CONTRAST    Result Date: 3/2/2025  EXAMINATION: CT OF THE HEAD WITHOUT CONTRAST  3/2/2025 11:03 pm TECHNIQUE: CT of the head was performed without the administration of intravenous contrast. Automated exposure control, iterative reconstruction, and/or weight based adjustment of the mA/kV was utilized to reduce the radiation dose to as low as reasonably achievable. COMPARISON: 08/21/2023 HISTORY: ORDERING SYSTEM PROVIDED HISTORY: altered mental status TECHNOLOGIST PROVIDED HISTORY: Reason for exam:->altered mental status Has a \"code stroke\" or \"stroke alert\" been called?->No Decision Support Exception - unselect if not a suspected or confirmed emergency medical condition->Emergency Medical Condition (MA) Reason for Exam: ams FINDINGS: BRAIN/VENTRICLES: The ventricles are mildly enlarged and there is diffuse mild prominence of the cortical sulci.  There is moderate periventricular low density bilaterally which is unchanged.  No intracranial hemorrhage or edema is seen.  There

## 2025-03-08 NOTE — PROGRESS NOTES
Nutrition Note    Patient prefers vanilla Ensure plus high protein and flavored Geo.  RD suggested adding an orange flavored Geo powder to a vanilla Ensure to make an orange cream flavored drink if needing to limit extra fluid.  Patient did eat all of breakfast including a Greek yogurt.     Electronically signed by ABDIEL MERCADO, CYNTHIA, LD on 3/8/25 at 12:24 PM EST    Contact: Office: [phone]; [place]: [phone]

## 2025-03-09 LAB
ANION GAP SERPL CALCULATED.3IONS-SCNC: 11 MMOL/L (ref 3–16)
BASOPHILS # BLD: 0 K/UL (ref 0–0.2)
BASOPHILS NFR BLD: 0.1 %
BUN SERPL-MCNC: 36 MG/DL (ref 7–20)
CALCIUM SERPL-MCNC: 8.7 MG/DL (ref 8.3–10.6)
CHLORIDE SERPL-SCNC: 101 MMOL/L (ref 99–110)
CO2 SERPL-SCNC: 30 MMOL/L (ref 21–32)
CREAT SERPL-MCNC: 0.8 MG/DL (ref 0.8–1.3)
DEPRECATED RDW RBC AUTO: 17.7 % (ref 12.4–15.4)
EOSINOPHIL # BLD: 0 K/UL (ref 0–0.6)
EOSINOPHIL NFR BLD: 0.4 %
GFR SERPLBLD CREATININE-BSD FMLA CKD-EPI: >90 ML/MIN/{1.73_M2}
GLUCOSE SERPL-MCNC: 101 MG/DL (ref 70–99)
HCT VFR BLD AUTO: 25.8 % (ref 40.5–52.5)
HGB BLD-MCNC: 8.6 G/DL (ref 13.5–17.5)
LYMPHOCYTES # BLD: 0.4 K/UL (ref 1–5.1)
LYMPHOCYTES NFR BLD: 6.3 %
MAGNESIUM SERPL-MCNC: 1.88 MG/DL (ref 1.8–2.4)
MCH RBC QN AUTO: 31.5 PG (ref 26–34)
MCHC RBC AUTO-ENTMCNC: 33.4 G/DL (ref 31–36)
MCV RBC AUTO: 94.3 FL (ref 80–100)
MONOCYTES # BLD: 0.2 K/UL (ref 0–1.3)
MONOCYTES NFR BLD: 2.5 %
NEUTROPHILS # BLD: 6.2 K/UL (ref 1.7–7.7)
NEUTROPHILS NFR BLD: 90.7 %
NT-PROBNP SERPL-MCNC: 2490 PG/ML (ref 0–449)
PHOSPHATE SERPL-MCNC: 1.3 MG/DL (ref 2.5–4.9)
PHOSPHATE SERPL-MCNC: 2.8 MG/DL (ref 2.5–4.9)
PLATELET # BLD AUTO: 144 K/UL (ref 135–450)
PMV BLD AUTO: 9.1 FL (ref 5–10.5)
POTASSIUM SERPL-SCNC: 3.6 MMOL/L (ref 3.5–5.1)
POTASSIUM SERPL-SCNC: 4.5 MMOL/L (ref 3.5–5.1)
RBC # BLD AUTO: 2.74 M/UL (ref 4.2–5.9)
SODIUM SERPL-SCNC: 142 MMOL/L (ref 136–145)
WBC # BLD AUTO: 6.8 K/UL (ref 4–11)

## 2025-03-09 PROCEDURE — 83735 ASSAY OF MAGNESIUM: CPT

## 2025-03-09 PROCEDURE — 2580000003 HC RX 258: Performed by: NURSE PRACTITIONER

## 2025-03-09 PROCEDURE — 94669 MECHANICAL CHEST WALL OSCILL: CPT

## 2025-03-09 PROCEDURE — 6360000002 HC RX W HCPCS: Performed by: INTERNAL MEDICINE

## 2025-03-09 PROCEDURE — 36591 DRAW BLOOD OFF VENOUS DEVICE: CPT

## 2025-03-09 PROCEDURE — 2700000000 HC OXYGEN THERAPY PER DAY

## 2025-03-09 PROCEDURE — 94761 N-INVAS EAR/PLS OXIMETRY MLT: CPT

## 2025-03-09 PROCEDURE — 6370000000 HC RX 637 (ALT 250 FOR IP): Performed by: INTERNAL MEDICINE

## 2025-03-09 PROCEDURE — 84132 ASSAY OF SERUM POTASSIUM: CPT

## 2025-03-09 PROCEDURE — 84100 ASSAY OF PHOSPHORUS: CPT

## 2025-03-09 PROCEDURE — 6370000000 HC RX 637 (ALT 250 FOR IP): Performed by: NURSE PRACTITIONER

## 2025-03-09 PROCEDURE — 2060000000 HC ICU INTERMEDIATE R&B

## 2025-03-09 PROCEDURE — 6370000000 HC RX 637 (ALT 250 FOR IP): Performed by: STUDENT IN AN ORGANIZED HEALTH CARE EDUCATION/TRAINING PROGRAM

## 2025-03-09 PROCEDURE — 99233 SBSQ HOSP IP/OBS HIGH 50: CPT | Performed by: INTERNAL MEDICINE

## 2025-03-09 PROCEDURE — 83880 ASSAY OF NATRIURETIC PEPTIDE: CPT

## 2025-03-09 PROCEDURE — 2500000003 HC RX 250 WO HCPCS: Performed by: NURSE PRACTITIONER

## 2025-03-09 PROCEDURE — 2580000003 HC RX 258: Performed by: INTERNAL MEDICINE

## 2025-03-09 PROCEDURE — 6360000002 HC RX W HCPCS: Performed by: NURSE PRACTITIONER

## 2025-03-09 PROCEDURE — 2500000003 HC RX 250 WO HCPCS: Performed by: INTERNAL MEDICINE

## 2025-03-09 PROCEDURE — 85025 COMPLETE CBC W/AUTO DIFF WBC: CPT

## 2025-03-09 PROCEDURE — 94640 AIRWAY INHALATION TREATMENT: CPT

## 2025-03-09 PROCEDURE — 80048 BASIC METABOLIC PNL TOTAL CA: CPT

## 2025-03-09 RX ORDER — METHYLPREDNISOLONE SODIUM SUCCINATE 40 MG/ML
20 INJECTION INTRAMUSCULAR; INTRAVENOUS DAILY
Status: DISCONTINUED | OUTPATIENT
Start: 2025-03-10 | End: 2025-03-10

## 2025-03-09 RX ORDER — METOPROLOL SUCCINATE 25 MG/1
25 TABLET, EXTENDED RELEASE ORAL ONCE
Status: COMPLETED | OUTPATIENT
Start: 2025-03-09 | End: 2025-03-09

## 2025-03-09 RX ADMIN — POTASSIUM PHOSPHATE, MONOBASIC POTASSIUM PHOSPHATE, DIBASIC 30 MMOL: 224; 236 INJECTION, SOLUTION, CONCENTRATE INTRAVENOUS at 09:30

## 2025-03-09 RX ADMIN — Medication 2 PUFF: at 08:22

## 2025-03-09 RX ADMIN — ENOXAPARIN SODIUM 40 MG: 100 INJECTION SUBCUTANEOUS at 08:00

## 2025-03-09 RX ADMIN — ALBUTEROL SULFATE 2.5 MG: 2.5 SOLUTION RESPIRATORY (INHALATION) at 19:50

## 2025-03-09 RX ADMIN — ALBUTEROL SULFATE 2.5 MG: 2.5 SOLUTION RESPIRATORY (INHALATION) at 08:23

## 2025-03-09 RX ADMIN — EZETIMIBE 10 MG: 10 TABLET ORAL at 08:00

## 2025-03-09 RX ADMIN — METOPROLOL SUCCINATE 25 MG: 25 TABLET, EXTENDED RELEASE ORAL at 08:55

## 2025-03-09 RX ADMIN — ROSUVASTATIN CALCIUM 20 MG: 20 TABLET, FILM COATED ORAL at 20:20

## 2025-03-09 RX ADMIN — METOPROLOL SUCCINATE 25 MG: 25 TABLET, FILM COATED, EXTENDED RELEASE ORAL at 20:19

## 2025-03-09 RX ADMIN — Medication 4 ML: at 19:49

## 2025-03-09 RX ADMIN — SODIUM CHLORIDE, PRESERVATIVE FREE 10 ML: 5 INJECTION INTRAVENOUS at 08:00

## 2025-03-09 RX ADMIN — METHYLPREDNISOLONE SODIUM SUCCINATE 40 MG: 40 INJECTION, POWDER, LYOPHILIZED, FOR SOLUTION INTRAMUSCULAR; INTRAVENOUS at 08:00

## 2025-03-09 RX ADMIN — CEFTRIAXONE SODIUM 2000 MG: 2 INJECTION, POWDER, FOR SOLUTION INTRAMUSCULAR; INTRAVENOUS at 15:33

## 2025-03-09 RX ADMIN — GUAIFENESIN 600 MG: 600 TABLET ORAL at 08:00

## 2025-03-09 RX ADMIN — SODIUM CHLORIDE, PRESERVATIVE FREE 10 ML: 5 INJECTION INTRAVENOUS at 20:24

## 2025-03-09 RX ADMIN — Medication 2 PUFF: at 19:50

## 2025-03-09 RX ADMIN — Medication 4 ML: at 12:51

## 2025-03-09 RX ADMIN — ALBUTEROL SULFATE 2.5 MG: 2.5 SOLUTION RESPIRATORY (INHALATION) at 12:51

## 2025-03-09 RX ADMIN — Medication 4 ML: at 08:23

## 2025-03-09 RX ADMIN — GUAIFENESIN 600 MG: 600 TABLET ORAL at 20:20

## 2025-03-09 ASSESSMENT — PAIN SCALES - GENERAL
PAINLEVEL_OUTOF10: 0

## 2025-03-09 NOTE — PLAN OF CARE
Problem: Skin/Tissue Integrity  Goal: Skin integrity remains intact  Description: 1.  Monitor for areas of redness and/or skin breakdown  2.  Assess vascular access sites hourly  3.  Every 4-6 hours minimum:  Change oxygen saturation probe site  4.  Every 4-6 hours:  If on nasal continuous positive airway pressure, respiratory therapy assess nares and determine need for appliance change or resting period  Outcome: Not Progressing  Flowsheets (Taken 3/7/2025 2000 by Ruth Burrell, RN)  Skin Integrity Remains Intact:   Monitor for areas of redness and/or skin breakdown   Assess vascular access sites hourly  Note: Versatel to skin tear sites;assess q shift, Turn q2H, Prevent lotion daily, Triad to buttocks twice daily and prn, Waffle cushion when up to chair.     Problem: ABCDS Injury Assessment  Goal: Absence of physical injury  Outcome: Not Progressing  Flowsheets (Taken 3/9/2025 0531)  Absence of Physical Injury: Implement safety measures based on patient assessment     Problem: Skin/Tissue Integrity - Adult  Goal: Skin integrity remains intact  Description: 1.  Monitor for areas of redness and/or skin breakdown  2.  Assess vascular access sites hourly  3.  Every 4-6 hours minimum:  Change oxygen saturation probe site  4.  Every 4-6 hours:  If on nasal continuous positive airway pressure, respiratory therapy assess nares and determine need for appliance change or resting period  Outcome: Not Progressing  Flowsheets (Taken 3/7/2025 2000 by Ruth Burrell, RN)  Skin Integrity Remains Intact:   Monitor for areas of redness and/or skin breakdown   Assess vascular access sites hourly  Note: Versatel to skin tear sites;assess q shift, Turn q2H, Prevent lotion daily, Triad to buttocks twice daily and prn, Waffle cushion when up to chair.     Problem: Skin/Tissue Integrity  Goal: Skin integrity remains intact  Description: 1.  Monitor for areas of redness and/or skin breakdown  2.  Assess vascular access sites hourly  3.

## 2025-03-09 NOTE — PROGRESS NOTES
Pulmonary Critical Care Note     Patient's name:  Yury Levin  Medical Record Number: [id number]  Patient's account/billing number: [id number]  Patient's YOB: 1950  Age: 74 y.o.  Date of Admission: 3/2/2025  9:38 PM  Date of Consult: 3/9/2025      Primary Care Physician: Diamond Robles MD      Code Status: Full Code    Reason for consult: Acute respiratory failure with hypoxia    Assessment and Plan     Acute respiratory with hypoxia less than 90% on room air with respiratory distress requiring BiPAP  ARDS   Multifocal pneumonia, pneumococcal  Influenza A pneumonia  Recent COVID infection  History of prostate cancer metastatic  History of A-fib  ERIBERTO      Plan:  Titrate oxygen to keep sats more than 90%, use BiPAP for increased work of breathing.   Lasix prn, keep euvolemic   Echo results reviewed  Aspiration precautions.  Ceftriaxone to finish the course of antibiotic  Solu-Medrol daily. Dose reduced   Status post Paxlovid.  Albuterol and 3%.  Stable to transfer out of ICU       Subjective:  Less sob  Confused     HISTORY OF PRESENT ILLNESS:   Mr./Ms. Yury Levin is a 74 y.o. gentleman with past medical history stated below significant for metastatic prostate cancer on Orgovyx, history of A-fib status post watchman, diagnosed with COVID infection on Tuesday started on Paxlovid presenting with worsening cough shortness of breath admitted to the ICU with hypoxic respiratory failure requiring BiPAP and high flow oxygen.      REVIEW OF SYSTEMS:  Review of Systems -   General ROS: Generalized weak  Psychological ROS: negative  Ophthalmic ROS: negative  ENT ROS: negative  Allergy and Immunology ROS: negative  Hematological and Lymphatic ROS: negative  Endocrine ROS: negative  Breast ROS: negative  Respiratory ROS: Shortness of breath, cough, sputum.  Cardiovascular ROS: no chest pain or dyspnea on exertion  Gastrointestinal ROS:negative  Genito-Urinary  for  evaluation.  No evidence of intraluminal filling defect to suggest pulmonary  embolism.  Main pulmonary artery is normal in caliber.    Mediastinum: The heart is enlarged without a pericardial effusion. Severe  coronary artery atherosclerosis. Status post CABG. The aorta and arch  branches are normal in course and caliber.    No pathologic lymphadenopathy.    Lungs/pleura: The lungs demonstrate bilateral airspace opacities involving  all lobes.  No significant pleural effusions.  The central airways are  patent.  No bronchial thickening or bronchiectasis.    Upper Abdomen: Cholelithiasis is present.    Soft Tissues/Bones: No acute bone or soft tissue abnormality.  Degenerative  changes are seen throughout the cervical spine.  Status post sternotomy.    Impression  1. No pulmonary embolus.  2. Multifocal pneumonia.  3. Cholelithiasis.      CXR PA/LAT: No results found for this or any previous visit.      CXR portable: Results for orders placed during the hospital encounter of 06/10/24    XR CHEST PORTABLE    Narrative  EXAMINATION:  ONE XRAY VIEW OF THE CHEST    6/10/2024 9:24 am    COMPARISON:  01/19/2018 radiograph    HISTORY:  ORDERING SYSTEM PROVIDED HISTORY: fatigue  TECHNOLOGIST PROVIDED HISTORY:  Reason for exam:->fatigue  Reason for Exam: fatigue    FINDINGS:  Cardiomegaly with remote prior surgical changes of CABG.  A right port  catheter is in place.  Pulmonary vascular markings are normal.  Minimal  bibasilar atelectasis.  The lungs are otherwise clear.  No significant  skeletal finding.    Impression  Cardiomegaly with no acute finding.      Cait Cabrera MD, M.D.            3/9/2025, 10:29 AM

## 2025-03-09 NOTE — PROGRESS NOTES
V2.0  [facility] [place] Hospitalist Progress Note      Name:  Yury Levin /Age/Sex: 1950  (74 y.o. male)   MRN & CSN:  [id number] & [id number] Encounter Date/Time: 3/9/2025 8:19 AM EST    Location:  [id number] PCP: Diamond Robles MD       Hospital Day: 8    Assessment and Plan:   Yury Levin is a 74 y.o. male presenting with shortness of breath      Plan:  Multifocal pneumonia  -Completed 1 week course of antibiotics today  -Influenza A positive in ED.  -Strep Pneumo antigen positive  -Transferred back to ICU 3/5.  Downgrading from ICU 3/9  COVID  -diagnosed as outpatient .  Negative on inpatient testing.  -s/p paxlovid  Acute hypoxic respiratory failure  -Continuing to improve down to 3 L today  -wean o2 as tolerated  -Discussed with pulmonology 3/9: Will hold off further scheduled Lasix for now.  Lasix as needed to keep euvolemic. Completing antibiotics today.  Continue daily Solu-Medrol (reducing dose), albuterol and 3% nebulizers.  Stable for transfer out of ICU  Metastatic prostate cancer  -follows with [facility]  -consult regarding patient's orgovyx therapy in setting of infection  -Oncology note 3/8 reviewed: off chemo for several weeks due to deconditioning.  PSA remains stable.  Patient declining hospice and wants to continue to be aggressive if possible  Anemia of chronic disease  -s/p dose of retacrit; heme/onc following.  Afib s/p watchman  -continue beta-blocker  Hypophosphatemia  -replace phosphate    Ppx: Lovenox  Dispo: anticipate SNF    Subjective:     Chief Complaint: Altered Mental Status (Pt is being treated for COVID since Tuesday and the patient has been hsaving spikes in fever and has been acting off, SpO2 79% RA pt doesn't normally wear oxygen )     Interval Hx:  Respiratory status continuing to improve, less short of breath.  Patient denies chest pain and shortness of breath. Very weak physically.  Denies fevers or chills, cough    Review of Systems:    Review of Systems    10 point

## 2025-03-09 NOTE — PROGRESS NOTES
NAME:  Yury Levin  YOB: 1950  MEDICAL RECORD NUMBER:  [id number]    Shift Summary: Pt alert, pleasantly confused. Some noted word finding difficulties which was discussed with pulmonary and hospitalist Dr Dixon. Wife arrived and confirmed this is not new and has been going on for years and some days are better than others. Oxgen has remained at 3L/m NC today. Replaced KPhos IV and recheck was done and normal. This evening patient with incr in HR Afib 110-130s. MD Art ordered extra dose of Metoprolol ER to be given.     Family updated: Yes:  Wife Marilyn at bedside 0855 until present. Updated with plan of care and all new orders. Wife was updated by MD Art and MD Beach.     Rhythm: Atrial Fibrillation     Most recent vitals:   Visit Vitals  /67   Pulse (!) 104   Temp 98.5 °F (36.9 °C) (Oral)   Resp 30   Ht 1.753 m (5' 9\")   Wt 64.3 kg (141 lb 12.1 oz)   SpO2 99%   BMI 20.93 kg/m²           No data found.    No data found.      Respiratory support needed (if any):  - O2 - NC - 3 lpm- incr to 4L at handoff by night ALBERT Miranda.     Admission weight Weight - Scale: 72.2 kg (159 lb 1.6 oz) (03/02/25 2143)    Today's weight    Wt Readings from Last 1 Encounters:   03/09/25 64.3 kg (141 lb 12.1 oz)        Pena need assessed each shift: N/A - no pena present  UOP >30ml/hr: YES  Last documented BM (in last 48 hrs):  Patient Vitals for the past 48 hrs:   Last BM (including prior to admit) Stool Occurrence   03/07/25 2000 03/05/25 --   03/08/25 1610 03/08/25 1   03/08/25 2000 03/08/25 --   03/09/25 0140 03/09/25 --   03/09/25 0445 03/09/25 --   03/09/25 0725 03/09/25 --   03/09/25 1155 03/09/25 1   03/09/25 1210 03/09/25 --                Restraints (in use currently or dc'd in last 12 hrs): No    Order current and documentation up to date? Yes    Lines/Drains reviewed @ bedside.  Implantable Port 08/29/24 Right Chest (Active)   Number of days: 192         Drip rates at handoff:    sodium

## 2025-03-09 NOTE — PROGRESS NOTES
DR STATES HE WILL CALL AND TALK TO THE DAUGHTER.  ALSO STATES DR SCHWAB IS ON 
HIS WAY. Went into room to assess patient and noted new skin tear below dressing on LFA. Removed previous dressing to LFA. Tear cleaned with would cleanser and applied new versatel to  skin tears and stretch guaze applied. Pt tolerated well. Wife at bedside.

## 2025-03-09 NOTE — PROGRESS NOTES
HR noted to be more uncontrolled this evening with HR in the 120-130s. Wife at bedside to verify meds from home which is only Metoprolol which he took this morning. Sent a secure message to MD Art at this time. New orders received. Wife Marilyn updated with plan.

## 2025-03-09 NOTE — PROGRESS NOTES
NAME:  Yury Levin  YOB: 1950  MEDICAL RECORD NUMBER:  [id number]    Shift Summary: Pt reports no sleep overnight. Oriented to self and city only. Word finding difficulty observed. Increased to 4lnc at start of shift for desaturation 86-88%. Weaned to 3l this am. Respirations unlabored at rest but tachypneic with exertion. Rales/rhonchi throughout lung fields. Tolerated bath/pm care well; maintained > 90% with activity.     Family updated: No    Rhythm: Normal Sinus Rhythm w/ PVC's &PAC's vs AF RVR with sinus beats    Most recent vitals:   Visit Vitals  /74   Pulse 86   Temp 98.5 °F (36.9 °C) (Oral)   Resp 24   Ht 1.753 m (5' 9\")   Wt 64.3 kg (141 lb 12.1 oz)   SpO2 97%   BMI 20.93 kg/m²           No data found.    No data found.      Respiratory support needed (if any):  - O2 - NC - 3 lpm    Admission weight Weight - Scale: 72.2 kg (159 lb 1.6 oz) (03/02/25 2143)    Today's weight    Wt Readings from Last 1 Encounters:   03/09/25 64.3 kg (141 lb 12.1 oz)        Pena need assessed each shift: N/A - no pena present  UOP >30ml/hr: YES  Last documented BM (in last 48 hrs):  Patient Vitals for the past 48 hrs:   Last BM (including prior to admit) Stool Occurrence   03/07/25 0800 03/05/25 --   03/07/25 2000 03/05/25 --   03/08/25 1610 03/08/25 1   03/08/25 2000 03/08/25 --   03/09/25 0140 03/09/25 --   03/09/25 0445 03/09/25 --                Restraints (in use currently or dc'd in last 12 hrs): No        Lines/Drains reviewed @ bedside.  Implantable Port 08/29/24 Right Chest (Active)   Number of days: 191         Drip rates at handoff:    sodium chloride         Lab Data:   CBC:   Recent Labs     03/08/25  0435 03/09/25  0440   WBC 7.7 6.8   HGB 8.8* 8.6*   HCT 26.3* 25.8*   MCV 95.0 94.3    144     BMP:    Recent Labs     03/07/25  0343 03/07/25  1025 03/08/25  0435 03/08/25  0950   *  --  144  --    K 3.2*   < > 3.5 4.7   CO2 29  --  29  --    BUN 25*  --  25*  --    CREATININE

## 2025-03-10 LAB
ANION GAP SERPL CALCULATED.3IONS-SCNC: 9 MMOL/L (ref 3–16)
BUN SERPL-MCNC: 39 MG/DL (ref 7–20)
CALCIUM SERPL-MCNC: 8.3 MG/DL (ref 8.3–10.6)
CHLORIDE SERPL-SCNC: 103 MMOL/L (ref 99–110)
CO2 SERPL-SCNC: 32 MMOL/L (ref 21–32)
CREAT SERPL-MCNC: 0.7 MG/DL (ref 0.8–1.3)
DEPRECATED RDW RBC AUTO: 18.3 % (ref 12.4–15.4)
GFR SERPLBLD CREATININE-BSD FMLA CKD-EPI: >90 ML/MIN/{1.73_M2}
GLUCOSE SERPL-MCNC: 111 MG/DL (ref 70–99)
HCT VFR BLD AUTO: 25.8 % (ref 40.5–52.5)
HGB BLD-MCNC: 8.5 G/DL (ref 13.5–17.5)
MAGNESIUM SERPL-MCNC: 1.98 MG/DL (ref 1.8–2.4)
MCH RBC QN AUTO: 31.5 PG (ref 26–34)
MCHC RBC AUTO-ENTMCNC: 33.1 G/DL (ref 31–36)
MCV RBC AUTO: 95.4 FL (ref 80–100)
PHOSPHATE SERPL-MCNC: 2.2 MG/DL (ref 2.5–4.9)
PLATELET # BLD AUTO: 152 K/UL (ref 135–450)
PMV BLD AUTO: 9.2 FL (ref 5–10.5)
POTASSIUM SERPL-SCNC: 3.6 MMOL/L (ref 3.5–5.1)
RBC # BLD AUTO: 2.71 M/UL (ref 4.2–5.9)
SODIUM SERPL-SCNC: 144 MMOL/L (ref 136–145)
WBC # BLD AUTO: 6.8 K/UL (ref 4–11)

## 2025-03-10 PROCEDURE — 2500000003 HC RX 250 WO HCPCS

## 2025-03-10 PROCEDURE — 94761 N-INVAS EAR/PLS OXIMETRY MLT: CPT

## 2025-03-10 PROCEDURE — APPNB15 APP NON BILLABLE TIME 0-15 MINS: Performed by: NURSE PRACTITIONER

## 2025-03-10 PROCEDURE — 2500000003 HC RX 250 WO HCPCS: Performed by: NURSE PRACTITIONER

## 2025-03-10 PROCEDURE — 83735 ASSAY OF MAGNESIUM: CPT

## 2025-03-10 PROCEDURE — 84100 ASSAY OF PHOSPHORUS: CPT

## 2025-03-10 PROCEDURE — 97110 THERAPEUTIC EXERCISES: CPT

## 2025-03-10 PROCEDURE — 97166 OT EVAL MOD COMPLEX 45 MIN: CPT

## 2025-03-10 PROCEDURE — 6360000002 HC RX W HCPCS: Performed by: NURSE PRACTITIONER

## 2025-03-10 PROCEDURE — 6360000002 HC RX W HCPCS: Performed by: INTERNAL MEDICINE

## 2025-03-10 PROCEDURE — 2700000000 HC OXYGEN THERAPY PER DAY

## 2025-03-10 PROCEDURE — 80048 BASIC METABOLIC PNL TOTAL CA: CPT

## 2025-03-10 PROCEDURE — 6370000000 HC RX 637 (ALT 250 FOR IP): Performed by: INTERNAL MEDICINE

## 2025-03-10 PROCEDURE — 94669 MECHANICAL CHEST WALL OSCILL: CPT

## 2025-03-10 PROCEDURE — 6370000000 HC RX 637 (ALT 250 FOR IP): Performed by: NURSE PRACTITIONER

## 2025-03-10 PROCEDURE — 99291 CRITICAL CARE FIRST HOUR: CPT | Performed by: INTERNAL MEDICINE

## 2025-03-10 PROCEDURE — 97530 THERAPEUTIC ACTIVITIES: CPT

## 2025-03-10 PROCEDURE — 94640 AIRWAY INHALATION TREATMENT: CPT

## 2025-03-10 PROCEDURE — 6360000002 HC RX W HCPCS: Performed by: STUDENT IN AN ORGANIZED HEALTH CARE EDUCATION/TRAINING PROGRAM

## 2025-03-10 PROCEDURE — 2580000003 HC RX 258: Performed by: NURSE PRACTITIONER

## 2025-03-10 PROCEDURE — 2060000000 HC ICU INTERMEDIATE R&B

## 2025-03-10 PROCEDURE — 85027 COMPLETE CBC AUTOMATED: CPT

## 2025-03-10 RX ORDER — MAGNESIUM SULFATE IN WATER 40 MG/ML
2000 INJECTION, SOLUTION INTRAVENOUS ONCE
Status: COMPLETED | OUTPATIENT
Start: 2025-03-10 | End: 2025-03-10

## 2025-03-10 RX ORDER — WATER 10 ML/10ML
INJECTION INTRAMUSCULAR; INTRAVENOUS; SUBCUTANEOUS
Status: COMPLETED
Start: 2025-03-10 | End: 2025-03-10

## 2025-03-10 RX ORDER — FUROSEMIDE 10 MG/ML
20 INJECTION INTRAMUSCULAR; INTRAVENOUS ONCE
Status: COMPLETED | OUTPATIENT
Start: 2025-03-10 | End: 2025-03-10

## 2025-03-10 RX ADMIN — POTASSIUM PHOSPHATE, MONOBASIC POTASSIUM PHOSPHATE, DIBASIC 20 MMOL: 224; 236 INJECTION, SOLUTION, CONCENTRATE INTRAVENOUS at 10:43

## 2025-03-10 RX ADMIN — WATER 10 ML: 1 INJECTION INTRAMUSCULAR; INTRAVENOUS; SUBCUTANEOUS at 07:52

## 2025-03-10 RX ADMIN — METOPROLOL SUCCINATE 25 MG: 25 TABLET, EXTENDED RELEASE ORAL at 07:52

## 2025-03-10 RX ADMIN — ROSUVASTATIN CALCIUM 20 MG: 20 TABLET, FILM COATED ORAL at 21:20

## 2025-03-10 RX ADMIN — ENOXAPARIN SODIUM 40 MG: 100 INJECTION SUBCUTANEOUS at 07:52

## 2025-03-10 RX ADMIN — METHYLPREDNISOLONE SODIUM SUCCINATE 20 MG: 40 INJECTION INTRAMUSCULAR; INTRAVENOUS at 07:52

## 2025-03-10 RX ADMIN — ALBUTEROL SULFATE 2.5 MG: 2.5 SOLUTION RESPIRATORY (INHALATION) at 20:35

## 2025-03-10 RX ADMIN — MAGNESIUM SULFATE HEPTAHYDRATE 2000 MG: 40 INJECTION, SOLUTION INTRAVENOUS at 08:20

## 2025-03-10 RX ADMIN — Medication 4 ML: at 08:02

## 2025-03-10 RX ADMIN — EZETIMIBE 10 MG: 10 TABLET ORAL at 08:16

## 2025-03-10 RX ADMIN — Medication 2 PUFF: at 20:36

## 2025-03-10 RX ADMIN — ALBUTEROL SULFATE 2.5 MG: 2.5 SOLUTION RESPIRATORY (INHALATION) at 12:00

## 2025-03-10 RX ADMIN — Medication 4 ML: at 20:35

## 2025-03-10 RX ADMIN — ALBUTEROL SULFATE 2.5 MG: 2.5 SOLUTION RESPIRATORY (INHALATION) at 08:02

## 2025-03-10 RX ADMIN — SODIUM CHLORIDE, PRESERVATIVE FREE 10 ML: 5 INJECTION INTRAVENOUS at 21:20

## 2025-03-10 RX ADMIN — FUROSEMIDE 20 MG: 10 INJECTION, SOLUTION INTRAMUSCULAR; INTRAVENOUS at 10:43

## 2025-03-10 RX ADMIN — GUAIFENESIN 600 MG: 600 TABLET ORAL at 21:20

## 2025-03-10 RX ADMIN — Medication 4 ML: at 12:00

## 2025-03-10 RX ADMIN — GUAIFENESIN 600 MG: 600 TABLET ORAL at 07:52

## 2025-03-10 RX ADMIN — SODIUM CHLORIDE, PRESERVATIVE FREE 10 ML: 5 INJECTION INTRAVENOUS at 07:56

## 2025-03-10 RX ADMIN — Medication 2 PUFF: at 08:02

## 2025-03-10 ASSESSMENT — PAIN SCALES - GENERAL: PAINLEVEL_OUTOF10: 0

## 2025-03-10 NOTE — PLAN OF CARE
Problem: Chronic Conditions and Co-morbidities  Goal: Patient's chronic conditions and co-morbidity symptoms are monitored and maintained or improved  Outcome: Progressing  Flowsheets (Taken 3/10/2025 0748)  Care Plan - Patient's Chronic Conditions and Co-Morbidity Symptoms are Monitored and Maintained or Improved: Monitor and assess patient's chronic conditions and comorbid symptoms for stability, deterioration, or improvement     Problem: Discharge Planning  Goal: Discharge to home or other facility with appropriate resources  Outcome: Progressing  Flowsheets (Taken 3/10/2025 0748)  Discharge to home or other facility with appropriate resources: Identify barriers to discharge with patient and caregiver     Problem: Pain  Goal: Verbalizes/displays adequate comfort level or baseline comfort level  Outcome: Progressing     Problem: Skin/Tissue Integrity  Goal: Skin integrity remains intact  Description: 1.  Monitor for areas of redness and/or skin breakdown  2.  Assess vascular access sites hourly  3.  Every 4-6 hours minimum:  Change oxygen saturation probe site  4.  Every 4-6 hours:  If on nasal continuous positive airway pressure, respiratory therapy assess nares and determine need for appliance change or resting period  Outcome: Progressing  Flowsheets  Taken 3/10/2025 0748 by Beatriz Jones RN  Skin Integrity Remains Intact: Monitor for areas of redness and/or skin breakdown    Problem: Safety - Adult  Goal: Free from fall injury  Outcome: Progressing     Problem: ABCDS Injury Assessment  Goal: Absence of physical injury  Outcome: Progressing     Problem: Nutrition Deficit:  Goal: Optimize nutritional status  Outcome: Progressing     Problem: Neurosensory - Adult  Goal: Achieves stable or improved neurological status  Outcome: Progressing  Flowsheets (Taken 3/10/2025 0748)  Achieves stable or improved neurological status:   Assess for and report changes in neurological status   Initiate measures to prevent  at discharge: Assess and monitor for signs and symptoms of infection     Problem: Metabolic/Fluid and Electrolytes - Adult  Goal: Electrolytes maintained within normal limits  Outcome: Progressing  Flowsheets (Taken 3/10/2025 0748)  Electrolytes maintained within normal limits: Monitor labs and assess patient for signs and symptoms of electrolyte imbalances     Problem: Gastrointestinal - Adult  Goal: Maintains adequate nutritional intake  Outcome: Progressing  Flowsheets (Taken 3/10/2025 0748)  Maintains adequate nutritional intake: Monitor percentage of each meal consumed

## 2025-03-10 NOTE — PROGRESS NOTES
NAME:  Yury Levin  YOB: 1950  MEDICAL RECORD NUMBER:  [id number]    Shift Summary: Pt was able to rest and sleep.  Woke up at 0500 and was given a bath and  prevent lotion applied to bruises.  Remained on 4L of oxygen, will drop at times to 89 % but spontaneously go back up.  HR has been irregular at 100-130's last night and this AM, 80's-90's.  Oriented to self and place.  Takes pills without difficulty.  Uses call light appropriately.  Bed alarm on.    Family updated: Yes:  Wife at bedside on handoff    Rhythm: Atrial Fibrillation     Most recent vitals:   Visit Vitals  /61   Pulse (!) 103   Temp 98.4 °F (36.9 °C) (Oral)   Resp 23   Ht 1.753 m (5' 9\")   Wt 66.3 kg (146 lb 2.6 oz)   SpO2 93%   BMI 21.58 kg/m²           No data found.    No data found.      Respiratory support needed (if any):  - O2 - NC - 4 lpm    Admission weight Weight - Scale: 72.2 kg (159 lb 1.6 oz) (03/02/25 2143)    Today's weight    Wt Readings from Last 1 Encounters:   03/10/25 66.3 kg (146 lb 2.6 oz)        Pena need assessed each shift: N/A - no pena present  UOP >30ml/hr: YES  Last documented BM (in last 48 hrs):  Patient Vitals for the past 48 hrs:   Last BM (including prior to admit) Stool Occurrence   03/08/25 1610 03/08/25 1   03/08/25 2000 03/08/25 --   03/09/25 0140 03/09/25 --   03/09/25 0445 03/09/25 --   03/09/25 0725 03/09/25 --   03/09/25 1155 03/09/25 1 03/09/25 1210 03/09/25 --   03/09/25 2000 03/09/25 --                Restraints (in use currently or dc'd in last 12 hrs): No    Order current and documentation up to date? No    Lines/Drains reviewed @ bedside.  Implantable Port 08/29/24 Right Chest (Active)   Number of days: 192         Drip rates at handoff:    sodium chloride         Lab Data:   CBC:   Recent Labs     03/09/25  0440 03/10/25  0445   WBC 6.8 6.8   HGB 8.6* 8.5*   HCT 25.8* 25.8*   MCV 94.3 95.4    152     BMP:    Recent Labs     03/09/25  0440 03/09/25  [id number]

## 2025-03-10 NOTE — PROGRESS NOTES
Pulmonary Progress Note    Date of Admission: 3/2/2025   LOS: 7 days     CC:  Chief Complaint   Patient presents with    Altered Mental Status     Pt is being treated for COVID since Tuesday and the patient has been hsaving spikes in fever and has been acting off, SpO2 79% RA pt doesn't normally wear oxygen            Assessment/Plan       Acute hypoxemic respiratory failure, due to  Pneumococcal pneumonia  Influenza pneumonia  -Wean oxygen goal saturation 90%  -Complete antibiotic course  -Can discontinue steroid    History of metastatic prostate cancer    No further inpatient recommendations, we will sign off at this time.  Please let us know if we can be of any further assistance.     HPI/Subjective  No acute events overnight.  Oxygen requirement continues to improve down to 4 L.  Patient states his respiratory status is feels better    ROS:   No nausea  No Vomiting  No chest pain      Intake/Output Summary (Last 24 hours) at 3/10/2025 1127  Last data filed at 3/10/2025 1045  Gross per 24 hour   Intake 1562.17 ml   Output 2000 ml   Net -437.83 ml         PHYSICAL EXAM:   Blood pressure 108/70, pulse (!) 107, temperature 98.4 °F (36.9 °C), temperature source Oral, resp. rate 27, height 1.753 m (5' 9\"), weight 66.3 kg (146 lb 2.6 oz), SpO2 100%.'  Gen:  No acute distress.   Resp:  No crackles. No wheezes. No rhonchi. No dullness on percussion.  CV: Regular rate. Regular rhythm. No murmur or rub. No edema.   M/S: No cyanosis. No clubbing.    Neuro: Awake alert oriented.          Labs reviewed:  CBC:   Recent Labs     03/08/25  0435 03/09/25  0440 03/10/25  0445   WBC 7.7 6.8 6.8   HGB 8.8* 8.6* 8.5*   HCT 26.3* 25.8* 25.8*   MCV 95.0 94.3 95.4    144 152     BMP:   Recent Labs     03/08/25  0435 03/08/25  0950 03/09/25  0440 03/09/25  1655 03/10/25  0445     --  142  --  144   K 3.5   < > 3.6 4.5 3.6     --  101  --  103   CO2 29  --  30  --  32   PHOS 2.4*  --  1.3* 2.8 2.2*   BUN 25*  --  36*

## 2025-03-10 NOTE — PROGRESS NOTES
Chief complaint:   Chief Complaint   Patient presents with   • Wrist Pain     1 week, pt started bowling regularly this past month, states when he would stop bowling his hand would hurt and then started hurting while bowling, pt heard a pop in his wrist last sunday, using his wrist/hand the pain flairs up and with certain movements.        Vitals:  Visit Vitals  /81 (BP Location: RUE - Right upper extremity, Patient Position: Sitting, Cuff Size: Regular)   Pulse 78   Temp 97.9 °F (36.6 °C) (Tympanic)   Ht 5' 10\" (1.778 m)   Wt 63.5 kg   SpO2 100%   BMI 20.09 kg/m²       HISTORY OF PRESENT ILLNESS     19-year-old presents to the walk-in clinic today with complaint of right wrist pain.  Patient states it has been going on for about a week.  He started bowling regularly this past month and noticed that after bowling, his wrist with hurt.  Recently, it started hurting while bowling.  Last Sunday, here heard a pop in his wrist.  Using the wrist and certain movements seem to cause the pain to flare up.  Over-the-counter medications help a little.  No radiation of the pain proximally.  No wrist drop or loss of strength per patient report.      Other significant problems:  There are no active problems to display for this patient.      PAST MEDICAL, FAMILY AND SOCIAL HISTORY     Medications:  Current Outpatient Medications   Medication   • albuterol 108 (90 Base) MCG/ACT inhaler   • naproxen (NAPROSYN) 500 MG tablet     No current facility-administered medications for this visit.        Allergies:  ALLERGIES:   Allergen Reactions   • Amoxicillin RASH       Past Medical  History/Surgeries:  Past Medical History:   Diagnosis Date   • Childhood asthma, unspecified asthma severity, uncomplicated        No past surgical history on file.    Family History:  Family History   Problem Relation Age of Onset   • Anemia Mother    • Crohn's Disease Mother    • Cancer, Colon Mother    • Seizure Disorder Brother    • Asthma Maternal  Physical Therapy  Facility/Department: [address] ICU  Physical Therapy Treatment Note    Name: Yury Levin  : 1950  MRN: [id number]  Date of Service: 3/10/2025    Discharge Recommendations:  Continue to assess pending progress, Subacute/Skilled Nursing Facility   PT Equipment Recommendations  Other: Defer to next level of care.      Yury Levin scored a  on the AM-PAC short mobility form. Current research shows that an AM-PAC score of 17 or less is typically not associated with a discharge to the patient's home setting. Based on the patient's AM-PAC score and their current functional mobility deficits, it is recommended that the patient have 3-5 sessions per week of Physical Therapy at d/c to increase the patient's independence.  Please see assessment section for further patient specific details.    If patient discharges prior to next session this note will serve as a discharge summary.  Please see below for the latest assessment towards goals.      Assessment  Body Structures, Functions, Activity Limitations Requiring Skilled Therapeutic Intervention: Decreased functional mobility ;Decreased strength;Decreased safe awareness;Decreased cognition;Decreased endurance;Decreased balance  Assessment: 75 y/o male admit 3/2/2025 with Acute Respiratory, Pneumonia, Sepsis, Influenza + and Recent COVID +. PMH as noted including CAD, NSTEMI, CABG (2018), CHF, Prostate Ca.   PTA pt living with wife in multi level home with few steps to enter and bed/bath on 2nd floor.  Pt reports independent daily care and functional mobility (with Walker).  Currently, pt does appear somewhat confused; requiring 4L O2 (increase briefly to 5L).  Pt requiring Mox assist x 2  to eob,  Transfers via Stedy (x2 attempts from eob) requiring Max assist x 2.  Pt tends to lean forward onto crossbar of Stedy during seated transfer to chair.  Initiated LE Strength Exs.  Very fatigued/limited endurance; requiring sign increase assist to  Grandmother    • Bleeding Disorder Maternal Grandmother        Social History:  Social History     Tobacco Use   • Smoking status: Never Smoker   • Smokeless tobacco: Never Used   Substance Use Topics   • Alcohol use: Not on file       REVIEW OF SYSTEMS     Review of Systems   Musculoskeletal: Positive for arthralgias and myalgias.       PHYSICAL EXAM     Physical Exam  Vitals signs and nursing note reviewed.   Constitutional:       General: He is not in acute distress.     Appearance: Normal appearance. He is well-developed. He is not ill-appearing, toxic-appearing or diaphoretic.   HENT:      Head: Normocephalic and atraumatic.   Eyes:      Conjunctiva/sclera: Conjunctivae normal.   Neck:      Musculoskeletal: Normal range of motion.   Cardiovascular:      Rate and Rhythm: Normal rate and regular rhythm.      Heart sounds: No murmur. No friction rub. No gallop.    Pulmonary:      Effort: Pulmonary effort is normal. No respiratory distress.      Breath sounds: Normal breath sounds. No wheezing, rhonchi or rales.   Musculoskeletal: Normal range of motion.         General: No swelling or tenderness (Unable to elicit tenderness on exam.).      Comments: Range of motion of the right wrist was full and unrestricted.  He was able to flex and extend as well as deviate toward the ulnar and radial aspect without difficulty.  Patient was able to make a fist without difficulty as well.  Strength was 5/5.   Skin:     General: Skin is warm and dry.   Neurological:      Mental Status: He is alert and oriented to person, place, and time.   Psychiatric:         Behavior: Behavior normal.         ASSESSMENT/PLAN     Diony was seen today for wrist pain.    Diagnoses and all orders for this visit:    Right wrist pain  -     naproxen (NAPROSYN) 500 MG tablet; Take 1 tablet by mouth 2 times daily.  -     FORM FIT WRIST.  Recommend rest, ice, compression, and elevation when possible.  Home care instructions provided for review.      If symptoms worsen or fail to improve, patient should follow-up with their PCP.

## 2025-03-10 NOTE — PROGRESS NOTES
ONCOLOGY HEMATOLOGY CARE PROGRESS NOTE      SUBJECTIVE: improving over the weekend. Oxygen needs decreasing. Still pretty weak, working with PT/OT.       ROS:     Constitutional:  No weight loss, No fever, No chills, No night sweats. +fatigue   Eyes:  No impairment or change in vision  ENT / Mouth:  No pain, abnormal ulceration, bleeding, nasal drip or change in voice or hearing  Cardiovascular:  No chest pain, palpitations, new edema, or calf discomfort  Respiratory:  No pain, hemoptysis, change to breathing  Breast:  No pain, discharge, change in appearance or texture  Gastrointestinal:  No pain, cramping, jaundice, change to eating and bowel habits  Urinary:  No pain, bleeding or change in continence  Genitalia: No pain, bleeding or discharge  Musculoskeletal:  No redness, pain, edema or +generalized weakness  Skin:  No pruritus, rash, change to nodules or lesions  Neurologic:  No discomfort, change in mental status, speech, sensory or motor activity  Psychiatric:  No change in concentration or change to affect or mood  Endocrine:  No hot flashes, increased thirst, or change to urine production  Hematologic: No petechiae, ecchymosis or bleeding  Lymphatic:  No lymphadenopathy or lymphedema  Allergy / Immunologic:  No eczema, hives, frequent or recurrent infections    OBJECTIVE        Physical    VITALS:  Patient Vitals for the past 24 hrs:   BP Temp Temp src Pulse Resp SpO2 Weight   03/10/25 0804 -- -- -- (!) 107 27 100 % --   03/10/25 0800 -- -- -- 97 23 99 % --   03/10/25 0748 108/70 98.4 °F (36.9 °C) Oral 88 21 98 % --   03/10/25 0600 111/61 -- -- (!) 103 23 93 % --   03/10/25 0539 -- -- -- -- -- -- 66.3 kg (146 lb 2.6 oz)   03/10/25 0400 113/69 98.4 °F (36.9 °C) Oral 93 22 -- --   03/10/25 0200 104/76 -- -- (!) 108 29 96 % --   03/10/25 0000 108/71 98.3 °F (36.8 °C) Oral 95 22 95 % --   03/09/25 2200 114/63 -- -- (!) 106 30 94 % --   03/09/25 2100 100/68 -- -- (!) 421 21

## 2025-03-10 NOTE — PROGRESS NOTES
V2.0  [facility] [place] Note      Name:  Yury Levin /Age/Sex: 1950  (74 y.o. male)   MRN & CSN:  [id number] & [id number] Encounter Date/Time: 3/10/2025 8:08 AM EST    Location:  [id number] PCP: Diamond Robles MD       Hospital Day: 9    Assessment and Plan:   Yury Levin is a 74 y.o. male presenting with shortness of breath      Plan:  Multifocal pneumonia  -Completed 1 week course of antibiotics today  -Influenza A positive in ED.  -Strep Pneumo antigen positive  -Transferred back to ICU 3/5.  Downgrading from ICU 3/9  COVID  -diagnosed as outpatient .  Negative on inpatient testing.  -s/p paxlovid  Acute hypoxic respiratory failure  -Continuing to improve down to 3 L today  -wean o2 as tolerated  -Patient weight up today, some mild LE edema; will give lower dose of Lasix.  Monitor toxicity of Lasix with BMP for ERIBERTO  -Pulmonology note 3/10 reviewed: Can DC steroid.  Complete antibiotic course.  Wean O2 as tolerated for goal saturation above 90%  Metastatic prostate cancer  -follows with [facility]  -consult regarding patient's orgovyx therapy in setting of infection  -Oncology note 3/8 reviewed: off chemo for several weeks due to deconditioning.  PSA remains stable.  Patient declining hospice and wants to continue to be aggressive if possible  Anemia of chronic disease  -s/p dose of retacrit; heme/onc following.  Afib s/p watchman  -continue beta-blocker  Hypophosphatemia  -replace phosphate    Ppx: Lovenox  Dispo: SNF, anticipate readiness for discharge in next 1 to 2 days    Subjective:     Chief Complaint: Altered Mental Status (Pt is being treated for COVID since Tuesday and the patient has been hsaving spikes in fever and has been acting off, SpO2 79% RA pt doesn't normally wear oxygen )     Interval Hx:  Respiratory status continuing to improve, less short of breath.  Patient denies chest pain and shortness of breath. Very weak physically.  Denies fevers or chills,  exam:->hypoxia covid Additional Contrast?->1 Reason for Exam: hypoxia covid FINDINGS: Pulmonary Arteries: Pulmonary arteries are adequately opacified for evaluation.  No evidence of intraluminal filling defect to suggest pulmonary embolism.  Main pulmonary artery is normal in caliber. Mediastinum: The heart is enlarged without a pericardial effusion. Severe coronary artery atherosclerosis. Status post CABG. The aorta and arch branches are normal in course and caliber. No pathologic lymphadenopathy. Lungs/pleura: The lungs demonstrate bilateral airspace opacities involving all lobes.  No significant pleural effusions.  The central airways are patent.  No bronchial thickening or bronchiectasis. Upper Abdomen: Cholelithiasis is present. Soft Tissues/Bones: No acute bone or soft tissue abnormality.  Degenerative changes are seen throughout the cervical spine.  Status post sternotomy.     1. No pulmonary embolus. 2. Multifocal pneumonia. 3. Cholelithiasis.     CT HEAD WO CONTRAST    Result Date: 3/2/2025  EXAMINATION: CT OF THE HEAD WITHOUT CONTRAST  3/2/2025 11:03 pm TECHNIQUE: CT of the head was performed without the administration of intravenous contrast. Automated exposure control, iterative reconstruction, and/or weight based adjustment of the mA/kV was utilized to reduce the radiation dose to as low as reasonably achievable. COMPARISON: 08/21/2023 HISTORY: ORDERING SYSTEM PROVIDED HISTORY: altered mental status TECHNOLOGIST PROVIDED HISTORY: Reason for exam:->altered mental status Has a \"code stroke\" or \"stroke alert\" been called?->No Decision Support Exception - unselect if not a suspected or confirmed emergency medical condition->Emergency Medical Condition (MA) Reason for Exam: ams FINDINGS: BRAIN/VENTRICLES: The ventricles are mildly enlarged and there is diffuse mild prominence of the cortical sulci.  There is moderate periventricular low density bilaterally which is unchanged.  No intracranial hemorrhage or

## 2025-03-10 NOTE — PROGRESS NOTES
Occupational Therapy  Facility/Department: [address] ICU  Occupational Therapy Initial Assessment (COTX) and Tentative D/C    [x]co-treatment indicated; patient seen for co-treatment due to: patient safety, patient endurance, and need for the assistance of 2 skilled therapists.    PT addressing: [x] Sitting balance/LE WB, [] Standing balance/LE WB, [x]Transfer training, [] BLE strength/endurance with functional tasks,  [] Other:  OT addressing: [x]ADL's, [] Pericare,  []clothing management, [x] BU E strength/endurance with functional tasks,  [] UE WB [] Other:        Name: Yury Levin  : 1950  MRN: [id number]  Date of Service: 3/10/2025    Staff assist recommendation: pablo lift      Discharge Recommendations: Yury Levin scored a 13/24 on the AM-PAC ADL Inpatient form. Current research shows that an AM-PAC score of 17 or less is typically not associated with a discharge to the patient's home setting. Based on the patient's AM-PAC score and their current ADL deficits, it is recommended that the patient have 3-5 sessions per week of Occupational Therapy at d/c to increase the patient's independence.  Please see assessment section for further patient specific details.    If patient discharges prior to next session this note will serve as a discharge summary.  Please see below for the latest assessment towards goals.     3-5 sessions per week, Patient would benefit from continued therapy after discharge  OT Equipment Recommendations  Equipment Needed: No       Patient Diagnosis(es): The primary encounter diagnosis was Influenza A. Diagnoses of COVID-19, Altered mental status, unspecified altered mental status type, Hypoxemia, Multifocal pneumonia, and Shortness of breath were also pertinent to this visit.  Past Medical History:  has a past medical history of CAD (coronary artery disease), Cancer of prostate (HCC), Chest pain, CHF (congestive heart failure) ([facility]), and Unstable angina pectoris ([facility]).  Past

## 2025-03-10 NOTE — CARE COORDINATION
03/10/25 1627   Service Assessment   Patient Orientation Alert and Oriented;Other (see comment)  (spoke w/ wife)   Cognition Alert   History Provided By Significant Other   Primary Caregiver Spouse   Accompanied By/Relationship wife   Support Systems Spouse/Significant Other   Patient's Healthcare Decision Maker is: Legal Next of Kin   PCP Verified by CM Yes   Last Visit to PCP Within last 3 months   Prior Functional Level Assistance with the following:;Bathing;Dressing;Cooking;Shopping;Housework;Mobility   Current Functional Level Assistance with the following:;Mobility;Shopping;Bathing;Dressing;Toileting;Cooking   Can patient return to prior living arrangement No   Ability to make needs known: Good   Family able to assist with home care needs: No   Would you like for me to discuss the discharge plan with any other family members/significant others, and if so, who? Yes  (wife)   Financial Resources Other (Comment)  ([facility])   Community Resources ECF/Home Care  ([facility])   CM/SW Referral Other (see comment)  (dc needs)   Discharge Planning   Type of Residence House   Living Arrangements Spouse/Significant Other   Current Services Prior To Admission Durable Medical Equipment   Current DME Prior to Arrival Walker;Other (Comment)  (RW - grab bars in shower- lift chair)   Potential Assistance Needed Skilled Nursing Facility   DME Ordered? No   Potential Assistance Purchasing Medications No   Type of Home Care Services OT;PT;Nursing Services   Patient expects to be discharged to: Skilled nursing facility   Services At/After Discharge   Transition of Care Consult (CM Consult) SNF   Services At/After Discharge Skilled Nursing Facility (SNF)   [place] Resource Information Provided? No   Mode of Transport at Discharge BLS   Condition of Participation: Discharge Planning   The Plan for Transition of Care is related to the following treatment goals: [facility] facility   The Patient and/or Patient Representative was

## 2025-03-10 NOTE — PROGRESS NOTES
NAME:  Yury Levin  YOB: 1950  MEDICAL RECORD NUMBER:  [id number]    Shift Summary: Pt stable. PT/OT worked with pt and got him up to the chair. Lift utilized to assist pt back to bed. VSS. Electrolytes replaced per orders.     Family updated: Yes:  Pt's son and wife were at bedside    Rhythm: Atrial Fibrillation     Most recent vitals:   Visit Vitals  /62   Pulse 94   Temp 98.4 °F (36.9 °C) (Oral)   Resp 25   Ht 1.753 m (5' 9\")   Wt 66.3 kg (146 lb 2.6 oz)   SpO2 98%   BMI 21.58 kg/m²           No data found.    No data found.      Respiratory support needed (if any):  - O2 - NC - 4 lpm    Admission weight Weight - Scale: 72.2 kg (159 lb 1.6 oz) (03/02/25 2143)    Today's weight    Wt Readings from Last 1 Encounters:   03/10/25 66.3 kg (146 lb 2.6 oz)        Pena need assessed each shift: N/A - no pena present  UOP >30ml/hr: YES  Last documented BM (in last 48 hrs):  Patient Vitals for the past 48 hrs:   Last BM (including prior to admit) Stool Occurrence   03/08/25 2000 03/08/25 --   03/09/25 0140 03/09/25 --   03/09/25 0445 03/09/25 --   03/09/25 0725 03/09/25 --   03/09/25 1155 03/09/25 1   03/09/25 1210 03/09/25 --   03/09/25 2000 03/09/25 --   03/10/25 0748 03/09/25 --                Restraints (in use currently or dc'd in last 12 hrs): No    Order current and documentation up to date? N/A    Lines/Drains reviewed @ bedside.  Implantable Port 08/29/24 Right Chest (Active)   Number of days: 193         Drip rates at handoff:    sodium chloride         Lab Data:   CBC:   Recent Labs     03/09/25  0440 03/10/25  0445   WBC 6.8 6.8   HGB 8.6* 8.5*   HCT 25.8* 25.8*   MCV 94.3 95.4    152     BMP:    Recent Labs     03/09/25  0440 03/09/25  1655 03/10/25  0445     --  144   K 3.6 4.5 3.6   CO2 30  --  32   BUN 36*  --  39*   CREATININE 0.8  --  0.7*     LIVR: No results for input(s): \"AST\", \"ALT\" in the last 72 hours.  PT/INR: No results for input(s): \"INR\" in the last 72

## 2025-03-11 LAB
ANION GAP SERPL CALCULATED.3IONS-SCNC: 7 MMOL/L (ref 3–16)
BASOPHILS # BLD: 0 K/UL (ref 0–0.2)
BASOPHILS NFR BLD: 0.2 %
BUN SERPL-MCNC: 40 MG/DL (ref 7–20)
CALCIUM SERPL-MCNC: 8.4 MG/DL (ref 8.3–10.6)
CHLORIDE SERPL-SCNC: 102 MMOL/L (ref 99–110)
CO2 SERPL-SCNC: 33 MMOL/L (ref 21–32)
CREAT SERPL-MCNC: 0.8 MG/DL (ref 0.8–1.3)
DEPRECATED RDW RBC AUTO: 18.3 % (ref 12.4–15.4)
EOSINOPHIL # BLD: 0.1 K/UL (ref 0–0.6)
EOSINOPHIL NFR BLD: 1 %
GFR SERPLBLD CREATININE-BSD FMLA CKD-EPI: >90 ML/MIN/{1.73_M2}
GLUCOSE SERPL-MCNC: 96 MG/DL (ref 70–99)
HCT VFR BLD AUTO: 25 % (ref 40.5–52.5)
HGB BLD-MCNC: 8.3 G/DL (ref 13.5–17.5)
LYMPHOCYTES # BLD: 0.5 K/UL (ref 1–5.1)
LYMPHOCYTES NFR BLD: 6.4 %
MAGNESIUM SERPL-MCNC: 2.21 MG/DL (ref 1.8–2.4)
MCH RBC QN AUTO: 32.3 PG (ref 26–34)
MCHC RBC AUTO-ENTMCNC: 33.3 G/DL (ref 31–36)
MCV RBC AUTO: 96.8 FL (ref 80–100)
MONOCYTES # BLD: 0.2 K/UL (ref 0–1.3)
MONOCYTES NFR BLD: 2.6 %
NEUTROPHILS # BLD: 6.5 K/UL (ref 1.7–7.7)
NEUTROPHILS NFR BLD: 89.8 %
PHOSPHATE SERPL-MCNC: 2.7 MG/DL (ref 2.5–4.9)
PLATELET # BLD AUTO: 155 K/UL (ref 135–450)
PMV BLD AUTO: 9.3 FL (ref 5–10.5)
POTASSIUM SERPL-SCNC: 3.9 MMOL/L (ref 3.5–5.1)
RBC # BLD AUTO: 2.59 M/UL (ref 4.2–5.9)
SODIUM SERPL-SCNC: 142 MMOL/L (ref 136–145)
WBC # BLD AUTO: 7.2 K/UL (ref 4–11)

## 2025-03-11 PROCEDURE — 85025 COMPLETE CBC W/AUTO DIFF WBC: CPT

## 2025-03-11 PROCEDURE — 2580000003 HC RX 258: Performed by: NURSE PRACTITIONER

## 2025-03-11 PROCEDURE — 80048 BASIC METABOLIC PNL TOTAL CA: CPT

## 2025-03-11 PROCEDURE — 97530 THERAPEUTIC ACTIVITIES: CPT

## 2025-03-11 PROCEDURE — 6360000002 HC RX W HCPCS: Performed by: INTERNAL MEDICINE

## 2025-03-11 PROCEDURE — 94640 AIRWAY INHALATION TREATMENT: CPT

## 2025-03-11 PROCEDURE — 94761 N-INVAS EAR/PLS OXIMETRY MLT: CPT

## 2025-03-11 PROCEDURE — 83735 ASSAY OF MAGNESIUM: CPT

## 2025-03-11 PROCEDURE — 6370000000 HC RX 637 (ALT 250 FOR IP): Performed by: NURSE PRACTITIONER

## 2025-03-11 PROCEDURE — 6370000000 HC RX 637 (ALT 250 FOR IP): Performed by: INTERNAL MEDICINE

## 2025-03-11 PROCEDURE — 97110 THERAPEUTIC EXERCISES: CPT

## 2025-03-11 PROCEDURE — 94669 MECHANICAL CHEST WALL OSCILL: CPT

## 2025-03-11 PROCEDURE — 36591 DRAW BLOOD OFF VENOUS DEVICE: CPT

## 2025-03-11 PROCEDURE — 84100 ASSAY OF PHOSPHORUS: CPT

## 2025-03-11 PROCEDURE — 2500000003 HC RX 250 WO HCPCS: Performed by: NURSE PRACTITIONER

## 2025-03-11 PROCEDURE — 2700000000 HC OXYGEN THERAPY PER DAY

## 2025-03-11 PROCEDURE — 2060000000 HC ICU INTERMEDIATE R&B

## 2025-03-11 RX ADMIN — Medication 4 ML: at 12:26

## 2025-03-11 RX ADMIN — EPOETIN ALFA-EPBX 40000 UNITS: 40000 INJECTION, SOLUTION INTRAVENOUS; SUBCUTANEOUS at 22:00

## 2025-03-11 RX ADMIN — Medication 2 PUFF: at 20:20

## 2025-03-11 RX ADMIN — EZETIMIBE 10 MG: 10 TABLET ORAL at 10:00

## 2025-03-11 RX ADMIN — ALBUTEROL SULFATE 2.5 MG: 2.5 SOLUTION RESPIRATORY (INHALATION) at 08:10

## 2025-03-11 RX ADMIN — METOPROLOL SUCCINATE 25 MG: 25 TABLET, EXTENDED RELEASE ORAL at 08:01

## 2025-03-11 RX ADMIN — SODIUM CHLORIDE, PRESERVATIVE FREE 10 ML: 5 INJECTION INTRAVENOUS at 21:45

## 2025-03-11 RX ADMIN — ALBUTEROL SULFATE 2.5 MG: 2.5 SOLUTION RESPIRATORY (INHALATION) at 20:20

## 2025-03-11 RX ADMIN — Medication 4 ML: at 08:10

## 2025-03-11 RX ADMIN — Medication 2 PUFF: at 08:10

## 2025-03-11 RX ADMIN — ROSUVASTATIN CALCIUM 20 MG: 20 TABLET, FILM COATED ORAL at 21:58

## 2025-03-11 RX ADMIN — ALBUTEROL SULFATE 2.5 MG: 2.5 SOLUTION RESPIRATORY (INHALATION) at 12:26

## 2025-03-11 RX ADMIN — GUAIFENESIN 600 MG: 600 TABLET ORAL at 21:58

## 2025-03-11 RX ADMIN — ENOXAPARIN SODIUM 40 MG: 100 INJECTION SUBCUTANEOUS at 08:01

## 2025-03-11 RX ADMIN — Medication 4 ML: at 20:20

## 2025-03-11 RX ADMIN — SODIUM CHLORIDE, PRESERVATIVE FREE 10 ML: 5 INJECTION INTRAVENOUS at 08:01

## 2025-03-11 RX ADMIN — GUAIFENESIN 600 MG: 600 TABLET ORAL at 08:01

## 2025-03-11 ASSESSMENT — PAIN SCALES - GENERAL: PAINLEVEL_OUTOF10: 0

## 2025-03-11 NOTE — CARE COORDINATION
Spoke w/ Anamika w/ [facility] #[phone]- she will review but does not anticipate bed availability until end of week    Also spoke w/ Letitia w/ [facility]- she will review but doesn't anticipate bed available until end of week     Additional SNF referrals made to [facility]   Will follow.  Electronically signed by Abby Garcia on 3/11/2025 at 11:46 AM  #[phone]

## 2025-03-11 NOTE — PROGRESS NOTES
department with a complaint of shortness of breath and shaking chills today.  The patient initially became ill on Monday, 6 days ago with dry nonproductive cough.  His wife contacted the primary care physician and they did a flu and COVID test on Tuesday.  He did test positive for COVID but was negative for flu.  He was prescribed Paxlovid and has 1 remaining dose.  She reports that he has progressively worsened throughout the week.  Appetite has been decreased and he has not really been eating or drinking much.     He does have a history of chronic kidney disease and saw the nephrologist on Friday and apparently was doing well.  Urine output is decreased but he is urinating.  He does report some slightly increased lower extremity edema.     He is still coughing but denies any productive sputum.  He does report shortness of breath and dyspnea on exertion as well as extreme fatigue.  He states that he has no strength.  He normally ambulates with a walker.  He was able to walk to the car tonight with some assistance to come to the hospital.     He denies any history of asthma or COPD.  He normally does not wear oxygen.     Family does report that he has been a little bit more confused today with difficulty recalling things.  He normally has some mild confusion and short-term memory issues.  There is no report of any headache, vision change, facial droop, slurred speech, vertigo, focal weakness or numbness.  He did have a fall several weeks ago but no recent falls     He was diagnosed with prostate cancer a few years ago and currently has metastatic disease to the bone.  His wife reports that his chemotherapy infusions were discontinued in December, 3 months ago because he was too weak.  He does take an oral daily medication.  No current active treatment     He denies any vomiting or diarrhea.  He denies any dysuria hematuria frequency urgency.  He denies any chest pain heaviness pressure or tightness.  He denies any  syncope or palpitations.  He denies any abdominal pain back pain or flank pain.     He denies any personal or family history of thromboembolic disease and denies any recent travel, leg pain calf pain.     Medical history significant for coronary artery disease, NSTEMI, prostate cancer stage IV, CHF.  Family / Caregiver Present: No  Referring Practitioner: Arben Art MD  Subjective  Subjective: Pt agreeable to OT treatment. Pt denies pain. Pt initially on 4L O2 and increased to 5L O2 with activity.  General Comment  Comments: okay for therapy per RN.     Social/Functional History  Social/Functional History  Lives With: Spouse  Type of Home: House  Home Layout: Multi-level, Bed/Bath upstairs, 1/2 bath on main level  Home Access: Stairs to enter with rails (Front : 2 steps to enter with handrail.  Garage : 1 step to enter without handrail.)  Bathroom Shower/Tub: Tub/Shower unit  Bathroom Toilet: Standard  Bathroom Equipment: Grab bars in shower  Bathroom Accessibility: Accessible  Home Equipment: Walker - Rolling  Has the patient had two or more falls in the past year or any fall with injury in the past year?: Yes  Prior Level of Assist for ADLs: Independent  Prior Level of Assist for Homemaking:  (Wife assists with homemaking needs.)  Prior Level of Assist for Ambulation: Independent household ambulator, with or without device (With Walker.)  Prior Level of Assist for Transfers: Independent  Active : Yes  Type of Occupation: Retired : .    Objective    Vision  Vision: Impaired  Vision Exceptions: Wears glasses at all times  Hearing  Hearing: Exceptions to [facility]  Hearing Exceptions: Hard of hearing/hearing concerns          Safety Devices  Type of Devices: Call light within reach;Chair alarm in place;Gait belt;Left in chair;Nurse notified  Restraints  Restraints Initially in Place: No  Bed Mobility Training  Bed Mobility Training: Yes  Overall Level of Assistance: 2 Person assistance;Partial/Moderate  Requires cues for some  Orientation  Orientation Level: Oriented to person (Pt alert to self, hospital setting; somewhat confused to recent events/situation.)               Exercise Treatment: pt completed x10 reps x2 sets of B UE exercises including shoulder flex/ext and elbow flex/ext; completes with increased time and rest breaks due to fatigue  Education Given To: Patient  Education Provided: Role of Therapy;Plan of Care;Transfer Training;ADL Adaptive Strategies;Home Exercise Program  Education Method: Demonstration;Verbal  Barriers to Learning: Cognition  Education Outcome: Verbalized understanding;Demonstrated understanding;Continued education needed          AM-PAC - ADL  AM-PAC Daily Activity - Inpatient   How much help is needed for putting on and taking off regular lower body clothing?: Total  How much help is needed for bathing (which includes washing, rinsing, drying)?: A Lot  How much help is needed for toileting (which includes using toilet, bedpan, or urinal)?: A Lot  How much help is needed for putting on and taking off regular upper body clothing?: A Lot  How much help is needed for taking care of personal grooming?: A Little  How much help for eating meals?: None  AM-[facility] Inpatient Daily Activity Raw Score: 14  AM-PAC Inpatient ADL T-Scale Score : 33.39  ADL Inpatient CMS 0-100% Score: 59.67  ADL Inpatient CMS G-Code Modifier : CK    Tinneti Score       Goals  Short Term Goals  Time Frame for Short Term Goals: prior to D/C; ongoing 3/11  Short Term Goal 1: complete bed mobility with Min A  Short Term Goal 2: complete transfers with Min A  Short Term Goal 3: complete bathing and dressing with Min A  Short Term Goal 4: complete toileting with Min A  Short Term Goal 5: tolerate B UE exercises x10 reps for increased strength w/ ADLs  Long Term Goals  Time Frame for Long Term Goals : STG=LTG  Patient Goals   Patient goals : return to PLOF      Therapy Time   Individual Concurrent Group Co-treatment

## 2025-03-11 NOTE — PROGRESS NOTES
NAME:  Yury Levin  YOB: 1950  MEDICAL RECORD NUMBER:  [id number]    Shift Summary: Uneventful night.  Remains on 4 L of oxygen.  Rested well. Call light within reach.    Family updated: Yes:  Wife at bedside on hand off    Rhythm: Atrial Fibrillation     Most recent vitals:   Visit Vitals  BP (!) 108/59   Pulse 79   Temp 98.8 °F (37.1 °C) (Oral)   Resp 18   Ht 1.753 m (5' 9\")   Wt 67.1 kg (147 lb 14.9 oz)   SpO2 94%   BMI 21.85 kg/m²           No data found.    No data found.      Respiratory support needed (if any):  - O2 - NC - 4 lpm    Admission weight Weight - Scale: 72.2 kg (159 lb 1.6 oz) (03/02/25 2143)    Today's weight    Wt Readings from Last 1 Encounters:   03/11/25 67.1 kg (147 lb 14.9 oz)        Pena need assessed each shift: N/A - no pena present  UOP >30ml/hr: YES  Last documented BM (in last 48 hrs):  Patient Vitals for the past 48 hrs:   Last BM (including prior to admit) Stool Occurrence   03/09/25 0725 03/09/25 --   03/09/25 1155 03/09/25 1   03/09/25 1210 03/09/25 --   03/09/25 2000 03/09/25 --   03/10/25 0748 03/09/25 --   03/10/25 2000 03/09/25 --   03/11/25 0000 03/11/25 1   03/11/25 0500 03/11/25 1                Restraints (in use currently or dc'd in last 12 hrs): No    Order current and documentation up to date? No    Lines/Drains reviewed @ bedside.  Implantable Port 08/29/24 Right Chest (Active)   Number of days: 193         Drip rates at handoff:    sodium chloride         Lab Data:   CBC:   Recent Labs     03/10/25  0445 03/11/25  0455   WBC 6.8 7.2   HGB 8.5* 8.3*   HCT 25.8* 25.0*   MCV 95.4 96.8    155     BMP:    Recent Labs     03/10/25  0445 03/11/25  0455    142   K 3.6 3.9   CO2 32 33*   BUN 39* 40*   CREATININE 0.7* 0.8     LIVR: No results for input(s): \"AST\", \"ALT\" in the last 72 hours.  PT/INR: No results for input(s): \"INR\" in the last 72 hours.    Invalid input(s): \"PROT\"  APTT: No results for input(s): \"APTT\" in the last 72 hours.  ABG:

## 2025-03-11 NOTE — CARE COORDINATION
Discussed available private room at [facility] [place] with patient and his wife. They are agreeable to [facility]  Spoke w/ Anamika at [facility] - she will initiate precert  Patient and his wife are agreeable to supplying orgovyx medication to facility.  Electronically signed by Abby Garcia on 3/11/2025 at 4:18 PM  #[phone]

## 2025-03-11 NOTE — PLAN OF CARE
Problem: Chronic Conditions and Co-morbidities  Goal: Patient's chronic conditions and co-morbidity symptoms are monitored and maintained or improved  Outcome: Progressing  Flowsheets (Taken 3/10/2025 2000)  Care Plan - Patient's Chronic Conditions and Co-Morbidity Symptoms are Monitored and Maintained or Improved:   Monitor and assess patient's chronic conditions and comorbid symptoms for stability, deterioration, or improvement   Collaborate with multidisciplinary team to address chronic and comorbid conditions and prevent exacerbation or deterioration   Update acute care plan with appropriate goals if chronic or comorbid symptoms are exacerbated and prevent overall improvement and discharge     Problem: Discharge Planning  Goal: Discharge to home or other facility with appropriate resources  Outcome: Progressing     Problem: Pain  Goal: Verbalizes/displays adequate comfort level or baseline comfort level  Outcome: Progressing     Problem: Skin/Tissue Integrity  Goal: Skin integrity remains intact  Description: 1.  Monitor for areas of redness and/or skin breakdown  2.  Assess vascular access sites hourly  3.  Every 4-6 hours minimum:  Change oxygen saturation probe site  4.  Every 4-6 hours:  If on nasal continuous positive airway pressure, respiratory therapy assess nares and determine need for appliance change or resting period  Outcome: Not Progressing  Flowsheets (Taken 3/10/2025 2000)  Skin Integrity Remains Intact:   Monitor for areas of redness and/or skin breakdown   Assess vascular access sites hourly     Problem: Safety - Adult  Goal: Free from fall injury  Outcome: Progressing     Problem: Nutrition Deficit:  Goal: Optimize nutritional status  Outcome: Progressing     Problem: Respiratory - Adult  Goal: Achieves optimal ventilation and oxygenation  Outcome: Progressing  Flowsheets (Taken 3/10/2025 2000)  Achieves optimal ventilation and oxygenation:   Assess for changes in respiratory status   Assess  for changes in mentation and behavior   Position to facilitate oxygenation and minimize respiratory effort   Oxygen supplementation based on oxygen saturation or arterial blood gases   Encourage broncho-pulmonary hygiene including cough, deep breathe, incentive spirometry   Assess the need for suctioning and aspirate as needed   Respiratory therapy support as indicated   Assess and instruct to report shortness of breath or any respiratory difficulty     Problem: Cardiovascular - Adult  Goal: Maintains optimal cardiac output and hemodynamic stability  Outcome: Progressing  Flowsheets (Taken 3/10/2025 2000)  Maintains optimal cardiac output and hemodynamic stability:   Monitor blood pressure and heart rate   Monitor urine output and notify Licensed Independent Practitioner for values outside of normal range   Assess for signs of decreased cardiac output     Problem: Skin/Tissue Integrity - Adult  Goal: Skin integrity remains intact  Description: 1.  Monitor for areas of redness and/or skin breakdown  2.  Assess vascular access sites hourly  3.  Every 4-6 hours minimum:  Change oxygen saturation probe site  4.  Every 4-6 hours:  If on nasal continuous positive airway pressure, respiratory therapy assess nares and determine need for appliance change or resting period  Outcome: Not Progressing  Flowsheets (Taken 3/10/2025 2000)  Skin Integrity Remains Intact:   Monitor for areas of redness and/or skin breakdown   Assess vascular access sites hourly     Problem: Genitourinary - Adult  Goal: Absence of urinary retention  Outcome: Progressing     Problem: Metabolic/Fluid and Electrolytes - Adult  Goal: Electrolytes maintained within normal limits  Outcome: Progressing  Flowsheets (Taken 3/10/2025 2000)  Electrolytes maintained within normal limits:   Monitor labs and assess patient for signs and symptoms of electrolyte imbalances   Administer electrolyte replacement as ordered   Monitor response to electrolyte replacements,

## 2025-03-11 NOTE — PROGRESS NOTES
NAME:  Yury Levin  YOB: 1950  MEDICAL RECORD NUMBER:  [id number]    Shift Summary: Pt stable.VSS. Plans of possible DC tomorrow    Family updated: Yes:  Pt's son and wife were at bedsdie    Rhythm: Atrial Fibrillation     Most recent vitals:   Visit Vitals  BP (!) 98/54   Pulse 92   Temp 98.7 °F (37.1 °C) (Oral)   Resp 22   Ht 1.753 m (5' 9.02\")   Wt 67.1 kg (147 lb 14.9 oz)   SpO2 98%   BMI 21.84 kg/m²           No data found.    No data found.      Respiratory support needed (if any):  - O2 - NC - 3 lpm    Admission weight Weight - Scale: 72.2 kg (159 lb 1.6 oz) (03/02/25 2143)    Today's weight    Wt Readings from Last 1 Encounters:   03/11/25 67.1 kg (147 lb 14.9 oz)        Pena need assessed each shift: N/A - no pena present  UOP >30ml/hr: YES  Last documented BM (in last 48 hrs):  Patient Vitals for the past 48 hrs:   Last BM (including prior to admit) Stool Occurrence   03/09/25 2000 03/09/25 --   03/10/25 0748 03/09/25 --   03/10/25 2000 03/09/25 --   03/11/25 0000 03/11/25 1   03/11/25 0500 03/11/25 1   03/11/25 0800 03/11/25 --   03/11/25 1211 03/11/25 1   03/11/25 1620 -- 1   03/11/25 1651 03/11/25 1                Restraints (in use currently or dc'd in last 12 hrs): No    Order current and documentation up to date? N/A    Lines/Drains reviewed @ bedside.  Implantable Port 08/29/24 Right Chest (Active)   Number of days: 194         Drip rates at handoff:    sodium chloride         Lab Data:   CBC:   Recent Labs     03/10/25  0445 03/11/25  0455   WBC 6.8 7.2   HGB 8.5* 8.3*   HCT 25.8* 25.0*   MCV 95.4 96.8    155     BMP:    Recent Labs     03/10/25  0445 03/11/25  0455    142   K 3.6 3.9   CO2 32 33*   BUN 39* 40*   CREATININE 0.7* 0.8     ABG: No results for input(s): \"PHART\", \"YAN5ONU\", \"PO2ART\" in the last 72 hours.    Any consults during the shift? No    Any signed and held orders to be released?  No        4 Eyes Skin Assessment       The patient is being assessed

## 2025-03-11 NOTE — PLAN OF CARE
Problem: Chronic Conditions and Co-morbidities  Goal: Patient's chronic conditions and co-morbidity symptoms are monitored and maintained or improved  3/11/2025 0824 by Beatriz Jones RN  Outcome: Progressing  Flowsheets (Taken 3/11/2025 0800)  Care Plan - Patient's Chronic Conditions and Co-Morbidity Symptoms are Monitored and Maintained or Improved: Monitor and assess patient's chronic conditions and comorbid symptoms for stability, deterioration, or improvement     Problem: Discharge Planning  Goal: Discharge to home or other facility with appropriate resources  3/11/2025 0824 by Beatriz Jones RN  Outcome: Progressing  Flowsheets (Taken 3/11/2025 0800)  Discharge to home or other facility with appropriate resources: Identify barriers to discharge with patient and caregiver     Problem: Pain  Goal: Verbalizes/displays adequate comfort level or baseline comfort level  3/11/2025 0824 by Beatriz Jones RN  Outcome: Progressing     Problem: Skin/Tissue Integrity  Goal: Skin integrity remains intact  Description: 1.  Monitor for areas of redness and/or skin breakdown  2.  Assess vascular access sites hourly  3.  Every 4-6 hours minimum:  Change oxygen saturation probe site  4.  Every 4-6 hours:  If on nasal continuous positive airway pressure, respiratory therapy assess nares and determine need for appliance change or resting period  3/11/2025 0824 by Beatriz Jones RN  Outcome: Progressing  Flowsheets (Taken 3/11/2025 0800)  Skin Integrity Remains Intact: Monitor for areas of redness and/or skin breakdown     Problem: Safety - Adult  Goal: Free from fall injury  3/11/2025 0824 by Beatriz Jones RN  Outcome: Progressing     Problem: ABCDS Injury Assessment  Goal: Absence of physical injury  Outcome: Progressing     Problem: Nutrition Deficit:  Goal: Optimize nutritional status  3/11/2025 0824 by Beatriz Jones RN  Outcome: Progressing     Problem: Neurosensory - Adult  Goal: Achieves stable or improved

## 2025-03-11 NOTE — PROGRESS NOTES
V2.0    [facility] [place] [facility] Progress Note      Name:  Yury Levin /Age/Sex: 1950  (74 y.o. male)   MRN & CSN:  [id number] & [id number] Encounter Date/Time: 3/11/2025 7:33 AM EDT   Location:  [id number] PCP: Diamond Robles MD     Attending:José Luis Stafford DO       Hospital Day: 10  Brief Hospital Course  Yury Levin is a 74 y.o. male with pertinent PMHx of CAD, prostate cancer, CHF who presented complaining of shortness of breath.  Was admitted for acute respiratory failure with hypoxia.  Was found to be influenza A positive and imaging was concerning for multifocal pneumonia.  Assessment & Plan     Multifocal pneumonia  Influenza A infection  Acute respiratory failure with hypoxia  -Completed 7-day course of antibiotics  -Continue to wean supplemental oxygen as tolerated, currently on 4 L via nasal cannula  -Continue Symbicort  -Likely will need to continue to wean off oxygen at [facility]    Metastatic prostate cancer  -Oncology following  -On Orgovyx    Anemia of chronic disease  -S/p Retacrit    Atrial fibrillation  -S/p watchman not currently on anticoagulation  -Continue metoprolol    Diet ADULT DIET; Easy to Chew; Low Sodium (2 gm)  ADULT ORAL NUTRITION SUPPLEMENT; Breakfast, Dinner; Wound Healing Oral Supplement  ADULT ORAL NUTRITION SUPPLEMENT; Breakfast, Lunch, Dinner; Standard High Calorie/High Protein Oral Supplement   DVT Prophylaxis [x] Lovenox, []  Heparin, [] SCDs, [] Ambulation,  [] Eliquis, [] Xarelto  [] Coumadin   Code Status Full Code   Disposition From: Home  Expected Disposition: SNF  Estimated Date of Discharge: 3/12/25           Subjective:     Chief Complaint: Shortness of breath    Patient was seen and examined today sitting up in chair in room with family at bedside  Denies any chest pain or palpitations  Still has some shortness of breath and is very weak  Overall says he feels improved  Denies any productive cough    Review of Systems:      Pertinent positives and negatives discussed

## 2025-03-11 NOTE — PROGRESS NOTES
ONCOLOGY HEMATOLOGY CARE PROGRESS NOTE      SUBJECTIVE:  Pt slowly getting stronger--denies sob/cp.    ROS:     Constitutional:  No weight loss, No fever, No chills, No night sweats.  Energy level good.  Eyes:  No impairment or change in vision  ENT / Mouth:  No pain, abnormal ulceration, bleeding, nasal drip or change in voice or hearing  Cardiovascular:  No chest pain, palpitations, new edema, or calf discomfort  Respiratory:  No pain, hemoptysis, change to breathing  Breast:  No pain, discharge, change in appearance or texture  Gastrointestinal:  No pain, cramping, jaundice, change to eating and bowel habits  Urinary:  No pain, bleeding or change in continence  Genitalia: No pain, bleeding or discharge  Musculoskeletal:  No redness, pain, edema or weakness  Skin:  No pruritus, rash, change to nodules or lesions  Neurologic:  No discomfort, change in mental status, speech, sensory or motor activity  Psychiatric:  No change in concentration or change to affect or mood  Endocrine:  No hot flashes, increased thirst, or change to urine production  Hematologic: No petechiae, ecchymosis or bleeding  Lymphatic:  No lymphadenopathy or lymphedema  Allergy / Immunologic:  No eczema, hives, frequent or recurrent infections    OBJECTIVE        Physical    VITALS:  Patient Vitals for the past 24 hrs:   BP Temp Temp src Pulse Resp SpO2 Height Weight   03/11/25 1600 (!) 98/54 98.7 °F (37.1 °C) Oral 92 22 98 % -- --   03/11/25 1215 -- -- -- 91 21 93 % -- --   03/11/25 1200 105/60 98.6 °F (37 °C) Oral 87 18 96 % -- --   03/11/25 0936 -- -- -- -- -- -- 1.753 m (5' 9.02\") --   03/11/25 0812 -- -- -- 72 19 98 % -- --   03/11/25 0800 104/72 98.4 °F (36.9 °C) Oral 78 23 93 % -- --   03/11/25 0500 -- -- -- -- -- -- -- 67.1 kg (147 lb 14.9 oz)   03/11/25 0417 -- -- -- 79 18 94 % -- --   03/11/25 0400 (!) 108/59 98.8 °F (37.1 °C) Oral 75 21 91 % -- --   03/11/25 0022 103/80 98.7 °F (37.1 °C) Oral 83  03/09/25  0440 03/08/25  0950 03/08/25  0435 03/07/25  1025 03/07/25  0343 03/06/25  0600 03/05/25  0511 03/04/25  0323 03/03/25  1323 03/03/25  0405 03/02/25  2225 03/02/25  2225    144  --  142  --  144  --  146*  --  143 143  --  141  --  140   K 3.9 3.6 4.5 3.6   < > 3.5   < > 3.2*  --  4.1 4.2   < > 3.5  --  3.6    103  --  101  --  106  --  108  --  109 108  --  104  --  102   CO2 33* 32  --  30  --  29  --  29  --  24 26  --  26  --  23   GLUCOSE 96 111*  --  101*  --  81  --  101*  --  112* 134*  --  100*  --  130*   BUN 40* 39*  --  36*  --  25*  --  25*  --  23* 25*  --  37*  --  37*   CREATININE 0.8 0.7*  --  0.8  --  0.9  --  0.8  --  0.8 0.9  --  1.2  --  1.4*   LABGLOM >90 >90  --  >90  --  89  --  >90  --  >90 89  --  63  --  52*   CALCIUM 8.4 8.3  --  8.7  --  8.4  --  8.1*  --  8.1* 7.9*  --  7.9*  --  8.9   AGRATIO  --   --   --   --   --   --   --   --   --  1.2 1.2  --  1.7  --  1.5   BILITOT  --   --   --   --   --   --   --   --   --  <0.2 <0.2  --  <0.2  --  0.3   ALKPHOS  --   --   --   --   --   --   --   --   --  57 51  --  63  --  77   ALT  --   --   --   --   --   --   --   --   --  19 21  --  28  --  26   AST  --   --   --   --   --   --   --   --   --  21 22  --  29  --  36   MG 2.21 1.98  --  1.88  --   --   --  2.05   < > 2.06 2.30  --  1.94   < >  --     < > = values in this interval not displayed.       Lab Results   Component Value Date    CALCIUM 8.4 03/11/2025    PHOS 2.7 03/11/2025       LDH:No results for input(s): \"LDH\" in the last 720 hours.    Radiology Review:  Echo (TTE) complete (PRN contrast/bubble/strain/3D)    Left Ventricle: Normal left ventricular systolic function with a   visually estimated EF of 55 - 60%. Left ventricle size is normal. Normal   wall thickness. Normal wall motion. Normal diastolic function.    Right Ventricle: Right ventricle size is normal. Normal wall thickness.   Normal systolic function. Normal wall motion.    Aortic Valve:

## 2025-03-11 NOTE — PROGRESS NOTES
Comprehensive Nutrition Assessment    Type and Reason for Visit:  Reassess    Nutrition Recommendations/Plan:   Easy to chew textures, 2 gm Na   Continue Geo bid  Continue Ensure with meals      Malnutrition Assessment:  Malnutrition Status:  At risk for malnutrition (03/03/25 1129)    Context:  Chronic Illness     Findings of the 6 clinical characteristics of malnutrition:  Energy Intake:  Mild decrease in energy intake  Weight Loss:  Greater than 20% over 1 year     Body Fat Loss:  No body fat loss     Muscle Mass Loss:  No muscle mass loss    Fluid Accumulation:  Mild     Strength:  Not Performed    Nutrition Assessment:    Follow up:  Patient remains in ICU, currently on 4 liters nasal cannula.  Patient continues on an easy to chew textured diet with low sodium restriction.  Po intake 25-75% meals. Geo has been ordered bid since 3/3 and Ensure plus started on 3/7 drinking %.    Nutrition Related Findings:    BUN 40 on 3/11; BG within normal limits; last BM on 3/11 Wound Type: Stage II (left buttocks)       Current Nutrition Intake & Therapies:    Average Meal Intake: 51-75%  Average Supplements Intake: %  ADULT DIET; Easy to Chew; Low Sodium (2 gm)  ADULT ORAL NUTRITION SUPPLEMENT; Breakfast, Dinner; Wound Healing Oral Supplement  ADULT ORAL NUTRITION SUPPLEMENT; Breakfast, Lunch, Dinner; Standard High Calorie/High Protein Oral Supplement    Anthropometric Measures:  Height: 175.3 cm (5' 9.02\")  Ideal Body Weight (IBW): 160 lbs (73 kg)       Current Body Weight: 67.1 kg (147 lb 14.9 oz), 92.5 % IBW. Weight Source: Bed scale  Current BMI (kg/m2): 21.8                             BMI Categories: Normal Weight (BMI 18.5-24.9)    Estimated Daily Nutrient Needs:  Energy Requirements Based On: Kcal/kg  Weight Used for Energy Requirements: Current  Energy (kcal/day): 1775 - 2125 (25 - 30 kcal/kg)  Weight Used for Protein Requirements: Current  Protein (g/day): 71 - 85 (1-1.2 g/kg)  Method Used for

## 2025-03-11 NOTE — PROGRESS NOTES
Physical Therapy  Facility/Department: [address] ICU  Physical Therapy Treatment Note    Name: Yury Levin  : 1950  MRN: [id number]  Date of Service: 3/11/2025    Discharge Recommendations:  Continue to assess pending progress, Subacute/Skilled Nursing Facility   PT Equipment Recommendations  Other: Defer to next level of care.      Yury Levin scored a  on the AM-PAC short mobility form. Current research shows that an AM-PAC score of 17 or less is typically not associated with a discharge to the patient's home setting. Based on the patient's AM-PAC score and their current functional mobility deficits, it is recommended that the patient have 3-5 sessions per week of Physical Therapy at d/c to increase the patient's independence.  Please see assessment section for further patient specific details.    If patient discharges prior to next session this note will serve as a discharge summary.  Please see below for the latest assessment towards goals.      Assessment  Body Structures, Functions, Activity Limitations Requiring Skilled Therapeutic Intervention: Decreased functional mobility ;Decreased strength;Decreased safe awareness;Decreased cognition;Decreased endurance;Decreased balance  Assessment: 73 y/o male admit 3/2/2025 with Acute Respiratory, Pneumonia, Sepsis, Influenza + and Recent COVID +. PMH as noted including CAD, NSTEMI, CABG (2018), CHF, Prostate Ca.   PTA pt living with wife in multi level home with few steps to enter and bed/bath on 2nd floor.  Pt reports independent daily care and functional mobility (with Walker).  Currently, pt does appear somewhat confused; requiring 4L O2 (increase briefly to 5L due to desat).  Pt requiring Mod assist x 2  to eob,  Transfers via Stedy from eob Mod assist x 2; to/from seat of Stedy Min assist; to/from recliner Max assist x 2.  Pt able to maintain very brief static stand ~15 sec during use of Stedy.   Cont  LE Strength Exs; initiated gentle static  pleasant; somewhat  confused although agreeable to PT Rx.  Feeling weak.         Social/Functional History  Social/Functional History  Lives With: Spouse  Type of Home: House  Home Layout: Multi-level, Bed/Bath upstairs, 1/2 bath on main level  Home Access: Stairs to enter with rails (Front : 2 steps to enter with handrail.  Garage : 1 step to enter without handrail.)  Bathroom Shower/Tub: Tub/Shower unit  Bathroom Toilet: Standard  Bathroom Equipment: Grab bars in shower  Bathroom Accessibility: Accessible  Home Equipment: Walker - Rolling  Has the patient had two or more falls in the past year or any fall with injury in the past year?: Yes  Prior Level of Assist for ADLs: Independent  Prior Level of Assist for Homemaking:  (Wife assists with homemaking needs.)  Prior Level of Assist for Ambulation: Independent household ambulator, with or without device (With Walker.)  Prior Level of Assist for Transfers: Independent  Active : Yes  Type of Occupation: Retired : .  Vision/Hearing  Vision  Vision: Within Functional Limits  Hearing  Hearing: Exceptions to WFL  Hearing Exceptions: Hard of hearing/hearing concerns    Cognition   Orientation  Orientation Level: Oriented to person (Pt alert to self, hospital setting; somewhat confused to recent events/situation.)  Cognition  Overall Cognitive Status: Exceptions  Arousal/Alertness: Appears intact  Following Commands: Follows one step commands with increased time  Attention Span: Attends with cues to redirect  Memory: Decreased recall of recent events  Safety Judgement: Impaired  Insights: Decreased awareness of deficits  Initiation: Requires cues for some  Sequencing: Requires cues for some    Objective    Observation/Palpation  Observation: Poor postural control of C-Spine :  forward/flexed resting positioning.                      Gait  Gait Training: No  Bed mobility  Supine to Sit: Partial/Moderate assistance;2 Person assistance (HOB  elevated.)  Transfers  Stand to Sit: Partial/Moderate assistance;2 Person Assistance  Bed to Chair: Dependent/Total;Partial/Moderate assistance;2 Person Assistance (Via Stedy.)  Comment: Transfers completed via Stedy from eob : Mod assist x 2; To/from Seat of Stedy : Min assist; To/from Recliner : Max assist x 2.  Cues for slow/control descent.        Balance  Comments: Pt able to maintain brief static stand via Stedy ~15 sec; Min assist.  Fatigues very quickly.  Exercise Treatment: Supine : Heelslides, Hip Abduct/Adduct : 10x Bilat LEs.  Sitting : Hip Flex, Hip Adduct Sets, Knee Ext, Ankle Pumps : 10x Bilat LEs.  Rest break between each ex due to fatigue.  Initiated gentle static stretch/positioning exs C-Spine.           [facility] - Mobility    AM-PAC Basic Mobility - Inpatient   How much help is needed turning from your back to your side while in a flat bed without using bedrails?: A Lot  How much help is needed moving from lying on your back to sitting on the side of a flat bed without using bedrails?: A Lot  How much help is needed moving to and from a bed to a chair?: Total  How much help is needed standing up from a chair using your arms?: A Lot  How much help is needed walking in hospital room?: Total  How much help is needed climbing 3-5 steps with a railing?: Total  AM-PAC Inpatient Mobility Raw Score : 9  AM-PAC Inpatient T-Scale Score : 30.55  Mobility Inpatient CMS 0-100% Score: 81.38  Mobility Inpatient CMS G-Code Modifier : CM         Goals  Short Term Goals  Time Frame for Short Term Goals: Upon d/c acute care setting.  Short Term Goal 1: Bed Mob Min assist  Short Term Goal 2: Transfers with assist device CGA.  Short Term Goal 3: Amb with assist device 15-25' CGA/Min assist.  Short Term Goal 4: Pt participating in approp Strength Exs.  Patient Goals   Patient Goals : Go home.       Education  Patient Education  Education Given To: Patient  Education Provided Comments: Role of PT, POC, Need to call for

## 2025-03-12 LAB
ANION GAP SERPL CALCULATED.3IONS-SCNC: 6 MMOL/L (ref 3–16)
BASOPHILS # BLD: 0 K/UL (ref 0–0.2)
BASOPHILS NFR BLD: 0.2 %
BUN SERPL-MCNC: 34 MG/DL (ref 7–20)
CALCIUM SERPL-MCNC: 8.5 MG/DL (ref 8.3–10.6)
CHLORIDE SERPL-SCNC: 102 MMOL/L (ref 99–110)
CO2 SERPL-SCNC: 33 MMOL/L (ref 21–32)
CREAT SERPL-MCNC: 0.8 MG/DL (ref 0.8–1.3)
DEPRECATED RDW RBC AUTO: 18.3 % (ref 12.4–15.4)
EOSINOPHIL # BLD: 0.1 K/UL (ref 0–0.6)
EOSINOPHIL NFR BLD: 1.1 %
GFR SERPLBLD CREATININE-BSD FMLA CKD-EPI: >90 ML/MIN/{1.73_M2}
GLUCOSE SERPL-MCNC: 99 MG/DL (ref 70–99)
HCT VFR BLD AUTO: 24.8 % (ref 40.5–52.5)
HGB BLD-MCNC: 8 G/DL (ref 13.5–17.5)
LYMPHOCYTES # BLD: 0.5 K/UL (ref 1–5.1)
LYMPHOCYTES NFR BLD: 8.2 %
MAGNESIUM SERPL-MCNC: 1.93 MG/DL (ref 1.8–2.4)
MCH RBC QN AUTO: 31.2 PG (ref 26–34)
MCHC RBC AUTO-ENTMCNC: 32.3 G/DL (ref 31–36)
MCV RBC AUTO: 96.6 FL (ref 80–100)
MONOCYTES # BLD: 0.2 K/UL (ref 0–1.3)
MONOCYTES NFR BLD: 3.2 %
NEUTROPHILS # BLD: 5.7 K/UL (ref 1.7–7.7)
NEUTROPHILS NFR BLD: 87.3 %
PHOSPHATE SERPL-MCNC: 2.2 MG/DL (ref 2.5–4.9)
PLATELET # BLD AUTO: 153 K/UL (ref 135–450)
PMV BLD AUTO: 9.1 FL (ref 5–10.5)
POTASSIUM SERPL-SCNC: 3.7 MMOL/L (ref 3.5–5.1)
RBC # BLD AUTO: 2.57 M/UL (ref 4.2–5.9)
SODIUM SERPL-SCNC: 141 MMOL/L (ref 136–145)
WBC # BLD AUTO: 6.5 K/UL (ref 4–11)

## 2025-03-12 PROCEDURE — 97110 THERAPEUTIC EXERCISES: CPT

## 2025-03-12 PROCEDURE — 94669 MECHANICAL CHEST WALL OSCILL: CPT

## 2025-03-12 PROCEDURE — 2500000003 HC RX 250 WO HCPCS: Performed by: NURSE PRACTITIONER

## 2025-03-12 PROCEDURE — 85025 COMPLETE CBC W/AUTO DIFF WBC: CPT

## 2025-03-12 PROCEDURE — 6370000000 HC RX 637 (ALT 250 FOR IP): Performed by: STUDENT IN AN ORGANIZED HEALTH CARE EDUCATION/TRAINING PROGRAM

## 2025-03-12 PROCEDURE — 6370000000 HC RX 637 (ALT 250 FOR IP): Performed by: NURSE PRACTITIONER

## 2025-03-12 PROCEDURE — 2700000000 HC OXYGEN THERAPY PER DAY

## 2025-03-12 PROCEDURE — 6360000002 HC RX W HCPCS: Performed by: INTERNAL MEDICINE

## 2025-03-12 PROCEDURE — 2500000003 HC RX 250 WO HCPCS: Performed by: STUDENT IN AN ORGANIZED HEALTH CARE EDUCATION/TRAINING PROGRAM

## 2025-03-12 PROCEDURE — 97530 THERAPEUTIC ACTIVITIES: CPT

## 2025-03-12 PROCEDURE — 6370000000 HC RX 637 (ALT 250 FOR IP): Performed by: INTERNAL MEDICINE

## 2025-03-12 PROCEDURE — 97535 SELF CARE MNGMENT TRAINING: CPT

## 2025-03-12 PROCEDURE — 2580000003 HC RX 258: Performed by: STUDENT IN AN ORGANIZED HEALTH CARE EDUCATION/TRAINING PROGRAM

## 2025-03-12 PROCEDURE — 2060000000 HC ICU INTERMEDIATE R&B

## 2025-03-12 PROCEDURE — 94640 AIRWAY INHALATION TREATMENT: CPT

## 2025-03-12 PROCEDURE — 84100 ASSAY OF PHOSPHORUS: CPT

## 2025-03-12 PROCEDURE — 83735 ASSAY OF MAGNESIUM: CPT

## 2025-03-12 PROCEDURE — 80048 BASIC METABOLIC PNL TOTAL CA: CPT

## 2025-03-12 PROCEDURE — 36591 DRAW BLOOD OFF VENOUS DEVICE: CPT

## 2025-03-12 PROCEDURE — 94761 N-INVAS EAR/PLS OXIMETRY MLT: CPT

## 2025-03-12 RX ORDER — CLOTRIMAZOLE 1 %
CREAM (GRAM) TOPICAL 2 TIMES DAILY
Status: DISCONTINUED | OUTPATIENT
Start: 2025-03-12 | End: 2025-03-13 | Stop reason: HOSPADM

## 2025-03-12 RX ORDER — CLOTRIMAZOLE 1 %
CREAM (GRAM) TOPICAL
Qty: 12 G | Refills: 0
Start: 2025-03-12 | End: 2025-03-19

## 2025-03-12 RX ADMIN — EZETIMIBE 10 MG: 10 TABLET ORAL at 08:33

## 2025-03-12 RX ADMIN — SODIUM CHLORIDE, PRESERVATIVE FREE 10 ML: 5 INJECTION INTRAVENOUS at 09:02

## 2025-03-12 RX ADMIN — GUAIFENESIN 600 MG: 600 TABLET ORAL at 21:30

## 2025-03-12 RX ADMIN — ROSUVASTATIN CALCIUM 20 MG: 20 TABLET, FILM COATED ORAL at 21:30

## 2025-03-12 RX ADMIN — ENOXAPARIN SODIUM 40 MG: 100 INJECTION SUBCUTANEOUS at 08:33

## 2025-03-12 RX ADMIN — METOPROLOL SUCCINATE 25 MG: 25 TABLET, EXTENDED RELEASE ORAL at 08:33

## 2025-03-12 RX ADMIN — CLOTRIMAZOLE: 10 CREAM TOPICAL at 10:57

## 2025-03-12 RX ADMIN — ALBUTEROL SULFATE 2.5 MG: 2.5 SOLUTION RESPIRATORY (INHALATION) at 09:01

## 2025-03-12 RX ADMIN — SODIUM CHLORIDE, PRESERVATIVE FREE 10 ML: 5 INJECTION INTRAVENOUS at 21:32

## 2025-03-12 RX ADMIN — Medication 4 ML: at 09:01

## 2025-03-12 RX ADMIN — ALBUTEROL SULFATE 2.5 MG: 2.5 SOLUTION RESPIRATORY (INHALATION) at 12:09

## 2025-03-12 RX ADMIN — SODIUM PHOSPHATE, MONOBASIC, MONOHYDRATE AND SODIUM PHOSPHATE, DIBASIC, ANHYDROUS 15 MMOL: 142; 276 INJECTION, SOLUTION INTRAVENOUS at 11:34

## 2025-03-12 RX ADMIN — GUAIFENESIN 600 MG: 600 TABLET ORAL at 08:33

## 2025-03-12 RX ADMIN — CLOTRIMAZOLE: 10 CREAM TOPICAL at 21:32

## 2025-03-12 RX ADMIN — Medication 4 ML: at 12:09

## 2025-03-12 RX ADMIN — Medication 2 PUFF: at 09:00

## 2025-03-12 ASSESSMENT — PAIN SCALES - GENERAL
PAINLEVEL_OUTOF10: 0

## 2025-03-12 NOTE — PROGRESS NOTES
NAME:  Yury Levin  YOB: 1950  MEDICAL RECORD NUMBER:  [id number]    Shift Summary: VSS throughout shift. Tolerating 2L NC. Up to chair with PT/OT in morning and stayed in chair all day. Patient alert and oriented to self and place. Discharge order received but patient will not be able to discharge until tomorrow - see case management note.     Family updated: Yes:  wife, Marilyn, at bedside    Rhythm: Normal Sinus Rhythm  w/ PACs vs AFib    Most recent vitals:   Visit Vitals  /60   Pulse 96   Temp 98.2 °F (36.8 °C) (Oral)   Resp 16   Ht 1.753 m (5' 9.02\")   Wt 67 kg (147 lb 11.3 oz)   SpO2 97%   BMI 21.80 kg/m²           No data found.    No data found.      Respiratory support needed (if any):  - O2 - NC - 2 lpm    Admission weight Weight - Scale: 72.2 kg (159 lb 1.6 oz) (03/02/25 2143)    Today's weight    Wt Readings from Last 1 Encounters:   03/12/25 67 kg (147 lb 11.3 oz)        Pena need assessed each shift: N/A - no pena present  UOP >30ml/hr: YES  Last documented BM (in last 48 hrs):  Patient Vitals for the past 48 hrs:   Last BM (including prior to admit) Stool Occurrence   03/10/25 2000 03/09/25 --   03/11/25 0000 03/11/25 1   03/11/25 0500 03/11/25 1   03/11/25 0800 03/11/25 --   03/11/25 1211 03/11/25 1   03/11/25 1620 -- 1   03/11/25 1651 03/11/25 1   03/11/25 2000 03/11/25 --   03/11/25 2300 03/11/25 1   03/12/25 0818 03/11/25 --                Restraints (in use currently or dc'd in last 12 hrs): No    Order current and documentation up to date? N/a    Lines/Drains reviewed @ bedside.  Implantable Port 08/29/24 Right Chest (Active)   Number of days: 195         Drip rates at handoff:    sodium chloride         Lab Data:   CBC:   Recent Labs     03/11/25  0455 03/12/25  0500   WBC 7.2 6.5   HGB 8.3* 8.0*   HCT 25.0* 24.8*   MCV 96.8 96.6    153     BMP:    Recent Labs     03/11/25  0455 03/12/25  0500    141   K 3.9 3.7   CO2 33* 33*   BUN 40* 34*   CREATININE 0.8  0.8     ABG: No results for input(s): \"PHART\", \"OIB6XXS\", \"PO2ART\" in the last 72 hours.    Any consults during the shift? No    Any signed and held orders to be released?  No        4 Eyes Skin Assessment       The patient is being assessed for  Shift Handoff    I agree that at least one RN has performed a thorough Head to Toe Skin Assessment on the patient. ALL assessment sites listed below have been assessed.      Areas assessed by both nurses: Head, Face, Ears, Shoulders, Back, Chest, Arms, Elbows, Hands, Sacrum. Buttock, Coccyx, Ischium, Legs. Feet and Heels, and Under Medical Devices         Does the Patient have a Wound? Yes wound(s) were present on assessment. LDA wound assessment was Initiated and completed by RN    Wound Care Orders initiated by RN: Yes       Jaime Prevention initiated by RN: Yes    Pressure Injury (Stage 3,4, Unstageable, DTI, NWPT, and Complex wounds) if present, place Wound referral order by RN under : Yes    New Ostomies, if present place, Ostomy referral order under : No     Nurse 1 eSignature: Electronically signed by Joyce Richardson RN on 3/12/25 at 6:58 PM EDT    **SHARE this note so that the co-signing nurse can place an eSignature**    Nurse 2 eSignature: Electronically signed by Abe Mcneal RN on 3/13/25 at 7:24 AM EDT

## 2025-03-12 NOTE — CARE COORDINATION
Received call from Anamika fernández/ [facility] #[phone] - she has received auth for skilled care BUT authorization is dated for 3/13/25 and is unable to change this start date to today (she has messaged the insurance co and is still unable to change auth date) she will anticipate admission tomorrow 3/13/2025  Electronically signed by Abby Garcia on 3/12/2025 at 12:36 PM  #[phone]

## 2025-03-12 NOTE — PROGRESS NOTES
NAME:  Yury Levin  YOB: 1950  MEDICAL RECORD NUMBER:  [id number]    Shift Summary: Rested intermittently. Respirations easy at rest on 2lnc; remains HERRERA. Tolerated pm care without complaints.     Family updated: No    Rhythm: Normal Sinus Rhythm  with     Most recent vitals:   Visit Vitals  /66   Pulse 88   Temp 98.7 °F (37.1 °C) (Oral)   Resp 22   Ht 1.753 m (5' 9.02\")   Wt 67 kg (147 lb 11.3 oz)   SpO2 98%   BMI 21.80 kg/m²           No data found.    No data found.      Respiratory support needed (if any):  - O2 - NC - 2 lpm    Admission weight Weight - Scale: 72.2 kg (159 lb 1.6 oz) (03/02/25 2143)    Today's weight    Wt Readings from Last 1 Encounters:   03/12/25 67 kg (147 lb 11.3 oz)        Pena need assessed each shift: N/A - no pena present  UOP >30ml/hr: YES  Last documented BM (in last 48 hrs):  Patient Vitals for the past 48 hrs:   Last BM (including prior to admit) Stool Occurrence   03/10/25 0748 03/09/25 --   03/10/25 2000 03/09/25 --   03/11/25 0000 03/11/25 1   03/11/25 0500 03/11/25 1   03/11/25 0800 03/11/25 --   03/11/25 1211 03/11/25 1   03/11/25 1620 -- 1   03/11/25 1651 03/11/25 1   03/11/25 2000 03/11/25 --   03/11/25 2300 03/11/25 1                Restraints (in use currently or dc'd in last 12 hrs): no    Order current and documentation up to date? N/a    Lines/Drains reviewed @ bedside.  Implantable Port 08/29/24 Right Chest (Active)   Number of days: 194         Drip rates at handoff:    sodium chloride         Lab Data:   CBC:   Recent Labs     03/11/25  0455 03/12/25  0500   WBC 7.2 6.5   HGB 8.3* 8.0*   HCT 25.0* 24.8*   MCV 96.8 96.6    153     BMP:    Recent Labs     03/11/25  0455 03/12/25  0500    141   K 3.9 3.7   CO2 33* 33*   BUN 40* 34*   CREATININE 0.8 0.8     ABG: No results for input(s): \"PHART\", \"DLO9FZP\", \"PO2ART\" in the last 72 hours.    Any consults during the shift? No    Any signed and held orders to be released?  No        4  Routing to DR. Jayden sandra'd up  Please see patient's mychart message below    Silverio Paz RN

## 2025-03-12 NOTE — PLAN OF CARE
Problem: Chronic Conditions and Co-morbidities  Goal: Patient's chronic conditions and co-morbidity symptoms are monitored and maintained or improved  3/12/2025 1219 by Joyce Richardson RN  Outcome: Progressing  Flowsheets (Taken 3/12/2025 0818)  Care Plan - Patient's Chronic Conditions and Co-Morbidity Symptoms are Monitored and Maintained or Improved:   Monitor and assess patient's chronic conditions and comorbid symptoms for stability, deterioration, or improvement   Collaborate with multidisciplinary team to address chronic and comorbid conditions and prevent exacerbation or deterioration   Update acute care plan with appropriate goals if chronic or comorbid symptoms are exacerbated and prevent overall improvement and discharge     Problem: Discharge Planning  Goal: Discharge to home or other facility with appropriate resources  Outcome: Progressing  Flowsheets (Taken 3/12/2025 0818)  Discharge to home or other facility with appropriate resources:   Identify barriers to discharge with patient and caregiver   Arrange for needed discharge resources and transportation as appropriate   Identify discharge learning needs (meds, wound care, etc)   Refer to discharge planning if patient needs post-hospital services based on physician order or complex needs related to functional status, cognitive ability or social support system     Problem: Pain  Goal: Verbalizes/displays adequate comfort level or baseline comfort level  3/12/2025 1219 by Joyce Richardson, RN  Outcome: Progressing  Flowsheets (Taken 3/12/2025 0818)  Verbalizes/displays adequate comfort level or baseline comfort level:   Encourage patient to monitor pain and request assistance   Assess pain using appropriate pain scale     Problem: Skin/Tissue Integrity  Goal: Skin integrity remains intact  Description: 1.  Monitor for areas of redness and/or skin breakdown  2.  Assess vascular access sites hourly  3.  Every 4-6 hours minimum:  Change oxygen  changes in respiratory status   Assess for changes in mentation and behavior   Position to facilitate oxygenation and minimize respiratory effort   Oxygen supplementation based on oxygen saturation or arterial blood gases   Respiratory therapy support as indicated     Problem: Cardiovascular - Adult  Goal: Maintains optimal cardiac output and hemodynamic stability  3/12/2025 1219 by Joyce Richardson RN  Outcome: Progressing  Flowsheets (Taken 3/12/2025 0818)  Maintains optimal cardiac output and hemodynamic stability:   Monitor blood pressure and heart rate   Monitor urine output and notify Licensed Independent Practitioner for values outside of normal range   Assess for signs of decreased cardiac output   Administer fluid and/or volume expanders as ordered   Administer vasoactive medications as ordered     Problem: Skin/Tissue Integrity - Adult  Goal: Skin integrity remains intact  Description: 1.  Monitor for areas of redness and/or skin breakdown  2.  Assess vascular access sites hourly  3.  Every 4-6 hours minimum:  Change oxygen saturation probe site  4.  Every 4-6 hours:  If on nasal continuous positive airway pressure, respiratory therapy assess nares and determine need for appliance change or resting period  3/12/2025 1219 by Joyce Richardson RN  Outcome: Progressing  Flowsheets (Taken 3/12/2025 0818)  Skin Integrity Remains Intact: Monitor for areas of redness and/or skin breakdown     Problem: Musculoskeletal - Adult  Goal: Return mobility to safest level of function  3/12/2025 1219 by Joyce Richardson RN  Outcome: Progressing  Flowsheets (Taken 3/12/2025 0818)  Return Mobility to Safest Level of Function: Assess patient stability and activity tolerance for standing, transferring and ambulating with or without assistive devices     Problem: Genitourinary - Adult  Goal: Absence of urinary retention  3/12/2025 1219 by Joyce Richardson RN  Outcome: Progressing  Flowsheets (Taken 3/12/2025

## 2025-03-12 NOTE — PROGRESS NOTES
Occupational Therapy  Facility/Department: [address] ICU  Occupational Therapy Treatment (COTX) and Tentative D/C    [x]co-treatment indicated; patient seen for co-treatment due to: patient safety, patient endurance, and need for the assistance of 2 skilled therapists.    PT addressing: [x] Sitting balance/LE WB, [x] Standing balance/LE WB, [x]Transfer training, [] BLE strength/endurance with functional tasks,  [] Other:  OT addressing: [x]ADL's, [] Pericare,  []clothing management, [x] BU E strength/endurance with functional tasks,  [] UE WB [x] Other: transfers      Name: Yury Levin  : 1950  MRN: [id number]  Date of Service: 3/12/2025    Discharge Recommendations:  3-5 sessions per week, Patient would benefit from continued therapy after discharge  OT Equipment Recommendations  Equipment Needed: No       Patient Diagnosis(es): The primary encounter diagnosis was Influenza A. Diagnoses of COVID-19, Altered mental status, unspecified altered mental status type, Hypoxemia, Multifocal pneumonia, and Shortness of breath were also pertinent to this visit.  Past Medical History:  has a past medical history of CAD (coronary artery disease), Cancer of prostate (HCC), Chest pain, CHF (congestive heart failure) (HCC), and Unstable angina pectoris (HCC).  Past Surgical History:  has a past surgical history that includes Coronary artery bypass graft (2018); Skin cancer excision; IR PORT PLACEMENT > 5 YEARS (Right, 2023); IR PORT PLACEMENT > 5 YEARS (2023); Colonoscopy; left atrial appendage occluder device (2023); and Cystoscopy (Right, 2024).           Assessment  Performance deficits / Impairments: Decreased functional mobility ;Decreased strength;Decreased endurance;Decreased ADL status;Decreased balance;Decreased high-level IADLs  Assessment: Pt tolerated session fair. Pt continues to be limited by ongoing weakness and fatigue. Pt completes bed mobility with Mod Ax2. Pt completes

## 2025-03-12 NOTE — PROGRESS NOTES
Physical Therapy  Facility/Department: [address] ICU  Physical Therapy Treatment Note    Name: Yury Levin  : 1950  MRN: [id number]  Date of Service: 3/12/2025    Discharge Recommendations:  Continue to assess pending progress, Subacute/Skilled Nursing Facility   PT Equipment Recommendations  Other: Defer to next level of care.      Yury Levin scored a  on the AM-PAC short mobility form. Current research shows that an AM-PAC score of 17 or less is typically not associated with a discharge to the patient's home setting. Based on the patient's AM-PAC score and their current functional mobility deficits, it is recommended that the patient have 3-5 sessions per week of Physical Therapy at d/c to increase the patient's independence.  Please see assessment section for further patient specific details.    If patient discharges prior to next session this note will serve as a discharge summary.  Please see below for the latest assessment towards goals.      Assessment  Body Structures, Functions, Activity Limitations Requiring Skilled Therapeutic Intervention: Decreased functional mobility ;Decreased strength;Decreased safe awareness;Decreased cognition;Decreased endurance;Decreased balance  Assessment: 73 y/o male admit 3/2/2025 with Acute Respiratory, Pneumonia, Sepsis, Influenza + and Recent COVID +. PMH as noted including CAD, NSTEMI, CABG (2018), CHF, Prostate Ca.   PTA pt living with wife in multi level home with few steps to enter and bed/bath on 2nd floor.  Pt reports independent daily care and functional mobility (with Walker).  Currently, pt does appear somewhat confused; requiring 2L O2 (increase briefly to 5L due to desat).  Pt requiring Mod assist x 2  to eob,  Transfers completed from eob, recliner via Stedy Min/Mod assist x 2 (seat height depend, fatigue related), very effortful to upright stand.  Pt able to maintain very brief static stand ~15 sec during use of Stedy; attempt lat wgt shift

## 2025-03-12 NOTE — PLAN OF CARE
Problem: Chronic Conditions and Co-morbidities  Goal: Patient's chronic conditions and co-morbidity symptoms are monitored and maintained or improved  Outcome: Progressing  Flowsheets (Taken 3/11/2025 0800 by Beatriz Jones, RN)  Care Plan - Patient's Chronic Conditions and Co-Morbidity Symptoms are Monitored and Maintained or Improved: Monitor and assess patient's chronic conditions and comorbid symptoms for stability, deterioration, or improvement     Problem: Pain  Goal: Verbalizes/displays adequate comfort level or baseline comfort level  Outcome: Progressing  Flowsheets (Taken 3/7/2025 2000 by Ruth Burrell, RN)  Verbalizes/displays adequate comfort level or baseline comfort level:   Encourage patient to monitor pain and request assistance   Assess pain using appropriate pain scale   Administer analgesics based on type and severity of pain and evaluate response   Implement non-pharmacological measures as appropriate and evaluate response     Problem: Skin/Tissue Integrity  Goal: Skin integrity remains intact  Description: 1.  Monitor for areas of redness and/or skin breakdown  2.  Assess vascular access sites hourly  3.  Every 4-6 hours minimum:  Change oxygen saturation probe site  4.  Every 4-6 hours:  If on nasal continuous positive airway pressure, respiratory therapy assess nares and determine need for appliance change or resting period  Outcome: Progressing  Flowsheets (Taken 3/11/2025 0800 by Beatriz Jones, RN)  Skin Integrity Remains Intact: Monitor for areas of redness and/or skin breakdown     Problem: Safety - Adult  Goal: Free from fall injury  Outcome: Progressing  Flowsheets (Taken 3/7/2025 1038 by Joyce Richardson, RN)  Free From Fall Injury:   Instruct family/caregiver on patient safety   Based on caregiver fall risk screen, instruct family/caregiver to ask for assistance with transferring infant if caregiver noted to have fall risk factors     Problem: Nutrition Deficit:  Goal: Optimize  needed   Identify factors contributing to decreased intake, treat as appropriate   Monitor percentage of each meal consumed   Monitor intake and output, weight and lab values

## 2025-03-12 NOTE — DISCHARGE SUMMARY
V2.0  Discharge Summary    Name:  Yury Levin /Age/Sex: 1950 (74 y.o. male)   Admit Date: 3/2/2025  Discharge Date: 3/13/25    MRN & CSN:  [id number] & [id number] Encounter Date and Time 3/12/25 8:56 AM EDT    Attending:  José Luis Stafford DO Discharging Provider: José Luis Stafford DO       Hospital Course:     Brief HPI: Yury Levin is a 74 y.o. male with a pertinent PMHx of CAD, prostate cancer, CHF who presented complaining of shortness of breath.  He was admitted for acute respiratory failure with hypoxia.  Was found to be influenza A positive and imaging was also concerning for multifocal pneumonia.    Brief Problem Based Course:     Multifocal pneumonia  Influenza A infection  Acute respiratory failure with hypoxia  -Completed course of Tamiflu and 7-day course of antibiotics  -Continue to wean supplemental oxygen as tolerated currently down to 2 L via nasal cannula    Metastatic prostate cancer  -Wants to continue with care, on Orgovyx  -Outpatient follow-up with oncology    Anemia of chronic disease  -S/p Retacrit    Atrial fibrillation  -S/p watchman not currently on anticoagulation  -Continue metoprolol    The patient expressed appropriate understanding of, and agreement with the discharge recommendations, medications, and plan.     Consults this admission:  IP CONSULT TO PULMONOLOGY  IP CONSULT TO HEM/ONC    Discharge Diagnosis:   Acute respiratory failure with hypoxia (HCC)  Influenza A infection  Multifocal pneumonia    Discharge Instruction:   Follow up appointments: PCP, oncology  Primary care physician: Diamond Robles MD within 1 week  Diet: regular diet   Activity: activity as tolerated  Disposition: Discharged to:   [x]Home, []C, []SNF, []Acute Rehab, []Hospice   Condition on discharge: Stable  Labs and Tests to be Followed up as an outpatient by PCP or Specialist:     Discharge Medications:        Medication List        START taking these medications      clotrimazole 1 %

## 2025-03-12 NOTE — DISCHARGE INSTR - COC
Continuity of Care Form    Patient Name: Yury Levin   :  1950  MRN:  [id number]    Admit date:  3/2/2025  Discharge date:  3/13/2025    Code Status Order: Full Code   Advance Directives:     Admitting Physician:  Tavo Macias MD  PCP: Diamond Robles MD    Discharging Nurse: Taryn UMAÑA RN  Discharging Hospital Unit/Room#: L8H-5526/2114-01  Discharging Unit Phone Number: [phone]    Emergency Contact:   Extended Emergency Contact Information  Primary Emergency Contact: Marilyn Levin  Address: [address] [place] [address]  Home Phone: [phone]  Mobile Phone: [phone]  Relation: Spouse  Secondary Emergency Contact: Alejandro Levin  Home Phone: [phone]  Relation: Child    Past Surgical History:  Past Surgical History:   Procedure Laterality Date    COLONOSCOPY      CORONARY ARTERY BYPASS GRAFT  2018    cabgx4 ([place])    CYSTOSCOPY Right 2024    CYSTOSCOPY RIGHT DIAGNOSTIC URETEROSCOPY, REMOVAL RIGHT RENAL PELVIS CLOT, RIGHT STENT EXHCANGE performed by Gary Gracia MD at [facility] OR    IR PORT PLACEMENT > 5 YEARS Right 2023    Power Port; RIJ access; 24cm; Dr. Jones    IR PORT PLACEMENT > 5 YEARS  2023    IR PORT PLACEMENT EQUAL OR GREATER THAN 5 YEARS 1/3/2023 [facility] SPECIAL PROCEDURES    LEFT ATRIAL APPENDAGE OCCLUDER DEVICE  2023    21 mm    SKIN CANCER EXCISION         Immunization History:   Immunization History   Administered Date(s) Administered    COVID-19, PFIZER PURPLE top, DILUTE for use, (age 12 y+), 30mcg/0.3mL 02/10/2021, 2021, 2021    Influenza, FLUAD, (age 65 y+), IM, Quadv, 0.5mL 10/02/2020, 2021, 2022    Influenza, FLUZONE High Dose, (age 65 y+), IM, Trivalent PF, 0.5mL 2020    Pneumococcal, PCV-13, PREVNAR 13, (age 6w+), IM, 0.5mL 10/02/2020    Pneumococcal, PPSV23, PNEUMOVAX 23, (age 2y+), SC/IM, 0.5mL 2021    TDaP, ADACEL (age 10y-64y), BOOSTRIX (age 10y+), IM,  breathable, moisture-wicking socks.  Change socks frequently, especially after sweating  Buttock area- Stage 3 to L and R buttock. Healing well. Currently using Triad barrier. If unavailable, can use calmoseptine twice daily. Cleanse skin with a Ph balanced cleanser, barrier wipes for manuela care.      Elimination:  Continence:   Bowel: No  Bladder: No - using external catheter  Urinary Catheter: None   Colostomy/Ileostomy/Ileal Conduit: No       Date of Last BM: 3/13/2025    Intake/Output Summary (Last 24 hours) at 3/13/2025 1156  Last data filed at 3/13/2025 0912  Gross per 24 hour   Intake 1230.21 ml   Output 1250 ml   Net -19.79 ml     I/O last 3 completed shifts:  In: 1550.2 [P.O.:1260; I.V.:40; IV Piggyback:250.2]  Out: 2050 [Urine:2050]    Safety Concerns:     At Risk for Falls  Chemo precautions    Impairments/Disabilities:      Vision - has glasses    Nutrition Therapy:  Current Nutrition Therapy:   - Oral Diet:  Easy to Chew and Low Sodium (2gm)  - Oral Nutrition Supplement:  Standard  three times a day and Wound Healing  twice a day    Routes of Feeding: Oral  Liquids: Thin Liquids  Daily Fluid Restriction: no  Last Modified Barium Swallow with Video (Video Swallowing Test): not done    Treatments at the Time of Hospital Discharge:   Respiratory Treatments: albuterol nebulizer 2.5 mg three times daily and PRN q4hr for wheezing; symbicort 2 puffs two times per day; sodium chloride 3% nebulizer 4 mL three times daily  Oxygen Therapy:  is on oxygen at 2 L/min per nasal cannula.  Ventilator:    - No ventilator support    Rehab Therapies: Physical Therapy and Occupational Therapy  Weight Bearing Status/Restrictions: No weight bearing restrictions  Other Medical Equipment (for information only, NOT a DME order):  angelica Cain min/mod assist x2   Other Treatments: would recommend cont PT (3-5) upon d/c     Patient's personal belongings (please select all that are sent with patient):  Gautam schuler RN

## 2025-03-13 VITALS
HEART RATE: 96 BPM | RESPIRATION RATE: 25 BRPM | SYSTOLIC BLOOD PRESSURE: 105 MMHG | BODY MASS INDEX: 22.17 KG/M2 | HEIGHT: 69 IN | WEIGHT: 149.69 LBS | DIASTOLIC BLOOD PRESSURE: 58 MMHG | OXYGEN SATURATION: 100 % | TEMPERATURE: 98.5 F

## 2025-03-13 LAB
ANION GAP SERPL CALCULATED.3IONS-SCNC: 7 MMOL/L (ref 3–16)
BASOPHILS # BLD: 0 K/UL (ref 0–0.2)
BASOPHILS NFR BLD: 0.3 %
BUN SERPL-MCNC: 33 MG/DL (ref 7–20)
CALCIUM SERPL-MCNC: 8.5 MG/DL (ref 8.3–10.6)
CHLORIDE SERPL-SCNC: 103 MMOL/L (ref 99–110)
CO2 SERPL-SCNC: 32 MMOL/L (ref 21–32)
CREAT SERPL-MCNC: 0.8 MG/DL (ref 0.8–1.3)
DEPRECATED RDW RBC AUTO: 18.3 % (ref 12.4–15.4)
EOSINOPHIL # BLD: 0.1 K/UL (ref 0–0.6)
EOSINOPHIL NFR BLD: 1.1 %
GFR SERPLBLD CREATININE-BSD FMLA CKD-EPI: >90 ML/MIN/{1.73_M2}
GLUCOSE SERPL-MCNC: 112 MG/DL (ref 70–99)
HCT VFR BLD AUTO: 23.9 % (ref 40.5–52.5)
HGB BLD-MCNC: 7.9 G/DL (ref 13.5–17.5)
LYMPHOCYTES # BLD: 0.5 K/UL (ref 1–5.1)
LYMPHOCYTES NFR BLD: 8.3 %
MAGNESIUM SERPL-MCNC: 2.03 MG/DL (ref 1.8–2.4)
MCH RBC QN AUTO: 31.5 PG (ref 26–34)
MCHC RBC AUTO-ENTMCNC: 33.1 G/DL (ref 31–36)
MCV RBC AUTO: 95.4 FL (ref 80–100)
MONOCYTES # BLD: 0.2 K/UL (ref 0–1.3)
MONOCYTES NFR BLD: 3.7 %
NEUTROPHILS # BLD: 4.9 K/UL (ref 1.7–7.7)
NEUTROPHILS NFR BLD: 86.6 %
PHOSPHATE SERPL-MCNC: 2.5 MG/DL (ref 2.5–4.9)
PLATELET # BLD AUTO: 152 K/UL (ref 135–450)
PMV BLD AUTO: 9.3 FL (ref 5–10.5)
POTASSIUM SERPL-SCNC: 3.8 MMOL/L (ref 3.5–5.1)
RBC # BLD AUTO: 2.5 M/UL (ref 4.2–5.9)
SODIUM SERPL-SCNC: 142 MMOL/L (ref 136–145)
WBC # BLD AUTO: 5.7 K/UL (ref 4–11)

## 2025-03-13 PROCEDURE — 6360000002 HC RX W HCPCS: Performed by: INTERNAL MEDICINE

## 2025-03-13 PROCEDURE — 36591 DRAW BLOOD OFF VENOUS DEVICE: CPT

## 2025-03-13 PROCEDURE — 94761 N-INVAS EAR/PLS OXIMETRY MLT: CPT

## 2025-03-13 PROCEDURE — 97530 THERAPEUTIC ACTIVITIES: CPT

## 2025-03-13 PROCEDURE — 2500000003 HC RX 250 WO HCPCS: Performed by: STUDENT IN AN ORGANIZED HEALTH CARE EDUCATION/TRAINING PROGRAM

## 2025-03-13 PROCEDURE — 85025 COMPLETE CBC W/AUTO DIFF WBC: CPT

## 2025-03-13 PROCEDURE — 94669 MECHANICAL CHEST WALL OSCILL: CPT

## 2025-03-13 PROCEDURE — 6370000000 HC RX 637 (ALT 250 FOR IP): Performed by: NURSE PRACTITIONER

## 2025-03-13 PROCEDURE — 80048 BASIC METABOLIC PNL TOTAL CA: CPT

## 2025-03-13 PROCEDURE — 6370000000 HC RX 637 (ALT 250 FOR IP): Performed by: INTERNAL MEDICINE

## 2025-03-13 PROCEDURE — 83735 ASSAY OF MAGNESIUM: CPT

## 2025-03-13 PROCEDURE — 84100 ASSAY OF PHOSPHORUS: CPT

## 2025-03-13 PROCEDURE — 2700000000 HC OXYGEN THERAPY PER DAY

## 2025-03-13 PROCEDURE — 2580000003 HC RX 258: Performed by: STUDENT IN AN ORGANIZED HEALTH CARE EDUCATION/TRAINING PROGRAM

## 2025-03-13 PROCEDURE — 2500000003 HC RX 250 WO HCPCS: Performed by: NURSE PRACTITIONER

## 2025-03-13 PROCEDURE — 94640 AIRWAY INHALATION TREATMENT: CPT

## 2025-03-13 RX ADMIN — CLOTRIMAZOLE: 10 CREAM TOPICAL at 08:53

## 2025-03-13 RX ADMIN — Medication 4 ML: at 08:13

## 2025-03-13 RX ADMIN — EZETIMIBE 10 MG: 10 TABLET ORAL at 08:40

## 2025-03-13 RX ADMIN — ALBUTEROL SULFATE 2.5 MG: 2.5 SOLUTION RESPIRATORY (INHALATION) at 08:13

## 2025-03-13 RX ADMIN — METOPROLOL SUCCINATE 25 MG: 25 TABLET, EXTENDED RELEASE ORAL at 08:40

## 2025-03-13 RX ADMIN — GUAIFENESIN 600 MG: 600 TABLET ORAL at 08:40

## 2025-03-13 RX ADMIN — SODIUM CHLORIDE, PRESERVATIVE FREE 10 ML: 5 INJECTION INTRAVENOUS at 09:12

## 2025-03-13 RX ADMIN — Medication 2 PUFF: at 08:12

## 2025-03-13 RX ADMIN — ENOXAPARIN SODIUM 40 MG: 100 INJECTION SUBCUTANEOUS at 08:40

## 2025-03-13 RX ADMIN — SODIUM PHOSPHATE, MONOBASIC, MONOHYDRATE AND SODIUM PHOSPHATE, DIBASIC, ANHYDROUS 15 MMOL: 142; 276 INJECTION, SOLUTION INTRAVENOUS at 09:07

## 2025-03-13 ASSESSMENT — PAIN SCALES - GENERAL: PAINLEVEL_OUTOF10: 0

## 2025-03-13 NOTE — DISCHARGE INSTR - DIET

## 2025-03-13 NOTE — PROGRESS NOTES
V2.0    [facility] Progress Note      Name:  Yury Levin /Age/Sex: 1950  (74 y.o. male)   MRN & CSN:  [id number] & [id number] Encounter Date/Time: 3/13/2025 7:32 AM EDT   Location:  [id number] PCP: Diamond Robles MD     Attending:José Luis Stafford DO       Hospital Day: 12  Brief Hospital Course  Yury Levin is a 74 y.o. male with pertinent PMHx of CAD, prostate cancer, CHF who presented complaining of shortness of breath.  He was admitted for acute respiratory failure with hypoxia.  He was found to be influenza A positive and imaging was also concerning for multifocal pneumonia.  Assessment & Plan     Multifocal pneumonia, resolved  Influenza A infection, resolved  Acute respiratory failure with hypoxia, improving  -Completed course of Tamiflu and 7-day course of antibiotics  -Continue to wean supplemental oxygen as tolerated currently maintaining saturations on 2 L via nasal cannula    Metastatic prostate cancer  -Continue Orgovyx  -Outpatient follow-up with Oncology    Anemia of chronic disease  -S/p Retacrit    Atrial fibrillation  -S/p watchman not currently on anticoagulation  -Continue metoprolol    Diet ADULT DIET; Easy to Chew; Low Sodium (2 gm)  ADULT ORAL NUTRITION SUPPLEMENT; Breakfast, Dinner; Wound Healing Oral Supplement  ADULT ORAL NUTRITION SUPPLEMENT; Breakfast, Lunch, Dinner; Standard High Calorie/High Protein Oral Supplement   DVT Prophylaxis [x] Lovenox, []  Heparin, [] SCDs, [] Ambulation,  [] Eliquis, [] Xarelto  [] Coumadin   Code Status Full Code   Disposition From: Home  Expected Disposition: SNF  Estimated Date of Discharge: Medically stable for discharge awaiting placement           Subjective:     Chief Complaint: Shortness of breath    Patient was seen and examined today sitting up in chair in room with family at bedside  Says he is feeling okay, denies any chest pain shortness of breath, or cough  Denies any abdominal pain or dysuria    Review of Systems:   None Seen 03/03/2025 04:10 AM    CLARITYU CLOUDY 03/03/2025 04:10 AM    SPECGRAV >=1.030 10/22/2018 04:11 PM    LEUKOCYTESUR MODERATE 03/03/2025 04:10 AM    UROBILINOGEN 0.2 03/03/2025 04:10 AM    BILIRUBINUR Negative 03/03/2025 04:10 AM    BLOODU LARGE 03/03/2025 04:10 AM    GLUCOSEU Negative 03/03/2025 04:10 AM    KETUA Negative 03/03/2025 04:10 AM     Urine Cultures:   Lab Results   Component Value Date/Time    LABURIN No growth at 18 to 36 hours 03/03/2025 04:05 AM     Blood Cultures:   Lab Results   Component Value Date/Time    BC No Growth after 4 days of incubation. 03/02/2025 08:42 PM     Lab Results   Component Value Date/Time    BLOODCULT2 No Growth after 4 days of incubation. 03/02/2025 11:47 PM     Organism:   Lab Results   Component Value Date/Time    ORG Influenza A H3 detected by PCR 03/03/2025 11:10 AM         Electronically signed by José Luis Stafford DO on 3/13/2025 at 7:32 AM

## 2025-03-13 NOTE — CARE COORDINATION
Case Management Discharge Note          Date / Time of Note: 3/13/2025 9:34 AM                  Patient Name: Yury Levin   YOB: 1950  Diagnosis: Hypoxemia [R09.02]  Influenza A [J10.1]  Acute respiratory failure with hypoxia (HCC) [J96.01]  Altered mental status, unspecified altered mental status type [R41.82]  Multifocal pneumonia [J18.9]  COVID-19 [U07.1]   Date / Time: 3/2/2025  9:38 PM    Financial:  Payor: [employer] / Plan: UNITED HEALTHCARE - CHOICE PLUS / Product Type: *No Product type* /      Pharmacy:    [facility] [id number] [place], [place] - [address] [facility] [phone] -  [phone]  [address] [facility] [address]  Phone: [phone] Fax: [phone]      Assistance purchasing medications?: Potential Assistance Purchasing Medications: No  Assistance provided by Case Management: None at this time    DISCHARGE Disposition: Skilled Nursing Facility (SNF)    Nursing Facility:   Discharging to Facility/ Agency   Name: [facility]  Address:  [address] [place] [address]   Phone:  [phone]  Fax:  [phone]      LOC at discharge: Skilled Nursing  KING Completed: Yes             NURSING REPORT NUMBER: [phone]               NURSING FAX NUMBER: [phone]    Notification completed in HENS/PAS?:  Yes : CM has completed HENS online through secure website for SNF admission at [facility].   Document ID #:      Transportation:  Transportation PLAN for discharge: EMS transportation   Mode of Transport: Ambulance stretcher - BLS  Reason for medical transport: Third party assistance/ attendant required to apply, administer or regulate oxygen during transport  Name of Transport Company: [email or web address]  Phone: [phone]  Time of Transport: 12:00 PM    Transport form completed: Yes    IMM Completed:   Not Indicated    Additional CM Notes: Discharging to

## 2025-03-13 NOTE — PLAN OF CARE
Problem: ABCDS Injury Assessment  Goal: Absence of physical injury  Outcome: Progressing  Flowsheets (Taken 3/12/2025 1219 by Joyce Richardson, RN)  Absence of Physical Injury: Implement safety measures based on patient assessment     Problem: Nutrition Deficit:  Goal: Optimize nutritional status  Outcome: Progressing  Flowsheets (Taken 3/12/2025 1219 by Joyce Richardson, RN)  Nutrient intake appropriate for improving, restoring, or maintaining nutritional needs:   Assess nutritional status and recommend course of action   Monitor oral intake, labs, and treatment plans   Recommend appropriate diets, oral nutritional supplements, and vitamin/mineral supplements     Problem: Metabolic/Fluid and Electrolytes - Adult  Goal: Electrolytes maintained within normal limits  Outcome: Progressing

## 2025-03-13 NOTE — CARE COORDINATION
Requested by bedside RN to assess patient bilateral feet for new redness. Pt sitting up in chair, unable to assess buttock area for follow-up, however, bedside RN reports buttock wounds improving and are smaller. Continuing to use Triad.   -Upon assessment, appears pt with tinea pedis. Already has Lotrimin cream ordered. Would recommend iodine intermittently, continue lotrimin cream upon d/c. Standard precautions as this is contagious.   Pt discharging today at noon. Will update KING.                       Electronically signed by Luna Garner RN CWOCN on 3/13/2025 at 10:20 AM

## 2025-03-13 NOTE — PROGRESS NOTES
NAME:  Yury Levin  YOB: 1950  MEDICAL RECORD NUMBER:  [id number]    Shift Summary: Returned to bed with Stedy; required 2 mod/max assist. Reports resting well overnight.SpO2 stable on 2l nasal canula.Skin tear dressings changed with pm care, still with some bleeding. In and out of controlled a fib/NSR    Family updated: No    Rhythm: Normal Sinus Rhythm /CAF    Most recent vitals:   Visit Vitals  BP (!) 94/50   Pulse 88   Temp 98.5 °F (36.9 °C) (Oral)   Resp 20   Ht 1.753 m (5' 9.02\")   Wt 67.9 kg (149 lb 11.1 oz)   SpO2 99%   BMI 22.10 kg/m²           No data found.    No data found.      Respiratory support needed (if any):  - O2 - NC - 2 lpm    Admission weight Weight - Scale: 72.2 kg (159 lb 1.6 oz) (03/02/25 2143)    Today's weight    Wt Readings from Last 1 Encounters:   03/13/25 67.9 kg (149 lb 11.1 oz)        Pena need assessed each shift: N/A - no pena present  UOP >30ml/hr: YES  Last documented BM (in last 48 hrs):  Patient Vitals for the past 48 hrs:   Last BM (including prior to admit) Stool Occurrence   03/11/25 0800 03/11/25 --   03/11/25 1211 03/11/25 1   03/11/25 1620 -- 1   03/11/25 1651 03/11/25 1   03/11/25 2000 03/11/25 --   03/11/25 2300 03/11/25 1   03/12/25 0818 03/11/25 --   03/12/25 2000 03/11/25 --                Restraints (in use currently or dc'd in last 12 hrs): No    Order current and documentation up to date? N/A    Lines/Drains reviewed @ bedside.  Implantable Port 08/29/24 Right Chest (Active)   Number of days: 195         Drip rates at handoff:    sodium chloride         Lab Data:   CBC:   Recent Labs     03/12/25  0500 03/13/25  0540   WBC 6.5 5.7   HGB 8.0* 7.9*   HCT 24.8* 23.9*   MCV 96.6 95.4    152     BMP:    Recent Labs     03/12/25  0500 03/13/25  0540    142   K 3.7 3.8   CO2 33* 32   BUN 34* 33*   CREATININE 0.8 0.8     ABG: No results for input(s): \"PHART\", \"OFA9VWO\", \"PO2ART\" in the last 72 hours.    Any consults during the shift?  parent(s)

## 2025-03-13 NOTE — PROGRESS NOTES
Port-a-cath deaccessed.    Patient discharged with [facility] on 2L NC at 1215. Belongings sent with wife except for his ring and home medication Orgovyx which was sent with patient. Handoff given to Roderick with transport team.

## 2025-03-13 NOTE — PLAN OF CARE
Problem: Chronic Conditions and Co-morbidities  Goal: Patient's chronic conditions and co-morbidity symptoms are monitored and maintained or improved  3/13/2025 1223 by Joyce Richardson RN  Outcome: Completed  3/13/2025 1223 by Joyce Richardson RN  Outcome: Adequate for Discharge  3/13/2025 0256 by Abe Mcneal RN  Outcome: Adequate for Discharge  Flowsheets (Taken 3/12/2025 0818 by Joyce Richardson RN)  Care Plan - Patient's Chronic Conditions and Co-Morbidity Symptoms are Monitored and Maintained or Improved:   Monitor and assess patient's chronic conditions and comorbid symptoms for stability, deterioration, or improvement   Collaborate with multidisciplinary team to address chronic and comorbid conditions and prevent exacerbation or deterioration   Update acute care plan with appropriate goals if chronic or comorbid symptoms are exacerbated and prevent overall improvement and discharge     Problem: Discharge Planning  Goal: Discharge to home or other facility with appropriate resources  3/13/2025 1223 by Joyce Richardson RN  Outcome: Completed  3/13/2025 1223 by Joyce Richardson RN  Outcome: Adequate for Discharge  3/13/2025 0256 by Abe Mcneal RN  Outcome: Adequate for Discharge  Flowsheets (Taken 3/12/2025 0818 by Joyce Richardson RN)  Discharge to home or other facility with appropriate resources:   Identify barriers to discharge with patient and caregiver   Arrange for needed discharge resources and transportation as appropriate   Identify discharge learning needs (meds, wound care, etc)   Refer to discharge planning if patient needs post-hospital services based on physician order or complex needs related to functional status, cognitive ability or social support system     Problem: Pain  Goal: Verbalizes/displays adequate comfort level or baseline comfort level  3/13/2025 1223 by Joyce Richardson RN  Outcome: Completed  3/13/2025 1223 by Joyce Richardson RN  Outcome: Adequate for

## 2025-03-13 NOTE — PROGRESS NOTES
Physical Therapy  Facility/Department: [address] ICU  Physical Therapy Treatment Note    Name: Yury Levin  : 1950  MRN: [id number]  Date of Service: 3/13/2025    Discharge Recommendations:  Continue to assess pending progress, Subacute/Skilled Nursing Facility   PT Equipment Recommendations  Other: Defer to next level of care.      Yury Levin scored a / on the AM-PAC short mobility form. Current research shows that an AM-PAC score of 17 or less is typically not associated with a discharge to the patient's home setting. Based on the patient's AM-PAC score and their current functional mobility deficits, it is recommended that the patient have 3-5 sessions per week of Physical Therapy at d/c to increase the patient's independence.  Please see assessment section for further patient specific details.    If patient discharges prior to next session this note will serve as a discharge summary.  Please see below for the latest assessment towards goals.      Assessment  Body Structures, Functions, Activity Limitations Requiring Skilled Therapeutic Intervention: Decreased functional mobility ;Decreased strength;Decreased safe awareness;Decreased cognition;Decreased endurance;Decreased balance  Assessment: 73 y/o male admit 3/2/2025 with Acute Respiratory, Pneumonia, Sepsis, Influenza + and Recent COVID +. PMH as noted including CAD, NSTEMI, CABG (2018), CHF, Prostate Ca.   PTA pt living with wife in multi level home with few steps to enter and bed/bath on 2nd floor.  Pt reports independent daily care and functional mobility (with Walker).  Currently, pt does appear abit confused at times/needs gentle encouragement.   Currently pt able to complete Transfers completed from eob, recliner via Stedy Mod/Max assist x 2 (seat height depend, fatigue related), very effortful to upright stand.  Pt able to maintain very brief static stand ~30-45 sec during use of Stedy; attempt lat wgt shift although unable to lift  Learning: Cognition  Education Outcome: Continued education needed      Therapy Time   Individual Concurrent Group Co-treatment   Time In 0833         Time Out 0856         Minutes 23                 Daisy Lebron, PT

## 2025-03-13 NOTE — PROGRESS NOTES
Occupational Therapy  Facility/Department: [address] ICU  Occupational Therapy Treatment (COTX) and Tentative D/C    [x]co-treatment indicated; patient seen for co-treatment due to: patient safety, patient endurance, and need for the assistance of 2 skilled therapists.    PT addressing: [x] Sitting balance/LE WB, [x] Standing balance/LE WB, [x]Transfer training, [] BLE strength/endurance with functional tasks,  [] Other:  OT addressing: [x]ADL's, [] Pericare,  []clothing management, [] BU E strength/endurance with functional tasks,  [] UE WB [x] Other: transfers        Name: Yury Levin  : 1950  MRN: [id number]  Date of Service: 3/13/2025    Staff assist recommendation: gonzalo escudero      Discharge Recommendations: Yury Levin scored a 15/24 on the AM-PAC ADL Inpatient form. Current research shows that an AM-PAC score of 17 or less is typically not associated with a discharge to the patient's home setting. Based on the patient's AM-PAC score and their current ADL deficits, it is recommended that the patient have 3-5 sessions per week of Occupational Therapy at d/c to increase the patient's independence.  Please see assessment section for further patient specific details.    If patient discharges prior to next session this note will serve as a discharge summary.  Please see below for the latest assessment towards goals.     3-5 sessions per week, Patient would benefit from continued therapy after discharge  OT Equipment Recommendations  Equipment Needed: No       Patient Diagnosis(es): The primary encounter diagnosis was Influenza A. Diagnoses of COVID-19, Altered mental status, unspecified altered mental status type, Hypoxemia, Multifocal pneumonia, and Shortness of breath were also pertinent to this visit.  Past Medical History:  has a past medical history of CAD (coronary artery disease), Cancer of prostate (HCC), Chest pain, CHF (congestive heart failure) ([facility]), and Unstable angina pectoris (HCC).  Past  needed for toileting (which includes using toilet, bedpan, or urinal)?: A Lot  How much help is needed for putting on and taking off regular upper body clothing?: A Lot  How much help is needed for taking care of personal grooming?: A Little  How much help for eating meals?: None  AM-PAC Inpatient Daily Activity Raw Score: 15  AM-PAC Inpatient ADL T-Scale Score : 34.69  ADL Inpatient CMS 0-100% Score: 56.46  ADL Inpatient CMS G-Code Modifier : CK    Tinneti Score       Goals  Short Term Goals  Time Frame for Short Term Goals: prior to D/C; ongoing 3/13  Short Term Goal 1: complete bed mobility with Min A  Short Term Goal 2: complete transfers with Min A  Short Term Goal 3: complete bathing and dressing with Min A  Short Term Goal 4: complete toileting with Min A  Short Term Goal 5: tolerate B UE exercises x10 reps for increased strength w/ ADLs  Long Term Goals  Time Frame for Long Term Goals : STG=LTG  Patient Goals   Patient goals : return to PLOF      Therapy Time   Individual Concurrent Group Co-treatment   Time In 0833         Time Out 0856         Minutes 23         Timed Code Treatment Minutes: 23 Minutes       Geovanni Castro OTR/L

## 2025-03-17 ENCOUNTER — TELEPHONE (OUTPATIENT)
Dept: INTERNAL MEDICINE CLINIC | Age: 75
End: 2025-03-17

## 2025-03-17 NOTE — TELEPHONE ENCOUNTER
Care Transitions Initial Follow Up Call    Outreach made within 2 business days of discharge: Yes    Patient: Yury Levin Patient : 1950   MRN: [id number]  Reason for Admission: Hypoxia  Discharge Date: 3/13/25       Spoke with: Ananth    Discharge department/facility: [facility] Interactive Patient Contact:  Was patient able to fill all prescriptions: Yes  Was patient instructed to bring all medications to the follow-up visit: Yes  Is patient taking all medications as directed in the discharge summary? Yes  Does patient understand their discharge instructions: Yes  Does patient have questions or concerns that need addressed prior to 7-14 day follow up office visit: no    Additional needs identified to be addressed with provider  No needs identified             Scheduled appointment with PCP within 7-14 days    Follow Up  Future Appointments   Date Time Provider Department Center   2025  8:15 AM Diamond Robles MD [facility] DEP   2025  4:15 PM Bo Millan MD [facility]   10/17/2025  8:40 AM Jason Bravo MD [facility]       Bibi Abraham MA  Patient in rehab will call back

## 2025-03-25 NOTE — PROGRESS NOTES
Physician Progress Note      PATIENT:               REX HESS  [facility] #:                  [id number]  :                       1950  ADMIT DATE:       3/2/2025 9:38 PM  DISCH DATE:        3/13/2025 12:15 PM  RESPONDING  PROVIDER #:        José Luis Stafford DO          QUERY TEXT:    Noted documentation of pressure injury. Per wound ostomy nurse note 3/7, Left   and Right buttocks pressure ulcer stage 3. If possible, please document in   progress notes and discharge summary the location, present on admission status   and stage of the pressure ulcer:    The medical record reflects the following:  Risk Factors: weakness, decreased mobility  Clinical Indicators: Per Wound ostomy nurse note, 3/7, \"Buttock wounds   improving. Still stage 3. Triad supplied to ICU. Will use triad for   debridement. Less manuela wound redness\"  Treatment: Triad barrier 2x daily, blue barrier wipes for cleansing. No   briefs. Geo bid. topical clindamycin    Stage 1:  Non-blanchable erythema of intact skin  Stage 2:  Abrasion, Blister, Partial-thickness skin loss, with exposed dermis  Stage 3:  Full-thickness skin loss with damage or necrosis of subcutaneous   tissue  Stage 4:  Full-thickness skin & soft tissue loss through to underlying muscle,   tendon or bone  Unstageable: Obscured full-thickness skin & tissue loss  Options provided:  -- Stage 3 Pressure Ulcer of Left and Right buttock present on admission.  -- Other - I will add my own diagnosis  -- Disagree - Not applicable / Not valid  -- Disagree - Clinically unable to determine / Unknown  -- Refer to Clinical Documentation Reviewer    PROVIDER RESPONSE TEXT:    Provider is clinically unable to determine a response to this query.  not able to be assessed    Query created by: Essence Linder on 3/25/2025 9:13 AM      Electronically signed by:  José Luis Stafford DO 3/25/2025 7:33 PM

## 2025-04-02 PROBLEM — J10.1 INFLUENZA A: Status: RESOLVED | Noted: 2025-03-03 | Resolved: 2025-04-02

## 2025-04-08 NOTE — PROGRESS NOTES
Patient with a history of coronary artery disease status post CABG in January 2018, stage IV prostate cancer, CKD, has watchman device.     Not on Plavix given bleeding, status post Watchman device.  Following oncologist.  Lives with his wife.  Retired .     Follows nephrologist-  Has physical therpaist and OT- comes twice a week.     Needs helps with ADLS and ADLS- only able to go to restroom. Needs help with cook, cleaning, bathing, transportation       Post-Discharge Transitional Care  Follow Up      Yury Levin   YOB: 1950    Date of Office Visit:  4/9/2025  Date of Hospital Admission: 3/2/25  Date of Hospital Discharge: 3/13/25, he was then discharged to rehab from 3/13/2025-4/4/2025   Risk of hospital readmission (high >=14%. Medium >=10%) :Readmission Risk Score: 21.2      Care management risk score Rising risk (score 2-5) and Complex Care (Scores >=6): No Risk Score On File     Non face to face  following discharge, date last encounter closed (first attempt may have been earlier): *No documented post hospital discharge outreach found in the last 14 days    Call initiated 2 business days of discharge: *No response recorded in the last 14 days    ASSESSMENT/PLAN:   Hospital discharge follow-up  Return back to home from a rehab on April 4. Pneumonia resolved.  Doing well, denies of fever or chills or trouble breathing.  Has been doing some stretching exercise.  Waiting for PT/OT and SN to visit.  We were able to speak with him over the phone.  -     OR DISCHARGE MEDS RECONCILED W/ CURRENT OUTPATIENT MED LIST  PAF (paroxysmal atrial fibrillation) (HCC) stable  Continue with current dose of metoprolol, continue following with specialist.    Medical Decision Making: moderate complexity  No follow-ups on file.           Subjective:   HPI:  Follow up of Hospital problems/diagnosis(es): He was admitted for acute respiratory failure with hypoxia- influenza A positive, multifocal

## 2025-04-09 ENCOUNTER — OFFICE VISIT (OUTPATIENT)
Dept: INTERNAL MEDICINE CLINIC | Age: 75
End: 2025-04-09

## 2025-04-09 VITALS — HEART RATE: 99 BPM | DIASTOLIC BLOOD PRESSURE: 60 MMHG | SYSTOLIC BLOOD PRESSURE: 118 MMHG | OXYGEN SATURATION: 96 %

## 2025-04-09 DIAGNOSIS — Z09 HOSPITAL DISCHARGE FOLLOW-UP: Primary | ICD-10-CM

## 2025-04-09 DIAGNOSIS — I48.0 PAF (PAROXYSMAL ATRIAL FIBRILLATION) (HCC): ICD-10-CM

## 2025-04-09 RX ORDER — DEXAMETHASONE 4 MG/1
TABLET ORAL
COMMUNITY
Start: 2025-01-09

## 2025-04-17 RX ORDER — METOPROLOL SUCCINATE 25 MG/1
25 TABLET, EXTENDED RELEASE ORAL DAILY
Qty: 30 TABLET | Refills: 5 | Status: SHIPPED | OUTPATIENT
Start: 2025-04-17

## 2025-04-17 NOTE — TELEPHONE ENCOUNTER
Last OV: 12/18/24  Next OV: 10/17/25  Last refill: 12/18/24 #30 3 R/F  Most recent Labs: 3/13/25  Last EKG (if needed): 3/2/25

## 2025-04-17 NOTE — TELEPHONE ENCOUNTER
Per telephone encounter, 1/9/25, Toprol reduced to 25 mg per Dr. Bravo for reported symptoms. Refill sent at new dose

## 2025-05-19 ASSESSMENT — ENCOUNTER SYMPTOMS
BLOOD IN STOOL: 0
COUGH: 0
NAUSEA: 0
ABDOMINAL PAIN: 0
VOMITING: 0
SORE THROAT: 0
SHORTNESS OF BREATH: 0
CHEST TIGHTNESS: 0

## 2025-05-19 NOTE — PROGRESS NOTES
Yury Levin (:  1950) is a 74 y.o. male, here for evaluation of the following chief complaint(s):  Follow-up    Patient with a history of coronary artery disease status post CABG in 2018, stage IV prostate cancer, CKD, has watchman device.    Not on Plavix given bleeding, status post Watchman device.  Following oncologist.  Lives with his wife.  Retired .      Started to see nephrologist-    Needs helps with ADLS and ADLS- only able to go to restroom. Needs help with cook, cleaning, bathing, transportation.  Currently has a HHA, PT., OT, nurse.    Assessment & Plan     1. Prostate cancer (HCC)  Monitored by oncologist, stage IV prostate cancer on Orgovyx, not ready for chemo yet.    2. S/P CABG x 4  Being monitored by cardiologist, continue with current treatment.    3. PAF (paroxysmal atrial fibrillation) (HCC)  Asymptomatic, symptoms stable.  Continue with metoprolol.    4. Limited mobility  Due to multiple comorbidities,  Receiving HHA, PT, OT and nurse.  Getting stronger, uses spirometer routinely.  Has been able to walk outside, uses a walker      Return in about 4 months (around 2025).         Subjective   SUBJECTIVE/OBJECTIVE:  Hyperlipidemia  This is a chronic problem. The current episode started more than 1 year ago. The problem is controlled. Pertinent negatives include no chest pain or shortness of breath. Current antihyperlipidemic treatment includes statins and ezetimibe. The current treatment provides significant improvement of lipids. There are no compliance problems.  Risk factors for coronary artery disease include dyslipidemia, male sex and obesity.   Coronary Artery Disease  Presents for follow-up visit. Pertinent negatives include no chest pain, chest pressure, chest tightness, dizziness, leg swelling, palpitations or shortness of breath. Risk factors include hyperlipidemia. The symptoms have been resolved. Compliance with diet is good. Compliance with

## 2025-05-20 ENCOUNTER — OFFICE VISIT (OUTPATIENT)
Dept: INTERNAL MEDICINE CLINIC | Age: 75
End: 2025-05-20
Payer: COMMERCIAL

## 2025-05-20 VITALS
DIASTOLIC BLOOD PRESSURE: 82 MMHG | HEART RATE: 80 BPM | BODY MASS INDEX: 24.06 KG/M2 | WEIGHT: 163 LBS | SYSTOLIC BLOOD PRESSURE: 130 MMHG | OXYGEN SATURATION: 97 %

## 2025-05-20 DIAGNOSIS — I48.0 PAF (PAROXYSMAL ATRIAL FIBRILLATION) (HCC): ICD-10-CM

## 2025-05-20 DIAGNOSIS — Z74.09 LIMITED MOBILITY: ICD-10-CM

## 2025-05-20 DIAGNOSIS — C61 PROSTATE CANCER (HCC): Primary | ICD-10-CM

## 2025-05-20 DIAGNOSIS — Z95.1 S/P CABG X 4: ICD-10-CM

## 2025-05-20 PROBLEM — J96.01 ACUTE RESPIRATORY FAILURE WITH HYPOXIA (HCC): Status: RESOLVED | Noted: 2025-03-03 | Resolved: 2025-05-20

## 2025-05-20 PROCEDURE — 99214 OFFICE O/P EST MOD 30 MIN: CPT | Performed by: STUDENT IN AN ORGANIZED HEALTH CARE EDUCATION/TRAINING PROGRAM

## 2025-05-20 PROCEDURE — G2211 COMPLEX E/M VISIT ADD ON: HCPCS | Performed by: STUDENT IN AN ORGANIZED HEALTH CARE EDUCATION/TRAINING PROGRAM

## 2025-05-20 PROCEDURE — G8427 DOCREV CUR MEDS BY ELIG CLIN: HCPCS | Performed by: STUDENT IN AN ORGANIZED HEALTH CARE EDUCATION/TRAINING PROGRAM

## 2025-05-20 PROCEDURE — 3017F COLORECTAL CA SCREEN DOC REV: CPT | Performed by: STUDENT IN AN ORGANIZED HEALTH CARE EDUCATION/TRAINING PROGRAM

## 2025-05-20 PROCEDURE — G8420 CALC BMI NORM PARAMETERS: HCPCS | Performed by: STUDENT IN AN ORGANIZED HEALTH CARE EDUCATION/TRAINING PROGRAM

## 2025-05-20 PROCEDURE — 1123F ACP DISCUSS/DSCN MKR DOCD: CPT | Performed by: STUDENT IN AN ORGANIZED HEALTH CARE EDUCATION/TRAINING PROGRAM

## 2025-05-20 PROCEDURE — 1036F TOBACCO NON-USER: CPT | Performed by: STUDENT IN AN ORGANIZED HEALTH CARE EDUCATION/TRAINING PROGRAM

## 2025-08-21 RX ORDER — METOPROLOL SUCCINATE 25 MG/1
25 TABLET, EXTENDED RELEASE ORAL DAILY
Qty: 90 TABLET | Refills: 3 | Status: SHIPPED | OUTPATIENT
Start: 2025-08-21

## 2025-09-05 DIAGNOSIS — L89.309 PRESSURE INJURY OF SKIN OF BUTTOCK, UNSPECIFIED INJURY STAGE, UNSPECIFIED LATERALITY: Primary | ICD-10-CM

## (undated) DEVICE — NITINOL STONE RETRIEVAL BASKET: Brand: ZERO TIP

## (undated) DEVICE — CYSTOSCOPY: Brand: MEDLINE INDUSTRIES, INC.

## (undated) DEVICE — ENDOSCOPIC VALVE WITH ADAPTER.: Brand: SURSEAL® II

## (undated) DEVICE — SYRINGE MED 10ML LUERLOCK TIP W/O SFTY DISP

## (undated) DEVICE — GLOVE SURG SZ 7.5 L11.73IN FNGR THK9.8MIL STRW LTX POLYMER

## (undated) DEVICE — SOLUTION IRRIG 3000ML STRL H2O USP UROMATIC PLAS CONT

## (undated) DEVICE — SOLUTION IRRIG 1000ML STRL H2O USP PLAS POUR BTL

## (undated) DEVICE — MERCY HEALTH WEST TURNOVER: Brand: MEDLINE INDUSTRIES, INC.

## (undated) DEVICE — ELECTRODE PT RET AD L9FT HI MOIST COND ADH HYDRGEL CORDED

## (undated) DEVICE — SHEATH URO 11 13FRX38CM UROPS

## (undated) DEVICE — DECANTER BTL 6IN: Brand: MEDLINE INDUSTRIES, INC.

## (undated) DEVICE — RENTAL LASER HOLMIUM MSTRPULSE HF STNDBY

## (undated) DEVICE — SINGLE-USE DIGITAL FLEXIBLE URETEROSCOPE: Brand: LITHOVUE

## (undated) DEVICE — SET ADMIN NEEDLESS Y SITE RLER CLMP M SPIN LOK 10 GTT LEN

## (undated) DEVICE — GUIDEWIRE URO L150CM DIA0.035IN PTFE NIT HYDRPHLC STR TIP